# Patient Record
Sex: FEMALE | Race: WHITE | HISPANIC OR LATINO | Employment: UNEMPLOYED | URBAN - METROPOLITAN AREA
[De-identification: names, ages, dates, MRNs, and addresses within clinical notes are randomized per-mention and may not be internally consistent; named-entity substitution may affect disease eponyms.]

---

## 2017-01-05 ENCOUNTER — ALLSCRIPTS OFFICE VISIT (OUTPATIENT)
Dept: OTHER | Facility: OTHER | Age: 57
End: 2017-01-05

## 2017-01-05 DIAGNOSIS — Z98.84 BARIATRIC SURGERY STATUS: ICD-10-CM

## 2017-01-05 DIAGNOSIS — R13.10 DYSPHAGIA: ICD-10-CM

## 2017-01-05 DIAGNOSIS — K91.2 POSTSURGICAL MALABSORPTION, NOT ELSEWHERE CLASSIFIED: ICD-10-CM

## 2017-01-07 ENCOUNTER — LAB (OUTPATIENT)
Dept: LAB | Facility: HOSPITAL | Age: 57
End: 2017-01-07
Attending: SURGERY
Payer: COMMERCIAL

## 2017-01-07 ENCOUNTER — TRANSCRIBE ORDERS (OUTPATIENT)
Dept: LAB | Facility: HOSPITAL | Age: 57
End: 2017-01-07

## 2017-01-07 DIAGNOSIS — R13.10 DYSPHAGIA: ICD-10-CM

## 2017-01-07 DIAGNOSIS — K91.2 OTHER AND UNSPECIFIED POSTSURGICAL NONABSORPTION: ICD-10-CM

## 2017-01-07 DIAGNOSIS — R13.10 PROBLEMS WITH SWALLOWING AND MASTICATION: ICD-10-CM

## 2017-01-07 DIAGNOSIS — R13.10 PROBLEMS WITH SWALLOWING AND MASTICATION: Primary | ICD-10-CM

## 2017-01-07 DIAGNOSIS — Z98.84 BARIATRIC SURGERY STATUS: ICD-10-CM

## 2017-01-07 DIAGNOSIS — K91.2 POSTSURGICAL MALABSORPTION, NOT ELSEWHERE CLASSIFIED: ICD-10-CM

## 2017-01-07 LAB
25(OH)D3 SERPL-MCNC: 14.8 NG/ML (ref 30–100)
ERYTHROCYTE [DISTWIDTH] IN BLOOD BY AUTOMATED COUNT: 13.3 % (ref 11.6–15.1)
HCT VFR BLD AUTO: 42.2 % (ref 34.8–46.1)
HGB BLD-MCNC: 13.6 G/DL (ref 11.5–15.4)
MCH RBC QN AUTO: 29.2 PG (ref 26.8–34.3)
MCHC RBC AUTO-ENTMCNC: 32.2 G/DL (ref 31.4–37.4)
MCV RBC AUTO: 91 FL (ref 82–98)
PLATELET # BLD AUTO: 299 THOUSANDS/UL (ref 149–390)
PMV BLD AUTO: 9.8 FL (ref 8.9–12.7)
PTH-INTACT SERPL-MCNC: 85.7 PG/ML (ref 14–72)
RBC # BLD AUTO: 4.65 MILLION/UL (ref 3.81–5.12)
VIT B12 SERPL-MCNC: 329 PG/ML (ref 100–900)
WBC # BLD AUTO: 4.52 THOUSAND/UL (ref 4.31–10.16)

## 2017-01-07 PROCEDURE — 84425 ASSAY OF VITAMIN B-1: CPT

## 2017-01-07 PROCEDURE — 82306 VITAMIN D 25 HYDROXY: CPT

## 2017-01-07 PROCEDURE — 84590 ASSAY OF VITAMIN A: CPT

## 2017-01-07 PROCEDURE — 85027 COMPLETE CBC AUTOMATED: CPT

## 2017-01-07 PROCEDURE — 83970 ASSAY OF PARATHORMONE: CPT

## 2017-01-07 PROCEDURE — 36415 COLL VENOUS BLD VENIPUNCTURE: CPT

## 2017-01-07 PROCEDURE — 82607 VITAMIN B-12: CPT

## 2017-01-11 LAB
VIT A SERPL-MCNC: 55 UG/DL (ref 20–65)
VIT B1 BLD-SCNC: 128.6 NMOL/L (ref 66.5–200)

## 2017-01-12 ENCOUNTER — GENERIC CONVERSION - ENCOUNTER (OUTPATIENT)
Dept: OTHER | Facility: OTHER | Age: 57
End: 2017-01-12

## 2017-01-24 ENCOUNTER — ANESTHESIA EVENT (OUTPATIENT)
Dept: GASTROENTEROLOGY | Facility: HOSPITAL | Age: 57
End: 2017-01-24
Payer: COMMERCIAL

## 2017-01-24 RX ORDER — SUCRALFATE 1 G/1
1 TABLET ORAL 4 TIMES DAILY
COMMUNITY
End: 2021-11-15 | Stop reason: ALTCHOICE

## 2017-01-24 RX ORDER — OMEPRAZOLE 40 MG/1
40 CAPSULE, DELAYED RELEASE ORAL DAILY
COMMUNITY
End: 2019-03-05 | Stop reason: ALTCHOICE

## 2017-01-24 RX ORDER — CALCIUM GLUCONATE 45(500) MG
500 TABLET ORAL DAILY
COMMUNITY

## 2017-01-24 RX ORDER — METOPROLOL SUCCINATE 25 MG/1
25 TABLET, EXTENDED RELEASE ORAL EVERY 12 HOURS
COMMUNITY
End: 2018-07-02 | Stop reason: SDUPTHER

## 2017-01-24 RX ORDER — CHLORAL HYDRATE 500 MG
1000 CAPSULE ORAL 2 TIMES DAILY
COMMUNITY

## 2017-01-24 RX ORDER — SUCRALFATE ORAL 1 G/10ML
1 SUSPENSION ORAL 4 TIMES DAILY
Status: ON HOLD | COMMUNITY
End: 2017-01-25 | Stop reason: CLARIF

## 2017-01-25 ENCOUNTER — HOSPITAL ENCOUNTER (OUTPATIENT)
Facility: HOSPITAL | Age: 57
Setting detail: OUTPATIENT SURGERY
Discharge: HOME | End: 2017-01-25
Attending: SURGERY | Admitting: SURGERY
Payer: COMMERCIAL

## 2017-01-25 ENCOUNTER — ANESTHESIA (OUTPATIENT)
Dept: GASTROENTEROLOGY | Facility: HOSPITAL | Age: 57
End: 2017-01-25
Payer: COMMERCIAL

## 2017-01-25 VITALS
DIASTOLIC BLOOD PRESSURE: 74 MMHG | HEIGHT: 65 IN | SYSTOLIC BLOOD PRESSURE: 124 MMHG | HEART RATE: 62 BPM | BODY MASS INDEX: 24.16 KG/M2 | OXYGEN SATURATION: 98 % | RESPIRATION RATE: 16 BRPM | TEMPERATURE: 98.2 F | WEIGHT: 145 LBS

## 2017-01-25 DIAGNOSIS — R10.9 ABDOMINAL PAIN: ICD-10-CM

## 2017-01-25 DIAGNOSIS — Z98.84 BARIATRIC SURGERY STATUS: ICD-10-CM

## 2017-01-25 PROCEDURE — 88342 IMHCHEM/IMCYTCHM 1ST ANTB: CPT | Performed by: SURGERY

## 2017-01-25 PROCEDURE — 88305 TISSUE EXAM BY PATHOLOGIST: CPT | Performed by: SURGERY

## 2017-01-25 PROCEDURE — C1726 CATH, BAL DIL, NON-VASCULAR: HCPCS | Performed by: SURGERY

## 2017-01-25 RX ORDER — CITALOPRAM 20 MG/1
20 TABLET ORAL DAILY
Status: ON HOLD | COMMUNITY
End: 2017-05-03 | Stop reason: ALTCHOICE

## 2017-01-25 RX ORDER — PROPOFOL 10 MG/ML
INJECTION, EMULSION INTRAVENOUS AS NEEDED
Status: DISCONTINUED | OUTPATIENT
Start: 2017-01-25 | End: 2017-01-25 | Stop reason: SURG

## 2017-01-25 RX ORDER — SODIUM CHLORIDE 9 MG/ML
125 INJECTION, SOLUTION INTRAVENOUS CONTINUOUS
Status: DISCONTINUED | OUTPATIENT
Start: 2017-01-25 | End: 2017-01-25 | Stop reason: HOSPADM

## 2017-01-25 RX ADMIN — PROPOFOL 50 MG: 10 INJECTION, EMULSION INTRAVENOUS at 11:26

## 2017-01-25 RX ADMIN — SODIUM CHLORIDE 125 ML/HR: 0.9 INJECTION, SOLUTION INTRAVENOUS at 10:23

## 2017-01-25 RX ADMIN — PROPOFOL 150 MG: 10 INJECTION, EMULSION INTRAVENOUS at 11:22

## 2017-01-25 RX ADMIN — PROPOFOL 50 MG: 10 INJECTION, EMULSION INTRAVENOUS at 11:29

## 2017-02-03 ENCOUNTER — ALLSCRIPTS OFFICE VISIT (OUTPATIENT)
Dept: OTHER | Facility: OTHER | Age: 57
End: 2017-02-03

## 2017-05-02 ENCOUNTER — ANESTHESIA EVENT (OUTPATIENT)
Dept: GASTROENTEROLOGY | Facility: HOSPITAL | Age: 57
End: 2017-05-02
Payer: COMMERCIAL

## 2017-05-03 ENCOUNTER — HOSPITAL ENCOUNTER (OUTPATIENT)
Facility: HOSPITAL | Age: 57
Setting detail: OUTPATIENT SURGERY
Discharge: HOME | End: 2017-05-03
Attending: SURGERY | Admitting: SURGERY
Payer: COMMERCIAL

## 2017-05-03 ENCOUNTER — ANESTHESIA (OUTPATIENT)
Dept: GASTROENTEROLOGY | Facility: HOSPITAL | Age: 57
End: 2017-05-03
Payer: COMMERCIAL

## 2017-05-03 VITALS
OXYGEN SATURATION: 100 % | HEIGHT: 65 IN | DIASTOLIC BLOOD PRESSURE: 97 MMHG | SYSTOLIC BLOOD PRESSURE: 152 MMHG | WEIGHT: 148 LBS | TEMPERATURE: 97.6 F | BODY MASS INDEX: 24.66 KG/M2 | RESPIRATION RATE: 16 BRPM | HEART RATE: 67 BPM

## 2017-05-03 PROCEDURE — C1726 CATH, BAL DIL, NON-VASCULAR: HCPCS | Performed by: SURGERY

## 2017-05-03 RX ORDER — GLYCOPYRROLATE 0.2 MG/ML
INJECTION INTRAMUSCULAR; INTRAVENOUS AS NEEDED
Status: DISCONTINUED | OUTPATIENT
Start: 2017-05-03 | End: 2017-05-03 | Stop reason: SURG

## 2017-05-03 RX ORDER — SODIUM CHLORIDE 9 MG/ML
125 INJECTION, SOLUTION INTRAVENOUS CONTINUOUS
Status: DISCONTINUED | OUTPATIENT
Start: 2017-05-03 | End: 2017-05-03 | Stop reason: HOSPADM

## 2017-05-03 RX ORDER — PROPOFOL 10 MG/ML
INJECTION, EMULSION INTRAVENOUS AS NEEDED
Status: DISCONTINUED | OUTPATIENT
Start: 2017-05-03 | End: 2017-05-03 | Stop reason: SURG

## 2017-05-03 RX ADMIN — PROPOFOL 50 MG: 10 INJECTION, EMULSION INTRAVENOUS at 11:55

## 2017-05-03 RX ADMIN — GLYCOPYRROLATE 0.2 MG: 0.2 INJECTION, SOLUTION INTRAMUSCULAR; INTRAVENOUS at 11:59

## 2017-05-03 RX ADMIN — LIDOCAINE HYDROCHLORIDE 50 MG: 20 INJECTION, SOLUTION INTRAVENOUS at 11:50

## 2017-05-03 RX ADMIN — PROPOFOL 150 MG: 10 INJECTION, EMULSION INTRAVENOUS at 11:50

## 2017-05-03 RX ADMIN — SODIUM CHLORIDE 125 ML/HR: 0.9 INJECTION, SOLUTION INTRAVENOUS at 11:13

## 2017-05-12 ENCOUNTER — ALLSCRIPTS OFFICE VISIT (OUTPATIENT)
Dept: OTHER | Facility: OTHER | Age: 57
End: 2017-05-12

## 2017-05-23 ENCOUNTER — HOSPITAL ENCOUNTER (OUTPATIENT)
Dept: SLEEP CENTER | Facility: CLINIC | Age: 57
Discharge: HOME | End: 2017-05-23
Payer: COMMERCIAL

## 2017-05-23 ENCOUNTER — TRANSCRIBE ORDERS (OUTPATIENT)
Dept: SLEEP CENTER | Facility: CLINIC | Age: 57
End: 2017-05-23

## 2017-05-23 DIAGNOSIS — G47.33 OSA (OBSTRUCTIVE SLEEP APNEA): Primary | ICD-10-CM

## 2017-05-23 DIAGNOSIS — G47.00 INSOMNIA, UNSPECIFIED: ICD-10-CM

## 2017-06-07 ENCOUNTER — ALLSCRIPTS OFFICE VISIT (OUTPATIENT)
Dept: OTHER | Facility: OTHER | Age: 57
End: 2017-06-07

## 2017-06-07 DIAGNOSIS — I10 ESSENTIAL (PRIMARY) HYPERTENSION: ICD-10-CM

## 2017-06-07 DIAGNOSIS — E78.5 HYPERLIPIDEMIA: ICD-10-CM

## 2017-06-07 DIAGNOSIS — Z12.31 ENCOUNTER FOR SCREENING MAMMOGRAM FOR MALIGNANT NEOPLASM OF BREAST: ICD-10-CM

## 2017-06-12 ENCOUNTER — LAB (OUTPATIENT)
Dept: LAB | Facility: CLINIC | Age: 57
End: 2017-06-12
Payer: COMMERCIAL

## 2017-06-12 DIAGNOSIS — E78.5 HYPERLIPIDEMIA: ICD-10-CM

## 2017-06-12 DIAGNOSIS — G47.00 INSOMNIA: ICD-10-CM

## 2017-06-12 DIAGNOSIS — I10 ESSENTIAL (PRIMARY) HYPERTENSION: ICD-10-CM

## 2017-06-12 LAB
ALBUMIN SERPL BCP-MCNC: 3.5 G/DL (ref 3.5–5)
ALP SERPL-CCNC: 65 U/L (ref 46–116)
ALT SERPL W P-5'-P-CCNC: 49 U/L (ref 12–78)
ANION GAP SERPL CALCULATED.3IONS-SCNC: 5 MMOL/L (ref 4–13)
AST SERPL W P-5'-P-CCNC: 36 U/L (ref 5–45)
BILIRUB SERPL-MCNC: 0.39 MG/DL (ref 0.2–1)
BUN SERPL-MCNC: 13 MG/DL (ref 5–25)
CALCIUM SERPL-MCNC: 8.6 MG/DL (ref 8.3–10.1)
CHLORIDE SERPL-SCNC: 107 MMOL/L (ref 100–108)
CHOLEST SERPL-MCNC: 246 MG/DL (ref 50–200)
CO2 SERPL-SCNC: 30 MMOL/L (ref 21–32)
CREAT SERPL-MCNC: 0.61 MG/DL (ref 0.6–1.3)
CREAT UR-MCNC: 134 MG/DL
GFR SERPL CREATININE-BSD FRML MDRD: >60 ML/MIN/1.73SQ M
GLUCOSE P FAST SERPL-MCNC: 82 MG/DL (ref 65–99)
HDLC SERPL-MCNC: 53 MG/DL (ref 40–60)
LDLC SERPL CALC-MCNC: 171 MG/DL (ref 0–100)
MICROALBUMIN UR-MCNC: 5.7 MG/L (ref 0–20)
MICROALBUMIN/CREAT 24H UR: 4 MG/G CREATININE (ref 0–30)
POTASSIUM SERPL-SCNC: 4.3 MMOL/L (ref 3.5–5.3)
PROT SERPL-MCNC: 6.8 G/DL (ref 6.4–8.2)
SODIUM SERPL-SCNC: 142 MMOL/L (ref 136–145)
TRIGL SERPL-MCNC: 109 MG/DL
TSH SERPL DL<=0.05 MIU/L-ACNC: 2.64 UIU/ML (ref 0.36–3.74)

## 2017-06-12 PROCEDURE — 84443 ASSAY THYROID STIM HORMONE: CPT

## 2017-06-12 PROCEDURE — 82570 ASSAY OF URINE CREATININE: CPT

## 2017-06-12 PROCEDURE — 80061 LIPID PANEL: CPT

## 2017-06-12 PROCEDURE — 36415 COLL VENOUS BLD VENIPUNCTURE: CPT

## 2017-06-12 PROCEDURE — 82043 UR ALBUMIN QUANTITATIVE: CPT

## 2017-06-12 PROCEDURE — 80053 COMPREHEN METABOLIC PANEL: CPT

## 2017-06-13 ENCOUNTER — ANESTHESIA EVENT (OUTPATIENT)
Dept: GASTROENTEROLOGY | Facility: HOSPITAL | Age: 57
End: 2017-06-13

## 2017-06-14 ENCOUNTER — ANESTHESIA (OUTPATIENT)
Dept: GASTROENTEROLOGY | Facility: HOSPITAL | Age: 57
End: 2017-06-14

## 2017-08-15 ENCOUNTER — ANESTHESIA EVENT (OUTPATIENT)
Dept: GASTROENTEROLOGY | Facility: HOSPITAL | Age: 57
End: 2017-08-15
Payer: COMMERCIAL

## 2017-08-16 ENCOUNTER — ANESTHESIA (OUTPATIENT)
Dept: GASTROENTEROLOGY | Facility: HOSPITAL | Age: 57
End: 2017-08-16
Payer: COMMERCIAL

## 2017-08-16 ENCOUNTER — HOSPITAL ENCOUNTER (OUTPATIENT)
Facility: HOSPITAL | Age: 57
Setting detail: OUTPATIENT SURGERY
Discharge: HOME | End: 2017-08-16
Attending: SURGERY | Admitting: SURGERY
Payer: COMMERCIAL

## 2017-08-16 VITALS
HEART RATE: 78 BPM | SYSTOLIC BLOOD PRESSURE: 176 MMHG | DIASTOLIC BLOOD PRESSURE: 90 MMHG | OXYGEN SATURATION: 99 % | WEIGHT: 158 LBS | RESPIRATION RATE: 16 BRPM | TEMPERATURE: 97.6 F | HEIGHT: 65 IN | BODY MASS INDEX: 26.33 KG/M2

## 2017-08-16 PROCEDURE — C1726 CATH, BAL DIL, NON-VASCULAR: HCPCS | Performed by: SURGERY

## 2017-08-16 RX ORDER — ONDANSETRON 2 MG/ML
4 INJECTION INTRAMUSCULAR; INTRAVENOUS ONCE AS NEEDED
Status: CANCELLED | OUTPATIENT
Start: 2017-08-16

## 2017-08-16 RX ORDER — SODIUM CHLORIDE 9 MG/ML
125 INJECTION, SOLUTION INTRAVENOUS CONTINUOUS
Status: DISCONTINUED | OUTPATIENT
Start: 2017-08-16 | End: 2017-08-16 | Stop reason: HOSPADM

## 2017-08-16 RX ORDER — PROPOFOL 10 MG/ML
INJECTION, EMULSION INTRAVENOUS AS NEEDED
Status: DISCONTINUED | OUTPATIENT
Start: 2017-08-16 | End: 2017-08-16 | Stop reason: SURG

## 2017-08-16 RX ORDER — GLYCOPYRROLATE 0.2 MG/ML
INJECTION INTRAMUSCULAR; INTRAVENOUS AS NEEDED
Status: DISCONTINUED | OUTPATIENT
Start: 2017-08-16 | End: 2017-08-16 | Stop reason: SURG

## 2017-08-16 RX ADMIN — PROPOFOL 150 MG: 10 INJECTION, EMULSION INTRAVENOUS at 09:47

## 2017-08-16 RX ADMIN — GLYCOPYRROLATE 0.4 MG: 0.2 INJECTION, SOLUTION INTRAMUSCULAR; INTRAVENOUS at 09:54

## 2017-08-16 RX ADMIN — PROPOFOL 50 MG: 10 INJECTION, EMULSION INTRAVENOUS at 09:52

## 2017-08-16 RX ADMIN — SODIUM CHLORIDE 125 ML/HR: 0.9 INJECTION, SOLUTION INTRAVENOUS at 08:14

## 2017-09-11 DIAGNOSIS — E78.5 HYPERLIPIDEMIA: ICD-10-CM

## 2017-11-09 ENCOUNTER — HOSPITAL ENCOUNTER (OUTPATIENT)
Dept: RADIOLOGY | Facility: HOSPITAL | Age: 57
Discharge: HOME | End: 2017-11-09
Payer: COMMERCIAL

## 2017-11-09 DIAGNOSIS — Z12.31 ENCOUNTER FOR SCREENING MAMMOGRAM FOR MALIGNANT NEOPLASM OF BREAST: ICD-10-CM

## 2017-11-09 PROCEDURE — G0202 SCR MAMMO BI INCL CAD: HCPCS

## 2017-12-19 ENCOUNTER — TRANSCRIBE ORDERS (OUTPATIENT)
Dept: LAB | Facility: HOSPITAL | Age: 57
End: 2017-12-19

## 2017-12-19 ENCOUNTER — LAB (OUTPATIENT)
Dept: LAB | Facility: HOSPITAL | Age: 57
End: 2017-12-19
Payer: COMMERCIAL

## 2017-12-19 DIAGNOSIS — E78.5 HYPERLIPIDEMIA: ICD-10-CM

## 2017-12-19 LAB
ALBUMIN SERPL BCP-MCNC: 3.4 G/DL (ref 3.5–5)
ALP SERPL-CCNC: 84 U/L (ref 46–116)
ALT SERPL W P-5'-P-CCNC: 52 U/L (ref 12–78)
AST SERPL W P-5'-P-CCNC: 42 U/L (ref 5–45)
BILIRUB DIRECT SERPL-MCNC: 0.1 MG/DL (ref 0–0.2)
BILIRUB SERPL-MCNC: 0.35 MG/DL (ref 0.2–1)
CHOLEST SERPL-MCNC: 127 MG/DL (ref 50–200)
HDLC SERPL-MCNC: 50 MG/DL (ref 40–60)
LDLC SERPL CALC-MCNC: 54 MG/DL (ref 0–100)
PROT SERPL-MCNC: 7.1 G/DL (ref 6.4–8.2)
TRIGL SERPL-MCNC: 117 MG/DL

## 2017-12-19 PROCEDURE — 80061 LIPID PANEL: CPT

## 2017-12-19 PROCEDURE — 80076 HEPATIC FUNCTION PANEL: CPT

## 2017-12-19 PROCEDURE — 36415 COLL VENOUS BLD VENIPUNCTURE: CPT

## 2017-12-21 ENCOUNTER — ALLSCRIPTS OFFICE VISIT (OUTPATIENT)
Dept: OTHER | Facility: OTHER | Age: 57
End: 2017-12-21

## 2017-12-22 NOTE — PROGRESS NOTES
Assessment   Assessed    1  Hypertension (401 9) (I10)   2  Accessory AV connection (426 7) (I45 6)    Plan   Accessory AV connection, Hypertension    · Follow-up visit in 1 year Evaluation and Treatment  Follow-up  Status: Hold For -    Scheduling  Requested for: [id number]   Ordered; For: Accessory AV connection, Hypertension; Ordered By: Niko Santiago Performed:  Due: 50CGA6339  Hypertension    · From  Metoprolol Succinate ER 25 MG Oral Tablet Extended Release 24 Hour    take 1 tablet every twelve hours To Metoprolol Succinate ER 25 MG Oral Tablet Extended    Release 24 Hour take 1 tablet by mouth daily   Rx By: Niko Santiago; Dispense: 90 Days ; #:90 Tablet Extended Release 24 Hour; Refill: 3;For: Hypertension; MILAGROS = N; Sent To: [facility]/PH[facility]ACY #[id number]  · EKG/ECG- POC; Status:Complete;   Done: 10MAE8789   Perform: In Office; [id number]; Last Updated Iglesia Romero; 12/21/2017 12:59:56 PM;Ordered;For:Hypertension; Ordered By:Gilberto Vega;  Unlinked    · Carafate 1 GM/10ML Oral Suspension   Dispense: 0 Days ; #:0 ML; Refill: 0; MILAGROS = N; Record; Last Updated By: Frida Richards; 12/21/2017 12:59:55 PM    Discussion/Summary   Cardiology Discussion Summary Free Text Note Form [facility]:    1) palpitations unclear etiology atach vs related to wpw completely suppressed on beta blocker will cut metoprolol xl down to once daily  Her HR is 58 bpm   htn stable hx gastric bypass surgery  has kept weight off   insomnia - to be addressed by dr Anna Perea  Chief Complaint   Chief Complaints    1  Palpitations  Chief Complaint Free Text Note Form: Pt is here for a yearly f/u office visit  Pt is c/o some occasional palpitations but states that they are much better than they used to be  Pt denies any other cardiac complaints or symptoms  History of Present Illness   Cardiology HPI Free Text Note Form [facility]: f/u wpw, htn, and increased lipids  very rare palpitations  very difficult time with sleep  meds not helping     decreased appetite  has a hard time eating foods but also had gastric bypass which helped with her DM and HTN            Review of Systems   Cardiology Female ROS:         Cardiac: rhythm problems-- and-- palpitations present , but-- as noted in HPI,-- no chest pain,-- no fainting/blackouts,-- no heart murmur present-- and-- no signs of swelling  Skin: No complaints of nonhealing sores or skin rash  Genitourinary: No complaints of recurrent urinary tract infections, frequent urination at night, difficult urination, blood in urine, kidney stones, loss of bladder control, kidney problems, denies any birth control or hormone replacement, is not post menopausal, not currently pregnant  Psychological: No complaints of feeling depressed, anxiety, panic attacks, or difficulty concentrating  General: trouble sleeping-- and-- lack of energy/fatigue, but-- No complaints of trouble sleeping, lack of energy, fatigue, appetite changes, weight changes, fever, frequent infections, or night sweats  -- and-- as noted in HPI  Respiratory: No complaints of shortness of breath, cough with sputum, or wheezing  HEENT: No complaints of serious problems, hearing problems, nose problems, throat problems, or snoring  Gastrointestinal: No complaints of liver problems, nausea, vomiting, heartburn, constipation, bloody stools, diarrhea, problems swallowing, adbominal pain, or rectal bleeding  Hematologic: No complaints of bleeding disorders, anemia, blood clots, or excessive brusing  Neurological: No complaints of numbness, tingling, dizziness, weakness, seizures, headaches, syncope or fainting, AM fatigue, daytime sleepiness, no witnessed apnea episodes  Musculoskeletal: No complaints of arthritis, back pain, or painfull swelling  ROS Reviewed:    ROS reviewed  Active Problems   Problems    1  Abdominal pain (789 00) (R10 9)   2  Accessory AV connection (426 7) (I45 6)   3   Depression with anxiety (300 4) (F41 8)   4  Encounter for adoption services (V68 89) (Z02 82)   5  Encounter for screening colonoscopy (V76 51) (Z12 11)   6  Gastric anastomotic stricture (993 96,804 04) (K92 9,K31 89)   7  Hyperlipidemia (272 4) (E78 5)   8  Hypertension (401 9) (I10)   9  Nausea (787 02) (R11 0)   10  Need for immunization against influenza (V04 81) (Z23)   11  History of Need for pneumococcal vaccination (V03 82) (Z23)   12  History of Need for prophylactic vaccination and inoculation against influenza (V04 81)      (Z23)   13  Postgastrectomy malabsorption (579 3) (K91 2,Z90 3)   14  Status post gastric bypass for obesity (V45 86) (Z98 84)   15  Ventricular pre-excitation syndrome (426 7) (I45 6)   16  Visit for screening mammogram (V76 12) (Z12 31)   17  Vitamin D deficiency (268 9) (E55 9)    Past Medical History   Problems    1  History of Anemia (285 9) (D64 9)   2  History of Anxiety (300 00) (F41 9)   3  History of Atypical chest pain (786 59) (R07 89)   4  History of Breast calcification, right (793 89) (R92 1)   5  History of Degenerative joint disease of ankle and foot, unspecified laterality (715 97)     (M19 079)   6  History of Depression (311) (F32 9)   7  History of Diabetes Mellitus (250 00)   8  History of Dysphagia (787 20) (R13 10)   9  History of Encounter for routine gynecological examination (V72 31) (Z01 419)   10  History of Encounter for screening mammogram for malignant neoplasm of breast      (V76 12) (Z12 31)   11  History of insomnia (V13 89) (Z87 898)   12  History of insomnia (V13 89) (Z87 898)   13  History of intestinal malabsorption (V12 79) (Z87 19)   14  History of iron deficiency anemia (V12 3) (Z86 2)   15  History of senile atrophic vaginitis (V13 29) (Z87 42)   16  History of Inflammatory disease of cervix, vagina, and vulva (616 9) (N73 9)   17  History of Nausea with vomiting (787 01) (R11 2)   18  History of Need for pneumococcal vaccination (V03 82) (Z23)   19   History of Need for prophylactic vaccination and inoculation against influenza (V04 81)      (Z23)   20  History of Obesity (278 00) (E66 9)   21  History of Pain, joint, shoulder (719 41) (M25 519)   22  History of Palpitations (785 1) (R00 2)   23  History of Peptic ulcer (533 90) (K27 9)   24  History of Screening examination for pulmonary tuberculosis (V74 1) (Z11 1)   25  History of Spontaneous  (634 90)   26  History of Stricture esophagus (530 3) (K22 2)   27  History of Tachycardia (785 0) (R00 0)   28  History of Type 2 Diabetes Mellitus - Uncomplicated, Controlled (250 00)  Active Problems And Past Medical History Reviewed: The active problems and past medical history were reviewed and updated today  Surgical History   Problems    1  History of Card Cath Post-Proc Data: ___ Lesions Successfully Dilated   2  History of Gastric Surgery For Morbid Obesity Gastric Bypass   3  History of Hysterectomy   4  History of Oophorectomy  Surgical History Reviewed: The surgical history was reviewed and updated today  Family History   Mother    1  Family history of Congenital Heart Disease  Father    2  Family history of Asthma (V17 5)  Sister    3  Family history of Acute Myocardial Infarction (V17 3)   4  Family history of Congenital Heart Disease   5  Family history of Hypertension (V17 49)  Brother    6  Family history of Acute Myocardial Infarction (V17 3)   7  Family history of Congenital Heart Disease   8  Family history of Hypertension (V17 49)   9  Family history of Stroke Syndrome (V17 1)   10  Family history of Sudden/Instantaneous Cardiac Death (V17 41)  Maternal Grandfather    6  Family history of malignant neoplasm of breast (V16 3) (Z80 3)  Family History    12  Family history of Arthritis (V17 7)   13  Family history of Coronary Artery Disease (V17 49)   14  Family history of Diabetes Mellitus (V18 0)   15  Family history of Reported Family History Of Cancer  Family History Reviewed:     The family history was reviewed and updated today  Social History   Problems    · Denied: History of Alcohol Use (History)   · Denied: History of Drug Use   · Never A Smoker  Social History Reviewed: The social history was reviewed and updated today  The social history was reviewed and is unchanged  Current Meds    1  Atorvastatin Calcium 20 MG Oral Tablet; TAKE 1 TABLET AT BEDTIME; Therapy: 73KPG9560 to (Evaluate:13Nmg2421)  Requested for: 43MIK7548; Last     Rx:27Nov2017 Ordered   2  B-Complex TABS; TAKE 1 TABLET DAILY; Therapy: (Recorded:27Mar2014) to Recorded   3  Calcium 500 MG TABS; TAKE 1 TABLET DAILY; Therapy: (Recorded:27Mar2014) to Recorded   4  Carafate 1 GM/10ML Oral Suspension Recorded   5  [employer] Vitamin D CAPS; take one tablet daily; Therapy: (Recorded:27Mar2014) to Recorded   6  [employer] Vitamin E CAPS; take one tablet daily; Therapy: (Recorded:27Mar2014) to Recorded   7  Fish Oil 1000 MG Oral Capsule; take one tablet twice daily; Therapy: (Recorded:27Mar2014) to Recorded   8  Iron TABS; TAKE 1 TABLET DAILY AS DIRECTED; Therapy: (Recorded:27Mar2014) to Recorded   9  Metoprolol Succinate ER 25 MG Oral Tablet Extended Release 24 Hour; take 1 tablet     every twelve hours; Therapy: 85GAD2308 to (Evaluate:15Nov2018)  Requested for: 04UAA9796; Last     Rx:20Nov2017 Ordered   10  Omeprazole 20 MG Oral Capsule Delayed Release; TAKE ONE CAPSULE BY MOUTH      TWICE A DAY; Therapy: 93YIY7636 to (Evaluate:01Jan2018)  Requested for: 82BGR1359; Last      Rx:03Oct2017 Ordered  Medication List Reviewed: The medication list was reviewed and updated today  Allergies   Medication    1   No Known Drug Allergies    Vitals   Vital Signs    Recorded: 15Gbe9001 12:56PM   Heart Rate 58   Systolic 135, LUE, Sitting   Diastolic 76, LUE, Sitting   Height 5 ft 5 in   Weight 158 lb 8 oz   BMI Calculated 26 38   BSA Calculated 1 79     Physical Exam        Constitutional - General appearance: No acute distress, well appearing and well nourished  Eyes - Conjunctiva and Sclera examination: Conjunctiva pink, sclera anicteric  Neck - Normal, no JVD   Pulmonary - Respiratory effort: No signs of respiratory distress  -- Auscultation of lungs: Clear to auscultation  Cardiovascular - Auscultation of heart: Normal rate and rhythm, normal S1 and S2, no murmurs  -- Pedal pulses: Normal, 2+ bilaterally  -- Examination of extremities for edema and/or varicosities: Normal        Abdomen - Soft  Musculoskeletal - Gait and station: Normal gait  Skin - Skin: Normal without rashes  Skin is warm and well perfused  Neurologic - Speech normal  No focal deficits  Psychiatric - Orientation to person, place, and time: Normal       Results/Data   ECG Report:      Rhythm and rate:  normal sinus rhythm  Future Appointments      Date/Time Provider Specialty Site   12/26/2017 01:10 PM Yuki Hull PCP   01/05/2018 01:30 PM CASEY Puckett   General Surgery [facility]     Signatures    Electronically signed by : Kenzie Burleson DO; Dec 21 2017  1:22PM EST                       (Author)

## 2017-12-26 ENCOUNTER — GENERIC CONVERSION - ENCOUNTER (OUTPATIENT)
Dept: OTHER | Facility: OTHER | Age: 57
End: 2017-12-26

## 2018-01-05 ENCOUNTER — ALLSCRIPTS OFFICE VISIT (OUTPATIENT)
Dept: OTHER | Facility: OTHER | Age: 58
End: 2018-01-05

## 2018-01-06 NOTE — PROGRESS NOTES
Assessment   1  Status post gastric bypass for obesity (V45 86) (Z98 84)   2  Postgastrectomy malabsorption (579 3) (K91 2,Z90 3)   3  Gastric anastomotic stricture (430 48,044 14) (K92 9,K31 89)    Discussion/Summary      69-year-old female status post laparoscopic Carline-en-Y gastric bypass in February of 2011  refer to my previous notes for further details  Unfortunate she has developed an anastomotic stricture that I have dilated 3 times  This stricture is refractory to treatment and she continues to have chronic nausea and vomiting despite maximal therapy with PPI and Carafate  She is turning into a maladaptive eating to compensate for this stricture and this is causing her to gain weight  We will petition for her revision to redo her gastrojejunal anastomosis and to get rid of this stricture  She will formally enroll in the revision pathway  Chief Complaint   Patient in office today to discuss revision  Post-Op   Post-Op Bariatric Surgery:      YURI Sadler is status post laparoscopic Carline-en-Y procedure,-- performed on 2/8/2011  HPI: today's weight is 159 pounds-- and-- herpre-op weight was 257 pounds  The patient reports a loss of appetite,-- nausea-- and-- vomiting, but-- no abdominal pain-- and-- no excessive pain  Diet and Exercise: Diet history reviewed and discussed with the patient  Weight loss/gains to date discussed with the patient  She reports no carbonated beverage consumption-- and-- no regular exercise  Supplements: multivitamins,-- B-12,-- calcium-- and-- iron  PE:      Abdominal exam: soft,-- nontender,-- normal bowel sounds,-- no rebound tenderness,-- no guarding,-- no incisional hernia-- and-- no palpable mass  The surgical incision site is well healed  Assessment:      Post-op, the patient is regressing  Plan: Activity restrictions: None        Instructions / Recommendations: dietary counseling recommended,-- recommended a daily protein intake of  grams,-- vitamin supplement(s) recommended,-- mineral supplement(s) recommended,-- recommended exercising at least 150 minutes per week,-- behavior modifications recommended,-- participating in a multi-disciplinary approach with her primary care provider and endocrinology or gastroenterology recommended-- and-- instructed to call the office for concerns, questions, or problems  The patient was instructed to follow up in 2 months  Active Problems   1  Abdominal pain (789 00) (R10 9)   2  Accessory AV connection (426 7) (I45 6)   3  Depression with anxiety (300 4) (F41 8)   4  Encounter for adoption services (V68 89) (Z02 82)   5  Encounter for screening colonoscopy (V76 51) (Z12 11)   6  Gastric anastomotic stricture (221 95,019 71) (K92 9,K31 89)   7  Hyperlipidemia (272 4) (E78 5)   8  Hypertension (401 9) (I10)   9  Insomnia (780 52) (G47 00)   10  Nausea (787 02) (R11 0)   11  Need for immunization against influenza (V04 81) (Z23)   12  Need for influenza vaccination (V04 81) (Z23)   13  History of Need for pneumococcal vaccination (V03 82) (Z23)   14  History of Need for prophylactic vaccination and inoculation against influenza (V04 81)      (Z23)   15  Postgastrectomy malabsorption (579 3) (K91 2,Z90 3)   16  Screening for colon cancer (V76 51) (Z12 11)   17  Status post gastric bypass for obesity (V45 86) (Z98 84)   18  Ventricular pre-excitation syndrome (426 7) (I45 6)   19  Visit for screening mammogram (V76 12) (Z12 31)   20  Vitamin D deficiency (268 9) (E55 9)    Social History    · Denied: History of Alcohol Use (History)   · Denied: History of Drug Use   · Never A Smoker    Current Meds    1  Atorvastatin Calcium 20 MG Oral Tablet; TAKE 1 TABLET AT BEDTIME; Therapy: 30WYI9891 to (Evaluate:92Ocr2102)  Requested for: 26Dec2017; Last     Rx:27Nov2017 Ordered   2  B-Complex TABS; TAKE 1 TABLET DAILY; Therapy: (Recorded:26Dec2017) to Recorded   3   Calcium 500 MG TABS; TAKE 1 TABLET DAILY; Therapy: (Recorded:87Wzz2890) to Recorded   4  [employer] Vitamin D CAPS; take one tablet daily; Therapy: (Recorded:22Iyt3044) to Recorded   5  [employer] Vitamin E CAPS; take one tablet daily; Therapy: (Recorded:79Zzw6866) to Recorded   6  Fish Oil 1000 MG Oral Capsule; take one tablet twice daily; Therapy: (Recorded:09Lxm1585) to Recorded   7  Iron TABS; TAKE 1 TABLET DAILY AS DIRECTED; Therapy: (Recorded:28Ajz8687) to Recorded   8  Metoprolol Succinate ER 25 MG Oral Tablet Extended Release 24 Hour; take 1 tablet by     mouth daily; Therapy: 99MJR2881 to (Evaluate:30Fcy9017)  Requested for: 80Mhq7029; Last     Rx:43Ija0494 Ordered   9  Omeprazole 20 MG Oral Capsule Delayed Release; TAKE ONE CAPSULE BY MOUTH     TWICE A DAY; Therapy: 15ZEQ4596 to (Evaluate:01Jan2018)  Requested for: 67Fqp3411; Last     Rx:03Oct2017 Ordered    Allergies   1   No Known Drug Allergies    Vitals    Recorded: 28CDW4735 01:51PM   Temperature 98 2 F   Heart Rate 70   Respiration 18   Systolic 694   Diastolic 70   Height 5 ft 3 in   Weight 159 lb    BMI Calculated 28 17   BSA Calculated 1 75     Signatures    Electronically signed by : CASEY Gonzalez ; Jan 5 2018  2:07PM EST                       (Author)

## 2018-01-09 ENCOUNTER — GENERIC CONVERSION - ENCOUNTER (OUTPATIENT)
Dept: OTHER | Facility: OTHER | Age: 58
End: 2018-01-09

## 2018-01-09 ENCOUNTER — TRANSCRIBE ORDERS (OUTPATIENT)
Dept: SLEEP CENTER | Facility: CLINIC | Age: 58
End: 2018-01-09

## 2018-01-09 DIAGNOSIS — G47.33 OSA (OBSTRUCTIVE SLEEP APNEA): Primary | ICD-10-CM

## 2018-01-10 ENCOUNTER — TRANSCRIBE ORDERS (OUTPATIENT)
Dept: SLEEP CENTER | Facility: CLINIC | Age: 58
End: 2018-01-10

## 2018-01-10 ENCOUNTER — HOSPITAL ENCOUNTER (OUTPATIENT)
Dept: SLEEP CENTER | Facility: CLINIC | Age: 58
Discharge: HOME | End: 2018-01-11

## 2018-01-10 DIAGNOSIS — G47.33 OSA (OBSTRUCTIVE SLEEP APNEA): ICD-10-CM

## 2018-01-10 DIAGNOSIS — G47.33 OSA (OBSTRUCTIVE SLEEP APNEA): Primary | ICD-10-CM

## 2018-01-10 NOTE — PROGRESS NOTES
Progress Note - [facility]:[birth year] MRN: [id number]      Reason for Visit:    61 y  o female with chronic insomnia    Assessment:  None of the medications which I have tried have been effective including trazodone, mirtazapine, Belsomra, melatonin, citalopram    Plan:  Try zolpidem 5 mg p o  q h s   I have given the patient a 30 day prescription with 2 refills  She can try 1/2 of a tablet or 2 tablets as necessary  Follow up:  3 months    History of Present Illness:  Prior history of obstructive sleep apnea status post bariatric surgery  The patient no longer snores, denies daytime sleepiness or awakening with a gasping sensation  She has had chronic sleep initiation insomnia which has been refractory to a number of medications  Historical Information    Past Medical History:   Past Medical History:   Diagnosis Date    Anastomotic stricture of gastrojejunostomy     s/p gastric bypass    Anxiety     Depression     Dysphagia     Gastric ulcer     History of melena     Hyperlipidemia     Hypertension     Insomnia     Insomnia     Postgastrectomy malabsorption     Ventricular pre-excitation syndrome     Vitamin D deficiency          Past Surgical History:   Past Surgical History:   Procedure Laterality Date    ESOPHAGOGASTRODUODENOSCOPY      GASTRIC BYPASS      VT EGD TRANSORAL BIOPSY SINGLE/MULTIPLE N/A 1/25/2017    Procedure: ESOPHAGOGASTRODUODENOSCOPY (EGD); Surgeon: Christiano Mallory MD;  Location: [facility]; Service: Bariatrics    VT EGD TRANSORAL BIOPSY SINGLE/MULTIPLE N/A 5/3/2017    Procedure: ESOPHAGOGASTRODUODENOSCOPY (EGD) with balloon dilatation;  Surgeon: Christiano Mallory MD;  Location: [facility]; Service: Bariatrics    VT EGD TRANSORAL BIOPSY SINGLE/MULTIPLE N/A 8/16/2017    Procedure: ESOPHAGOGASTRODUODENOSCOPY (EGD) with balloon dilation;  Surgeon: Christiano Mallory MD;  Location: [facility];   Service: Bariatrics         Social History - see chart  History   Alcohol use: Not on file     History   Drug Use Not on file     History   Smoking Status    Not on file   Smokeless Tobacco    Not on file     Family History: No family history on file  Medications/Allergies:      Current Outpatient Prescriptions:     b complex vitamins tablet, Take 1 tablet by mouth daily, Disp: , Rfl:     calcium gluconate 500 mg tablet, Take 500 mg by mouth daily, Disp: , Rfl:     Cholecalciferol (VITAMIN D PO), Take 1 tablet by mouth daily, Disp: , Rfl:     IRON, FERROUS GLUCONATE, PO, Take 1 tablet by mouth daily, Disp: , Rfl:     metoprolol succinate (TOPROL-XL) 25 mg 24 hr tablet, Take 25 mg by mouth every 12 (twelve) hours, Disp: , Rfl:     Omega-3 Fatty Acids (FISH OIL) 1,000 mg, Take 1,000 mg by mouth 2 (two) times a day, Disp: , Rfl:     omeprazole (PriLOSEC) 40 MG capsule, Take 40 mg by mouth daily, Disp: , Rfl:     sucralfate (CARAFATE) 1 g tablet, Take 1 g by mouth 4 (four) times a day, Disp: , Rfl:     VITAMIN E PO, Take 1 tablet by mouth daily, Disp: , Rfl:       Objective    Vital Signs:   See Chart    Physical Exam:    General: Alert, appropriate, cooperative, overweight    Head: NC/AT    Skin: Warm, dry    Neuro: No motor abnormalities, cranial nerves appear intact    Psych: Normal affect      Counseling / Coordination of Care  Total clinic time spent today 15 minutes  Greater than 50% of total time was spent with the patient and / or family counseling and / or coordination of care  A description of the counseling / coordination of care: treatment was discussed    CASEY Marquez    Board Certified Sleep Specialist

## 2018-01-13 VITALS
TEMPERATURE: 98.8 F | WEIGHT: 148 LBS | BODY MASS INDEX: 24.66 KG/M2 | HEART RATE: 72 BPM | HEIGHT: 65 IN | SYSTOLIC BLOOD PRESSURE: 112 MMHG | RESPIRATION RATE: 22 BRPM | DIASTOLIC BLOOD PRESSURE: 70 MMHG

## 2018-01-13 VITALS
DIASTOLIC BLOOD PRESSURE: 80 MMHG | TEMPERATURE: 97.7 F | HEIGHT: 65 IN | WEIGHT: 154 LBS | RESPIRATION RATE: 22 BRPM | SYSTOLIC BLOOD PRESSURE: 142 MMHG | BODY MASS INDEX: 25.66 KG/M2 | HEART RATE: 74 BPM

## 2018-01-13 VITALS
TEMPERATURE: 98.2 F | HEART RATE: 80 BPM | SYSTOLIC BLOOD PRESSURE: 100 MMHG | HEIGHT: 65 IN | BODY MASS INDEX: 25.71 KG/M2 | DIASTOLIC BLOOD PRESSURE: 78 MMHG | WEIGHT: 154.32 LBS

## 2018-01-15 VITALS
DIASTOLIC BLOOD PRESSURE: 80 MMHG | BODY MASS INDEX: 24.66 KG/M2 | WEIGHT: 148 LBS | SYSTOLIC BLOOD PRESSURE: 132 MMHG | HEART RATE: 67 BPM | HEIGHT: 65 IN | TEMPERATURE: 97.5 F | RESPIRATION RATE: 22 BRPM

## 2018-01-16 NOTE — RESULT NOTES
Dear Leopold Cassis,   Your test results have returned and are listed below:      Plan  Postgastrectomy malabsorption, Status post gastric bypass for  obesity, Vitamin D deficiency    · (LC) Intact PTH (Includes Calcium); Status:Active - Retrospective By  Protocol Authorization; Requested for:12Apr2017;     Discussion/Summary  Your results show some abnormalities  Your Parathyroid hormone (PTH) was elevated and your vitamin D level was low  This can effect your bone health  Recommend that you take 50,000 IU of vitamin D3( Replesta) once per week for 8 weeks  Katharine Simpler is an over the counter product and a slip is enclosed for you to take to the pharmacy, or it can be purchased on line  In addition, you need to take 1200 to 1500 mg of calcium per day, in divided doses of 500 to 600 mg each  After 8 weeks, switch to a maintenance dose of 4000 IU of vitamin D3 per day and continue to take calcium daily  Enclosed is a lab slip to recheck your levels again in 3 months  If you have any questions, please don't hesitate to call the office     Sincerely,      Signatures   Electronically signed by : NOÉ Hood; Jan 12 2017  3:10PM EST                       (Author)    Electronically signed by : CASEY Zapien ; Jan 12 2017  3:57PM EST                       (Validation)

## 2018-01-16 NOTE — MISCELLANEOUS
Provider Comments  Provider Comments:     Dear Andrés Bravo had a scheduled appointment at our office for 10/28/2016 that you called to cancel but did not reschedule for another day  It is very important that you follow up with us so that we can assess your physical and nutritional safety after your surgery  Please call our office at [phone] to reschedule your appointment       Sincerely,     [facility]            Signatures   Electronically signed by : Ilana Johnson, ; Oct 27 2016  2:20PM EST                       (Author)

## 2018-01-23 VITALS — HEIGHT: 63 IN | BODY MASS INDEX: 28.21 KG/M2 | WEIGHT: 159.2 LBS

## 2018-01-23 VITALS
HEIGHT: 63 IN | BODY MASS INDEX: 28.17 KG/M2 | RESPIRATION RATE: 18 BRPM | WEIGHT: 159 LBS | TEMPERATURE: 98.2 F | HEART RATE: 70 BPM | DIASTOLIC BLOOD PRESSURE: 70 MMHG | SYSTOLIC BLOOD PRESSURE: 122 MMHG

## 2018-01-23 VITALS — BODY MASS INDEX: 28.2 KG/M2 | WEIGHT: 159.13 LBS | HEIGHT: 63 IN

## 2018-01-23 VITALS
WEIGHT: 158.5 LBS | SYSTOLIC BLOOD PRESSURE: 118 MMHG | HEIGHT: 65 IN | HEART RATE: 58 BPM | BODY MASS INDEX: 26.41 KG/M2 | DIASTOLIC BLOOD PRESSURE: 76 MMHG

## 2018-01-23 NOTE — PROGRESS NOTES
History of Present Illness  Bariatric Behavioral Health Evaluation [facility]:   She is here today because: Increase health, increase mobility and prevent family health issues  Patient had gastric bypass surgery in   Patient is being considered for a revision  She is seeking a Bariatric surgery evaluation  She researched this option for: Since   She realizes the post-op requirements Yes, patient has researched procedure  Patient had gastric bypass surgery   Her Psychiatric/Psychological diagnosis: She does not have an outpatient counselor  She does not have the counselor's number  She does not have a Psychiatrist  She does not have the Psychiatrist's number  She has not had Inpatient Treatment  (Patient no longer treated for depression and anxiety( insurance no longer covers)  Patient reports she doesnot have depressive nor anxious symptoms  )  Family Constellation: Family Constellation: Mother:  @ 80years old  Father:  when patient was an infant  Siblings: 1 sister living and 2 brothers living  Spouse:  28 years; spouse supports surgery  Children: 2 adopted daughters  She lives withher spouse   Domestic Violence: has not happened  Abuse History: (None reported )   Physical/psychological assessment Appearance: appropriate   Sociability: average  Affect: appropriate  Mood: calm  Thought Process: coherent  Speech: normal  Content: no impairment  The patient was oriented to person, oriented to place, oriented to time and normal memory   normal attention span  no decreased concentration ability  normal judgment  Her emotional insight was: good  Her intellectual insight was: good  Risk assessment: Symptoms:  no suicidal ideation, no suicide plan, no suicide attempt, no homicidal thoughts, no hopelessness, no helplessness, no feelings of despair, no anxiety, no depressed mood, no loss of interests, no anhedonia and no sense of isolation  The patient is currently asymptomatic   Associated symptoms:  no aggressive behavior, no high risk behavior, no racing thoughts, no periods of excess energy, no periods of euphoria, no delusions, no command hallucinations, no auditory hallucinations and no visual hallucinations  No associated symptoms are reported  The patient is not currently being treated for this problem  Pertinent medical history:  no depression, no seasonal affective disorder, no premenstrual dysphoric disorder, no postpartum depression, no anxiety disorder, no bipolar disorder, no post traumatic stress disorder, no eating disorder, no personality disorder, no schizophrenia, no chronic pain, no chronic headaches, no dementia, no malignancy and no HIV infection  Risk factors:  no bereavement, no financial stress, no relationship problems, no job loss, no social isolation, no access to lethal means, no alcohol abuse, no substance abuse, no physical abuse, no sexual abuse, no emotional abuse, no bullying, no previous suicide attempt, no recent psychiatric hospitalization, no recent family suicide and no recent friend suicide  No risk factors have been identified  Family history:  no suicide, no depression, no bipolar disorder and no substance abuse  She was previously evaluated by me  Sexual history: She is not pregnant  She does not have a history of   She does not have a history of miscarriage  She does not have a history of hypersexuality  She does not have a history of STD's  Recommendations: She is recommended for surgery  She states adequate knowledge of nutrition, exercise, and behavior modification  The Following Ratings are based on my: Obsevation of this individual over the last  Risk of Harm to Self or Others: The following are demographic risk factors associated with harm to others: unemployed  ( )  (None reported )  Recent Specific Risk Factors: The patient is currently asymptomatic  No associated symptoms are reported     Summary and Recommendations:   Low: No thoughts or occasional thoughts of suicide, but no intent or actions  Self inflicted scratches, abrasions, or other self- injurious of behavior where medical attention is typically not warranted  No elements of homicidality or occasional thoughts but no plan, intent, or actions  Active Problems    1  Abdominal pain (789 00) (R10 9)   2  Accessory AV connection (426 7) (I45 6)   3  Depression with anxiety (300 4) (F41 8)   4  Encounter for adoption services (V68 89) (Z02 82)   5  Encounter for screening colonoscopy (V76 51) (Z12 11)   6  Gastric anastomotic stricture (388 47,493 60) (K92 9,K31 89)   7  Hyperlipidemia (272 4) (E78 5)   8  Hypertension (401 9) (I10)   9  Insomnia (780 52) (G47 00)   10  Nausea (787 02) (R11 0)   11  Need for immunization against influenza (V04 81) (Z23)   12  Need for influenza vaccination (V04 81) (Z23)   13  History of Need for pneumococcal vaccination (V03 82) (Z23)   14  History of Need for prophylactic vaccination and inoculation against influenza (V04 81)    (Z23)   15  Postgastrectomy malabsorption (579 3) (K91 2,Z90 3)   16  Screening for colon cancer (V76 51) (Z12 11)   17  Status post gastric bypass for obesity (V45 86) (Z98 84)   18  Ventricular pre-excitation syndrome (426 7) (I45 6)   19  Visit for screening mammogram (V76 12) (Z12 31)   20  Vitamin D deficiency (268 9) (E55 9)    Past Medical History    1  History of Anemia (285 9) (D64 9)   2  History of Anxiety (300 00) (F41 9)   3  History of Atypical chest pain (786 59) (R07 89)   4  History of Breast calcification, right (793 89) (R92 1)   5  History of Degenerative joint disease of ankle and foot, unspecified laterality (715 97)   (M19 079)   6  History of Depression (311) (F32 9)   7  History of Diabetes Mellitus (250 00)   8  History of Dysphagia (787 20) (R13 10)   9  History of Encounter for routine gynecological examination (V72 31) (Z01 419)   10   History of Encounter for screening colonoscopy (V76 51) (Z12 11)   11  History of Encounter for screening mammogram for malignant neoplasm of breast    (V76 12) (Z12 31)   12  History of insomnia (V13 89) (Z87 898)   13  History of insomnia (V13 89) (Z87 898)   14  History of intestinal malabsorption (V12 79) (Z87 19)   15  History of iron deficiency anemia (V12 3) (Z86 2)   16  History of senile atrophic vaginitis (V13 29) (Z87 42)   17  History of Inflammatory disease of cervix, vagina, and vulva (616 9) (N73 9)   18  History of Nausea with vomiting (787 01) (R11 2)   19  History of Need for pneumococcal vaccination (V03 82) (Z23)   20  History of Need for prophylactic vaccination and inoculation against influenza (V04 81)    (Z23)   21  History of Obesity (278 00) (E66 9)   22  History of Pain, joint, shoulder (719 41) (M25 519)   23  History of Palpitations (785 1) (R00 2)   24  History of Peptic ulcer (533 90) (K27 9)   25  History of Screening examination for pulmonary tuberculosis (V74 1) (Z11 1)   26  History of Spontaneous  (634 90)   27  History of Stricture esophagus (530 3) (K22 2)   28  History of Tachycardia (785 0) (R00 0)   29  History of Type 2 Diabetes Mellitus - Uncomplicated, Controlled (250 00)    Surgical History    1  History of Card Cath Post-Proc Data: ___ Lesions Successfully Dilated   2  History of Gastric Surgery For Morbid Obesity Gastric Bypass   3  History of Hysterectomy   4  History of Oophorectomy    Family History  Mother    1  Family history of Congenital Heart Disease  Father    2  Family history of Asthma (V17 5)  Sister    3  Family history of Acute Myocardial Infarction (V17 3)   4  Family history of Congenital Heart Disease   5  Family history of Hypertension (V17 49)  Brother    6  Family history of Acute Myocardial Infarction (V17 3)   7  Family history of Congenital Heart Disease   8  Family history of Hypertension (V17 49)   9  Family history of Stroke Syndrome (V17 1)   10   Family history of Sudden/Instantaneous Cardiac Death (V17 41)  Maternal Grandfather    11  Family history of malignant neoplasm of breast (V16 3) (Z80 3)  Family History    12  Family history of Arthritis (V17 7)   13  Family history of Coronary Artery Disease (V17 49)   14  Family history of Diabetes Mellitus (V18 0)   15  Family history of Reported Family History Of Cancer    Social History    · Denied: History of Alcohol Use (History)   · Denied: History of Drug Use   · Never A Smoker    Current Meds   1  Atorvastatin Calcium 20 MG Oral Tablet; TAKE 1 TABLET AT BEDTIME; Therapy: 14HSM1155 to (Evaluate:61Cwc1133)  Requested for: 90Dqu2022; Last   Rx:27Nov2017 Ordered   2  B-Complex TABS; TAKE 1 TABLET DAILY; Therapy: (Recorded:12Kqt6205) to Recorded   3  Calcium 500 MG TABS; TAKE 1 TABLET DAILY; Therapy: (Recorded:85Lxn9284) to Recorded   4  [employer] Vitamin D CAPS; take one tablet daily; Therapy: (Recorded:12Vvq5066) to Recorded   5  [employer] Vitamin E CAPS; take one tablet daily; Therapy: (Recorded:35Tje9090) to Recorded   6  Fish Oil 1000 MG Oral Capsule; take one tablet twice daily; Therapy: (Recorded:68Mxr7421) to Recorded   7  Iron TABS; TAKE 1 TABLET DAILY AS DIRECTED; Therapy: (Recorded:31Tkf5629) to Recorded   8  Metoprolol Succinate ER 25 MG Oral Tablet Extended Release 24 Hour; take 1 tablet by   mouth daily; Therapy: 34FRQ3009 to (Evaluate:05Sfm7672)  Requested for: 56Rro9827; Last   Rx:72Ltg0625 Ordered   9  Omeprazole 20 MG Oral Capsule Delayed Release; TAKE ONE CAPSULE BY MOUTH   TWICE A DAY; Therapy: 86MCC1313 to (Evaluate:01Jan2018)  Requested for: 08Qfb4874; Last   Rx:03Oct2017 Ordered    Allergies    1  No Known Drug Allergies    Vitals  Signs   Recorded: 18NWY9730 08:26AM   Height: 5 ft 3 in  Weight: 159 lb 2 oz  BMI Calculated: 28 19  BSA Calculated: 1 75     Note   Note:     Completed Behavioral Health Assessment  Provided patient, education as needed  Patient denies to [place] I diagnosis   Patient meets criteria   Patient meets criteria for [facility] bariatric surgery program and is referred to physician   SHONA CRESPO LCSW         Signatures   Electronically signed by : DIANE Soler; Jan 9 2018  8:55AM EST                       (Author)    Electronically signed by : CASEY [place] ; Hollis 10 2018 11:51AM EST                       (Validation)

## 2018-01-23 NOTE — PROGRESS NOTES
History of Present Illness  Bariatric MNT [facility] Surgery Screening Preop [facility]:     She was on time  Her surgeon is Dr Alpa De La Vega  The patient was present at the session and her patient's  Faye Hammans attended the session  (BMI 26 5, patient is having complications s/p gastric bypass in 2011)   She has the following comorbidities: strictures  Labs: Landy Lock She was reminded to have her labs drawn   Exercise Frequency:  She exercises walking weather permitting  Relationship to food: She is an emotional eater  She knows the difference between being comfortably full and uncomfortably full and knows the difference between being stuffed and comfortably full  She completed a food journal 24-Hour Food Recall Breakfast bread, banana, 1 cup coffee  Lunch: bread banana  Dinner: tried rice and beans and fish last night was not table to eat  Knowledge Deficit Prior to Education  She previosu gastri cbypass patient  Barriers to education: language  Medical Nutrition Therapy Intervention: Individualized Meal Plan and Lifestyle /Behavior Modification Techniques  Area's Reviewed: Lifestyle [place] Modification Techniques and Pre- surgery goals /rules  Brief Review of Vitamins  Patient is a 61year old who is here for nutritional evaluation for weight loss surgery  Patient is English speaking,  present and provided translation  Patient had gastric bypass in 2011 and since is experience complications (stricture)  I reviewed comorbidities and medications prior to session  Patient reports that she is unable to tolerate meats  She is able to tolerate eggs, yogurt, meat patties, chicken in broth/soups, beans  Patient portillo also tolerate bread, banana, crackers  Patient does practice the 30/60 rule, she does not consume sugar sweetened beverages and she does not consume carbonated beverages  She will skip meals at times due to pain or not being hungry   She has dieted in the past with variable success but would regain the weight back  (refer to diet history for details)  For her personal pre-operative goals she will:   1  include a protein source at all meals and snacks (as tolerated)- provided patient with samples of protein shakes to trial to assist with increasing protein intakes to recommended ranges (premeir)  2  consume adequate hydration daily  She will complete all 6 lesson plans in her bariatric booklet  She was instructed on the importance of consistent vitamin and mineral intake after her surgery to prevent deficiencies  She currently takes a MVI, vitamin D, C, E, b complex, Calcium and omega 3   reviewed recommendations for vitamins and minerals for post-op patients  Reviewed importance of support after surgery and discussed the web forum, rené, pep rally and support group  Patient states adequate knowledge of nutrition, exercise and behavior modification required for long term success  Pt  is recommended for surgery  Pt also understands that she needs to implement lifestyle changes in preparation for surgery  Active Problems    1  Abdominal pain (789 00) (R10 9)   2  Accessory AV connection (426 7) (I45 6)   3  Depression with anxiety (300 4) (F41 8)   4  Encounter for adoption services (V68 89) (Z02 82)   5  Encounter for screening colonoscopy (V76 51) (Z12 11)   6  Gastric anastomotic stricture (031 11,874 55) (K92 9,K31 89)   7  Hyperlipidemia (272 4) (E78 5)   8  Hypertension (401 9) (I10)   9  Insomnia (780 52) (G47 00)   10  Nausea (787 02) (R11 0)   11  Need for immunization against influenza (V04 81) (Z23)   12  Need for influenza vaccination (V04 81) (Z23)   13  History of Need for pneumococcal vaccination (V03 82) (Z23)   14  History of Need for prophylactic vaccination and inoculation against influenza (V04 81)    (Z23)   15  Postgastrectomy malabsorption (579 3) (K91 2,Z90 3)   16  Screening for colon cancer (V76 51) (Z12 11)   17  Status post gastric bypass for obesity (V45 86) (Z98 84)   18  Ventricular pre-excitation syndrome (426 7) (I45 6)   19  Visit for screening mammogram (V76 12) (Z12 31)   20  Vitamin D deficiency (268 9) (E55 9)    Past Medical History    1  History of Anemia (285 9) (D64 9)   2  History of Anxiety (300 00) (F41 9)   3  History of Atypical chest pain (786 59) (R07 89)   4  History of Breast calcification, right (793 89) (R92 1)   5  History of Degenerative joint disease of ankle and foot, unspecified laterality (715 97)   (M19 079)   6  History of Depression (311) (F32 9)   7  History of Diabetes Mellitus (250 00)   8  History of Dysphagia (787 20) (R13 10)   9  History of Encounter for routine gynecological examination (V72 31) (Z01 419)   10  History of Encounter for screening colonoscopy (V76 51) (Z12 11)   11  History of Encounter for screening mammogram for malignant neoplasm of breast    (V76 12) (Z12 31)   12  History of insomnia (V13 89) (Z87 898)   13  History of insomnia (V13 89) (Z87 898)   14  History of intestinal malabsorption (V12 79) (Z87 19)   15  History of iron deficiency anemia (V12 3) (Z86 2)   16  History of senile atrophic vaginitis (V13 29) (Z87 42)   17  History of Inflammatory disease of cervix, vagina, and vulva (616 9) (N73 9)   18  History of Nausea with vomiting (787 01) (R11 2)   19  History of Need for pneumococcal vaccination (V03 82) (Z23)   20  History of Need for prophylactic vaccination and inoculation against influenza (V04 81)    (Z23)   21  History of Obesity (278 00) (E66 9)   22  History of Pain, joint, shoulder (719 41) (M25 519)   23  History of Palpitations (785 1) (R00 2)   24  History of Peptic ulcer (533 90) (K27 9)   25  History of Screening examination for pulmonary tuberculosis (V74 1) (Z11 1)   26  History of Spontaneous  (634 90)   27  History of Stricture esophagus (530 3) (K22 2)   28  History of Tachycardia (785 0) (R00 0)   29   History of Type 2 Diabetes Mellitus - Uncomplicated, Controlled (250 00)    Surgical History    1  History of Card Cath Post-Proc Data: ___ Lesions Successfully Dilated   2  History of Gastric Surgery For Morbid Obesity Gastric Bypass   3  History of Hysterectomy   4  History of Oophorectomy    Family History  Mother    1  Family history of Congenital Heart Disease  Father    2  Family history of Asthma (V17 5)  Sister    3  Family history of Acute Myocardial Infarction (V17 3)   4  Family history of Congenital Heart Disease   5  Family history of Hypertension (V17 49)  Brother    6  Family history of Acute Myocardial Infarction (V17 3)   7  Family history of Congenital Heart Disease   8  Family history of Hypertension (V17 49)   9  Family history of Stroke Syndrome (V17 1)   10  Family history of Sudden/Instantaneous Cardiac Death (V17 41)  Maternal Grandfather    6  Family history of malignant neoplasm of breast (V16 3) (Z80 3)  Family History    12  Family history of Arthritis (V17 7)   13  Family history of Coronary Artery Disease (V17 49)   14  Family history of Diabetes Mellitus (V18 0)   15  Family history of Reported Family History Of Cancer    Social History    · Denied: History of Alcohol Use (History)   · Denied: History of Drug Use   · Never A Smoker    Current Meds   1  Atorvastatin Calcium 20 MG Oral Tablet; TAKE 1 TABLET AT BEDTIME; Therapy: 94QPN3274 to (Evaluate:44Jnp7312)  Requested for: 52Kys2478; Last   Rx:27Nov2017 Ordered   2  B-Complex TABS; TAKE 1 TABLET DAILY; Therapy: (Recorded:71Hql7309) to Recorded   3  Calcium 500 MG TABS; TAKE 1 TABLET DAILY; Therapy: (Recorded:36Oml3703) to Recorded   4  [employer] Vitamin D CAPS; take one tablet daily; Therapy: (Recorded:89Vdp5911) to Recorded   5  [employer]S Vitamin E CAPS; take one tablet daily; Therapy: (Recorded:23Cav5177) to Recorded   6  Fish Oil 1000 MG Oral Capsule; take one tablet twice daily; Therapy: (Recorded:15Tjw8848) to Recorded   7  Iron TABS; TAKE 1 TABLET DAILY AS DIRECTED;    Therapy: (Recorded:77Nyq4302) to Recorded   8  Metoprolol Succinate ER 25 MG Oral Tablet Extended Release 24 Hour; take 1 tablet by   mouth daily; Therapy: 29PAK6180 to (Evaluate:09Wsg6657)  Requested for: 00Aao7369; Last   Rx:74Nki3061 Ordered   9  Omeprazole 20 MG Oral Capsule Delayed Release; TAKE ONE CAPSULE BY MOUTH   TWICE A DAY; Therapy: 22IZF0469 to (Evaluate:01Jan2018)  Requested for: 37Fmm7080; Last   Rx:03Oct2017 Ordered    Allergies    1   No Known Drug Allergies    Vitals  Signs   Recorded: 52EGY4277 09:58AM   Height: 5 ft 3 in  Weight: 159 lb 3 2 oz  BMI Calculated: 28 2  BSA Calculated: 1 76    Signatures   Electronically signed by : NOÉ Ruggiero; Jan 9 2018 10:02AM EST                       (Author)    Electronically signed by : CASEY Sanchez ; Hollis 10 2018 11:51AM EST                       (Validation)

## 2018-01-24 VITALS
DIASTOLIC BLOOD PRESSURE: 90 MMHG | WEIGHT: 157.85 LBS | SYSTOLIC BLOOD PRESSURE: 130 MMHG | TEMPERATURE: 98.7 F | HEART RATE: 68 BPM | BODY MASS INDEX: 27.97 KG/M2 | HEIGHT: 63 IN

## 2018-06-18 ENCOUNTER — TELEPHONE (OUTPATIENT)
Dept: INTERNAL MEDICINE CLINIC | Facility: CLINIC | Age: 58
End: 2018-06-18

## 2018-06-18 DIAGNOSIS — E78.2 MIXED HYPERLIPIDEMIA: Primary | ICD-10-CM

## 2018-06-18 PROBLEM — E55.9 VITAMIN D DEFICIENCY: Status: ACTIVE | Noted: 2017-01-12

## 2018-06-18 PROBLEM — G47.00 INSOMNIA: Status: ACTIVE | Noted: 2017-12-26

## 2018-06-18 PROBLEM — K92.9 GASTRIC ANASTOMOTIC STRICTURE: Status: ACTIVE | Noted: 2017-02-03

## 2018-06-18 PROBLEM — K31.89 GASTRIC ANASTOMOTIC STRICTURE: Status: ACTIVE | Noted: 2017-02-03

## 2018-06-18 RX ORDER — OMEPRAZOLE 20 MG/1
1 CAPSULE, DELAYED RELEASE ORAL 2 TIMES DAILY
COMMUNITY
Start: 2017-05-03 | End: 2018-08-20 | Stop reason: SDUPTHER

## 2018-06-18 RX ORDER — ATORVASTATIN CALCIUM 20 MG/1
20 TABLET, FILM COATED ORAL DAILY
Qty: 90 TABLET | Refills: 0 | Status: SHIPPED | OUTPATIENT
Start: 2018-06-18 | End: 2018-08-20 | Stop reason: SDUPTHER

## 2018-06-18 RX ORDER — TRAZODONE HYDROCHLORIDE 300 MG/1
TABLET ORAL
COMMUNITY
End: 2018-07-02 | Stop reason: ALTCHOICE

## 2018-06-18 RX ORDER — CITALOPRAM 20 MG/1
1 TABLET ORAL DAILY
COMMUNITY
Start: 2014-12-17 | End: 2018-07-02 | Stop reason: ALTCHOICE

## 2018-06-18 RX ORDER — ATORVASTATIN CALCIUM 20 MG/1
1 TABLET, FILM COATED ORAL
COMMUNITY
Start: 2012-02-16 | End: 2018-06-18 | Stop reason: SDUPTHER

## 2018-07-02 ENCOUNTER — OFFICE VISIT (OUTPATIENT)
Dept: INTERNAL MEDICINE CLINIC | Facility: CLINIC | Age: 58
End: 2018-07-02
Payer: COMMERCIAL

## 2018-07-02 VITALS
BODY MASS INDEX: 27.62 KG/M2 | HEIGHT: 65 IN | TEMPERATURE: 98.3 F | SYSTOLIC BLOOD PRESSURE: 130 MMHG | WEIGHT: 165.79 LBS | HEART RATE: 64 BPM | DIASTOLIC BLOOD PRESSURE: 90 MMHG

## 2018-07-02 DIAGNOSIS — R20.2 PARESTHESIA AND PAIN OF BOTH UPPER EXTREMITIES: ICD-10-CM

## 2018-07-02 DIAGNOSIS — M79.601 PARESTHESIA AND PAIN OF BOTH UPPER EXTREMITIES: ICD-10-CM

## 2018-07-02 DIAGNOSIS — Z12.11 COLON CANCER SCREENING: ICD-10-CM

## 2018-07-02 DIAGNOSIS — M79.602 PARESTHESIA AND PAIN OF BOTH UPPER EXTREMITIES: ICD-10-CM

## 2018-07-02 DIAGNOSIS — M79.89 MASS OF SOFT TISSUE: ICD-10-CM

## 2018-07-02 DIAGNOSIS — E78.2 MIXED HYPERLIPIDEMIA: ICD-10-CM

## 2018-07-02 DIAGNOSIS — F51.01 PRIMARY INSOMNIA: ICD-10-CM

## 2018-07-02 DIAGNOSIS — H91.92 DECREASED HEARING, LEFT: ICD-10-CM

## 2018-07-02 DIAGNOSIS — I10 ESSENTIAL HYPERTENSION: Primary | ICD-10-CM

## 2018-07-02 PROCEDURE — 3008F BODY MASS INDEX DOCD: CPT | Performed by: PHYSICIAN ASSISTANT

## 2018-07-02 PROCEDURE — 99214 OFFICE O/P EST MOD 30 MIN: CPT | Performed by: PHYSICIAN ASSISTANT

## 2018-07-02 RX ORDER — METOPROLOL SUCCINATE 25 MG/1
25 TABLET, EXTENDED RELEASE ORAL DAILY
Qty: 90 TABLET | Refills: 1 | Status: SHIPPED | OUTPATIENT
Start: 2018-07-02 | End: 2019-03-05 | Stop reason: SDUPTHER

## 2018-07-02 NOTE — PATIENT INSTRUCTIONS
Continue same medications for BP and cholesterol  Schedule the hearing test  Schedule the ultrasound   Schedule follow up with Dr Brian Hernandez with me after testing  Mammogram due 11/2018  We will call with appt for colonosopy  Get your labs completed

## 2018-07-02 NOTE — PROGRESS NOTES
Assessment/Plan:    Hypertension    Continue metoprolol daily    Hyperlipidemia   Continue atorvastatin daily    Paresthesia and pain of both upper extremities   Please schedule the EMG for evaluation of carpal tunnel    Mass of soft tissue   Please schedule the ultrasound of the right lateral axilla due to mass and pain  Gastric anastomotic stricture    Continue follow-up with Dr Tamika Knott       Diagnoses and all orders for this visit:    Essential hypertension  -     metoprolol succinate (TOPROL-XL) 25 mg 24 hr tablet; Take 1 tablet (25 mg total) by mouth daily for 180 days  -     Comprehensive metabolic panel  -     Microalbumin / creatinine urine ratio    Primary insomnia    Mixed hyperlipidemia  -     Lipid Panel with Direct LDL reflex    Colon cancer screening  -     Ambulatory Referral to GI Endoscopy; Future    Mass of soft tissue  -     US MSK limited; Future    Paresthesia and pain of both upper extremities  -     EMG 2 Limb Upper Extremity; Future    Decreased hearing, left  -     Comprehensive hearing evaluation; Future          Subjective:      Patient ID: Melyssa Ornelas is a 60 y o  female  Pt here for check up  patient confirms taking her blood pressure medication every morning however does admit that she has not had the metoprolol in the past 2 days  Patient confirms taking her atorvastatin every night  States did follow up with Dr Joey Cervantes and was tried on [place] after others did not help and states no change to sleep  Patient states even without the medication she feels she has been sleeping better  Patient reports her depression has resolved and does not have this issue for over 1 year now  Also reports was advised by Dr Tamika Knott the surgery for dilation s/p gastric bypass is not covered  Patient has had the anastomotic stricture dilated 3 times  Patient reports almost no improvement after each 1 of the dilations    Patient reports whenever she tries to eat higher protein foods she ends up vomiting because it will not stay down  Patient states she has reverted to softer poor diet and thus has experienced increased weight gain  The plan was for a neither surgery to go in and correct the anastomosis however this is what they are reporting the insurance is not approving  Patient states they are looking to change her insurance to 1 that will cover the procedure  Patient continues with her stomach medication as per Dr Camila Ivey for the nausea and vomiting  Here today c/o pain to R axilla states not breast, has all the time but inc with pressure  Patient reports that she feels a lump in that area  She states that when she has her arm down or when she rolls onto the side is when she feels most of the discomfort  Patient states she has had this for the past few months however in the past 1 month she feels more discomfort to that area  Patient completed her mammogram in November and BI-RADS 1  Pt also c/o ringing to L ear  also decreased sound  Patient denies any pain in her left here however she feels and hears a fluttering sound and sometimes a ringing and feels that she has decreased hearing in that ear  Patient also reporting the numbness and tingling in both of her hands especially at night and 1st thing in the morning is significantly worse and would now like to proceed with evaluation  Patient states she is not currently dropping anything but the pain is significant  The following portions of the patient's history were reviewed and updated as appropriate: allergies, current medications, past family history, past medical history, past social history, past surgical history and problem list     Review of Systems   Constitutional: Negative  Negative for activity change, appetite change, fatigue and unexpected weight change  HENT: Positive for hearing loss and tinnitus  Eyes: Negative  Respiratory: Negative  Negative for cough and shortness of breath  Cardiovascular: Negative  Negative for chest pain and palpitations  Gastrointestinal: Positive for abdominal pain, nausea and vomiting  Negative for blood in stool, constipation and diarrhea  Endocrine: Negative  Genitourinary: Negative  Musculoskeletal: Positive for arthralgias  Skin: Negative for rash  Allergic/Immunologic: Negative  Neurological: Positive for numbness  Negative for headaches  Hematological: Negative  Psychiatric/Behavioral: Negative  Negative for decreased concentration and dysphoric mood  Objective:      /90 (BP Location: Right arm, Patient Position: Sitting, Cuff Size: Adult)   Pulse 64   Temp 98 3 °F (36 8 °C) (Oral)   Ht 5' 5" (1 651 m)   Wt 75 2 kg (165 lb 12 6 oz)   BMI 27 59 kg/m²          Physical Exam   Constitutional: She is oriented to person, place, and time  She appears well-developed and well-nourished  HENT:   Head: Normocephalic and atraumatic  Right Ear: External ear normal  Tympanic membrane is not erythematous, not retracted and not bulging  No middle ear effusion  No decreased hearing is noted  Left Ear: External ear normal  Tympanic membrane is not perforated, not erythematous, not retracted and not bulging  No middle ear effusion  Decreased hearing is noted  Nose: Nose normal    Mouth/Throat: Oropharynx is clear and moist    Eyes: Pupils are equal, round, and reactive to light  Cardiovascular: Normal rate and regular rhythm  No murmur heard  Pulmonary/Chest: Effort normal and breath sounds normal  She has no wheezes  Abdominal: Soft  Bowel sounds are normal  There is no tenderness  Musculoskeletal: Normal range of motion  Neurological: She is alert and oriented to person, place, and time  No cranial nerve deficit  Neg Tinel, positive Phalen   Skin: Skin is warm and dry  Psychiatric: She has a normal mood and affect   Her behavior is normal  Judgment and thought content normal

## 2018-08-20 DIAGNOSIS — Z98.84 BARIATRIC SURGERY STATUS: Primary | ICD-10-CM

## 2018-08-20 DIAGNOSIS — E78.2 MIXED HYPERLIPIDEMIA: ICD-10-CM

## 2018-08-21 RX ORDER — OMEPRAZOLE 20 MG/1
CAPSULE, DELAYED RELEASE ORAL
Qty: 90 CAPSULE | Refills: 3 | Status: SHIPPED | OUTPATIENT
Start: 2018-08-21 | End: 2019-08-21 | Stop reason: SDUPTHER

## 2018-08-21 RX ORDER — ATORVASTATIN CALCIUM 20 MG/1
20 TABLET, FILM COATED ORAL DAILY
Qty: 90 TABLET | Refills: 0 | Status: SHIPPED | OUTPATIENT
Start: 2018-08-21 | End: 2018-11-26 | Stop reason: SDUPTHER

## 2018-11-26 DIAGNOSIS — E78.2 MIXED HYPERLIPIDEMIA: ICD-10-CM

## 2018-11-26 RX ORDER — ATORVASTATIN CALCIUM 20 MG/1
20 TABLET, FILM COATED ORAL DAILY
Qty: 90 TABLET | Refills: 0 | Status: SHIPPED | OUTPATIENT
Start: 2018-11-26 | End: 2019-05-15 | Stop reason: SDUPTHER

## 2018-11-26 NOTE — TELEPHONE ENCOUNTER
Attempted to contact patient but there was no response, will attempt again at a later time     (voicemail not set up )

## 2019-01-23 ENCOUNTER — TRANSCRIBE ORDERS (OUTPATIENT)
Dept: ADMINISTRATIVE | Facility: HOSPITAL | Age: 59
End: 2019-01-23

## 2019-01-23 DIAGNOSIS — Z12.39 SCREENING BREAST EXAMINATION: Primary | ICD-10-CM

## 2019-01-25 ENCOUNTER — TELEPHONE (OUTPATIENT)
Dept: INTERNAL MEDICINE CLINIC | Facility: CLINIC | Age: 59
End: 2019-01-25

## 2019-01-25 DIAGNOSIS — Z12.31 ENCOUNTER FOR SCREENING MAMMOGRAM FOR BREAST CANCER: Primary | ICD-10-CM

## 2019-01-29 ENCOUNTER — HOSPITAL ENCOUNTER (OUTPATIENT)
Dept: RADIOLOGY | Facility: HOSPITAL | Age: 59
Discharge: HOME | End: 2019-01-29

## 2019-01-29 VITALS — BODY MASS INDEX: 27.49 KG/M2 | HEIGHT: 65 IN | WEIGHT: 165 LBS

## 2019-01-29 DIAGNOSIS — Z12.31 ENCOUNTER FOR SCREENING MAMMOGRAM FOR BREAST CANCER: ICD-10-CM

## 2019-01-29 PROCEDURE — 77067 SCR MAMMO BI INCL CAD: CPT

## 2019-02-14 ENCOUNTER — TELEPHONE (OUTPATIENT)
Dept: INTERNAL MEDICINE CLINIC | Facility: CLINIC | Age: 59
End: 2019-02-14

## 2019-02-18 ENCOUNTER — APPOINTMENT (OUTPATIENT)
Dept: LAB | Facility: CLINIC | Age: 59
End: 2019-02-18

## 2019-02-18 LAB
ALBUMIN SERPL BCP-MCNC: 3.7 G/DL (ref 3.5–5)
ALP SERPL-CCNC: 109 U/L (ref 46–116)
ALT SERPL W P-5'-P-CCNC: 38 U/L (ref 12–78)
ANION GAP SERPL CALCULATED.3IONS-SCNC: 3 MMOL/L (ref 4–13)
AST SERPL W P-5'-P-CCNC: 31 U/L (ref 5–45)
BILIRUB SERPL-MCNC: 0.37 MG/DL (ref 0.2–1)
BUN SERPL-MCNC: 12 MG/DL (ref 5–25)
CALCIUM SERPL-MCNC: 8.6 MG/DL (ref 8.3–10.1)
CHLORIDE SERPL-SCNC: 107 MMOL/L (ref 100–108)
CHOLEST SERPL-MCNC: 145 MG/DL (ref 50–200)
CO2 SERPL-SCNC: 29 MMOL/L (ref 21–32)
CREAT SERPL-MCNC: 0.6 MG/DL (ref 0.6–1.3)
CREAT UR-MCNC: 156 MG/DL
GFR SERPL CREATININE-BSD FRML MDRD: 115 ML/MIN/1.73SQ M
GLUCOSE P FAST SERPL-MCNC: 94 MG/DL (ref 65–99)
HDLC SERPL-MCNC: 51 MG/DL (ref 40–60)
LDLC SERPL CALC-MCNC: 76 MG/DL (ref 0–100)
MICROALBUMIN UR-MCNC: 10 MG/L (ref 0–20)
MICROALBUMIN/CREAT 24H UR: 6 MG/G CREATININE (ref 0–30)
POTASSIUM SERPL-SCNC: 4.5 MMOL/L (ref 3.5–5.3)
PROT SERPL-MCNC: 7.2 G/DL (ref 6.4–8.2)
SODIUM SERPL-SCNC: 139 MMOL/L (ref 136–145)
TRIGL SERPL-MCNC: 92 MG/DL

## 2019-02-18 PROCEDURE — 80053 COMPREHEN METABOLIC PANEL: CPT | Performed by: PHYSICIAN ASSISTANT

## 2019-02-18 PROCEDURE — 80061 LIPID PANEL: CPT | Performed by: PHYSICIAN ASSISTANT

## 2019-02-18 PROCEDURE — 36415 COLL VENOUS BLD VENIPUNCTURE: CPT | Performed by: PHYSICIAN ASSISTANT

## 2019-02-18 PROCEDURE — 82570 ASSAY OF URINE CREATININE: CPT | Performed by: PHYSICIAN ASSISTANT

## 2019-02-18 PROCEDURE — 82043 UR ALBUMIN QUANTITATIVE: CPT | Performed by: PHYSICIAN ASSISTANT

## 2019-03-05 ENCOUNTER — OFFICE VISIT (OUTPATIENT)
Dept: INTERNAL MEDICINE CLINIC | Facility: CLINIC | Age: 59
End: 2019-03-05

## 2019-03-05 VITALS
TEMPERATURE: 98.2 F | BODY MASS INDEX: 30 KG/M2 | WEIGHT: 169.31 LBS | DIASTOLIC BLOOD PRESSURE: 80 MMHG | HEART RATE: 72 BPM | SYSTOLIC BLOOD PRESSURE: 110 MMHG | HEIGHT: 63 IN

## 2019-03-05 DIAGNOSIS — I10 ESSENTIAL HYPERTENSION: Primary | ICD-10-CM

## 2019-03-05 DIAGNOSIS — Z90.3 POSTGASTRECTOMY MALABSORPTION: ICD-10-CM

## 2019-03-05 DIAGNOSIS — K91.2 POSTGASTRECTOMY MALABSORPTION: ICD-10-CM

## 2019-03-05 DIAGNOSIS — E78.2 MIXED HYPERLIPIDEMIA: ICD-10-CM

## 2019-03-05 PROBLEM — R20.2 PARESTHESIA AND PAIN OF BOTH UPPER EXTREMITIES: Status: RESOLVED | Noted: 2018-07-02 | Resolved: 2019-03-05

## 2019-03-05 PROBLEM — M79.602 PARESTHESIA AND PAIN OF BOTH UPPER EXTREMITIES: Status: RESOLVED | Noted: 2018-07-02 | Resolved: 2019-03-05

## 2019-03-05 PROBLEM — M79.601 PARESTHESIA AND PAIN OF BOTH UPPER EXTREMITIES: Status: RESOLVED | Noted: 2018-07-02 | Resolved: 2019-03-05

## 2019-03-05 PROBLEM — G47.00 INSOMNIA: Status: RESOLVED | Noted: 2017-12-26 | Resolved: 2019-03-05

## 2019-03-05 PROCEDURE — 99213 OFFICE O/P EST LOW 20 MIN: CPT | Performed by: PHYSICIAN ASSISTANT

## 2019-03-05 RX ORDER — METOPROLOL SUCCINATE 25 MG/1
25 TABLET, EXTENDED RELEASE ORAL DAILY
Qty: 90 TABLET | Refills: 1 | Status: SHIPPED | OUTPATIENT
Start: 2019-03-05 | End: 2019-08-21 | Stop reason: SDUPTHER

## 2019-03-05 NOTE — PROGRESS NOTES
Assessment/Plan:    Hypertension  As reviewed today are blood pressure is very well controlled on your metoprolol  Please continue this 25 mg tablet once daily  Continue your heart healthy diet as well  Hyperlipidemia  As discussed your cholesterol is significantly improved with your dietary changes as well as the atorvastatin daily  As you can get this for significantly reduced cost from [facility] and you confirm it is the same medication I am fine with you taking the atorvastatin that you get from there  Continue your heart healthy diet  Postgastrectomy malabsorption  Please continue your daily supplements  You are aware that you have to take these continuously after your gastric bypass  Diagnoses and all orders for this visit:    Essential hypertension  -     metoprolol succinate (TOPROL-XL) 25 mg 24 hr tablet; Take 1 tablet (25 mg total) by mouth daily for 180 days    Mixed hyperlipidemia  -     Lipid Panel with Direct LDL reflex; Future  -     Comprehensive metabolic panel; Future    Postgastrectomy malabsorption  -     CBC and differential; Future  -     Vitamin B12; Future  -     Folate; Future  -     Vitamin D 25 hydroxy; Future          Subjective:      Patient ID: Agueda Melgar is a 60 y o  female  Confirms taking meds for BP and cholesterol  States has made diet changes as well  Avoiding fried, not a lot of fatty or red meats  Is getting the atorvastatin from [facility] as much less expensive  Patient is here with her daughter who confirms that it is atorvastatin that they are getting and this was confirmed based on a comparison of the to boxes  Confirms no longer having any stomach pain, continues to take the omeprazole daily  Patient had been following with bariatric surgeon as had anastomosis complications status post gastric bypass  Patient states as noted she is no longer having any pain at all      Confirms taking all her supplements daily    Patient reports she did have a colonoscopy in the past at [facility]  Discussed with her we do not have any record of this but will have someone check  Hypertension   This is a chronic problem  The current episode started more than 1 year ago  The problem is controlled  Pertinent negatives include no blurred vision, chest pain, headaches, malaise/fatigue, palpitations or shortness of breath  There are no associated agents to hypertension  Past treatments include beta blockers  There are no compliance problems  Hyperlipidemia   This is a chronic problem  The current episode started more than 1 year ago  The problem is controlled  Pertinent negatives include no chest pain or shortness of breath  Current antihyperlipidemic treatment includes statins  The current treatment provides significant improvement of lipids  There are no compliance problems  The following portions of the patient's history were reviewed and updated as appropriate: allergies, current medications, past family history, past medical history, past social history, past surgical history and problem list     Review of Systems   Constitutional: Negative  Negative for activity change, appetite change and malaise/fatigue  HENT: Negative  Eyes: Negative  Negative for blurred vision and visual disturbance  Respiratory: Negative  Negative for shortness of breath  Cardiovascular: Negative  Negative for chest pain, palpitations and leg swelling  Gastrointestinal: Negative  Negative for abdominal pain, constipation and diarrhea  Endocrine: Negative  Musculoskeletal: Negative  Neurological: Negative for weakness, light-headedness and headaches  Psychiatric/Behavioral: Negative            Objective:      /80 (BP Location: Left arm, Patient Position: Sitting, Cuff Size: Standard)   Pulse 72   Temp 98 2 °F (36 8 °C) (Oral)   Ht 5' 3" (1 6 m)   Wt 76 8 kg (169 lb 5 oz)   BMI 29 99 kg/m²          Physical Exam   Constitutional: She is oriented to person, place, and time  She appears well-developed and well-nourished  HENT:   Head: Normocephalic  Eyes: Pupils are equal, round, and reactive to light  EOM are normal    Neck: Normal range of motion  No thyromegaly present  Cardiovascular: Normal rate, regular rhythm and normal heart sounds  Pulmonary/Chest: Effort normal and breath sounds normal    Abdominal: Soft  Bowel sounds are normal  There is no tenderness  Musculoskeletal: Normal range of motion  She exhibits no edema  Neurological: She is alert and oriented to person, place, and time  Skin: Skin is warm and dry  Psychiatric: She has a normal mood and affect   Her behavior is normal

## 2019-03-05 NOTE — ASSESSMENT & PLAN NOTE
As reviewed today are blood pressure is very well controlled on your metoprolol  Please continue this 25 mg tablet once daily  Continue your heart healthy diet as well

## 2019-03-05 NOTE — ASSESSMENT & PLAN NOTE
As discussed your cholesterol is significantly improved with your dietary changes as well as the atorvastatin daily  As you can get this for significantly reduced cost from [facility] and you confirm it is the same medication I am fine with you taking the atorvastatin that you get from there  Continue your heart healthy diet

## 2019-03-05 NOTE — PATIENT INSTRUCTIONS
I have refilled your blood pressure medication  Please continue the atorvastatin that you are receiving from [facility]  Please continue your omeprazole and dietary supplements after your bypass surgery  Please get all of your labs completed as dated in 6 months  Mammo was completed in January and due in January of the year 2020  We will look in her records to try to find the report of your colonoscopy completed at this hospital approximately 6 or 7 years ago  Continue routine follow-up with gyn

## 2019-03-05 NOTE — ASSESSMENT & PLAN NOTE
Please continue your daily supplements  You are aware that you have to take these continuously after your gastric bypass

## 2019-05-15 DIAGNOSIS — E78.2 MIXED HYPERLIPIDEMIA: ICD-10-CM

## 2019-05-15 RX ORDER — ATORVASTATIN CALCIUM 20 MG/1
TABLET, FILM COATED ORAL
Qty: 90 TABLET | Refills: 1 | Status: SHIPPED | OUTPATIENT
Start: 2019-05-15 | End: 2019-08-21 | Stop reason: SDUPTHER

## 2019-08-21 DIAGNOSIS — Z98.84 BARIATRIC SURGERY STATUS: ICD-10-CM

## 2019-08-21 DIAGNOSIS — I10 ESSENTIAL HYPERTENSION: ICD-10-CM

## 2019-08-21 DIAGNOSIS — E78.2 MIXED HYPERLIPIDEMIA: ICD-10-CM

## 2019-08-21 RX ORDER — OMEPRAZOLE 20 MG/1
20 CAPSULE, DELAYED RELEASE ORAL DAILY
Qty: 30 CAPSULE | Refills: 0 | Status: SHIPPED | OUTPATIENT
Start: 2019-08-21 | End: 2020-03-09

## 2019-08-21 RX ORDER — ATORVASTATIN CALCIUM 20 MG/1
20 TABLET, FILM COATED ORAL DAILY
Qty: 90 TABLET | Refills: 1 | Status: SHIPPED | OUTPATIENT
Start: 2019-08-21 | End: 2020-02-27

## 2019-08-21 RX ORDER — METOPROLOL SUCCINATE 25 MG/1
25 TABLET, EXTENDED RELEASE ORAL DAILY
Qty: 90 TABLET | Refills: 1 | Status: SHIPPED | OUTPATIENT
Start: 2019-08-21 | End: 2020-03-09

## 2019-08-28 ENCOUNTER — LAB (OUTPATIENT)
Dept: LAB | Facility: CLINIC | Age: 59
End: 2019-08-28

## 2019-08-28 DIAGNOSIS — E78.2 MIXED HYPERLIPIDEMIA: ICD-10-CM

## 2019-08-28 DIAGNOSIS — Z90.3 POSTGASTRECTOMY MALABSORPTION: ICD-10-CM

## 2019-08-28 DIAGNOSIS — K91.2 POSTGASTRECTOMY MALABSORPTION: ICD-10-CM

## 2019-08-28 LAB
25(OH)D3 SERPL-MCNC: 20 NG/ML (ref 30–100)
ALBUMIN SERPL BCP-MCNC: 3.7 G/DL (ref 3.5–5)
ALP SERPL-CCNC: 112 U/L (ref 46–116)
ALT SERPL W P-5'-P-CCNC: 37 U/L (ref 12–78)
ANION GAP SERPL CALCULATED.3IONS-SCNC: 2 MMOL/L (ref 4–13)
AST SERPL W P-5'-P-CCNC: 29 U/L (ref 5–45)
BASOPHILS # BLD AUTO: 0.02 THOUSANDS/ΜL (ref 0–0.1)
BASOPHILS NFR BLD AUTO: 0 % (ref 0–1)
BILIRUB SERPL-MCNC: 0.4 MG/DL (ref 0.2–1)
BUN SERPL-MCNC: 10 MG/DL (ref 5–25)
CALCIUM SERPL-MCNC: 8.6 MG/DL (ref 8.3–10.1)
CHLORIDE SERPL-SCNC: 105 MMOL/L (ref 100–108)
CHOLEST SERPL-MCNC: 144 MG/DL (ref 50–200)
CO2 SERPL-SCNC: 31 MMOL/L (ref 21–32)
CREAT SERPL-MCNC: 0.64 MG/DL (ref 0.6–1.3)
EOSINOPHIL # BLD AUTO: 0.1 THOUSAND/ΜL (ref 0–0.61)
EOSINOPHIL NFR BLD AUTO: 2 % (ref 0–6)
ERYTHROCYTE [DISTWIDTH] IN BLOOD BY AUTOMATED COUNT: 12.8 % (ref 11.6–15.1)
FOLATE SERPL-MCNC: >20 NG/ML (ref 3.1–17.5)
GFR SERPL CREATININE-BSD FRML MDRD: 112 ML/MIN/1.73SQ M
GLUCOSE P FAST SERPL-MCNC: 98 MG/DL (ref 65–99)
HCT VFR BLD AUTO: 41.8 % (ref 34.8–46.1)
HDLC SERPL-MCNC: 49 MG/DL (ref 40–60)
HGB BLD-MCNC: 12.8 G/DL (ref 11.5–15.4)
IMM GRANULOCYTES # BLD AUTO: 0.01 THOUSAND/UL (ref 0–0.2)
IMM GRANULOCYTES NFR BLD AUTO: 0 % (ref 0–2)
LDLC SERPL CALC-MCNC: 77 MG/DL (ref 0–100)
LYMPHOCYTES # BLD AUTO: 1.69 THOUSANDS/ΜL (ref 0.6–4.47)
LYMPHOCYTES NFR BLD AUTO: 33 % (ref 14–44)
MCH RBC QN AUTO: 28.7 PG (ref 26.8–34.3)
MCHC RBC AUTO-ENTMCNC: 30.6 G/DL (ref 31.4–37.4)
MCV RBC AUTO: 94 FL (ref 82–98)
MONOCYTES # BLD AUTO: 0.56 THOUSAND/ΜL (ref 0.17–1.22)
MONOCYTES NFR BLD AUTO: 11 % (ref 4–12)
NEUTROPHILS # BLD AUTO: 2.78 THOUSANDS/ΜL (ref 1.85–7.62)
NEUTS SEG NFR BLD AUTO: 54 % (ref 43–75)
NRBC BLD AUTO-RTO: 0 /100 WBCS
PLATELET # BLD AUTO: 238 THOUSANDS/UL (ref 149–390)
PMV BLD AUTO: 10.8 FL (ref 8.9–12.7)
POTASSIUM SERPL-SCNC: 4.5 MMOL/L (ref 3.5–5.3)
PROT SERPL-MCNC: 7.4 G/DL (ref 6.4–8.2)
RBC # BLD AUTO: 4.46 MILLION/UL (ref 3.81–5.12)
SODIUM SERPL-SCNC: 138 MMOL/L (ref 136–145)
TRIGL SERPL-MCNC: 88 MG/DL
VIT B12 SERPL-MCNC: 375 PG/ML (ref 100–900)
WBC # BLD AUTO: 5.16 THOUSAND/UL (ref 4.31–10.16)

## 2019-08-28 PROCEDURE — 85025 COMPLETE CBC W/AUTO DIFF WBC: CPT

## 2019-08-28 PROCEDURE — 80061 LIPID PANEL: CPT

## 2019-08-28 PROCEDURE — 82306 VITAMIN D 25 HYDROXY: CPT

## 2019-08-28 PROCEDURE — 82607 VITAMIN B-12: CPT

## 2019-08-28 PROCEDURE — 82746 ASSAY OF FOLIC ACID SERUM: CPT

## 2019-08-28 PROCEDURE — 36415 COLL VENOUS BLD VENIPUNCTURE: CPT

## 2019-08-28 PROCEDURE — 80053 COMPREHEN METABOLIC PANEL: CPT

## 2019-09-05 ENCOUNTER — OFFICE VISIT (OUTPATIENT)
Dept: INTERNAL MEDICINE CLINIC | Facility: CLINIC | Age: 59
End: 2019-09-05

## 2019-09-05 VITALS
WEIGHT: 170.19 LBS | BODY MASS INDEX: 30.16 KG/M2 | TEMPERATURE: 98 F | HEIGHT: 63 IN | DIASTOLIC BLOOD PRESSURE: 80 MMHG | HEART RATE: 64 BPM | SYSTOLIC BLOOD PRESSURE: 130 MMHG

## 2019-09-05 DIAGNOSIS — E78.2 MIXED HYPERLIPIDEMIA: ICD-10-CM

## 2019-09-05 DIAGNOSIS — K91.2 POSTGASTRECTOMY MALABSORPTION: ICD-10-CM

## 2019-09-05 DIAGNOSIS — I10 ESSENTIAL HYPERTENSION: Primary | Chronic | ICD-10-CM

## 2019-09-05 DIAGNOSIS — E66.9 OBESITY (BMI 30.0-34.9): ICD-10-CM

## 2019-09-05 DIAGNOSIS — E55.9 VITAMIN D DEFICIENCY: ICD-10-CM

## 2019-09-05 DIAGNOSIS — Z90.3 POSTGASTRECTOMY MALABSORPTION: ICD-10-CM

## 2019-09-05 PROBLEM — M79.89 MASS OF SOFT TISSUE: Status: RESOLVED | Noted: 2018-07-02 | Resolved: 2019-09-05

## 2019-09-05 PROCEDURE — 99213 OFFICE O/P EST LOW 20 MIN: CPT | Performed by: PHYSICIAN ASSISTANT

## 2019-09-05 RX ORDER — CHOLECALCIFEROL (VITAMIN D3) 50 MCG
4000 TABLET ORAL DAILY
Qty: 180 TABLET | Refills: 1 | Status: SHIPPED | OUTPATIENT
Start: 2019-09-05 | End: 2020-03-09

## 2019-09-05 NOTE — PROGRESS NOTES
Assessment/Plan:    As reviewed today all of her labs are good except for your vitamin-D remains to low  Your level is 20 and needs to be greater than 30  You report you were taking vitamin-D 2000 International Units daily and therefore I have increased this to 4000 units daily  Please continue all of your other supplements on a daily basis status post gastric bypass surgery  Also reviewed you have had a steady weight gain  We reviewed importance of returning to your dietary advice status post your bad past surgery  Additional information provided to you today to assist with resuming healthier diet  Blood pressure and cholesterol are well controlled on current medications no changes  Mammogram due January of 2020  Patient had to colonoscopy is completed the 1st in 2009 the 2nd 3 years later in 2012  It is noted on both exams patient had no polyps she did have some mild diverticular disease and hemorrhoids  Reports do not indicate poor prep nor positive family history so unsure why patient has been scheduled for a 5 year follow-up  Based on exam findings and no family history should be a 10 follow-up for colonoscopy  You are hopeful that may be able to get completed in [facility] and will bring results and report here  Appointment here in 6 months  No labs required prior to next visit  No problem-specific Assessment & Plan notes found for this encounter  Diagnoses and all orders for this visit:    Essential hypertension    Mixed hyperlipidemia    Postgastrectomy malabsorption    Vitamin D deficiency  -     Cholecalciferol (VITAMIN D) 2000 units tablet; Take 2 tablets (4,000 Units total) by mouth daily for 180 days    Obesity (BMI 30 0-34  9)          Subjective:      Patient ID: Mary Palacios is a 60 y o  female      Pt here for routine 6 months check up    Feeling well no new concerns today    Reviewed labs and all OK except for Vit D is still low, although slightly improved as her level is now 20 and previously it was 14  Rest OK confirms taking her supplements as is s/p gastric bypass surgery    Patient is aware her last colonoscopy was in 2012 and was noted to need a repeat in 5 years  It was however noted that she had no polyps but did have some diverticular disease and hemorrhoids  Patient also had a colonoscopy 3 years prior to that however that was for change in bowel function and rectal bleeding however was also noted patient did have internal hemorrhoids  Patient is aware this was due in 2017 however she reports secondary to no insurance she cannot afford to have this completed at this time  Based on having already had 2 colonoscopies with no polyps or other abnormalities patient should really be on a 10 year plan which means her next colonoscopy would be due in 2022  Patient reports she is helping when she visit [facility] she can have a completed there and will bring the report here  Patient offers no new medical concerns today and confirm she is taking all of her medications as directed  Also discussed with patient that she is status post gastric bypass surgery and we have been noting a steady increase in her weight  Patient encouraged to resume her previous dietary program   This includes increasing clear liquids and healthy low-fat proteins on a regular basis  Mammogram due January of 2020  The following portions of the patient's history were reviewed and updated as appropriate: allergies, current medications, past family history, past medical history, past social history, past surgical history and problem list     Review of Systems   Constitutional: Negative  Negative for activity change, appetite change and unexpected weight change  Eyes: Negative  Negative for visual disturbance  Respiratory: Negative  Negative for cough, chest tightness and shortness of breath  Cardiovascular: Negative    Negative for chest pain, palpitations and leg swelling  Gastrointestinal: Negative for abdominal pain, constipation and diarrhea  Endocrine: Negative  Negative for cold intolerance and heat intolerance  Genitourinary: Negative  Negative for frequency and urgency  Musculoskeletal: Negative  Negative for back pain and neck pain  Skin: Negative for rash  Neurological: Negative for dizziness, weakness, light-headedness and headaches  Psychiatric/Behavioral: Negative  Negative for sleep disturbance  Objective:      /80 (BP Location: Right arm, Patient Position: Sitting, Cuff Size: Large)   Pulse 64   Temp 98 °F (36 7 °C) (Oral)   Ht 5' 3" (1 6 m)   Wt 77 2 kg (170 lb 3 1 oz)   BMI 30 15 kg/m²          Physical Exam   Constitutional: She is oriented to person, place, and time  She appears well-developed and well-nourished  HENT:   Head: Normocephalic and atraumatic  Eyes: Pupils are equal, round, and reactive to light  Conjunctivae are normal    Neck: No thyromegaly present  Cardiovascular: Normal rate, regular rhythm and normal heart sounds  No murmur heard  Pulmonary/Chest: Effort normal and breath sounds normal    Abdominal: Soft  Bowel sounds are normal  There is no tenderness  Musculoskeletal: She exhibits no edema  Neurological: She is alert and oriented to person, place, and time  Skin: Skin is warm  No rash noted  Psychiatric: She has a normal mood and affect  Her behavior is normal  Judgment and thought content normal          PHQ-9 Depression Screening    PHQ-9:    Frequency of the following problems over the past two weeks:       Little interest or pleasure in doing things:  0 - not at all  Feeling down, depressed, or hopeless:  0 - not at all  PHQ-2 Score:  0       BMI Counseling: Body mass index is 30 15 kg/m²  Discussed the patient's BMI with her  The BMI is above average  BMI counseling and education was provided to the patient   Nutrition recommendations include reducing portion sizes, 3-5 servings of fruits/vegetables daily, decreasing soda and/or juice intake, moderation in carbohydrate intake and increasing intake of lean protein  Exercise recommendations include exercising 3-5 times per week

## 2019-09-05 NOTE — PATIENT INSTRUCTIONS
As reviewed today all of her labs are good except for your vitamin-D remains to low  Your level is 20 and needs to be greater than 30  You report you were taking vitamin-D 2000 International Units daily and therefore I have increased this to 4000 units daily  Please continue all of your other supplements on a daily basis status post gastric bypass surgery  Also reviewed you have had a steady weight gain  We reviewed importance of returning to your dietary advice status post your bad past surgery  Additional information provided to you today to assist with resuming healthier diet  Blood pressure and cholesterol are well controlled on current medications no changes  Mammogram due January of 2020  Patient had to colonoscopy is completed the 1st in 2009 the 2nd 3 years later in 2012  It is noted on both exams patient had no polyps she did have some mild diverticular disease and hemorrhoids  Reports do not indicate poor prep nor positive family history so unsure why patient has been scheduled for a 5 year follow-up  Based on exam findings and no family history should be a 10 follow-up for colonoscopy  You are hopeful that may be able to get completed in [facility] and will bring results and report here  Appointment here in 6 months  No labs required prior to next visit  Nutrición después de la cirugía bariátrica   LO QUE NECESITA SABER:   ¿Qué necesito saber acerca de la cirugía bariátrica? · La cirugía bariátrica incluye la jimenes gástrica laparoscópica ajustable, [place]-en-Y, y la gastrectomía en manga  Los Gabriela Connor de nutrición después de la [place] bariátrica son perder peso, absorber nutrientes, obtener suficientes líquidos y evitar problemas de [place]  Los problemas de debby que pueden ocurrir después de la Duwaine Meter y síndrome de dumping   El síndrome de dumping es trino condición que ocurre cuando el alimento se mueve demasiado rápido desde byrd estómago y en el intestino kline  Puede causar náuseas, dolor abdominal y [place]  · Hay 4 etapas de recomendaciones de nutrición que debe seguir después de la cirugía bariátrica  Moss dietista le indicará cuándo puede pasar de trino etapa a otra y cuáles alimentos debe escoger en cada etapa  ¿Cuál es la primera etapa de las recomendaciones de nutrición? En el primer o jorgito día después de la [facility] , solo podrá mallika líquidos wilman  Estos líquidos serán bajos en azúcar o sin azúcar, sin cafeína y no carbonatados  Podrá mallika [employer], bebidas saborizadas sin azúcar, café descafeinado o té  También puede mallika caldo darren, gelatina sin azúcar y paletas de helado sin azúcar  Renee los líquidos muy lentamente para evitar vómitos  No use pajillas  Hermann ayuda a evitar que tome aire extra  ¿Cuál es la segunda etapa? Dos a 3 días después de la [facility] , usted continuará tomando líquidos wilman  También agregará líquidos completos que contengan proteínas  La proteína lo ayuda a curarse después de la [facility]  Seguirá esta etapa nieves [employer]  · Consuma de 48 a 64 onzas de líquido al día  La mitad de los líquidos que bebemos deben ser líquidos wilman y la otra mitad deben ser líquidos completos  Elegir líquidos bajos en azúcar y Vassie Breaker  [address] tenor graso de 1%, leche de soya, batidos de proteínas, yogurt light y pudín sin azúcar  Puede agregar proteína extra a los líquidos mediante la adición de proteína en polvo  · [address] 5 y 6 comidas pequeñas  Cada comida debe estar compuesta por 8 a 10 onzas de líquido  ¿Cuál es la tercera etapa? · Aproximadamente 2 semanas después de la cirugía:      ¨ Coma 4 a 6 comidas que contengan alimentos blandos con proteína  Los alimentos deben ser blandos, húmedos, estar picados, molidos o en puré   Benny tales renetta la carne picada, las aves de colon (akira o [place]) y el pescado, pueden prepararse picadas, en puré y [place]  Para humedecer los alimentos, agregar Ynes Kennedy  También puede comer SANDEFNEGRITARD, queso cottage, queso bajo en [place], yogur y mallika sopas de frijoles  ¨ [place] cerca de 1/4 taza de alimento por cada comida  Al principio, es posible que solo tolere unas cucharadas de alimento en cada comida o merienda  ¨ Renee de 48 a 64 onzas de líquidos wilman por día  No tome líquidos con las comidas  Espere al menos 30 minutos después de comer para mallika líquidos  · En aproximadamente 4 a 6 semanas después de la cirugía:      ¨ Coma 4 a 6 comidas que contengan alimentos blandos con proteína y frutas y verduras blandas  Ahora puede comer verduras gilberto cocidas y frutas peladas o enlatadas  ¨ Coma cerca de 1/2 taza de alimento por cada comida  Coma la proteína marianne  Coma despacio y [employer]  ¨ Renee de 48 a 64 onzas de líquidos wilman por día  No tome líquidos con las comidas  Espere al menos 30 minutos después de comer para mallika líquidos  ¿Cuál es la Patrick Giron? Al cabo de 7 meses después de la [facility] , usted podrá empezar a comer alimentos sólidos  Siga estas pautas de nutrición por el tamika de byrd chucho  · Consuma alimentos saludables y variados  Coma la proteína marianne  Escoja jabari tiernas, akira, pavo, pescados gilberto cocidos, huevos y alimentos de soja  No añada aceite al preparar estos alimentos  Debe incluir frutas, verduras cocinadas (sin semillas, ni piel) y carbohidratos  · [place] de 4 a 6 comidas pequeñas  Weston alrededor de ¾ de taza de alimento por cada comida  Coma despacio y [employer]  · Probar alimentos nuevos poco a poco   Lentamente, añada alimentos con nuevas texturas que puedan ser difíciles de tolerar  Trate de añadir un nuevo alimento cada día para mary qué jones gilberto usted lo tolera  Estos alimentos incluyen jabari fritas o duras, renetta chuletas de carne o cerdo   Evite las frutas fibrosas, en bruto y las verduras renetta apio, Millie Abler y naranjas  · Renee de 48 a 64 onzas de líquidos wilman por día  No tome líquidos con las comidas  Espere de 30 a 60 minutos después de comer para mallika líquidos  ¿Qué pautas de nutrición general farhan seguir después de la cirugía bariátrica? · [address] y suplementos minerales todos los días según las indicaciones  Después de la [place], moss cuerpo no puede absorber las suficientes vitaminas y [address] de los alimentos que usted consume  Hasta 6 meses después de la cirugía se pueden recomendar suplementos masticables o líquidos  Los suplementos que necesita dependen del tipo de [place] bariátrica a la que se sometió  Moss médico le dirá qué suplementos necesita y cómo debe tomarlos  Posiblemente deba mallika suplementos por el tamika de moss chucho  · [address] se le haya indicado  [place] le ayudará a evitar la deshidratación  Es conveniente mallika 1 taza de líquido poco a poco nieves trino hora para ingerir suficientes líquidos  No tome líquidos dentro de los 30 minutos previos a moss próxima comida  No tome bebidas carbonatadas (gaseosas), o que tengan cafeína o alcohol  Pregúntele a moss dietista o médico cuándo puede mallika esas bebidas de nuevo  · Coma en horarios programados  [place] le ayudará a evitar comer nieves todo el día y comer más de Denis Benitez  Coma despacio y [employer]  Tómese por lo menos 20 minutos hasta terminar moss comida  · Evite los alimentos altos en grasa y los dulces  Los [employer] incluyen:azúcar, miel, [place], [place], tortas, caramelos y [place]  Los alimentos altos en grasa son:los alimentos fritos, el tocino, [address] o jabari con alto contenido de April rosas [place], Sylvia, AbhijitFreeman Heart Institutequeta regular y crema semi-agria  Otros alimentos altos en grasa y [place], el yamileth, las medina, Reji y Miguel regino  Estos alimentos pueden causar síndrome de vaciamiento rápido   John Bowman y los alimentos altos en grasa también son bajos en nutrientes y altos en calorías  Le pueden hacer recuperar el peso que ha perdido  · Concurra a las visitas de seguimiento con [address] [facility], según lo indicado  Agrawal dietista puede ayudarlo a seguir las pautas de nutrición después de la [place]  Agrawal dietista también puede ayudarlo a elegir alimentos saludables para mantener agrawal peso a lo sameer del [place]  ACUERDOS SOBRE AGRAWAL CUIDADO:   Usted tiene el derecho de ayudar a planear agrawal cuidado  Discuta leia opciones de tratamiento con leia médicos para decidir el cuidado que usted desea recibir  Usted siempre tiene el derecho de rechazar el tratamiento  Esta información es sólo para uso en educación  Agrawal intención no es darle un consejo médico sobre enfermedades o tratamientos  Colsulte con agrawal Cherylyn Plough farmacéutico antes de seguir cualquier régimen médico para saber si es seguro y efectivo para usted  © 2017 [address] Information is for End User's use only and may not be sold, redistributed or otherwise used for commercial purposes  All illustrations and images included in CareNotes® are the copyrighted property of [employer]  or Álvaro Mccallum

## 2019-12-02 DIAGNOSIS — I10 ESSENTIAL HYPERTENSION: ICD-10-CM

## 2019-12-02 DIAGNOSIS — E78.2 MIXED HYPERLIPIDEMIA: ICD-10-CM

## 2019-12-02 DIAGNOSIS — E55.9 VITAMIN D DEFICIENCY: ICD-10-CM

## 2019-12-02 RX ORDER — CHOLECALCIFEROL (VITAMIN D3) 50 MCG
4000 TABLET ORAL DAILY
Qty: 180 TABLET | Refills: 1 | Status: CANCELLED | OUTPATIENT
Start: 2019-12-02 | End: 2020-05-30

## 2019-12-02 RX ORDER — ATORVASTATIN CALCIUM 20 MG/1
20 TABLET, FILM COATED ORAL DAILY
Qty: 90 TABLET | Refills: 1 | Status: CANCELLED | OUTPATIENT
Start: 2019-12-02 | End: 2020-05-30

## 2019-12-02 RX ORDER — METOPROLOL SUCCINATE 25 MG/1
25 TABLET, EXTENDED RELEASE ORAL DAILY
Qty: 90 TABLET | Refills: 1 | Status: CANCELLED | OUTPATIENT
Start: 2019-12-02 | End: 2020-05-30

## 2020-01-31 ENCOUNTER — TRANSCRIBE ORDERS (OUTPATIENT)
Dept: ADMINISTRATIVE | Facility: HOSPITAL | Age: 60
End: 2020-01-31

## 2020-01-31 DIAGNOSIS — Z12.31 ENCOUNTER FOR SCREENING MAMMOGRAM FOR MALIGNANT NEOPLASM OF BREAST: Primary | ICD-10-CM

## 2020-02-27 DIAGNOSIS — E78.2 MIXED HYPERLIPIDEMIA: ICD-10-CM

## 2020-02-27 RX ORDER — ATORVASTATIN CALCIUM 20 MG/1
TABLET, FILM COATED ORAL
Qty: 90 TABLET | Refills: 1 | Status: SHIPPED | OUTPATIENT
Start: 2020-02-27 | End: 2020-08-07 | Stop reason: SDUPTHER

## 2020-03-07 DIAGNOSIS — I10 ESSENTIAL HYPERTENSION: ICD-10-CM

## 2020-03-07 DIAGNOSIS — E55.9 VITAMIN D DEFICIENCY: ICD-10-CM

## 2020-03-07 DIAGNOSIS — Z98.84 BARIATRIC SURGERY STATUS: ICD-10-CM

## 2020-03-09 ENCOUNTER — TELEPHONE (OUTPATIENT)
Dept: INTERNAL MEDICINE CLINIC | Facility: CLINIC | Age: 60
End: 2020-03-09

## 2020-03-09 RX ORDER — OMEPRAZOLE 20 MG/1
CAPSULE, DELAYED RELEASE ORAL
Qty: 30 CAPSULE | Refills: 0 | Status: SHIPPED | OUTPATIENT
Start: 2020-03-09 | End: 2020-04-13 | Stop reason: SDUPTHER

## 2020-03-09 RX ORDER — METOPROLOL SUCCINATE 25 MG/1
TABLET, EXTENDED RELEASE ORAL
Qty: 90 TABLET | Refills: 0 | Status: SHIPPED | OUTPATIENT
Start: 2020-03-09 | End: 2020-08-14 | Stop reason: SDUPTHER

## 2020-03-09 RX ORDER — CHOLECALCIFEROL (VITAMIN D3) 125 MCG
CAPSULE ORAL
Qty: 180 TABLET | Refills: 0 | Status: SHIPPED | OUTPATIENT
Start: 2020-03-09 | End: 2020-08-07 | Stop reason: SDUPTHER

## 2020-03-11 ENCOUNTER — OFFICE VISIT (OUTPATIENT)
Dept: INTERNAL MEDICINE CLINIC | Facility: CLINIC | Age: 60
End: 2020-03-11

## 2020-03-11 VITALS
OXYGEN SATURATION: 98 % | DIASTOLIC BLOOD PRESSURE: 80 MMHG | BODY MASS INDEX: 30.23 KG/M2 | WEIGHT: 170.64 LBS | HEART RATE: 70 BPM | HEIGHT: 63 IN | SYSTOLIC BLOOD PRESSURE: 148 MMHG | TEMPERATURE: 97.5 F

## 2020-03-11 DIAGNOSIS — Z23 NEED FOR INFLUENZA VACCINATION: ICD-10-CM

## 2020-03-11 DIAGNOSIS — Z90.3 POSTGASTRECTOMY MALABSORPTION: ICD-10-CM

## 2020-03-11 DIAGNOSIS — I10 ESSENTIAL HYPERTENSION: Chronic | ICD-10-CM

## 2020-03-11 DIAGNOSIS — Z23 NEED FOR TDAP VACCINATION: ICD-10-CM

## 2020-03-11 DIAGNOSIS — K91.2 POSTGASTRECTOMY MALABSORPTION: ICD-10-CM

## 2020-03-11 DIAGNOSIS — Z12.4 CERVICAL CANCER SCREENING: ICD-10-CM

## 2020-03-11 DIAGNOSIS — Z91.89 NEED FOR DENTAL CARE: ICD-10-CM

## 2020-03-11 DIAGNOSIS — Z00.00 ANNUAL PHYSICAL EXAM: Primary | ICD-10-CM

## 2020-03-11 DIAGNOSIS — K92.9 GASTRIC ANASTOMOTIC STRICTURE: ICD-10-CM

## 2020-03-11 DIAGNOSIS — K31.89 GASTRIC ANASTOMOTIC STRICTURE: ICD-10-CM

## 2020-03-11 DIAGNOSIS — E78.2 MIXED HYPERLIPIDEMIA: ICD-10-CM

## 2020-03-11 PROBLEM — H91.92 DECREASED HEARING, LEFT: Status: RESOLVED | Noted: 2018-07-02 | Resolved: 2020-03-11

## 2020-03-11 PROCEDURE — 90472 IMMUNIZATION ADMIN EACH ADD: CPT | Performed by: PHYSICIAN ASSISTANT

## 2020-03-11 PROCEDURE — 90682 RIV4 VACC RECOMBINANT DNA IM: CPT | Performed by: PHYSICIAN ASSISTANT

## 2020-03-11 PROCEDURE — 90471 IMMUNIZATION ADMIN: CPT | Performed by: PHYSICIAN ASSISTANT

## 2020-03-11 PROCEDURE — 90715 TDAP VACCINE 7 YRS/> IM: CPT | Performed by: PHYSICIAN ASSISTANT

## 2020-03-11 PROCEDURE — 99396 PREV VISIT EST AGE 40-64: CPT | Performed by: PHYSICIAN ASSISTANT

## 2020-03-11 NOTE — PROGRESS NOTES
[facility]    NAME: August Luna  AGE: 60 y o  SEX: female  : 1960     DATE: 3/11/2020     Assessment and Plan: We completed your annual checkup today  Immunizations updated today  Referral for gyn for Pap smear provided today  Script to call and schedule your mammogram also provided today  Information to schedule with [facility] dental office also provided today  Regarding your issue status post her gastric bypass unfortunately we cannot refer you back to the weight management program because you do not currently have insurance but we can refer you to the gastroenterologist for further discussion  As reviewed at last visit with Dr Christoph Rubio in 2018 when you had medical insurance the discussion was possible revision secondary to gastric anastomosis and stricture that was not amenable to balloon dilation in the past   And the meantime we can at least get you further evaluation with our Gastroenterology Department to see if there are other recommendations  Please continue your supplements on a daily basis  No change to your medications today  Script provided for labs as dated in 6 months then an appointment here to review    Problem List Items Addressed This Visit        Digestive    Gastric anastomotic stricture    Relevant Orders    Ambulatory referral to Gastroenterology    Postgastrectomy malabsorption    Relevant Orders    Ambulatory referral to Gastroenterology       Cardiovascular and Mediastinum    Essential hypertension (Chronic)    Relevant Orders    Comprehensive metabolic panel    Lipid Panel with Direct LDL reflex       Other    Hyperlipidemia    Relevant Orders    Lipid Panel with Direct LDL reflex      Other Visit Diagnoses     Annual physical exam    -  Primary    Need for influenza vaccination        Relevant Orders    influenza vaccine, 3344-0229, quadrivalent, recombinant, PF, 0 5 mL, for patients 18 yr+ (FLUBLOK) (Completed)    Need for Tdap vaccination        Relevant Orders    TDAP VACCINE GREATER THAN OR EQUAL TO 6YO IM (Completed)    Cervical cancer screening        Relevant Orders    Ambulatory referral to Obstetrics / Gynecology    Need for dental care        Relevant Orders    Ambulatory referral to Dentistry          Immunizations and preventive care screenings were discussed with patient today  Appropriate education was printed on patient's after visit summary  Counseling:  Alcohol/drug use: discussed moderation in alcohol intake, the recommendations for healthy alcohol use, and avoidance of illicit drug use  Dental Health: discussed importance of regular tooth brushing, flossing, and dental visits  · Exercise: the importance of regular exercise/physical activity was discussed  Recommend exercise 3-5 times per week for at least 30 minutes  Return in about 6 months (around 9/18/2020)  Chief Complaint:   Still having abdominal pain  Chief Complaint   Patient presents with    6 months follow up      History of Present Illness:     Adult Annual Physical   Patient here for a comprehensive physical exam  The patient reports problems - still having stomach pain  As noted previously patient had gastric bypass in 2014 and subsequently had complications  Patient had previously been under evaluation by bariatric program and underwent multiple EGDs with noted gastric anastomotic strictures  Patient reports she had dilation procedures but was not effective in alleviating her symptoms  At last visit 2018 discussion was being had regarding getting her set up for surgical revision however since that time patient lost her medical insurance and has not been able to follow-up  Patient continues to report she is not eating the way she is supposed to be after the bypass because almost anything she eats protein wise causes her to have nausea and vomiting    Patient states sometimes she can have salads but more frequently eating softer foods like plan tail ends but does admit she has been avoiding rice  Advised patient because she is without insurance I cannot get her in with the weight management program but we can get her in with the gastroenterologist to would also be able to do follow-up evaluation of her symptoms and move forward with a plan based on that  Patient agreeable to this plan  Patient confirms taking her multivitamins, as well as her blood pressure and cholesterol medications  Diet and Physical Activity  · Diet/Nutrition: poor diet  · Exercise: walking, 3-4 times a week on average and 30-60 minutes on average  Depression Screening  PHQ-9 Depression Screening    PHQ-9:    Frequency of the following problems over the past two weeks:       Little interest or pleasure in doing things:  0 - not at all  Feeling down, depressed, or hopeless:  0 - not at all  PHQ-2 Score:  0       General Health  · Sleep: sleeps poorly and upstairs neighboors very loud, already spoke with landloard  · Hearing: normal - bilateral   · Vision: goes for regular eye exams, most recent eye exam <1 year ago, wears glasses and reading only  · Dental: no dental visits for >1 year, brushes teeth twice daily and last exam [place] >1 year ago  /GYN Health  · Patient is: postmenopausal  · Last menstrual period: *years ago, states had surgery to remove both ovaries in [place] but has uterus states due to cysts  · Contraceptive method: None  Review of Systems:     Review of Systems   Constitutional: Positive for appetite change  HENT: Negative  Eyes: Negative  Respiratory: Negative  Negative for cough and shortness of breath  Cardiovascular: Negative  Negative for chest pain, palpitations and leg swelling  Gastrointestinal: Positive for abdominal distention, abdominal pain, nausea and vomiting  Negative for constipation  Endocrine: Negative    Negative for polydipsia, polyphagia and polyuria  Musculoskeletal: Negative  Skin: Negative  Neurological: Negative  Psychiatric/Behavioral: Negative  Past Medical History:     Past Medical History:   Diagnosis Date    Anastomotic stricture of gastrojejunostomy     s/p gastric bypass    Anemia     iron deficiency resolved 11/14/14    Anxiety     resolved 6/7/17    Degenerative joint disease of ankle and foot, unspecified laterality     last assessed 5/17/13    Depression     last assessed 11/7/12    [facility]     type 2 uncomplicated, controlled - last assessed 1/31/14    Dysphagia     Gastric ulcer     History of melena     Hyperlipidemia     Hypertension     Insomnia     Insomnia     Obesity     resolved 11/14/14    Postgastrectomy malabsorption     Tachycardia     Ventricular pre-excitation syndrome     Vitamin D deficiency       Past Surgical History:     Past Surgical History:   Procedure Laterality Date    CARDIAC CATHETERIZATION      post proc data:____ lesions successfully dilated    ESOPHAGOGASTRODUODENOSCOPY      GASTRIC BYPASS  02/08/2011    for morbid obesity     HYSTERECTOMY      @ age 38    OOPHORECTOMY Bilateral     @ age 38    ME EGD TRANSORAL BIOPSY SINGLE/MULTIPLE N/A 1/25/2017    Procedure: ESOPHAGOGASTRODUODENOSCOPY (EGD); Surgeon: Yolanda Granger MD;  [facility]; Service: Bariatrics    ME EGD TRANSORAL BIOPSY SINGLE/MULTIPLE N/A 5/3/2017    Procedure: ESOPHAGOGASTRODUODENOSCOPY (EGD) with balloon dilatation;  Surgeon: Yolanda Granger MD;  Location: [facility]; Service: Bariatrics    ME EGD TRANSORAL BIOPSY SINGLE/MULTIPLE N/A 8/16/2017    Procedure: ESOPHAGOGASTRODUODENOSCOPY (EGD) with balloon dilation;  Surgeon: Yolanda Granger MD;  Location: [facility];   Service: Bariatrics      Social History:     E-Cigarette/Vaping    E-Cigarette Use Never User      E-Cigarette/Vaping Substances    Nicotine No     THC No     CBD No     Flavoring No     Other No     Unknown No      Social History     Socioeconomic History    Marital status: /Civil Union     Spouse name: None    Number of children: None    Years of education: None    Highest education level: None   Occupational History    None   Social Needs    Financial resource strain: Not hard at all   Merlyn-uromovie insecurity:     Worry: Never true     Inability: Never true   Project Repat needs:     Medical: No     Non-medical: No   Tobacco Use    Smoking status: Never Smoker    Smokeless tobacco: Never Used   Substance and Sexual Activity    Alcohol use: Never     Frequency: Never     Binge frequency: Never    Drug use: Never    Sexual activity: Not Currently   Lifestyle    Physical activity:     Days per week: 0 days     Minutes per session: 0 min    Stress: Not at all   Relationships    Social connections:     Talks on phone: None     Gets together: None     Attends Buddhist service: None     Active member of club or organization: None     Attends meetings of clubs or organizations: None     Relationship status: None    Intimate partner violence:     Fear of current or ex partner: None     Emotionally abused: None     Physically abused: None     Forced sexual activity: None   Other Topics Concern    None   Social History Narrative    None      Family History:     Family History   Problem Relation Age of Onset    Congenital heart disease Mother     Asthma Father     Heart attack Sister         acute MI    Congenital heart disease Sister     Hypertension Sister     Heart attack Brother         acute MI    Congenital heart disease Brother     Hypertension Brother     Stroke Brother         syndrome    Other Brother         sudden/instantaneous cardiac death    Arthritis Family     Coronary artery disease Family     Diabetes Family         mellitus    Cancer Family     Breast cancer Cousin         ? age   [facility] Breast cancer Paternal Grandmother 80      Current Medications:     Current Outpatient Medications   Medication Sig Dispense Refill    atorvastatin (LIPITOR) 20 mg tablet Take 1 tablet by mouth once daily 90 tablet 1    b complex vitamins tablet Take 1 tablet by mouth daily      calcium gluconate 500 mg tablet Take 500 mg by mouth daily      Cholecalciferol (VITAMIN D3) 50 MCG (2000 UT) TABS Take 2 tablets by mouth once daily 180 tablet 0    IRON, FERROUS GLUCONATE, PO Take 1 tablet by mouth daily      metoprolol succinate (TOPROL-XL) 25 mg 24 hr tablet Take 1 tablet by mouth once daily 90 tablet 0    Omega-3 Fatty Acids (FISH OIL) 1,000 mg Take 1,000 mg by mouth 2 (two) times a day      omeprazole (PriLOSEC) 20 mg delayed release capsule Take 1 capsule by mouth once daily 30 capsule 0    sucralfate (CARAFATE) 1 g tablet Take 1 g by mouth 4 (four) times a day      VITAMIN E PO Take 1 tablet by mouth daily       No current facility-administered medications for this visit  Allergies:     No Known Allergies   Physical Exam:     /80 (BP Location: Right arm, Patient Position: Sitting, Cuff Size: Large)   Pulse 70   Temp 97 5 °F (36 4 °C) (Oral)   Ht 5' 3" (1 6 m)   Wt 77 4 kg (170 lb 10 2 oz)   SpO2 98%   BMI 30 23 kg/m²     Physical Exam   Constitutional: She appears well-nourished  No distress  HENT:   Head: Normocephalic and atraumatic  Eyes: Pupils are equal, round, and reactive to light  Conjunctivae are normal    Neck: Normal range of motion  No JVD present  Cardiovascular: Normal rate, regular rhythm and normal heart sounds  Pulmonary/Chest: Effort normal and breath sounds normal    Abdominal: Soft  Bowel sounds are normal  There is tenderness  Musculoskeletal: Normal range of motion  She exhibits no edema  Neurological: She is alert  Skin: Skin is warm  No rash noted  Psychiatric: She has a normal mood and affect  Her behavior is normal  Thought content normal    Nursing note and vitals reviewed        Alba Douglas ANU [address]

## 2020-03-11 NOTE — PATIENT INSTRUCTIONS
We completed your annual checkup today  Immunizations updated today  Referral for gyn for Pap smear provided today  Script to call and schedule your mammogram also provided today  Information to schedule with [facility] dental office also provided today  Regarding your issue status post her gastric bypass unfortunately we cannot refer you back to the weight management program because you do not currently have insurance but we can refer you to the gastroenterologist for further discussion  As reviewed at last visit with Dr Ry Sena in 2018 when you had medical insurance the discussion was possible revision secondary to gastric anastomosis and stricture that was not amenable to balloon dilation in the past   And the meantime we can at least get you further evaluation with our Gastroenterology Department to see if there are other recommendations  Please continue your supplements on a daily basis  No change to your medications today  Script provided for labs as dated in 6 months then an appointment here to review  Wellness Visit for Adults   AMBULATORY CARE:   A wellness visit  is when you see your healthcare provider to get screened for health problems  You can also get advice on how to stay healthy  Write down your questions so you remember to ask them  Ask your healthcare provider how often you should have a wellness visit  What happens at a wellness visit:  Your healthcare provider will ask about your health, and your family history of health problems  This includes high blood pressure, heart disease, and cancer  He or she will ask if you have symptoms that concern you, if you smoke, and about your mood  You may also be asked about your intake of medicines, supplements, food, and alcohol  Any of the following may be done:  · Your weight  will be checked  Your height may also be checked so your body mass index (BMI) can be calculated  Your BMI shows if you are at a healthy weight       · Your blood pressure  and heart rate will be checked  Your temperature may also be checked  · Blood and urine tests  may be done  Blood tests may be done to check your cholesterol levels  Abnormal cholesterol levels increase your risk for heart disease and stroke  You may also need a blood or urine test to check for diabetes if you are at increased risk  Urine tests may be done to look for signs of an infection or kidney disease  · A physical exam  includes checking your heartbeat and lungs with a stethoscope  Your healthcare provider may also check your skin to look for sun damage  · Screening tests  may be recommended  A screening test is done to check for diseases that may not cause symptoms  The screening tests you may need depend on your age, gender, family history, and lifestyle habits  For example, colorectal screening may be recommended if you are 48years old or older  Screening tests you need if you are a woman:   · A Pap smear  is used to screen for cervical cancer  Pap smears are usually done every 3 to 5 years depending on your age  You may need them more often if you have had abnormal Pap smear test results in the past  Ask your healthcare provider how often you should have a Pap smear  · A mammogram  is an x-ray of your breasts to screen for breast cancer  Experts recommend mammograms every 2 years starting at age 48 years  You may need a mammogram at age 52 years or younger if you have an increased risk for breast cancer  Talk to your healthcare provider about when you should start having mammograms and how often you need them  Vaccines you may need:   · Get an influenza vaccine  every year  The influenza vaccine protects you from the flu  Several types of viruses cause the flu  The viruses change over time, so new vaccines are made each year  · Get a tetanus-diphtheria (Td) booster vaccine  every 10 years  This vaccine protects you against tetanus and diphtheria   Tetanus is a severe infection that may cause painful muscle spasms and lockjaw  Diphtheria is a severe bacterial infection that causes a thick covering in the back of your mouth and throat  · Get a human papillomavirus (HPV) vaccine  if you are female and aged 23 to 32 or male 23 to 24 and never received it  This vaccine protects you from HPV infection  HPV is the most common infection spread by sexual contact  HPV may also cause vaginal, penile, and anal cancers  · Get a pneumococcal vaccine  if you are aged 72 years or older  The pneumococcal vaccine is an injection given to protect you from pneumococcal disease  Pneumococcal disease is an infection caused by pneumococcal bacteria  The infection may cause pneumonia, meningitis, or an ear infection  · Get a shingles vaccine  if you are aged 61 or older, even if you have had shingles before  The shingles vaccine is an injection to protect you from the varicella-zoster virus  This is the same virus that causes chickenpox  Shingles is a painful rash that develops in people who had chickenpox or have been exposed to the virus  How to eat healthy:  My Plate is a model for planning healthy meals  It shows the types and amounts of foods that should go on your plate  Fruits and vegetables make up about half of your plate, and grains and protein make up the other half  A serving of dairy is included on the side of your plate  The amount of calories and serving sizes you need depends on your age, gender, weight, and height  Examples of healthy foods are listed below:  · Eat a variety of vegetables  such as dark green, red, and orange vegetables  You can also include canned vegetables low in sodium (salt) and frozen vegetables without added butter or sauces  · Eat a variety of fresh fruits , canned fruit in 100% juice, frozen fruit, and dried fruit  · Include whole grains  At least half of the grains you eat should be whole grains   Examples include whole-wheat bread, wheat pasta, brown rice, and whole-grain cereals such as oatmeal     · Eat a variety of protein foods such as seafood (fish and shellfish), lean meat, and poultry without skin (turkey and chicken)  Examples of lean meats include pork leg, shoulder, or tenderloin, and beef round, sirloin, tenderloin, and extra lean ground beef  Other protein foods include eggs and egg substitutes, beans, peas, soy products, nuts, and seeds  · Choose low-fat dairy products such as skim or 1% milk or low-fat yogurt, cheese, and cottage cheese  · Limit unhealthy fats  such as butter, hard margarine, and shortening  Exercise:  Exercise at least 30 minutes per day on most days of the week  Some examples of exercise include walking, biking, dancing, and swimming  You can also fit in more physical activity by taking the stairs instead of the elevator or parking farther away from stores  Include muscle strengthening activities 2 days each week  Regular exercise provides many health benefits  It helps you manage your weight, and decreases your risk for type 2 diabetes, heart disease, stroke, and high blood pressure  Exercise can also help improve your mood  Ask your healthcare provider about the best exercise plan for you  General health and safety guidelines:   · Do not smoke  Nicotine and other chemicals in cigarettes and cigars can cause lung damage  Ask your healthcare provider for information if you currently smoke and need help to quit  E-cigarettes or smokeless tobacco still contain nicotine  Talk to your healthcare provider before you use these products  · Limit alcohol  A drink of alcohol is 12 ounces of beer, 5 ounces of wine, or 1½ ounces of liquor  · Lose weight, if needed  Being overweight increases your risk of certain health conditions  These include heart disease, high blood pressure, type 2 diabetes, and certain types of cancer  · Protect your skin  Do not sunbathe or use tanning beds   Use sunscreen with a SPF 13 or higher  Apply sunscreen at least 15 minutes before you go outside  Reapply sunscreen every 2 hours  Wear protective clothing, hats, and sunglasses when you are outside  · Drive safely  Always wear your seatbelt  Make sure everyone in your car wears a seatbelt  A seatbelt can save your life if you are in an accident  Do not use your cell phone when you are driving  This could distract you and cause an accident  Pull over if you need to make a call or send a text message  · Practice safe sex  Use latex condoms if are sexually active and have more than one partner  Your healthcare provider may recommend screening tests for sexually transmitted infections (STIs)  · Wear helmets, lifejackets, and protective gear  Always wear a helmet when you ride a bike or motorcycle, go skiing, or play sports that could cause a head injury  Wear protective equipment when you play sports  Wear a lifejacket when you are on a boat or doing water sports  © 2017 [facility] Information is for End User's use only and may not be sold, redistributed or otherwise used for commercial purposes  All illustrations and images included in CareNotes® are the copyrighted property of A D A M , Inc  or Álvaro Mccallum  The above information is an  only  It is not intended as medical advice for individual conditions or treatments  Talk to your doctor, nurse or pharmacist before following any medical regimen to see if it is safe and effective for you

## 2020-03-18 NOTE — PROGRESS NOTES
Maeve Hill is a 63 y o  female who presents for annual well woman exam     GYN:  · Fernanda Brito is s/p a hysterectomy w/ BSO at age 38  After discussion, she is unsure if she had a hysterectomy, total hysterectomy or a supracervical hysterectomy  · Denies vaginal discharge, labial erythema or lesions, dyspareunia  · Menarche at 11  · Contraception: none  · Patient is sexually active with 1, her   · She has not history of STDs or abnormal pap tests    OB:  ·  female  · She adopted 2 children with her   :  · Denies dysuria, urinary frequency or urgency  · Denies hematuria, flank pain, incontinence  Breast:  · Denies breast mass, skin changes, dimpling, reddening, nipple retraction  · denies breast discharge  · Patient does not have a family history of breast, endometrial, or ovarian ca  General:  · Diet: very little, she is s/p gastrectomy and has difficulty eating and keeping meals down  · Exercise: minimal  · Work: no  · ETOH use: no  · Tobacco use: no  · Recreational drug use: no    Screening:  · Cervical cancer: last pap smear is unknown  · Breast cancer: last mammogram in 2019  Results were negative  · STD screening: declined      Past Medical History:   Diagnosis Date    Anastomotic stricture of gastrojejunostomy     s/p gastric bypass    Anemia     iron deficiency resolved 14    Anxiety     resolved 17    Degenerative joint disease of ankle and foot, unspecified laterality     last assessed 13    Depression     last assessed 12    Diabetes mellitus ([facility] [address] )     type 2 uncomplicated, controlled - last assessed 14    Dysphagia     Gastric ulcer     History of melena     Hyperlipidemia     Hypertension     Insomnia     Insomnia     Obesity     resolved 14    Postgastrectomy malabsorption     Tachycardia     Ventricular pre-excitation syndrome     Vitamin D deficiency      Past Surgical History: Procedure Laterality Date    CARDIAC CATHETERIZATION      post proc data:____ lesions successfully dilated    ESOPHAGOGASTRODUODENOSCOPY      GASTRIC BYPASS  02/08/2011    for morbid obesity     HYSTERECTOMY      @ age 38    OOPHORECTOMY Bilateral     @ age 38    NC EGD TRANSORAL BIOPSY SINGLE/MULTIPLE N/A 1/25/2017    Procedure: ESOPHAGOGASTRODUODENOSCOPY (EGD); Surgeon: Colby Fuentes MD;  [facility]: [facility]; Service: Bariatrics    NC EGD TRANSORAL BIOPSY SINGLE/MULTIPLE N/A 5/3/2017    Procedure: ESOPHAGOGASTRODUODENOSCOPY (EGD) with balloon dilatation;  Surgeon: Colby Fuentes MD;  [facility]; Service: Bariatrics    NC EGD TRANSORAL BIOPSY SINGLE/MULTIPLE N/A 8/16/2017    Procedure: ESOPHAGOGASTRODUODENOSCOPY (EGD) with balloon dilation;  Surgeon: Colby Fuentes MD;  [facility]; Service: Bariatrics     Objective      BP (!) 161/101 (BP Location: Left arm)   Pulse 77   Ht 5' 5" (1 651 m)   Wt 78 4 kg (172 lb 12 8 oz)   BMI 28 76 kg/m²   Physical Exam   Constitutional: She is oriented to person, place, and time  She appears well-developed and well-nourished  No distress  Cardiovascular: Normal rate, regular rhythm and normal heart sounds  Exam reveals no gallop and no friction rub  No murmur heard  Pulmonary/Chest: Effort normal and breath sounds normal  No stridor  No respiratory distress  She has no wheezes  She exhibits no tenderness  Right breast exhibits no inverted nipple, no mass and no skin change  Left breast exhibits no inverted nipple, no mass and no skin change  No breast swelling, tenderness, discharge or bleeding  Breasts are symmetrical    Abdominal: Soft  She exhibits no distension  There is no tenderness  Genitourinary: Vagina normal  Rectal exam shows external hemorrhoid  No breast swelling, tenderness, discharge or bleeding  There is no rash, tenderness or lesion on the right labia  There is no rash, tenderness or lesion on the left labia   Cervix exhibits no motion tenderness, no discharge and no friability  Right adnexum displays no mass, no tenderness and no fullness  Left adnexum displays no mass, no tenderness and no fullness  No erythema, tenderness or bleeding in the vagina  No vaginal discharge found  Genitourinary Comments: Small nulliparous, postmenopausal cervix  Minimal vaginal rugae  Unable to palpate uterus or ovaries    Neurological: She is alert and oriented to person, place, and time  Skin: Skin is warm and dry  She is not diaphoretic  Psychiatric: She has a normal mood and affect  Her behavior is normal                Assessment/Plan  Problem List Items Addressed This Visit        Unprioritized    Women's annual routine gynecological examination - Primary     Patient found to have cervix  Uterus was not palpated  Patient will be sent for pelvic ultrasound to confirm present anatomy  Pap smear completed today  Mammogram scheduled for April  Elevated blood pressure on exam today  Patient will follow up with her PCP  She took her BP medication this morning at 0800  RTC in 1 year unless needed sooner  Relevant Orders    Liquid-based pap, screening    US pelvis complete non OB      Other Visit Diagnoses     Cervical cancer screening        Relevant Orders    Liquid-based pap, screening          Chelo Choe MD  OB/GYN  3/19/2020  10:17 AM

## 2020-03-19 ENCOUNTER — OFFICE VISIT (OUTPATIENT)
Dept: OBGYN CLINIC | Facility: CLINIC | Age: 60
End: 2020-03-19

## 2020-03-19 VITALS
WEIGHT: 172.8 LBS | SYSTOLIC BLOOD PRESSURE: 161 MMHG | BODY MASS INDEX: 28.79 KG/M2 | HEART RATE: 77 BPM | HEIGHT: 65 IN | DIASTOLIC BLOOD PRESSURE: 101 MMHG

## 2020-03-19 DIAGNOSIS — Z12.4 CERVICAL CANCER SCREENING: ICD-10-CM

## 2020-03-19 DIAGNOSIS — Z01.419 WOMEN'S ANNUAL ROUTINE GYNECOLOGICAL EXAMINATION: Primary | ICD-10-CM

## 2020-03-19 PROCEDURE — 1036F TOBACCO NON-USER: CPT | Performed by: OBSTETRICS & GYNECOLOGY

## 2020-03-19 PROCEDURE — 3077F SYST BP >= 140 MM HG: CPT | Performed by: OBSTETRICS & GYNECOLOGY

## 2020-03-19 PROCEDURE — 3080F DIAST BP >= 90 MM HG: CPT | Performed by: OBSTETRICS & GYNECOLOGY

## 2020-03-19 PROCEDURE — 3008F BODY MASS INDEX DOCD: CPT | Performed by: OBSTETRICS & GYNECOLOGY

## 2020-03-19 PROCEDURE — G0145 SCR C/V CYTO,THINLAYER,RESCR: HCPCS | Performed by: OBSTETRICS & GYNECOLOGY

## 2020-03-19 PROCEDURE — 87624 HPV HI-RISK TYP POOLED RSLT: CPT | Performed by: OBSTETRICS & GYNECOLOGY

## 2020-03-19 PROCEDURE — 99386 PREV VISIT NEW AGE 40-64: CPT | Performed by: OBSTETRICS & GYNECOLOGY

## 2020-03-19 NOTE — ASSESSMENT & PLAN NOTE
Patient found to have cervix  Uterus was not palpated  Patient will be sent for pelvic ultrasound to confirm present anatomy  Pap smear completed today  Mammogram scheduled for April  Elevated blood pressure on exam today  Patient will follow up with her PCP  She took her BP medication this morning at 0800  RTC in 1 year unless needed sooner

## 2020-03-20 LAB
HPV HR 12 DNA CVX QL NAA+PROBE: NEGATIVE
HPV16 DNA CVX QL NAA+PROBE: NEGATIVE
HPV18 DNA CVX QL NAA+PROBE: NEGATIVE

## 2020-03-24 LAB
LAB AP GYN PRIMARY INTERPRETATION: NORMAL
Lab: NORMAL

## 2020-03-26 ENCOUNTER — TELEPHONE (OUTPATIENT)
Dept: OBGYN CLINIC | Facility: CLINIC | Age: 60
End: 2020-03-26

## 2020-03-26 NOTE — TELEPHONE ENCOUNTER
Patient notified of negative pap  She was instructed that this was the last PAP she would need but that she should still continue with her annual gyn check ups,

## 2020-03-28 DIAGNOSIS — I10 ESSENTIAL HYPERTENSION: ICD-10-CM

## 2020-03-30 RX ORDER — METOPROLOL SUCCINATE 25 MG/1
TABLET, EXTENDED RELEASE ORAL
Qty: 90 TABLET | Refills: 0 | OUTPATIENT
Start: 2020-03-30

## 2020-04-06 DIAGNOSIS — I10 ESSENTIAL HYPERTENSION: ICD-10-CM

## 2020-04-06 RX ORDER — METOPROLOL SUCCINATE 25 MG/1
TABLET, EXTENDED RELEASE ORAL
Qty: 90 TABLET | Refills: 0 | OUTPATIENT
Start: 2020-04-06

## 2020-04-07 DIAGNOSIS — I10 ESSENTIAL HYPERTENSION: ICD-10-CM

## 2020-04-07 RX ORDER — METOPROLOL SUCCINATE 25 MG/1
TABLET, EXTENDED RELEASE ORAL
Qty: 90 TABLET | Refills: 0 | OUTPATIENT
Start: 2020-04-07

## 2020-04-08 DIAGNOSIS — I10 ESSENTIAL HYPERTENSION: ICD-10-CM

## 2020-04-08 RX ORDER — METOPROLOL SUCCINATE 25 MG/1
TABLET, EXTENDED RELEASE ORAL
Qty: 90 TABLET | Refills: 0 | OUTPATIENT
Start: 2020-04-08

## 2020-04-11 DIAGNOSIS — E55.9 VITAMIN D DEFICIENCY: ICD-10-CM

## 2020-04-11 DIAGNOSIS — Z98.84 BARIATRIC SURGERY STATUS: ICD-10-CM

## 2020-04-11 DIAGNOSIS — I10 ESSENTIAL HYPERTENSION: ICD-10-CM

## 2020-04-13 RX ORDER — OMEPRAZOLE 20 MG/1
20 CAPSULE, DELAYED RELEASE ORAL DAILY
Qty: 90 CAPSULE | Refills: 0 | Status: SHIPPED | OUTPATIENT
Start: 2020-04-13 | End: 2020-08-07 | Stop reason: SDUPTHER

## 2020-04-13 RX ORDER — METOPROLOL SUCCINATE 25 MG/1
TABLET, EXTENDED RELEASE ORAL
Qty: 90 TABLET | Refills: 0 | OUTPATIENT
Start: 2020-04-13

## 2020-04-13 RX ORDER — CHOLECALCIFEROL (VITAMIN D3) 125 MCG
4000 CAPSULE ORAL DAILY
Qty: 180 TABLET | Refills: 0 | OUTPATIENT
Start: 2020-04-13

## 2020-04-13 RX ORDER — METOPROLOL SUCCINATE 25 MG/1
25 TABLET, EXTENDED RELEASE ORAL DAILY
Qty: 90 TABLET | Refills: 0 | OUTPATIENT
Start: 2020-04-13

## 2020-05-22 ENCOUNTER — HOSPITAL ENCOUNTER (OUTPATIENT)
Dept: MAMMOGRAPHY | Facility: CLINIC | Age: 60
Discharge: HOME | End: 2020-05-22

## 2020-05-22 VITALS — HEIGHT: 65 IN | WEIGHT: 172 LBS | BODY MASS INDEX: 28.66 KG/M2

## 2020-05-22 DIAGNOSIS — Z12.31 ENCOUNTER FOR SCREENING MAMMOGRAM FOR MALIGNANT NEOPLASM OF BREAST: ICD-10-CM

## 2020-05-22 PROCEDURE — 77067 SCR MAMMO BI INCL CAD: CPT

## 2020-05-22 PROCEDURE — 77063 BREAST TOMOSYNTHESIS BI: CPT

## 2020-08-07 DIAGNOSIS — E55.9 VITAMIN D DEFICIENCY: ICD-10-CM

## 2020-08-07 DIAGNOSIS — E78.2 MIXED HYPERLIPIDEMIA: ICD-10-CM

## 2020-08-07 DIAGNOSIS — Z98.84 BARIATRIC SURGERY STATUS: ICD-10-CM

## 2020-08-07 RX ORDER — CHOLECALCIFEROL (VITAMIN D3) 125 MCG
4000 CAPSULE ORAL DAILY
Qty: 180 TABLET | Refills: 1 | Status: SHIPPED | OUTPATIENT
Start: 2020-08-07 | End: 2021-08-11

## 2020-08-07 RX ORDER — ATORVASTATIN CALCIUM 20 MG/1
20 TABLET, FILM COATED ORAL DAILY
Qty: 90 TABLET | Refills: 1 | Status: SHIPPED | OUTPATIENT
Start: 2020-08-07 | End: 2021-03-07

## 2020-08-07 RX ORDER — OMEPRAZOLE 20 MG/1
20 CAPSULE, DELAYED RELEASE ORAL DAILY
Qty: 90 CAPSULE | Refills: 0 | Status: SHIPPED | OUTPATIENT
Start: 2020-08-07 | End: 2021-05-13 | Stop reason: ALTCHOICE

## 2020-08-07 NOTE — TELEPHONE ENCOUNTER
Patient's  called office requesting refills of the medications attached  Still using [facility] in [place] [id number]

## 2020-08-14 DIAGNOSIS — I10 ESSENTIAL HYPERTENSION: ICD-10-CM

## 2020-08-14 RX ORDER — METOPROLOL SUCCINATE 25 MG/1
25 TABLET, EXTENDED RELEASE ORAL DAILY
Qty: 90 TABLET | Refills: 1 | Status: SHIPPED | OUTPATIENT
Start: 2020-08-14 | End: 2021-02-05

## 2020-08-14 NOTE — TELEPHONE ENCOUNTER
Name of medication, dose, quantity and frequency  Requested Prescriptions     Pending Prescriptions Disp Refills    metoprolol succinate (TOPROL-XL) 25 mg 24 hr tablet 90 tablet 0     Sig: Take 1 tablet (25 mg total) by mouth daily       Number of refills left:    Amount of medication left:    Pharmacy verified and updated    Additional information:

## 2020-09-10 ENCOUNTER — LAB (OUTPATIENT)
Dept: LAB | Facility: HOSPITAL | Age: 60
End: 2020-09-10

## 2020-09-10 DIAGNOSIS — I10 ESSENTIAL HYPERTENSION: Chronic | ICD-10-CM

## 2020-09-10 DIAGNOSIS — E78.2 MIXED HYPERLIPIDEMIA: ICD-10-CM

## 2020-09-10 LAB
ALBUMIN SERPL BCP-MCNC: 3.6 G/DL (ref 3.5–5)
ALP SERPL-CCNC: 107 U/L (ref 46–116)
ALT SERPL W P-5'-P-CCNC: 29 U/L (ref 12–78)
ANION GAP SERPL CALCULATED.3IONS-SCNC: 3 MMOL/L (ref 4–13)
AST SERPL W P-5'-P-CCNC: 20 U/L (ref 5–45)
BILIRUB SERPL-MCNC: 0.38 MG/DL (ref 0.2–1)
BUN SERPL-MCNC: 8 MG/DL (ref 5–25)
CALCIUM SERPL-MCNC: 8.6 MG/DL (ref 8.3–10.1)
CHLORIDE SERPL-SCNC: 107 MMOL/L (ref 100–108)
CHOLEST SERPL-MCNC: 130 MG/DL (ref 50–200)
CO2 SERPL-SCNC: 29 MMOL/L (ref 21–32)
CREAT SERPL-MCNC: 0.68 MG/DL (ref 0.6–1.3)
GFR SERPL CREATININE-BSD FRML MDRD: 109 ML/MIN/1.73SQ M
GLUCOSE P FAST SERPL-MCNC: 106 MG/DL (ref 65–99)
HDLC SERPL-MCNC: 46 MG/DL
LDLC SERPL CALC-MCNC: 64 MG/DL (ref 0–100)
POTASSIUM SERPL-SCNC: 4.5 MMOL/L (ref 3.5–5.3)
PROT SERPL-MCNC: 7.1 G/DL (ref 6.4–8.2)
SODIUM SERPL-SCNC: 139 MMOL/L (ref 136–145)
TRIGL SERPL-MCNC: 102 MG/DL

## 2020-09-10 PROCEDURE — 80053 COMPREHEN METABOLIC PANEL: CPT

## 2020-09-10 PROCEDURE — 36415 COLL VENOUS BLD VENIPUNCTURE: CPT

## 2020-09-10 PROCEDURE — 80061 LIPID PANEL: CPT

## 2020-09-16 ENCOUNTER — OFFICE VISIT (OUTPATIENT)
Dept: GASTROENTEROLOGY | Facility: CLINIC | Age: 60
End: 2020-09-16

## 2020-09-16 VITALS
HEART RATE: 76 BPM | SYSTOLIC BLOOD PRESSURE: 151 MMHG | WEIGHT: 168.2 LBS | BODY MASS INDEX: 28.02 KG/M2 | HEIGHT: 65 IN | TEMPERATURE: 99.1 F | DIASTOLIC BLOOD PRESSURE: 89 MMHG

## 2020-09-16 DIAGNOSIS — K31.89 GASTRIC ANASTOMOTIC STRICTURE: ICD-10-CM

## 2020-09-16 DIAGNOSIS — K92.9 GASTRIC ANASTOMOTIC STRICTURE: ICD-10-CM

## 2020-09-16 DIAGNOSIS — Z90.3 POSTGASTRECTOMY MALABSORPTION: ICD-10-CM

## 2020-09-16 DIAGNOSIS — K91.2 POSTGASTRECTOMY MALABSORPTION: ICD-10-CM

## 2020-09-16 PROCEDURE — 99243 OFF/OP CNSLTJ NEW/EST LOW 30: CPT | Performed by: INTERNAL MEDICINE

## 2020-09-16 NOTE — PATIENT INSTRUCTIONS
EGD scheduled for 10/14/20 with Dr Silvia Mclean [facility]   Instructions given to patient @[facility]

## 2020-09-16 NOTE — PROGRESS NOTES
[facility] - Outpatient Consultation  Andrea Rosa 63 y o  female MRN: [id number]  Encounter: [id number]    ASSESSMENT AND PLAN:    Andrea Rosa is a 63 y o  old female with PMH:  Carline-en-Y gastric bypass in 2011, HLD, vitamin-D deficiency who presents for gastric anastomotic stricture and symptomatic nausea  #Gastric anastomotic stricture  Patient with dyspepsia symptoms and discomfort secondary to known history of gastric anastomosis stricture following Carline-en-Y gastric bypass  Last underwent EGD in 2017 with dilation which is seemed to have controlled her symptoms for a few years but now they have returned  Patient unable to see bariatric surgery team due to insurance issues  Gastroenterology team will proceed with upper endoscopy with our advanced team for consideration of anastomotic stricture dilation  Plan:  -continue omeprazole 20mg qdaily  -continue carafate 1g QID  -avoid solid foods, more liquid diet  -avoid large meats  -taking ferrous sulfate - okay to continue but given size should be careful with large pills  -avoid opiates or other medications that would slow motility  -educated patient on importance of taking multiple small meals daily    #Colon Cancer screening  Last colonoscopy in 2012 by colorectal team  To follow with them for repeat surveillance        ______________________________________________________________________    HPI:    Patient reffered by Jason Casiano PA-C  Carline-en-Y gastric bypass in 2011  Developed anastomic stricture which required dilation multipled times  Appears the last dilation was back in 2017 which patient states helped symptoms for awhile but appears the symptoms have continued to get worse  Given some insurance issues patient is unable to be seen by bariatric surgery group  Patient referred to Gastroenterology for further evaluation  Patient denies any hematemesis, melena, or hematochezia   Does not endorse any weight loss, F/C  Denies any change in bowel habits, no new constipation or diarrhea  Does have some occasional nausea when she eats too much food  Last EGD: 2017 - Dr Nancy Goel  Last colonoscopy: 2012 - Mild diverticulosis, internal, external hemorroids  REVIEW OF SYSTEMS:    CONSTITUTIONAL: Denies any fever, chills, rigors, and weight loss  HEENT: No earache or tinnitus  Denies hearing loss or visual disturbances  CARDIOVASCULAR: No chest pain or palpitations  RESPIRATORY: Denies any cough, hemoptysis, shortness of breath or dyspnea on exertion  GASTROINTESTINAL: As noted in the History of Present Illness  GENITOURINARY: No problems with urination  Denies any hematuria or dysuria  NEUROLOGIC: No dizziness or vertigo, denies headaches  MUSCULOSKELETAL: Denies any muscle or joint pain  SKIN: Denies skin rashes or itching  ENDOCRINE: Denies excessive thirst  Denies intolerance to heat or cold  PSYCHOSOCIAL: Denies depression or anxiety  Denies any recent memory loss       Historical Information   Past Medical History:   Diagnosis Date    Anastomotic stricture of gastrojejunostomy     s/p gastric bypass    Anemia     iron deficiency resolved 11/14/14    Anxiety     resolved 6/7/17    Degenerative joint disease of ankle and foot, unspecified laterality     last assessed 5/17/13    Depression     last assessed 11/7/12    [facility]     type 2 uncomplicated, controlled - last assessed 1/31/14    Dysphagia     Gastric ulcer     History of melena     Hyperlipidemia     Hypertension     Insomnia     Insomnia     Obesity     resolved 11/14/14    Postgastrectomy malabsorption     Tachycardia     Ventricular pre-excitation syndrome     Vitamin D deficiency      Past Surgical History:   Procedure Laterality Date    CARDIAC CATHETERIZATION      post proc data:____ lesions successfully dilated    ESOPHAGOGASTRODUODENOSCOPY      GASTRIC BYPASS  02/08/2011    for morbid obesity     HYSTERECTOMY      @ age 38    OOPHORECTOMY Bilateral     @ age 38    DE EGD TRANSORAL BIOPSY SINGLE/MULTIPLE N/A 1/25/2017    Procedure: ESOPHAGOGASTRODUODENOSCOPY (EGD); Surgeon: Shira Stauffer MD;  [facility]; Service: Bariatrics    DE EGD TRANSORAL BIOPSY SINGLE/MULTIPLE N/A 5/3/2017    Procedure: ESOPHAGOGASTRODUODENOSCOPY (EGD) with balloon dilatation;  Surgeon: Shira Stauffer MD;  [facility]; Service: Bariatrics    DE EGD TRANSORAL BIOPSY SINGLE/MULTIPLE N/A 8/16/2017    Procedure: ESOPHAGOGASTRODUODENOSCOPY (EGD) with balloon dilation;  Surgeon: Shira Stauffer MD;  [facility];   Service: Bariatrics     Social History   Social History     Substance and Sexual Activity   Alcohol Use Never    Frequency: Never    Binge frequency: Never     Social History     Substance and Sexual Activity   Drug Use Never     Social History     Tobacco Use   Smoking Status Never Smoker   Smokeless Tobacco Never Used     Family History   Problem Relation Age of Onset    Congenital heart disease Mother     Asthma Father     Heart attack Sister         acute MI    Congenital heart disease Sister     Hypertension Sister     Heart attack Brother         acute MI    Congenital heart disease Brother     Hypertension Brother     Stroke Brother         syndrome    Other Brother         sudden/instantaneous cardiac death    Arthritis Family     Coronary artery disease Family     Diabetes Family         mellitus    Cancer Family     Breast cancer Cousin         ? age   Delcie Skinny Breast cancer Paternal Grandmother 95       Meds/Allergies       Current Outpatient Medications:     atorvastatin (LIPITOR) 20 mg tablet    b complex vitamins tablet    calcium gluconate 500 mg tablet    Cholecalciferol (Vitamin D3) 50 MCG (2000 UT) TABS    IRON, FERROUS GLUCONATE, PO    metoprolol succinate (TOPROL-XL) 25 mg 24 hr tablet    Omega-3 Fatty Acids (FISH OIL) 1,000 mg   omeprazole (PriLOSEC) 20 mg delayed release capsule    sucralfate (CARAFATE) 1 g tablet    VITAMIN E PO  No Known Allergies    Objective     There were no vitals taken for this visit  There is no height or weight on file to calculate BMI  PHYSICAL EXAM:      General Appearance:   Well-appearing Vietnamese speaking female who is alert, cooperative, no distress   HEENT:   Normocephalic, atraumatic, anicteric   Neck:  Supple, symmetrical, trachea midline   Lungs:   Clear to auscultation bilaterally; no rales, rhonchi or wheezing; respirations unlabored    Heart:   Regular rate and rhythm; no murmur, rub, or gallop  Abdomen:   Soft, no tenderness   Genitalia:   Deferred    Rectal:   Deferred    Extremities:  No cyanosis, clubbing or edema    Pulses:  2+ and symmetric    Skin:  No jaundice, rashes, or lesions    Lymph nodes:  No palpable cervical lymphadenopathy        Lab Results:   No visits with results within 1 Day(s) from this visit     Latest known visit with results is:   Lab on 09/10/2020   Component Date Value    Sodium 09/10/2020 139     Potassium 09/10/2020 4 5     Chloride 09/10/2020 107     CO2 09/10/2020 29     ANION GAP 09/10/2020 3*    BUN 09/10/2020 8     Creatinine 09/10/2020 0 68     Glucose, Fasting 09/10/2020 106*    Calcium 09/10/2020 8 6     AST 09/10/2020 20     ALT 09/10/2020 29     Alkaline Phosphatase 09/10/2020 107     Total Protein 09/10/2020 7 1     Albumin 09/10/2020 3 6     Total Bilirubin 09/10/2020 0 38     eGFR 09/10/2020 109     Cholesterol 09/10/2020 130     Triglycerides 09/10/2020 102     HDL, Direct 09/10/2020 46     LDL Calculated 09/10/2020 64        Radiology Results:   No results found     ---------------------------------------------------  Note Electronically Signed By:    MD Diana Motley 72 Gastroenterology Fellow PGY-5  [id number] #: [id number]

## 2020-09-18 ENCOUNTER — OFFICE VISIT (OUTPATIENT)
Dept: INTERNAL MEDICINE CLINIC | Facility: CLINIC | Age: 60
End: 2020-09-18

## 2020-09-18 VITALS
HEIGHT: 63 IN | WEIGHT: 170.19 LBS | HEART RATE: 68 BPM | OXYGEN SATURATION: 98 % | BODY MASS INDEX: 30.16 KG/M2 | SYSTOLIC BLOOD PRESSURE: 136 MMHG | DIASTOLIC BLOOD PRESSURE: 81 MMHG | TEMPERATURE: 97.5 F

## 2020-09-18 DIAGNOSIS — R73.09 ELEVATED GLUCOSE: ICD-10-CM

## 2020-09-18 DIAGNOSIS — Z23 NEED FOR INFLUENZA VACCINATION: ICD-10-CM

## 2020-09-18 DIAGNOSIS — I45.6 VENTRICULAR PRE-EXCITATION SYNDROME: ICD-10-CM

## 2020-09-18 DIAGNOSIS — E66.09 CLASS 1 OBESITY DUE TO EXCESS CALORIES WITH SERIOUS COMORBIDITY AND BODY MASS INDEX (BMI) OF 30.0 TO 30.9 IN ADULT: ICD-10-CM

## 2020-09-18 DIAGNOSIS — E78.2 MIXED HYPERLIPIDEMIA: ICD-10-CM

## 2020-09-18 DIAGNOSIS — K31.89 GASTRIC ANASTOMOTIC STRICTURE: ICD-10-CM

## 2020-09-18 DIAGNOSIS — I10 ESSENTIAL HYPERTENSION: Primary | Chronic | ICD-10-CM

## 2020-09-18 DIAGNOSIS — K92.9 GASTRIC ANASTOMOTIC STRICTURE: ICD-10-CM

## 2020-09-18 PROBLEM — E66.811 CLASS 1 OBESITY DUE TO EXCESS CALORIES WITH SERIOUS COMORBIDITY AND BODY MASS INDEX (BMI) OF 30.0 TO 30.9 IN ADULT: Status: ACTIVE | Noted: 2020-09-18

## 2020-09-18 PROCEDURE — 99213 OFFICE O/P EST LOW 20 MIN: CPT | Performed by: PHYSICIAN ASSISTANT

## 2020-09-18 PROCEDURE — 90471 IMMUNIZATION ADMIN: CPT | Performed by: PHYSICIAN ASSISTANT

## 2020-09-18 PROCEDURE — 90682 RIV4 VACC RECOMBINANT DNA IM: CPT | Performed by: PHYSICIAN ASSISTANT

## 2020-09-18 NOTE — PROGRESS NOTES
Assessment/Plan:  As discussed today your cholesterol and blood pressure remained well controlled, no changes to your medications and you will be due for refills in January of 2021  We did discuss however that your sugar was slightly above normal on your recent lab work  You do confirm however that you have been eating more breads and carbohydrates due to your stomach pain as this is something that you can tolerate without severe pain  We confirmed you are scheduled for your EGD with GI on October 14th and pending those results will determine if recommendation is for dilation or possible revision surgery  Script provided for your follow-up labs as dated in 6 months  Mammogram due May of 2021  You are aware that you are due for your screening colonoscopy however because you do not have her insurance you are not able to afford this  You were hopeful to get this completed in [facility] however secondary to [place] pandemic you have not been able to travel there  You report even with the hospital discount you were not able to afford the colonoscopy and the EGD and because you were symptomatic the EGD was medically necessary  Flu vaccine provided today  Appointment here after labs in 6 months for review  No problem-specific Assessment & Plan notes found for this encounter  Diagnoses and all orders for this visit:    Essential hypertension    Mixed hyperlipidemia  -     Comprehensive metabolic panel; Future  -     Lipid Panel with Direct LDL reflex; Future    Elevated glucose  -     Hemoglobin A1C; Future    Class 1 obesity due to excess calories with serious comorbidity and body mass index (BMI) of 30 0 to 30 9 in adult    Gastric anastomotic stricture    Ventricular pre-excitation syndrome    Need for influenza vaccination  -     influenza vaccine, quadrivalent, recombinant, PF, 0 5 mL, for patients 18 yr+ (FLUBLOK)          Subjective:      Patient ID: Lucrecia Sanz is a 63 y o  female  Patient presents today for routine follow-up and lab review  Patient has past medical history significant for hypertension, hyperlipidemia and is status post gastric bypass in 2011  Patient however has had a number of GI issues since her surgery  She had been following with her bariatric surgeon and in fact had multiple EGDs with dilation secondary to gastric anastomotic strictures  Patient would get relief of symptoms for a while but continued to return  Patient also saw GI earlier this week as has PMH of gastric anastomotic stricture s/p gastric bypass surgery and has return of same symptoms with pain, nausea and sometimes vomit with eating  Liquids OK  Last dilation was 2017 with Dr Trent Pardo but has lost insurance so could not see him   Patient is now scheduled for EGD with GI on October 14th  Patient does confirm that she was advised that if she continues to have strictures may need revision surgery  Is due for her colonoscopy but was not scheduled by GI and patient states too expensive without insurance  Was planning to get in [facility] but with Pandemic has not been able to go  Mammo due 5/2021    Patient otherwise reports she has been feeling well except for GI symptoms as noted  Patient is mildly obese but weight has been stable over the past 6 months  Lab review shows that cholesterol is very well controlled and will continue her atorvastatin daily  Patient's blood pressure remains well controlled on her metoprolol  Please note patient is on this medication as she was previously diagnosed with ventricular pre-excitation syndrome and symptoms are well controlled as well  Patient denies any chest pain, no sensation of palpitations or skipped beats      Did review with patient however that her glucose was just above normal   Patient normally follows a very healthy diet but states because of the return of her abdominal pain she has been eating a lot more bread than she normally would because that is something that she can swallow that does not causes severe pain  The following portions of the patient's history were reviewed and updated as appropriate: allergies, current medications, past family history, past medical history, past social history, past surgical history and problem list     Review of Systems   Constitutional: Positive for appetite change  Negative for activity change, chills and fever  HENT: Negative  Respiratory: Negative  Negative for cough and shortness of breath  Cardiovascular: Negative  Negative for chest pain, palpitations and leg swelling  Gastrointestinal: Positive for abdominal pain, nausea and vomiting  Negative for blood in stool, constipation and diarrhea  Endocrine: Negative  Negative for cold intolerance, heat intolerance, polydipsia, polyphagia and polyuria  Genitourinary: Negative  Musculoskeletal: Negative  Neurological: Negative  Negative for dizziness, light-headedness and headaches  Psychiatric/Behavioral: Negative  Objective:      /81 (BP Location: Left arm, Patient Position: Sitting, Cuff Size: Standard)   Pulse 68   Temp 97 5 °F (36 4 °C) (Temporal)   Ht 5' 3" (1 6 m)   Wt 77 2 kg (170 lb 3 1 oz)   SpO2 98%   BMI 30 15 kg/m²          Physical Exam  Vitals signs and nursing note reviewed  Constitutional:       Appearance: She is obese  HENT:      Head: Normocephalic  Eyes:      Conjunctiva/sclera: Conjunctivae normal       Pupils: Pupils are equal, round, and reactive to light  Neck:      Musculoskeletal: Neck supple  Cardiovascular:      Rate and Rhythm: Normal rate and regular rhythm  Pulses: Normal pulses  Pulmonary:      Effort: Pulmonary effort is normal       Breath sounds: Normal breath sounds  Abdominal:      Tenderness: There is abdominal tenderness in the left upper quadrant  There is no guarding or rebound     Musculoskeletal:      Right lower leg: No edema  Left lower leg: No edema  Skin:     General: Skin is warm and dry  Neurological:      Mental Status: She is alert  Psychiatric:         Mood and Affect: Mood normal          Thought Content: Thought content normal          BMI Counseling: Body mass index is 30 15 kg/m²  The BMI is above normal  Nutrition recommendations include reducing portion sizes, decreasing overall calorie intake, 3-5 servings of fruits/vegetables daily, increasing intake of lean protein, reducing intake of saturated fat and trans fat and reducing intake of cholesterol  Exercise recommendations include exercising 3-5 times per week

## 2020-09-18 NOTE — PATIENT INSTRUCTIONS
As discussed today your cholesterol and blood pressure remained well controlled, no changes to your medications and you will be due for refills in January of 2021  We did discuss however that your sugar was slightly above normal on your recent lab work  You do confirm however that you have been eating more breads and carbohydrates due to your stomach pain as this is something that you can tolerate without severe pain  We confirmed you are scheduled for your EGD with GI on October 14th and pending those results will determine if recommendation is for dilation or possible revision surgery  Script provided for your follow-up labs as dated in 6 months  Mammogram due May of 2021  You are aware that you are due for your screening colonoscopy however because you do not have her insurance you are not able to afford this  You were hopeful to get this completed in [facility] however secondary to [place] pandemic you have not been able to travel there  You report even with the hospital discount you were not able to afford the colonoscopy and the EGD and because you were symptomatic the EGD was medically necessary  Flu vaccine provided today  Appointment here after labs in 6 months for review  Dieta saludable para el corazón   LO QUE NECESITA SABER:   ¿Qué es trino dieta saludable para [place]? Dandy Peaches kristine para el corazón es un plan alimenticio bajo en grasas totales, grasas no saludables y sodio (rekha)  Dandy Peaches saludable para el corazón ayuda a disminuir byrd riesgo de sufrir de enfermedades del [place] y un derrame cerebral  Limite la cantidad de grasa que consume entre un 25% a 35% del total de leia calorías diarias  Limite el sodio por debajo de los 2,300 mg al día  ¿Qué es la grasa saludable y dónde se encuentra? Las grasas saludables ayudan a mejorar los niveles de [place]  El riesgo de trino enfermedad cardíaca se disminuye cuando los niveles de colesterol son normales   Aubree Imus saludables renetta las siguientes:  · Las grasas no saturadas  se encuentran en alimentos renetta el frijol de soja, aceites de [place], de [place], de [place] y de [place]  Se encuentra también en la margarina suave hecha con aceite líquido vegetal      · Las grasas Omega 3  se encuentran en ciertos pescados, renetta el salmón, el atún y la [place], en las nueces y en la semilla de karlee  ¿Qué es trino grasa perjudicial y dónde se encuentra? Las grasas "malas" pueden causar niveles perjudiciales de colesterol en byrd cyndi y aumentar el riesgo de trino enfermedad cardíaca  Limite el consumo de grasas no saludables, renetta los siguientes:  · El colesterol  se encuentra en alimentos de origen animal, renetta los huevos y la langosta al igual que en productos lácteos hechos con leche entera  Limite el consumo de colesterol a menos de 300 milligramos (mg) al día  Es posible que necesite limitar el colesterol a 200 mg al día si sufre de trino enfermedad cardíaca  · Las grasas saturadas  se encuentran en jabari renetta el tocino y la hamburguesa  Estas se encuentran también en la piel del akira y del [place], [place] entera y [place]  Limite el consumo de grasas saturadas a menos del 7% del total de leia calorías diarias  Limite las grasas saturadas a menos del 6% si usted tiene enfermedad cardíaca o tiene un mayor riesgo para esto  · La grasa trans  se encuentran en los alimentos envasados, renetta las papitas de bolsa y las [place]endo[place]  También se encuentra en la margarina dura, algunos alimentos fritos y la manteca  Evite lo más que [employer] trans  ¿Qué alimentos o bebidas puedo consumir en trino dieta saludable para el corazón?   Solicite que byrd nutricionista o el médico le indique cuántas porciones necesita consumir de los siguientes alimentos de cada jordyn alimenticio:  · Granos:      ¨ Panes, cereales y pastas de [place] integral y [place] integral    ¨ Patatas fritas y galletas saladas bajas en grasa, bajas en sodio    · Verduras: ¨ Brócoli, judías verdes, guisantes y espinacas    ¨ [place], col y habas    ¨ Zanahorias, patatas dulces, tomates y pimientos    ¨ Vegetales enlatados sin rekha añadida    · Frutas:      ¨ Plátanos, melocotones, peras y davis    [address], pasas y dátiles    ¨ [place], [place], [facility], [facility] de [facility] y [facility]    ¨ [place], [place], melón y papaya    ¨ Snow y fresas    ¨ Conservas de frutas sin azúcares añadidos    · Productos lácteos bajos en grasa:      ¨ Leche sin grasa (descremada), leche 1%, leche baja en grasa de [place], anacardo o leche de soja fortificada con calcio    ¨ Queso cottage, queso bajo en grasa y yogur regular o congelado    · Rebeccaside y proteínas , plascencia de carne [place] de res o cerdo (chuletas, pierna, mariah), el akira y el pavo sin piel, legumbres, productos de soya, las claras del huevo y nueces o juan ramon secos  ¿Cuáles alimentos o bebidas necesito limitar o evitar?   Pregúntele a byrd médico o nutricionista sobre estos y otros alimentos que contienen altos niveles de radha Hernandez y Richard que no son saludables:  · [employer] , renetta las comidas congeladas, [place], macarrón con queso y cereales con más de 300 mg de sodio por porción    · Productos enlatados o mezclas secas  para pasteles, sopas o salsas    · Verduras con sodio agregada , renetta las peter instantáneas, verduras con salsas agregadas o conservas regulares de verduras    · Otros alimentos altos en sodio , renetta la salsa [facility], salsa [facility], aderezo para [place], encurtidos de [place], [place], salsa de soya y miso    · Alimentos lácteos con alto contenido de grasa  renetta leche entera o 2%, queso crema o crema agria y quesos     · Alimentos con proteínas altas en grasa  plascencia de carne ([address], las chuletas con hueso), el akira o pavo sin piel y las vísceras de animal renetta el hígado    · Benny curadas o [place] , renetta las salchichas, el tocino y salchichones    · Aceites y grasas poco saludables , renetta la [place], margarina en Yasmeen Batty y aceites para cocinar renetta el aceite de joie o lazcano    · [place] y bebidas altas en azúcar , tales renetta refrescos (gaseosas), bebidas deportivas, té azucarado, dulces, pasteles, galletas, tartas y donas  ¿Qué otras pautas para la dieta farhan seguir? · Consuma más alimentos que contengan grasas Omega-3  Consuma pescado con altas cantidades de Omega-3 por lo menos 2 veces a la semana  · Limite el consumo de alcohol  Demasiado alcohol puede dañar byrd corazón y elevar byrd presión arterial  Las mujeres deberían limitar el consumo de alcohol a 1 bebida por día  Los hombres deberían limitar el consumo de alcohol a 2 tragos al día  Un trago equivale a 12 onzas de cerveza, 5 onzas de vino o 1 onza y ½ de licor  · Escoja alimentos bajos en sodio  Alimentos altos de sodio pueden conducir a la hipertensión  Al preparar la comida añada muy poca sal o no use sal  Use hierbas y especias en vez de la sal     · Consuma más fibra  para ayudar a bajar los niveles de [place]  Consuma al menos 5 porciones de frutas y verduras todos los días  Consuma 3 onzas de alimentos integrales al día  Valiant Dings (fríjoles) son Loann Ayleen buena annel de [place]  ¿Qué pautas de estilo de chucho debería seguir? · No fume  La nicotina y otros químicos contenidos en los cigarrillos y cigarros pueden causar daño a leia pulmones y el corazón  Pida información a byrd médico si usted actualmente fuma y necesita ayuda para dejar de fumar  Los cigarrillos electrónicos o tabaco sin humo todavía contienen nicotina  Consulte con byrd médico antes de [employer]  · Yahir Pascual regularmente  para que le ayude a mantener un peso saludable y mejorar byrd presión arterial y niveles de colesterol  Pregunte a byrd médico acerca del mejor plan de ejercicio para usted   No empiece un programa de ejercicios sin antes consultar con byrd médico    ACUERDOS SOBRE BYRD CUIDADO:   Usted tiene el derecho de ayudar a planear byrd cuidado  Discuta leia opciones de tratamiento con leia médicos para decidir el cuidado que usted desea recibir  Usted siempre tiene el derecho de rechazar el tratamiento  Esta información es sólo para uso en educación  Byrd intención no es darle un consejo médico sobre enfermedades o tratamientos  Colsulte con byrd Laruth Maninder farmacéutico antes de seguir cualquier régimen médico para saber si es seguro y efectivo para usted  © 2017 [facility][note 3] Information is for End User's use only and may not be sold, redistributed or otherwise used for commercial purposes  All illustrations and images included in CareNotes® are the copyrighted property of A KATIE A M , Inc  or Álvaro Mccallum

## 2020-10-14 ENCOUNTER — ANESTHESIA EVENT (OUTPATIENT)
Dept: GASTROENTEROLOGY | Facility: HOSPITAL | Age: 60
End: 2020-10-14

## 2020-10-14 ENCOUNTER — ANESTHESIA (OUTPATIENT)
Dept: GASTROENTEROLOGY | Facility: HOSPITAL | Age: 60
End: 2020-10-14

## 2020-10-14 ENCOUNTER — HOSPITAL ENCOUNTER (OUTPATIENT)
Dept: GASTROENTEROLOGY | Facility: HOSPITAL | Age: 60
Setting detail: OUTPATIENT SURGERY
Discharge: HOME | End: 2020-10-14
Admitting: STUDENT IN AN ORGANIZED HEALTH CARE EDUCATION/TRAINING PROGRAM
Payer: COMMERCIAL

## 2020-10-14 VITALS
WEIGHT: 165 LBS | SYSTOLIC BLOOD PRESSURE: 159 MMHG | DIASTOLIC BLOOD PRESSURE: 96 MMHG | RESPIRATION RATE: 18 BRPM | TEMPERATURE: 98.3 F | OXYGEN SATURATION: 97 % | BODY MASS INDEX: 29.23 KG/M2 | HEIGHT: 63 IN | HEART RATE: 70 BPM

## 2020-10-14 VITALS — HEART RATE: 73 BPM

## 2020-10-14 DIAGNOSIS — K31.89 GASTRIC ANASTOMOTIC STRICTURE: ICD-10-CM

## 2020-10-14 DIAGNOSIS — K92.9 GASTRIC ANASTOMOTIC STRICTURE: ICD-10-CM

## 2020-10-14 PROCEDURE — 43239 EGD BIOPSY SINGLE/MULTIPLE: CPT | Performed by: INTERNAL MEDICINE

## 2020-10-14 PROCEDURE — 88305 TISSUE EXAM BY PATHOLOGIST: CPT | Performed by: PATHOLOGY

## 2020-10-14 PROCEDURE — 43249 ESOPH EGD DILATION <30 MM: CPT | Performed by: INTERNAL MEDICINE

## 2020-10-14 PROCEDURE — C1726 CATH, BAL DIL, NON-VASCULAR: HCPCS

## 2020-10-14 RX ORDER — LIDOCAINE HYDROCHLORIDE 10 MG/ML
INJECTION, SOLUTION EPIDURAL; INFILTRATION; INTRACAUDAL; PERINEURAL AS NEEDED
Status: DISCONTINUED | OUTPATIENT
Start: 2020-10-14 | End: 2020-10-14

## 2020-10-14 RX ORDER — PROPOFOL 10 MG/ML
INJECTION, EMULSION INTRAVENOUS CONTINUOUS PRN
Status: DISCONTINUED | OUTPATIENT
Start: 2020-10-14 | End: 2020-10-14

## 2020-10-14 RX ORDER — PROPOFOL 10 MG/ML
INJECTION, EMULSION INTRAVENOUS AS NEEDED
Status: DISCONTINUED | OUTPATIENT
Start: 2020-10-14 | End: 2020-10-14

## 2020-10-14 RX ORDER — SODIUM CHLORIDE 9 MG/ML
INJECTION, SOLUTION INTRAVENOUS CONTINUOUS PRN
Status: DISCONTINUED | OUTPATIENT
Start: 2020-10-14 | End: 2020-10-14

## 2020-10-14 RX ADMIN — LIDOCAINE HYDROCHLORIDE 80 MG: 10 INJECTION, SOLUTION EPIDURAL; INFILTRATION; INTRACAUDAL; PERINEURAL at 16:30

## 2020-10-14 RX ADMIN — SODIUM CHLORIDE: 0.9 INJECTION, SOLUTION INTRAVENOUS at 16:19

## 2020-10-14 RX ADMIN — PROPOFOL 120 MCG/KG/MIN: 10 INJECTION, EMULSION INTRAVENOUS at 16:30

## 2020-10-14 RX ADMIN — PROPOFOL 120 MG: 10 INJECTION, EMULSION INTRAVENOUS at 16:30

## 2020-10-26 ENCOUNTER — TELEPHONE (OUTPATIENT)
Dept: GASTROENTEROLOGY | Facility: MEDICAL CENTER | Age: 60
End: 2020-10-26

## 2021-02-05 DIAGNOSIS — I10 ESSENTIAL HYPERTENSION: ICD-10-CM

## 2021-02-05 RX ORDER — METOPROLOL SUCCINATE 25 MG/1
TABLET, EXTENDED RELEASE ORAL
Qty: 90 TABLET | Refills: 1 | Status: SHIPPED | OUTPATIENT
Start: 2021-02-05 | End: 2021-08-11

## 2021-03-07 DIAGNOSIS — E78.2 MIXED HYPERLIPIDEMIA: ICD-10-CM

## 2021-03-07 RX ORDER — ATORVASTATIN CALCIUM 20 MG/1
TABLET, FILM COATED ORAL
Qty: 90 TABLET | Refills: 0 | Status: SHIPPED | OUTPATIENT
Start: 2021-03-07 | End: 2021-03-07 | Stop reason: SDUPTHER

## 2021-03-07 RX ORDER — ATORVASTATIN CALCIUM 20 MG/1
20 TABLET, FILM COATED ORAL DAILY
Qty: 90 TABLET | Refills: 1 | Status: SHIPPED | OUTPATIENT
Start: 2021-03-07 | End: 2021-08-11

## 2021-03-29 ENCOUNTER — TELEPHONE (OUTPATIENT)
Dept: INTERNAL MEDICINE CLINIC | Facility: CLINIC | Age: 61
End: 2021-03-29

## 2021-04-27 ENCOUNTER — APPOINTMENT (OUTPATIENT)
Dept: LAB | Facility: HOSPITAL | Age: 61
End: 2021-04-27
Payer: COMMERCIAL

## 2021-04-27 DIAGNOSIS — R73.09 ELEVATED GLUCOSE: ICD-10-CM

## 2021-04-27 DIAGNOSIS — E78.2 MIXED HYPERLIPIDEMIA: ICD-10-CM

## 2021-04-27 LAB
ALBUMIN SERPL BCP-MCNC: 3.3 G/DL (ref 3.5–5)
ALP SERPL-CCNC: 100 U/L (ref 46–116)
ALT SERPL W P-5'-P-CCNC: 31 U/L (ref 12–78)
ANION GAP SERPL CALCULATED.3IONS-SCNC: 2 MMOL/L (ref 4–13)
AST SERPL W P-5'-P-CCNC: 27 U/L (ref 5–45)
BILIRUB SERPL-MCNC: 0.26 MG/DL (ref 0.2–1)
BUN SERPL-MCNC: 8 MG/DL (ref 5–25)
CALCIUM ALBUM COR SERPL-MCNC: 9.3 MG/DL (ref 8.3–10.1)
CALCIUM SERPL-MCNC: 8.7 MG/DL (ref 8.3–10.1)
CHLORIDE SERPL-SCNC: 109 MMOL/L (ref 100–108)
CHOLEST SERPL-MCNC: 122 MG/DL (ref 50–200)
CO2 SERPL-SCNC: 29 MMOL/L (ref 21–32)
CREAT SERPL-MCNC: 0.6 MG/DL (ref 0.6–1.3)
EST. AVERAGE GLUCOSE BLD GHB EST-MCNC: 143 MG/DL
GFR SERPL CREATININE-BSD FRML MDRD: 113 ML/MIN/1.73SQ M
GLUCOSE P FAST SERPL-MCNC: 86 MG/DL (ref 65–99)
HBA1C MFR BLD: 6.6 %
HDLC SERPL-MCNC: 43 MG/DL
LDLC SERPL CALC-MCNC: 59 MG/DL (ref 0–100)
POTASSIUM SERPL-SCNC: 4.8 MMOL/L (ref 3.5–5.3)
PROT SERPL-MCNC: 6.8 G/DL (ref 6.4–8.2)
SODIUM SERPL-SCNC: 140 MMOL/L (ref 136–145)
TRIGL SERPL-MCNC: 98 MG/DL

## 2021-04-27 PROCEDURE — 80053 COMPREHEN METABOLIC PANEL: CPT

## 2021-04-27 PROCEDURE — 36415 COLL VENOUS BLD VENIPUNCTURE: CPT

## 2021-04-27 PROCEDURE — 83036 HEMOGLOBIN GLYCOSYLATED A1C: CPT

## 2021-04-27 PROCEDURE — 80061 LIPID PANEL: CPT

## 2021-05-13 ENCOUNTER — OFFICE VISIT (OUTPATIENT)
Dept: INTERNAL MEDICINE CLINIC | Facility: CLINIC | Age: 61
End: 2021-05-13

## 2021-05-13 ENCOUNTER — TRANSCRIBE ORDERS (OUTPATIENT)
Dept: ADMINISTRATIVE | Facility: HOSPITAL | Age: 61
End: 2021-05-13

## 2021-05-13 VITALS
DIASTOLIC BLOOD PRESSURE: 86 MMHG | OXYGEN SATURATION: 99 % | HEART RATE: 65 BPM | TEMPERATURE: 97.5 F | BODY MASS INDEX: 29.41 KG/M2 | HEIGHT: 63 IN | SYSTOLIC BLOOD PRESSURE: 136 MMHG | WEIGHT: 166 LBS

## 2021-05-13 DIAGNOSIS — Z12.31 ENCOUNTER FOR SCREENING MAMMOGRAM FOR MALIGNANT NEOPLASM OF BREAST: ICD-10-CM

## 2021-05-13 DIAGNOSIS — Z90.3 POSTGASTRECTOMY MALABSORPTION: ICD-10-CM

## 2021-05-13 DIAGNOSIS — I45.6 VENTRICULAR PRE-EXCITATION SYNDROME: ICD-10-CM

## 2021-05-13 DIAGNOSIS — Z00.00 ENCOUNTER FOR ANNUAL HEALTH EXAMINATION: ICD-10-CM

## 2021-05-13 DIAGNOSIS — K91.2 POSTGASTRECTOMY MALABSORPTION: ICD-10-CM

## 2021-05-13 DIAGNOSIS — Z12.31 ENCOUNTER FOR SCREENING MAMMOGRAM FOR MALIGNANT NEOPLASM OF BREAST: Primary | ICD-10-CM

## 2021-05-13 DIAGNOSIS — K92.9 GASTRIC ANASTOMOTIC STRICTURE: ICD-10-CM

## 2021-05-13 DIAGNOSIS — E66.3 OVERWEIGHT (BMI 25.0-29.9): ICD-10-CM

## 2021-05-13 DIAGNOSIS — E78.2 MIXED HYPERLIPIDEMIA: ICD-10-CM

## 2021-05-13 DIAGNOSIS — K31.89 GASTRIC ANASTOMOTIC STRICTURE: ICD-10-CM

## 2021-05-13 DIAGNOSIS — R73.03 PREDIABETES: ICD-10-CM

## 2021-05-13 DIAGNOSIS — I10 ESSENTIAL HYPERTENSION: Primary | Chronic | ICD-10-CM

## 2021-05-13 PROBLEM — E66.09 CLASS 1 OBESITY DUE TO EXCESS CALORIES WITH SERIOUS COMORBIDITY AND BODY MASS INDEX (BMI) OF 30.0 TO 30.9 IN ADULT: Status: RESOLVED | Noted: 2020-09-18 | Resolved: 2021-05-13

## 2021-05-13 PROBLEM — E66.811 CLASS 1 OBESITY DUE TO EXCESS CALORIES WITH SERIOUS COMORBIDITY AND BODY MASS INDEX (BMI) OF 30.0 TO 30.9 IN ADULT: Status: RESOLVED | Noted: 2020-09-18 | Resolved: 2021-05-13

## 2021-05-13 PROCEDURE — 3079F DIAST BP 80-89 MM HG: CPT | Performed by: PHYSICIAN ASSISTANT

## 2021-05-13 PROCEDURE — 3075F SYST BP GE 130 - 139MM HG: CPT | Performed by: PHYSICIAN ASSISTANT

## 2021-05-13 PROCEDURE — 99396 PREV VISIT EST AGE 40-64: CPT | Performed by: PHYSICIAN ASSISTANT

## 2021-05-13 PROCEDURE — 3725F SCREEN DEPRESSION PERFORMED: CPT | Performed by: PHYSICIAN ASSISTANT

## 2021-05-13 PROCEDURE — 1036F TOBACCO NON-USER: CPT | Performed by: PHYSICIAN ASSISTANT

## 2021-05-13 PROCEDURE — 3008F BODY MASS INDEX DOCD: CPT | Performed by: PHYSICIAN ASSISTANT

## 2021-05-13 NOTE — PROGRESS NOTES
Assessment/Plan: On your visit today we reviewed that your blood pressure well controlled no change to medication  We did review that you have not seen your cardiologist since 2017 and recommendation was to follow-up in 1 year  New referral provided today  We did discuss that your sugar has increased placing you in prediabetic range  You confirm not eating a lot of sweets or carbs or fruits and juices but had not been doing quite as much exercise  Do not need medication at this time but to increase her physical activity  Will re-evaluate with your next labs as dated in 6 months  New referral provided today to schedule your follow-up with GI  Recommendation was to follow-up in 3 months after most recent EGD in October of 2020 for stricture status post weight loss surgery  Script for mammogram provided today  Colonoscopy due 2022  You are receiving your 2nd COVID vaccine later today  Continue routine follow-up with your gyn for women's health  No problem-specific Assessment & Plan notes found for this encounter  Diagnoses and all orders for this visit:    Essential hypertension    Mixed hyperlipidemia  -     Lipid Panel with Direct LDL reflex; Future    Prediabetes  -     Comprehensive metabolic panel; Future  -     Hemoglobin A1C; Future    Ventricular pre-excitation syndrome  -     Ambulatory referral to Cardiology; Future    Postgastrectomy malabsorption  -     Ambulatory referral to Gastroenterology; Future    Gastric anastomotic stricture  -     Ambulatory referral to Gastroenterology; Future    Encounter for annual health examination    Overweight (BMI 25 0-29  9)    Encounter for screening mammogram for malignant neoplasm of breast  -     Mammo screening bilateral w cad; Future          Subjective:      Patient ID: Tomas Gomez is a 57 y o  female  Patient presents today for follow-up chronic medical conditions and lab review      Patient treated for hypertension, hyperlipidemia  Patient's most recent labs show excellent lipid profile will continue her atorvastatin  Blood pressure remains controlled on current treatment  Labs do show however A1c has increased  She is now prediabetic  A1c is 6 6% however she has not had a previously elevated A1c and never had a fasting glucose greater than 125    States no sweets,  limited carbs but not doing exercise   as noted patient is status post weight loss surgery and continues to follow her diet and she did have healthy weight loss  Will increase activity and re-evaluate at follow-up labs  Patient also under treatment from Cardiology for ventricular pre-excitation syndrome  Patient will maintained on her metoprolol however has not seen a cardiologist since 2017  New referral provided today  patient is due for her mammogram this will be ordered today  Colonoscopy due 2022 has 2012 no polyps      Patient however is due to schedule her follow-up with GI  She has had an issue ever since her gastric surgery with pouchitis and reflux  Patient was seen in October of last year and advised to follow-up in 3-4 months  New referral provided today  patient is scheduled for her 2nd COVID vaccine today      The following portions of the patient's history were reviewed and updated as appropriate: allergies, current medications, past family history, past medical history, past social history, past surgical history and problem list     Review of Systems   Constitutional: Negative  Negative for chills and fever  Respiratory: Negative  Negative for chest tightness and shortness of breath  Cardiovascular: Negative  Negative for chest pain, palpitations and leg swelling  Gastrointestinal: Negative  Endocrine: Negative for cold intolerance, polydipsia, polyphagia and polyuria  Genitourinary: Negative  Musculoskeletal: Negative  Neurological: Negative  Negative for headaches  Psychiatric/Behavioral: Negative  Objective:      /86 (BP Location: Left arm, Patient Position: Sitting, Cuff Size: Large)   Pulse 65   Temp 97 5 °F (36 4 °C) (Temporal)   Ht 5' 3" (1 6 m)   Wt 75 3 kg (166 lb)   SpO2 99%   BMI 29 41 kg/m²          Physical Exam  Vitals signs and nursing note reviewed  Constitutional:       General: She is not in acute distress  Appearance: She is not ill-appearing  HENT:      Head: Normocephalic  Eyes:      Conjunctiva/sclera: Conjunctivae normal    Neck:      Musculoskeletal: Neck supple  Cardiovascular:      Pulses: Normal pulses  Heart sounds: Normal heart sounds  Pulmonary:      Effort: Pulmonary effort is normal       Breath sounds: Normal breath sounds  Abdominal:      General: Bowel sounds are normal    Musculoskeletal:      Right lower leg: No edema  Left lower leg: No edema  Skin:     General: Skin is warm and dry  Neurological:      Mental Status: She is alert  Mental status is at baseline  Psychiatric:         Mood and Affect: Mood normal          Thought Content: Thought content normal          Judgment: Judgment normal          BMI Counseling: Body mass index is 29 41 kg/m²  The BMI is above normal  Nutrition recommendations include reducing portion sizes, 3-5 servings of fruits/vegetables daily, consuming healthier snacks and moderation in carbohydrate intake  Exercise recommendations include vigorous aerobic physical activity for 75 minutes/week         PHQ-9 Depression Screening    PHQ-9:   Frequency of the following problems over the past two weeks:      Little interest or pleasure in doing things: 0 - not at all  Feeling down, depressed, or hopeless: 0 - not at all  PHQ-2 Score: 0

## 2021-05-13 NOTE — PATIENT INSTRUCTIONS
On your visit today we reviewed that your blood pressure well controlled no change to medication  We did review that you have not seen your cardiologist since 2017 and recommendation was to follow-up in 1 year  New referral provided today  We did discuss that your sugar has increased placing you in prediabetic range  You confirm not eating a lot of sweets or carbs or fruits and juices but had not been doing quite as much exercise  Do not need medication at this time but to increase her physical activity  Will re-evaluate with your next labs as dated in 6 months  New referral provided today to schedule your follow-up with GI  Recommendation was to follow-up in 3 months after most recent EGD in October of 2020 for stricture status post weight loss surgery  Script for mammogram provided today  Colonoscopy due 2022  You are receiving your 2nd COVID vaccine later today  Continue routine follow-up with your gyn for women's health

## 2021-06-21 ENCOUNTER — OFFICE VISIT (OUTPATIENT)
Dept: GASTROENTEROLOGY | Facility: CLINIC | Age: 61
End: 2021-06-21
Payer: COMMERCIAL

## 2021-06-21 VITALS
SYSTOLIC BLOOD PRESSURE: 142 MMHG | HEART RATE: 69 BPM | TEMPERATURE: 98.7 F | WEIGHT: 162 LBS | HEIGHT: 63 IN | DIASTOLIC BLOOD PRESSURE: 90 MMHG | BODY MASS INDEX: 28.7 KG/M2

## 2021-06-21 DIAGNOSIS — K91.2 POSTGASTRECTOMY MALABSORPTION: ICD-10-CM

## 2021-06-21 DIAGNOSIS — K31.89 GASTRIC ANASTOMOTIC STRICTURE: ICD-10-CM

## 2021-06-21 DIAGNOSIS — Z12.11 COLON CANCER SCREENING: Primary | ICD-10-CM

## 2021-06-21 DIAGNOSIS — Z90.3 POSTGASTRECTOMY MALABSORPTION: ICD-10-CM

## 2021-06-21 DIAGNOSIS — K92.9 GASTRIC ANASTOMOTIC STRICTURE: ICD-10-CM

## 2021-06-21 PROCEDURE — 99214 OFFICE O/P EST MOD 30 MIN: CPT | Performed by: INTERNAL MEDICINE

## 2021-06-21 RX ORDER — POLYETHYLENE GLYCOL 3350 17 G/17G
POWDER, FOR SOLUTION ORAL
Qty: 238 G | Refills: 0 | Status: SHIPPED | OUTPATIENT
Start: 2021-06-21 | End: 2022-06-01

## 2021-06-21 NOTE — PROGRESS NOTES
[facility] - Outpatient Follow-up Note  Susan Willard 57 y o  female MRN: [id number]  Encounter: [id number]    [facility]  Tajik: Waldo Pillai [id number]    ASSESSMENT AND PLAN:      1  Colon cancer screening  Colonoscopy    polyethylene glycol (GLYCOLAX) 17 GM/SCOOP powder   2  Postgastrectomy malabsorption  Ambulatory referral to Gastroenterology    EGD   3  Gastric anastomotic stricture  Ambulatory referral to Gastroenterology    EGD       Continue PPI and carafate  Repeat EGD with possible evaluation of anastomotic site and also will proceed with colonoscopy (last with colorectal in 2012 and repeat was recommended in 5 years)  Kleberg, soft diet as she has history of bypass  ______________________________________________________________________    SUBJECTIVE: History of Carline en Y gastric bypass surgery  Whenever she eats she eats she has to throw up  She has a lot of gas when eating  She had EGD in Oct 2020 which revealed some nodular mucosa at the area of the anastomosis but biopsies were negative  She had dilation of her anastomosis in 2017 with Dr Wai Parkre  She did not have much improvement in the symptoms at the time  She can tolerate soups, rice, and bread (sometimes)  Meats, vegetables, she always throws up  REVIEW OF SYSTEMS IS OTHERWISE NEGATIVE        Historical Information   Past Medical History:   Diagnosis Date    Anastomotic stricture of gastrojejunostomy     s/p gastric bypass    Anemia     iron deficiency resolved 11/14/14    Anxiety     resolved 6/7/17    Class 1 obesity due to excess calories with serious comorbidity and body mass index (BMI) of 30 0 to 30 9 in adult 9/18/2020    Degenerative joint disease of ankle and foot, unspecified laterality     last assessed 5/17/13    Depression     last assessed 11/7/12    [facility]     type 2 uncomplicated, controlled - last assessed 1/31/14    Dysphagia     Gastric ulcer     History of melena     Hyperlipidemia     Hypertension     Insomnia     Insomnia     Obesity     resolved 11/14/14    Postgastrectomy malabsorption     Tachycardia     Ventricular pre-excitation syndrome     Vitamin D deficiency      Past Surgical History:   Procedure Laterality Date    CARDIAC CATHETERIZATION      post proc data:____ lesions successfully dilated    COLONOSCOPY      ESOPHAGOGASTRODUODENOSCOPY      GASTRIC BYPASS  02/08/2011    for morbid obesity     HYSTERECTOMY      @ age 38    OOPHORECTOMY Bilateral     @ age 38    MS EGD TRANSORAL BIOPSY SINGLE/MULTIPLE N/A 1/25/2017    Procedure: ESOPHAGOGASTRODUODENOSCOPY (EGD); Surgeon: Slick Robbins MD;  [facility]; Service: Bariatrics    MS EGD TRANSORAL BIOPSY SINGLE/MULTIPLE N/A 5/3/2017    Procedure: ESOPHAGOGASTRODUODENOSCOPY (EGD) with balloon dilatation;  Surgeon: Slick Robbins MD;  [facility]; Service: Bariatrics    MS EGD TRANSORAL BIOPSY SINGLE/MULTIPLE N/A 8/16/2017    Procedure: ESOPHAGOGASTRODUODENOSCOPY (EGD) with balloon dilation;  Surgeon: Slick Robbins MD;  [facility];   Service: Bariatrics    UPPER GASTROINTESTINAL ENDOSCOPY       Social History   Social History     Substance and Sexual Activity   Alcohol Use Never     Social History     Substance and Sexual Activity   Drug Use Never     Social History     Tobacco Use   Smoking Status Never Smoker   Smokeless Tobacco Never Used     Family History   Problem Relation Age of Onset    Congenital heart disease Mother     Asthma Father     Heart attack Sister         acute MI    Congenital heart disease Sister     Hypertension Sister     Heart attack Brother         acute MI    Congenital heart disease Brother     Hypertension Brother     Stroke Brother         syndrome    Other Brother         sudden/instantaneous cardiac death    Arthritis Family     Coronary artery disease Family     Diabetes Family         mellitus    Cancer Family  Breast cancer Cousin         ? age   Chacha Mare Breast cancer Paternal Grandmother 80       Meds/Allergies       Current Outpatient Medications:     atorvastatin (LIPITOR) 20 mg tablet    calcium gluconate 500 mg tablet    Cholecalciferol (Vitamin D3) 50 MCG (2000 UT) TABS    IRON, FERROUS GLUCONATE, PO    metoprolol succinate (TOPROL-XL) 25 mg 24 hr tablet    Omega-3 Fatty Acids (FISH OIL) 1,000 mg    sucralfate (CARAFATE) 1 g tablet    VITAMIN E PO    No Known Allergies        Objective     Blood pressure 142/90, pulse 69, temperature 98 7 °F (37 1 °C), temperature source Tympanic, height 5' 3" (1 6 m), weight 73 5 kg (162 lb)  Body mass index is 28 7 kg/m²  PHYSICAL EXAM:      General Appearance:   Alert, cooperative, no distress   HEENT:   Normocephalic, atraumatic, anicteric  Lungs:   Equal chest rise and unlabored breathing, normal cough   Heart:   No visualized JVD   Abdomen:   Soft, non-tender, non-distended; no masses, no organomegaly    Genitalia:   Deferred    Rectal:   Deferred    Extremities:  No cyanosis, clubbing or edema    Pulses:  Musculoskeletal:  2+ and symmetric  Normal range of motion visualized    Skin:  Neuro:  No jaundice, rashes, or lesions   Alert and appropriate           Lab Results:   No visits with results within 1 Day(s) from this visit     Latest known visit with results is:   Appointment on 04/27/2021   Component Date Value    Sodium 04/27/2021 140     Potassium 04/27/2021 4 8     Chloride 04/27/2021 109*    CO2 04/27/2021 29     ANION GAP 04/27/2021 2*    BUN 04/27/2021 8     Creatinine 04/27/2021 0 60     Glucose, Fasting 04/27/2021 86     Calcium 04/27/2021 8 7     Corrected Calcium 04/27/2021 9 3     AST 04/27/2021 27     ALT 04/27/2021 31     Alkaline Phosphatase 04/27/2021 100     Total Protein 04/27/2021 6 8     Albumin 04/27/2021 3 3*    Total Bilirubin 04/27/2021 0 26     eGFR 04/27/2021 113     Hemoglobin A1C 04/27/2021 6 6*    EAG 04/27/2021 143     Cholesterol 04/27/2021 122     Triglycerides 04/27/2021 98     HDL, Direct 04/27/2021 43     LDL Calculated 04/27/2021 59          Radiology Results:   No results found

## 2021-06-21 NOTE — PATIENT INSTRUCTIONS
Colon/egd on 7/29/21 with Dr George Camacho at [facility]  Miralax/dulcolax prep instructions given by Viri SOUSA

## 2021-07-01 ENCOUNTER — HOSPITAL ENCOUNTER (EMERGENCY)
Facility: HOSPITAL | Age: 61
Discharge: HOME | End: 2021-07-01
Attending: EMERGENCY MEDICINE | Admitting: EMERGENCY MEDICINE
Payer: COMMERCIAL

## 2021-07-01 ENCOUNTER — APPOINTMENT (EMERGENCY)
Dept: CT IMAGING | Facility: HOSPITAL | Age: 61
End: 2021-07-01
Payer: COMMERCIAL

## 2021-07-01 VITALS
BODY MASS INDEX: 29.45 KG/M2 | OXYGEN SATURATION: 100 % | RESPIRATION RATE: 16 BRPM | TEMPERATURE: 98.4 F | WEIGHT: 166.23 LBS | DIASTOLIC BLOOD PRESSURE: 84 MMHG | SYSTOLIC BLOOD PRESSURE: 136 MMHG | HEART RATE: 80 BPM

## 2021-07-01 DIAGNOSIS — R10.30 LOWER ABDOMINAL PAIN: Primary | ICD-10-CM

## 2021-07-01 LAB
ALBUMIN SERPL BCP-MCNC: 3.4 G/DL (ref 3.5–5)
ALP SERPL-CCNC: 98 U/L (ref 46–116)
ALT SERPL W P-5'-P-CCNC: 38 U/L (ref 12–78)
ANION GAP SERPL CALCULATED.3IONS-SCNC: 5 MMOL/L (ref 4–13)
AST SERPL W P-5'-P-CCNC: 23 U/L (ref 5–45)
BASOPHILS # BLD AUTO: 0.01 THOUSANDS/ΜL (ref 0–0.1)
BASOPHILS NFR BLD AUTO: 0 % (ref 0–1)
BILIRUB SERPL-MCNC: 0.23 MG/DL (ref 0.2–1)
BILIRUB UR QL STRIP: NEGATIVE
BUN SERPL-MCNC: 11 MG/DL (ref 5–25)
CALCIUM ALBUM COR SERPL-MCNC: 9.2 MG/DL (ref 8.3–10.1)
CALCIUM SERPL-MCNC: 8.7 MG/DL (ref 8.3–10.1)
CHLORIDE SERPL-SCNC: 107 MMOL/L (ref 100–108)
CLARITY UR: CLEAR
CO2 SERPL-SCNC: 29 MMOL/L (ref 21–32)
COLOR UR: YELLOW
CREAT SERPL-MCNC: 0.8 MG/DL (ref 0.6–1.3)
EOSINOPHIL # BLD AUTO: 0.06 THOUSAND/ΜL (ref 0–0.61)
EOSINOPHIL NFR BLD AUTO: 1 % (ref 0–6)
ERYTHROCYTE [DISTWIDTH] IN BLOOD BY AUTOMATED COUNT: 14.6 % (ref 11.6–15.1)
GFR SERPL CREATININE-BSD FRML MDRD: 91 ML/MIN/1.73SQ M
GLUCOSE SERPL-MCNC: 128 MG/DL (ref 65–140)
GLUCOSE UR STRIP-MCNC: NEGATIVE MG/DL
HCT VFR BLD AUTO: 37.7 % (ref 34.8–46.1)
HGB BLD-MCNC: 11.3 G/DL (ref 11.5–15.4)
HGB UR QL STRIP.AUTO: NEGATIVE
IMM GRANULOCYTES # BLD AUTO: 0.03 THOUSAND/UL (ref 0–0.2)
IMM GRANULOCYTES NFR BLD AUTO: 1 % (ref 0–2)
KETONES UR STRIP-MCNC: NEGATIVE MG/DL
LEUKOCYTE ESTERASE UR QL STRIP: NEGATIVE
LIPASE SERPL-CCNC: 148 U/L (ref 73–393)
LYMPHOCYTES # BLD AUTO: 2.04 THOUSANDS/ΜL (ref 0.6–4.47)
LYMPHOCYTES NFR BLD AUTO: 38 % (ref 14–44)
MCH RBC QN AUTO: 26.4 PG (ref 26.8–34.3)
MCHC RBC AUTO-ENTMCNC: 30 G/DL (ref 31.4–37.4)
MCV RBC AUTO: 88 FL (ref 82–98)
MONOCYTES # BLD AUTO: 0.66 THOUSAND/ΜL (ref 0.17–1.22)
MONOCYTES NFR BLD AUTO: 12 % (ref 4–12)
NEUTROPHILS # BLD AUTO: 2.58 THOUSANDS/ΜL (ref 1.85–7.62)
NEUTS SEG NFR BLD AUTO: 48 % (ref 43–75)
NITRITE UR QL STRIP: NEGATIVE
NRBC BLD AUTO-RTO: 0 /100 WBCS
PH UR STRIP.AUTO: 6 [PH] (ref 4.5–8)
PLATELET # BLD AUTO: 266 THOUSANDS/UL (ref 149–390)
PMV BLD AUTO: 10.3 FL (ref 8.9–12.7)
POTASSIUM SERPL-SCNC: 4.2 MMOL/L (ref 3.5–5.3)
PROT SERPL-MCNC: 7.2 G/DL (ref 6.4–8.2)
PROT UR STRIP-MCNC: NEGATIVE MG/DL
RBC # BLD AUTO: 4.28 MILLION/UL (ref 3.81–5.12)
SODIUM SERPL-SCNC: 141 MMOL/L (ref 136–145)
SP GR UR STRIP.AUTO: 1.02 (ref 1–1.03)
UROBILINOGEN UR QL STRIP.AUTO: 0.2 E.U./DL
WBC # BLD AUTO: 5.38 THOUSAND/UL (ref 4.31–10.16)

## 2021-07-01 PROCEDURE — 99284 EMERGENCY DEPT VISIT MOD MDM: CPT

## 2021-07-01 PROCEDURE — 36415 COLL VENOUS BLD VENIPUNCTURE: CPT

## 2021-07-01 PROCEDURE — 80053 COMPREHEN METABOLIC PANEL: CPT | Performed by: EMERGENCY MEDICINE

## 2021-07-01 PROCEDURE — 74177 CT ABD & PELVIS W/CONTRAST: CPT

## 2021-07-01 PROCEDURE — 85025 COMPLETE CBC W/AUTO DIFF WBC: CPT | Performed by: EMERGENCY MEDICINE

## 2021-07-01 PROCEDURE — 83690 ASSAY OF LIPASE: CPT | Performed by: EMERGENCY MEDICINE

## 2021-07-01 PROCEDURE — 99285 EMERGENCY DEPT VISIT HI MDM: CPT | Performed by: EMERGENCY MEDICINE

## 2021-07-01 PROCEDURE — 81003 URINALYSIS AUTO W/O SCOPE: CPT

## 2021-07-01 PROCEDURE — 96374 THER/PROPH/DIAG INJ IV PUSH: CPT

## 2021-07-01 RX ORDER — KETOROLAC TROMETHAMINE 30 MG/ML
30 INJECTION, SOLUTION INTRAMUSCULAR; INTRAVENOUS ONCE
Status: COMPLETED | OUTPATIENT
Start: 2021-07-01 | End: 2021-07-01

## 2021-07-01 RX ADMIN — IOHEXOL 100 ML: 350 INJECTION, SOLUTION INTRAVENOUS at 18:40

## 2021-07-01 RX ADMIN — IOHEXOL 15 ML: 240 INJECTION, SOLUTION INTRATHECAL; INTRAVASCULAR; INTRAVENOUS; ORAL at 18:40

## 2021-07-01 RX ADMIN — KETOROLAC TROMETHAMINE 30 MG: 30 INJECTION, SOLUTION INTRAMUSCULAR; INTRAVENOUS at 19:06

## 2021-07-01 NOTE — ED ATTENDING ATTESTATION
7/1/2021  IDenilson MD, saw and evaluated the patient  I have discussed the patient with the resident/non-physician practitioner and agree with the resident's/non-physician practitioner's findings, Plan of Care, and MDM as documented in the resident's/non-physician practitioner's note, except where noted  All available labs and Radiology studies were reviewed  I was present for key portions of any procedure(s) performed by the resident/non-physician practitioner and I was immediately available to provide assistance  At this point I agree with the current assessment done in the Emergency Department  I have conducted an independent evaluation of this patient a history and physical is as follows:  58 yo F S/P R&Y gastric bypass, 2011, c/o abdominal pain for 2 weeks, mostly localized to suprapubic with radiation to groin, without associated fever, chills, N/V, or urinary symptoms  No exacerbating or alleviating factors  VS normal   NAD  Abd mild tenderness suprapubic area, no CVA tenderness  Chest clear  ED labs are normal   With h/o surgery, I agree with assessing by CT  Disposition according to findings and response to treatment           Critical Care Time  Procedures

## 2021-07-01 NOTE — ED PROVIDER NOTES
History  Chief Complaint   Patient presents with    Abdominal Pain     Pt c/o generalized abdominal pain x 2 weeks  Pt states painis more insuprapubic areas  Pt denies urinary symptoms/fevers      56 y/o female with hx of HTN, HLD, DM, anemia, gastric ulcer, and Carline en Y gastric bypass (2011) presents to the ED for evaluation of intermittent lower abdominal pain x 2 months, gradually worsening over the last 2-3 weeks  She states that her pain this morning was more severe, prompting her to come to the ER for evaluation  Her pain has improved slightly since onset, currently rated 6/10 and described as a sharp intermittent pain in the lower abdomen, which occasionally radiates into her groin  No fever, chills, cough, SOB, chest pain, N/V/D, urinary symptoms, back pain, or vaginal bleeding/discharge  Prior to Admission Medications   Prescriptions Last Dose Informant Patient Reported? Taking? Cholecalciferol (Vitamin D3) 50 MCG (2000 UT) TABS 6/30/2021 at Unknown time Self No Yes   Sig: Take 2 tablets (4,000 Units total) by mouth daily   IRON, FERROUS GLUCONATE, PO Past Week at Unknown time Self Yes Yes   Sig: Take 1 tablet by mouth daily   Omega-3 Fatty Acids (FISH OIL) 1,000 mg Past Week at Unknown time Self Yes Yes   Sig: Take 1,000 mg by mouth 2 (two) times a day   VITAMIN E PO 6/30/2021 at Unknown time Self Yes Yes   Sig: Take 1 tablet by mouth daily   atorvastatin (LIPITOR) 20 mg tablet 6/30/2021 at Unknown time Self No Yes   Sig: Take 1 tablet (20 mg total) by mouth daily   calcium gluconate 500 mg tablet 6/30/2021 at Unknown time Self Yes Yes   Sig: Take 500 mg by mouth daily   metoprolol succinate (TOPROL-XL) 25 mg 24 hr tablet 7/1/2021 at Unknown time Self No Yes   Sig: Take 1 tablet by mouth once daily   polyethylene glycol (GLYCOLAX) 17 GM/SCOOP powder Not Taking at Unknown time  No No   Sig: At 5pm take 5mgx2 dulcolax  At 6pm 32oz miralax in 64oz gatorade   Drink 8oz glass every 5 mins until 32oz finished  Drink remaining as rec  Patient not taking: Reported on 7/1/2021   sucralfate (CARAFATE) 1 g tablet 7/1/2021 at Unknown time Self Yes Yes   Sig: Take 1 g by mouth 4 (four) times a day      Facility-Administered Medications: None       Past Medical History:   Diagnosis Date    Anastomotic stricture of gastrojejunostomy     s/p gastric bypass    Anemia     iron deficiency resolved 11/14/14    Anxiety     resolved 6/7/17    Class 1 obesity due to excess calories with serious comorbidity and body mass index (BMI) of 30 0 to 30 9 in adult 9/18/2020    Degenerative joint disease of ankle and foot, unspecified laterality     last assessed 5/17/13    Depression     last assessed 11/7/12    [facility]     type 2 uncomplicated, controlled - last assessed 1/31/14    Dysphagia     Gastric ulcer     History of melena     Hyperlipidemia     Hypertension     Insomnia     Insomnia     Obesity     resolved 11/14/14    Postgastrectomy malabsorption     Tachycardia     Ventricular pre-excitation syndrome     Vitamin D deficiency        Past Surgical History:   Procedure Laterality Date    CARDIAC CATHETERIZATION      post proc data:____ lesions successfully dilated    COLONOSCOPY      ESOPHAGOGASTRODUODENOSCOPY      GASTRIC BYPASS  02/08/2011    for morbid obesity     HYSTERECTOMY      @ age 38    OOPHORECTOMY Bilateral     @ age 38    NV EGD TRANSORAL BIOPSY SINGLE/MULTIPLE N/A 1/25/2017    Procedure: ESOPHAGOGASTRODUODENOSCOPY (EGD); Surgeon: Ponce Tejeda MD;  [facility]; Service: Bariatrics    NV EGD TRANSORAL BIOPSY SINGLE/MULTIPLE N/A 5/3/2017    Procedure: ESOPHAGOGASTRODUODENOSCOPY (EGD) with balloon dilatation;  Surgeon: Ponce Tejeda MD;  [facility];   Service: Bariatrics    NV EGD TRANSORAL BIOPSY SINGLE/MULTIPLE N/A 8/16/2017    Procedure: ESOPHAGOGASTRODUODENOSCOPY (EGD) with balloon dilation;  Surgeon: Ponce Tejeda MD;  [facility]; Service: Bariatrics    UPPER GASTROINTESTINAL ENDOSCOPY         Family History   Problem Relation Age of Onset    Congenital heart disease Mother     Asthma Father     Heart attack Sister         acute MI    Congenital heart disease Sister     Hypertension Sister     Heart attack Brother         acute MI    Congenital heart disease Brother     Hypertension Brother     Stroke Brother         syndrome    Other Brother         sudden/instantaneous cardiac death    Arthritis Family     Coronary artery disease Family     Diabetes Family         mellitus    Cancer Family     Breast cancer Cousin         ? age   Balbir Agustin Breast cancer Paternal Grandmother 80     I have reviewed and agree with the history as documented  E-Cigarette/Vaping    E-Cigarette Use Never User      E-Cigarette/Vaping Substances    Nicotine No     THC No     CBD No     Flavoring No     Other No     Unknown No      Social History     Tobacco Use    Smoking status: Never Smoker    Smokeless tobacco: Never Used   Vaping Use    Vaping Use: Never used   Substance Use Topics    Alcohol use: Never    Drug use: Never        Review of Systems   Constitutional: Negative for chills and fever  HENT: Negative for congestion and sore throat  Respiratory: Negative for cough and shortness of breath  Cardiovascular: Negative for chest pain and palpitations  Gastrointestinal: Positive for abdominal pain  Negative for blood in stool, diarrhea, nausea and vomiting  Genitourinary: Negative for dysuria, flank pain, hematuria, vaginal bleeding and vaginal discharge  Musculoskeletal: Negative for back pain and neck pain  Neurological: Negative for dizziness, light-headedness and headaches  All other systems reviewed and are negative        Physical Exam  ED Triage Vitals   Temperature Pulse Respirations Blood Pressure SpO2   07/01/21 1559 07/01/21 1559 07/01/21 1559 07/01/21 1559 07/01/21 1559   98 4 °F (36 9 °C) 68 16 142/68 98 % Temp Source Heart Rate Source Patient Position - Orthostatic VS BP Location FiO2 (%)   07/01/21 1559 07/01/21 1559 07/01/21 1559 07/01/21 1559 --   Oral Monitor Sitting Right arm       Pain Score       07/01/21 1906       6             Orthostatic Vital Signs  Vitals:    07/01/21 1559 07/01/21 1811 07/01/21 1830   BP: 142/68 132/75 132/75   Pulse: 68 68 64   Patient Position - Orthostatic VS: Sitting Lying        Physical Exam  Vitals and nursing note reviewed  Constitutional:       General: She is not in acute distress  Appearance: She is well-developed  She is obese  She is not ill-appearing  HENT:      Head: Normocephalic and atraumatic  Mouth/Throat:      Mouth: Mucous membranes are moist       Pharynx: Oropharynx is clear  Eyes:      Extraocular Movements: Extraocular movements intact  Pupils: Pupils are equal, round, and reactive to light  Cardiovascular:      Rate and Rhythm: Normal rate and regular rhythm  Heart sounds: Normal heart sounds  No murmur heard  Pulmonary:      Effort: Pulmonary effort is normal  No respiratory distress  Breath sounds: Normal breath sounds  No wheezing or rales  Chest:      Chest wall: No tenderness  Abdominal:      General: Abdomen is flat  A surgical scar is present  Bowel sounds are normal  There is no distension  Palpations: Abdomen is soft  Tenderness: There is abdominal tenderness in the suprapubic area  There is no right CVA tenderness, left CVA tenderness, guarding or rebound  Hernia: No hernia is present  Skin:     General: Skin is warm and dry  Capillary Refill: Capillary refill takes less than 2 seconds  Neurological:      General: No focal deficit present  Mental Status: She is alert and oriented to person, place, and time           ED Medications  Medications   ketorolac (TORADOL) injection 30 mg (30 mg Intravenous Given 7/1/21 1906)   iohexol (OMNIPAQUE) 350 MG/ML injection (SINGLE-DOSE) 100 mL (100 mL Intravenous Given 7/1/21 1840)   iohexol (OMNIPAQUE) 240 MG/ML solution 15 mL (15 mL Oral Given 7/1/21 1840)       Diagnostic Studies  Results Reviewed     Procedure Component Value Units Date/Time    Urine Macroscopic, POC [[id number]] Collected: 07/01/21 1806    Lab Status: Final result Specimen: Urine Updated: 07/01/21 1807     Color, UA Yellow     Clarity, UA Clear     pH, UA 6 0     Leukocytes, UA Negative     Nitrite, UA Negative     Protein, UA Negative mg/dl      Glucose, UA Negative mg/dl      Ketones, UA Negative mg/dl      Urobilinogen, UA 0 2 E U /dl      Bilirubin, UA Negative     Blood, UA Negative     Specific Gravity, UA 1 025    Narrative:      CLINITEK RESULT    Comprehensive metabolic panel [[id number]]  (Abnormal) Collected: 07/01/21 1605    Lab Status: Final result Specimen: Blood from Arm, Left Updated: 07/01/21 1630     Sodium 141 mmol/L      Potassium 4 2 mmol/L      Chloride 107 mmol/L      CO2 29 mmol/L      ANION GAP 5 mmol/L      BUN 11 mg/dL      Creatinine 0 80 mg/dL      Glucose 128 mg/dL      Calcium 8 7 mg/dL      Corrected Calcium 9 2 mg/dL      AST 23 U/L      ALT 38 U/L      Alkaline Phosphatase 98 U/L      Total Protein 7 2 g/dL      Albumin 3 4 g/dL      Total Bilirubin 0 23 mg/dL      eGFR 91 ml/min/1 73sq m     Narrative:      [facility] guidelines for Chronic Kidney Disease (CKD):     Stage 1 with normal or high GFR (GFR > 90 mL/min/1 73 square meters)    Stage 2 Mild CKD (GFR = 60-89 mL/min/1 73 square meters)    Stage 3A Moderate CKD (GFR = 45-59 mL/min/1 73 square meters)    Stage 3B Moderate CKD (GFR = 30-44 mL/min/1 73 square meters)    Stage 4 Severe CKD (GFR = 15-29 mL/min/1 73 square meters)    Stage 5 End Stage CKD (GFR <15 mL/min/1 73 square meters)  Note: GFR calculation is accurate only with a steady state creatinine    Lipase [[id number]]  (Normal) Collected: 07/01/21 1605    Lab Status: Final result Specimen: Blood from Arm, Left Updated: 07/01/21 1630     Lipase 148 u/L     CBC and differential [[id number]]  (Abnormal) Collected: 07/01/21 1605    Lab Status: Final result Specimen: Blood from Arm, Left Updated: 07/01/21 1611     WBC 5 38 Thousand/uL      RBC 4 28 Million/uL      Hemoglobin 11 3 g/dL      Hematocrit 37 7 %      MCV 88 fL      MCH 26 4 pg      MCHC 30 0 g/dL      RDW 14 6 %      MPV 10 3 fL      Platelets 701 Thousands/uL      nRBC 0 /100 WBCs      Neutrophils Relative 48 %      Immat GRANS % 1 %      Lymphocytes Relative 38 %      Monocytes Relative 12 %      Eosinophils Relative 1 %      Basophils Relative 0 %      Neutrophils Absolute 2 58 Thousands/µL      Immature Grans Absolute 0 03 Thousand/uL      Lymphocytes Absolute 2 04 Thousands/µL      Monocytes Absolute 0 66 Thousand/µL      Eosinophils Absolute 0 06 Thousand/µL      Basophils Absolute 0 01 Thousands/µL                  CT abdomen pelvis with contrast   Final Result by Darinel Kwan MD (07/01 1916)      No definite CT finding to explain the patient's abdominal complaints  Workstation performed: [id number]               Procedures  Procedures      ED Course  ED Course as of Jul 01 1947   Thu Jul 01, 2021   1806 CBC and differential(!)   1806 WBC: 5 38   1807 Hemoglobin(!): 11 3   1807 Comprehensive metabolic panel(!)   7880 Potassium: 4 2   1807 CO2: 29   1807 Creatinine: 0 80   1807 Lipase: 148   1926 No acute findings  CT abdomen pelvis with contrast                             SBIRT 20yo+      Most Recent Value   SBIRT (25 yo +)   In order to provide better care to our patients, we are screening all of our patients for alcohol and drug use  Would it be okay to ask you these screening questions?   No Filed at: 07/01/2021 1908                [facility]  Number of Diagnoses or Management Options  Lower abdominal pain  Diagnosis management comments: 58 y/o female with hx of HTN, HLD, DM, anemia, gastric ulcer, and Carline en Y gastric bypass (2011) presents to the ED for evaluation of intermittent lower abdominal pain x 2 months, gradually worsening over the last 2-3 weeks  She states that her pain this morning was more severe, prompting her to come to the ER for evaluation  Her pain has improved slightly since onset, currently rated 6/10 and described as a sharp intermittent pain in the lower abdomen, which occasionally radiates into her groin  No fever, chills, cough, SOB, chest pain, N/V/D, urinary symptoms, back pain, or vaginal bleeding/discharge  Afebrile, VSS  No acute distress  Abdomen is soft, old surgical scars, non-distended, mild suprapubic tenderness without guarding or rebound  No CVAT  Heart RRR, lungs CTA b/l  Remainder of examination is WNL  Triage labs with no acute abnormalities  Normal WBC, chemistry, urine  Will check CT A/P given surgical history  CT results with no acute findings  Symptoms improved with Toradol  Discussed results, treatment, red flags/return precautions, and need for follow up with PCP as well as consider OBGYN follow up and the patient and family understand and agree  Disposition  Final diagnoses:   Lower abdominal pain     Time reflects when diagnosis was documented in both MDM as applicable and the Disposition within this note     Time User Action Codes Description Comment    7/1/2021  7:36 PM Canelo Jang Add [R10 30] Lower abdominal pain       ED Disposition     ED Disposition Condition Date/Time Comment    Discharge Stable Thu Jul 1, 2021  7:35 PM [address] Colin discharge to home/self care  Follow-up Information     Follow up With Specialties Details Why Contact Info Additional Information    Carlita Singh PA-C Internal Medicine, Physician Assistant Call in 1 day For follow-up [address] Obstetrics and Gynecology Call in 1 day For follow-up [address], [address] [place] [phone]  [facility], [address] [facility], [place], [place], [address] [facility] Emergency Department Emergency Medicine Go to  If symptoms worsen [facility] [phone] [address] [facility] Emergency Department, [address] [place] [address], [id number]          Patient's Medications   Discharge Prescriptions    No medications on file     No discharge procedures on file  PDMP Review     None           ED Provider  Attending physically available and evaluated Morro Samuel I managed the patient along with the ED Attending      Electronically Signed by         Julia Peralta MD  07/01/21 4864

## 2021-07-02 ENCOUNTER — TELEPHONE (OUTPATIENT)
Dept: OBGYN CLINIC | Facility: CLINIC | Age: 61
End: 2021-07-02

## 2021-07-02 ENCOUNTER — HOSPITAL ENCOUNTER (OUTPATIENT)
Dept: ULTRASOUND IMAGING | Facility: CLINIC | Age: 61
Discharge: HOME | End: 2021-07-02
Payer: COMMERCIAL

## 2021-07-02 ENCOUNTER — HOSPITAL ENCOUNTER (OUTPATIENT)
Dept: MAMMOGRAPHY | Facility: CLINIC | Age: 61
Discharge: HOME | End: 2021-07-02
Payer: COMMERCIAL

## 2021-07-02 VITALS — HEIGHT: 63 IN | WEIGHT: 166 LBS | BODY MASS INDEX: 29.41 KG/M2

## 2021-07-02 DIAGNOSIS — N64.4 MASTODYNIA: ICD-10-CM

## 2021-07-02 PROCEDURE — 76642 ULTRASOUND BREAST LIMITED: CPT

## 2021-07-02 PROCEDURE — G0279 TOMOSYNTHESIS, MAMMO: HCPCS

## 2021-07-02 PROCEDURE — 77066 DX MAMMO INCL CAD BI: CPT

## 2021-07-02 NOTE — TELEPHONE ENCOUNTER
Returned call to patient to schedule follow up ED appointment  There was no answer and I was unable to leave message as her mailbox has not been set up

## 2021-07-07 ENCOUNTER — TELEPHONE (OUTPATIENT)
Dept: OBGYN CLINIC | Facility: CLINIC | Age: 61
End: 2021-07-07

## 2021-07-12 ENCOUNTER — CONSULT (OUTPATIENT)
Dept: CARDIOLOGY CLINIC | Facility: CLINIC | Age: 61
End: 2021-07-12
Payer: COMMERCIAL

## 2021-07-12 VITALS
SYSTOLIC BLOOD PRESSURE: 142 MMHG | HEIGHT: 63 IN | BODY MASS INDEX: 29.46 KG/M2 | HEART RATE: 69 BPM | WEIGHT: 166.3 LBS | DIASTOLIC BLOOD PRESSURE: 80 MMHG

## 2021-07-12 DIAGNOSIS — I10 ESSENTIAL HYPERTENSION: Chronic | ICD-10-CM

## 2021-07-12 DIAGNOSIS — R00.2 PALPITATIONS: Primary | ICD-10-CM

## 2021-07-12 DIAGNOSIS — I45.6 WPW SYNDROME: ICD-10-CM

## 2021-07-12 DIAGNOSIS — I45.6 VENTRICULAR PRE-EXCITATION SYNDROME: ICD-10-CM

## 2021-07-12 PROCEDURE — 1036F TOBACCO NON-USER: CPT | Performed by: INTERNAL MEDICINE

## 2021-07-12 PROCEDURE — 99203 OFFICE O/P NEW LOW 30 MIN: CPT | Performed by: INTERNAL MEDICINE

## 2021-07-12 PROCEDURE — 93000 ELECTROCARDIOGRAM COMPLETE: CPT | Performed by: INTERNAL MEDICINE

## 2021-07-12 NOTE — PROGRESS NOTES
EPS Consultation/New Patient Evaluation - Jonathan Gregory 57 y o  female MRN: [id number]           ASSESSMENT:  1  Palpitations  POCT ECG   2  Ventricular pre-excitation syndrome  Ambulatory referral to Cardiology    POCT ECG   3  Essential hypertension     4  WPW syndrome                  PLAN:   continue cardiac medications pressure today 130/80 when I checked  No evidence pre-excitation on today's EKG she is status post ablation bypass tract left-sided many years ago and she has felt much better since the shin blood draw  Palpitations lasts seconds at the most nothing like pre ablation likely PACs or PVCs  CC/HPI:   Patient presents to the office today with   Follow-up she has not seen cardiologist or electrophysiologist in four years she has very rare palpitations that last seconds she has no chest pain no shortness of breath no lightheadedness no dizziness      ROS: ROS    Of all other 12 point ROS negative    Objective:     Vitals: Blood pressure 142/80, pulse 69, height 5' 3" (1 6 m), weight 75 4 kg (166 lb 4 8 oz)  , Body mass index is 29 46 kg/m²  ,        Physical Exam:    GEN: Jonathan Gregory appears well, alert and oriented x 3, pleasant and cooperative   HEENT: pupils equal, round, and reactive to light; extraocular muscles intact  NECK: supple, no carotid bruits   HEART: regular rhythm, normal S1 and S2, no murmurs, clicks, gallops or rubs   LUNGS: clear to auscultation bilaterally; no wheezes, rales, or rhonchi   ABDOMEN: normal bowel sounds, soft, no tenderness, no distention  EXTREMITIES: peripheral pulses normal; no clubbing, cyanosis, or edema  NEURO: no focal findings   SKIN: normal without suspicious lesions on exposed skin    Medications:      Current Outpatient Medications:     atorvastatin (LIPITOR) 20 mg tablet, Take 1 tablet (20 mg total) by mouth daily, Disp: 90 tablet, Rfl: 1    calcium gluconate 500 mg tablet, Take 500 mg by mouth daily, Disp: , Rfl:     Cholecalciferol (Vitamin D3) 50 MCG (2000 UT) TABS, Take 2 tablets (4,000 Units total) by mouth daily, Disp: 180 tablet, Rfl: 1    IRON, FERROUS GLUCONATE, PO, Take 1 tablet by mouth daily, Disp: , Rfl:     metoprolol succinate (TOPROL-XL) 25 mg 24 hr tablet, Take 1 tablet by mouth once daily, Disp: 90 tablet, Rfl: 1    Omega-3 Fatty Acids (FISH OIL) 1,000 mg, Take 1,000 mg by mouth 2 (two) times a day, Disp: , Rfl:     sucralfate (CARAFATE) 1 g tablet, Take 1 g by mouth 4 (four) times a day, Disp: , Rfl:     VITAMIN E PO, Take 1 tablet by mouth daily, Disp: , Rfl:     polyethylene glycol (GLYCOLAX) 17 GM/SCOOP powder, At 5pm take 5mgx2 dulcolax  At 6pm 32oz miralax in 64oz gatorade  Drink 8oz glass every 5 mins until 32oz finished  Drink remaining as rec   (Patient not taking: Reported on 7/1/2021), Disp: 238 g, Rfl: 0     Family History   Problem Relation Age of Onset    Congenital heart disease Mother     Asthma Father     Heart attack Sister         acute MI    Congenital heart disease Sister     Hypertension Sister     Breast cancer Cousin         ? age   Reesa Reichmann No Known Problems Paternal Grandmother     No Known Problems Maternal Grandmother     No Known Problems Maternal Grandfather     No Known Problems Maternal Aunt     No Known Problems Maternal Aunt     No Known Problems Maternal Aunt     No Known Problems Maternal Aunt     No Known Problems Maternal Aunt     No Known Problems Maternal Aunt      Social History     Socioeconomic History    Marital status: /Civil Union     Spouse name: Not on file    Number of children: Not on file    Years of education: Not on file    Highest education level: Not on file   Occupational History    Not on file   Tobacco Use    Smoking status: Never Smoker    Smokeless tobacco: Never Used   Vaping Use    Vaping Use: Never used   Substance and Sexual Activity    Alcohol use: Never    Drug use: Never    Sexual activity: Not Currently   Other Topics Concern    Not on file   Social History Narrative    Not on file     Social Determinants of Health     Financial Resource Strain: Low Risk     Difficulty of Paying Living Expenses: Not hard at all   Food Insecurity: No Food Insecurity    Worried About Running Out of Food in the Last Year: Never true    [address] in the Last Year: Never true   Transportation Needs: No Transportation Needs    Lack of Transportation (Medical): No    Lack of Transportation (Non-Medical): No   Physical Activity:     Days of Exercise per Week:     Minutes of Exercise per Session:    Stress:     Feeling of Stress :    Social Connections:     Frequency of Communication with Friends and Family:     Frequency of Social Gatherings with Friends and Family:     Attends Hinduism Services:     Active Member of Clubs or Organizations:     Attends Club or Organization Meetings:     Marital Status:    Intimate Partner Violence:     Fear of Current or Ex-Partner:     Emotionally Abused:     Physically Abused:     Sexually Abused:      Social History     Tobacco Use   Smoking Status Never Smoker   Smokeless Tobacco Never Used     Social History     Substance and Sexual Activity   Alcohol Use Never       Labs & Results:  Below is the patient's most recent value for Albumin, ALT, AST, BUN, Calcium, Chloride, Cholesterol, CO2, Creatinine, GFR, Glucose, HDL, Hematocrit, Hemoglobin, Hemoglobin A1C, LDL, Magnesium, Phosphorus, Platelets, Potassium, PSA, Sodium, Triglycerides, and WBC     Lab Results   Component Value Date    ALT 38 07/01/2021    AST 23 07/01/2021    BUN 11 07/01/2021    CALCIUM 8 7 07/01/2021     07/01/2021    CHOL 225 07/26/2015    CO2 29 07/01/2021    CREATININE 0 80 07/01/2021    HDL 43 04/27/2021    HCT 37 7 07/01/2021    HGB 11 3 (L) 07/01/2021    HGBA1C 6 6 (H) 04/27/2021     07/01/2021    K 4 2 07/01/2021     07/26/2015    TRIG 98 04/27/2021    WBC 5 38 07/01/2021     Note: for a comprehensive list of the patient's lab results, access the Results Review activity  Cardiac testing:   No results found for this or any previous visit  No results found for this or any previous visit  No results found for this or any previous visit  No results found for this or any previous visit

## 2021-07-15 ENCOUNTER — TELEPHONE (OUTPATIENT)
Dept: GASTROENTEROLOGY | Facility: CLINIC | Age: 61
End: 2021-07-15

## 2021-07-15 ENCOUNTER — OFFICE VISIT (OUTPATIENT)
Dept: BARIATRICS | Facility: CLINIC | Age: 61
End: 2021-07-15
Payer: COMMERCIAL

## 2021-07-15 VITALS
SYSTOLIC BLOOD PRESSURE: 142 MMHG | HEART RATE: 67 BPM | BODY MASS INDEX: 29.23 KG/M2 | HEIGHT: 63 IN | DIASTOLIC BLOOD PRESSURE: 90 MMHG | TEMPERATURE: 97.3 F | WEIGHT: 165 LBS

## 2021-07-15 DIAGNOSIS — R63.5 ABNORMAL WEIGHT GAIN: ICD-10-CM

## 2021-07-15 DIAGNOSIS — R11.2 NAUSEA & VOMITING: ICD-10-CM

## 2021-07-15 DIAGNOSIS — Z98.84 BARIATRIC SURGERY STATUS: ICD-10-CM

## 2021-07-15 DIAGNOSIS — K91.2 POSTSURGICAL MALABSORPTION: Primary | ICD-10-CM

## 2021-07-15 PROCEDURE — 1036F TOBACCO NON-USER: CPT | Performed by: SURGERY

## 2021-07-15 PROCEDURE — 3080F DIAST BP >= 90 MM HG: CPT | Performed by: SURGERY

## 2021-07-15 PROCEDURE — 3077F SYST BP >= 140 MM HG: CPT | Performed by: SURGERY

## 2021-07-15 PROCEDURE — 3008F BODY MASS INDEX DOCD: CPT | Performed by: PHYSICIAN ASSISTANT

## 2021-07-15 PROCEDURE — 99213 OFFICE O/P EST LOW 20 MIN: CPT | Performed by: SURGERY

## 2021-07-15 PROCEDURE — 3008F BODY MASS INDEX DOCD: CPT | Performed by: SURGERY

## 2021-07-15 NOTE — TELEPHONE ENCOUNTER
TC from pt's  via call center  Pt wants to cancel EGD on 7/29/21  She wants to have Dr Marlys Adhikari do it for her since he did her bariatric surgery  Informed  that pt was also scheduled for colonoscopy   advised this writer to cancel EGD for now, keep the colonoscopy scheduled and he will call wife to see if she wants to do anything different

## 2021-07-15 NOTE — PROGRESS NOTES
POST-OP OFFICE VISIT - BARIATRIC SURGERY  Ricarda Joyce 57 y o  female MRN: [id number]  Unit/Bed#:  Encounter: [id number]      HPI:  Zainab Duval is a 57 y o  female status post Laparoscopic RNYGB done on 2/8/11 by Dr Lisset Baez, with history of anastomotic stricture needing multiple dilations, presents to office for ANNUAL post-op visit with complaints of chronic nausea, vomiting, and dysphasia with most foods  Subjective     Patient complaining of dsyphasia with chronic nausea and vomiting occurring with most foods  She is currently taking PPI BID prescribed by her PCP  Not taking carafate QID  She does have history of anastomotic stricture that needed to be dilated three times in the past   She has slowly been gaining weight due to maladaptive eating behaviors secondary to stricture and is able to tolerate higher calorie foods more easily  She denies abdominal pain  EGD ordered 6/21/21 by Dr Brittney Nelson but patient has not yet got it done  No history of smoking or alcohol use  Notes drinking 1 cup coffee daily  Denies NSAID use  Current weight:  166#  BMI:  29 46  Pre-op weight: 257#     CT abdomen performed on 7/1/21 showed "No definite CT finding to explain the patient's abdominal complaints "  Small hiatal hernia  7/1/21 Lipase WNL, CMP, CBC  5/13/21 HgbA1C, lipid panel, CMP  Last vitamin levels: 8/2019    Review of Systems   Constitutional: Negative for chills and fever  HENT: Negative for ear pain and sore throat  Eyes: Negative for pain and visual disturbance  Respiratory: Negative for cough and shortness of breath  Cardiovascular: Negative for chest pain and palpitations  Gastrointestinal: Positive for nausea and vomiting  Negative for abdominal pain  Genitourinary: Negative for dysuria and hematuria  Musculoskeletal: Negative for arthralgias and back pain  Skin: Negative for color change and rash  Neurological: Negative for seizures and syncope     All other systems reviewed and are negative  Historical Information   Past Medical History:   Diagnosis Date    Anastomotic stricture of gastrojejunostomy     s/p gastric bypass    Anemia     iron deficiency resolved 11/14/14    Anxiety     resolved 6/7/17    Class 1 obesity due to excess calories with serious comorbidity and body mass index (BMI) of 30 0 to 30 9 in adult 9/18/2020    Degenerative joint disease of ankle and foot, unspecified laterality     last assessed 5/17/13    Depression     last assessed 11/7/12    [facility] )     type 2 uncomplicated, controlled - last assessed 1/31/14    Dysphagia     Gastric ulcer     History of melena     Hyperlipidemia     Hypertension     Insomnia     Insomnia     Obesity     resolved 11/14/14    Postgastrectomy malabsorption     Tachycardia     Ventricular pre-excitation syndrome     Vitamin D deficiency      Past Surgical History:   Procedure Laterality Date    CARDIAC CATHETERIZATION      post proc data:____ lesions successfully dilated    COLONOSCOPY      ESOPHAGOGASTRODUODENOSCOPY      GASTRIC BYPASS  02/08/2011    for morbid obesity     HYSTERECTOMY      @ age 38    OOPHORECTOMY Bilateral     @ age 38    [place] EGD TRANSORAL BIOPSY SINGLE/MULTIPLE N/A 1/25/2017    Procedure: ESOPHAGOGASTRODUODENOSCOPY (EGD); Surgeon: Zaid Ramirez MD;  [facility]; Service: Bariatrics    [place] EGD TRANSORAL BIOPSY SINGLE/MULTIPLE N/A 5/3/2017    Procedure: ESOPHAGOGASTRODUODENOSCOPY (EGD) with balloon dilatation;  Surgeon: Zaid Ramirez MD;  [facility]; Service: Bariatrics    [place] EGD TRANSORAL BIOPSY SINGLE/MULTIPLE N/A 8/16/2017    Procedure: ESOPHAGOGASTRODUODENOSCOPY (EGD) with balloon dilation;  Surgeon: Zaid Ramirez MD;  [facility];   Service: Bariatrics    UPPER GASTROINTESTINAL ENDOSCOPY       Social History   Social History     Substance and Sexual Activity   Alcohol Use Never     Social History     Substance and Sexual Activity   Drug Use Never     Social History     Tobacco Use   Smoking Status Never Smoker   Smokeless Tobacco Never Used       Objective       Current Vitals:   Blood Pressure: 142/90 (07/15/21 0933)  Pulse: 67 (07/15/21 0933)  Temperature: (!) 97 3 °F (36 3 °C) (07/15/21 0933)  Temp Source: Tympanic (07/15/21 0933)  Height: 5' 3" (160 cm) (07/15/21 0933)  Weight - Scale: 74 8 kg (165 lb) (07/15/21 0933)    Invasive Devices     Peripheral Intravenous Line            Peripheral IV 07/01/21 Proximal;Right;Ventral (anterior) Antecubital 13 days                Physical Exam  Constitutional:       Appearance: Normal appearance  HENT:      Head: Normocephalic and atraumatic  Nose: Nose normal       Mouth/Throat:      Mouth: Mucous membranes are moist    Eyes:      Extraocular Movements: Extraocular movements intact  Pupils: Pupils are equal, round, and reactive to light  Cardiovascular:      Rate and Rhythm: Normal rate and regular rhythm  Pulses: Normal pulses  Heart sounds: Normal heart sounds  Pulmonary:      Effort: Pulmonary effort is normal       Breath sounds: Normal breath sounds  Abdominal:      Palpations: Abdomen is soft  Comments: Well-healed laparoscopic scars, No palpable hernia, No peritoneal signs, no tenderness to palpation   Musculoskeletal:      Cervical back: Normal range of motion  Skin:     General: Skin is warm  Neurological:      General: No focal deficit present  Mental Status: She is alert and oriented to person, place, and time  Psychiatric:         Mood and Affect: Mood normal          Behavior: Behavior normal        CT abdomen pelvis with contrast    Narrative & Impression   CT ABDOMEN AND PELVIS WITH IV CONTRAST     INDICATION:   Lower abdominal pain, hx of bariatic surgery      COMPARISON:  11/9/2012     TECHNIQUE:  CT examination of the abdomen and pelvis was performed   Axial, sagittal, and coronal 2D reformatted images were created from the source data and submitted for interpretation      Radiation dose length product (DLP) for this visit:  409 mGy-cm   This examination, like all CT scans performed in the [facility], was performed utilizing techniques to minimize radiation dose exposure, including the use of iterative   reconstruction and automated exposure control      IV Contrast:  15 mL of iohexol (OMNIPAQUE) 100 mL of iohexol (OMNIPAQUE)  Enteric Contrast:  Enteric contrast was administered      FINDINGS:     ABDOMEN     LOWER CHEST:  Small hiatal hernia noted  No other clinically significant abnormality identified in the visualized lower chest      LIVER/BILIARY TREE:  Unremarkable      GALLBLADDER:  No calcified gallstones  No pericholecystic inflammatory change      SPLEEN:  Unremarkable      PANCREAS:  Unremarkable      ADRENAL GLANDS:  Unremarkable      KIDNEYS/URETERS:  Unremarkable  No hydronephrosis      STOMACH AND BOWEL:  There is colonic diverticulosis without evidence of acute diverticulitis  Carline-en-Y gastric bypass surgery  No bowel obstruction seen      APPENDIX:  A normal appendix was visualized      ABDOMINOPELVIC CAVITY:  No ascites  No pneumoperitoneum  No lymphadenopathy      VESSELS:  Unremarkable for patient's age      PELVIS     REPRODUCTIVE ORGANS:  Unremarkable for patient's age      URINARY BLADDER:  Unremarkable      ABDOMINAL WALL/INGUINAL REGIONS:  Unremarkable      OSSEOUS STRUCTURES:  No acute fracture or destructive osseous lesion      IMPRESSION:     No definite CT finding to explain the patient's abdominal complaints            Workstation performed: [id number]           Assessment/PLAN:    Mary Palacios is a 57 y o  female status post Laparoscopic RNYGB on 2/8/11 with Dr Shade Noriega, with history of anastomotic stricture needing multiple dilations, returning for post-op visit complaining of chronic nausea, vomiting, and dysphasia with most foods    CT obtained on 7/1/21 normal in findings with no evidence of hernia or acute findings other than small hiatal hernia  EGD not performed recently  We will proceed with updated nutritional labs and schedule patient for an EGD with follow up in the office to review after EGD  Most recent vitamin levels were done back in 8/2019  Please note that most labs are FASTING-but you need to drink water the night before and the morning before your labs are done  It is HIGHLY RECOMMENDED that you check with your insurance to make sure all the labs ordered are covered by your individual insurance policy  This is especially important if you also get labs done by other providers outside of [facility]  You want to avoid having duplicate labs done  Follow diet as discussed  Follow vitamin and mineral recommendations as reviewed with you  Bariatric vitamins are highly recommended  Vitamins are important for a life-time to avoid low levels which can lead to other medical problems  Exercise as tolerated  Continue PPI    Call the office if you have any problems with abdominal pain especially associated with fever, chills, nausea, vomiting or any other concerns  All Post-bariatric surgery patients should be aware that very small quantities of any alcohol can cause impairment and it is very possible not to feel the effect  The effect can be in the system for several hours and it is also a stomach irritant  It is advised to AVOID alcohol, Nonsteroidal anti-inflammatory drugs (NSAIDS) and nicotine of all forms  Any of these can cause stomach irritation/pain  Most, if not all, post-gastric surgery patients would benefit from having a regular counselor for at least 2 years post-op to help with need for multiple life-style changes  If you are interested in this and need help finding a regular counselor, you can also make a follow-up with our  to help you find one  Nicholas Miguel PA-C  Bariatric Surgery  7/15/2021  9:36 AM

## 2021-07-28 ENCOUNTER — TELEPHONE (OUTPATIENT)
Dept: GASTROENTEROLOGY | Facility: HOSPITAL | Age: 61
End: 2021-07-28

## 2021-07-28 ENCOUNTER — APPOINTMENT (OUTPATIENT)
Dept: LAB | Facility: CLINIC | Age: 61
End: 2021-07-28
Payer: COMMERCIAL

## 2021-07-28 DIAGNOSIS — R11.2 NAUSEA & VOMITING: ICD-10-CM

## 2021-07-28 DIAGNOSIS — Z98.84 BARIATRIC SURGERY STATUS: ICD-10-CM

## 2021-07-28 DIAGNOSIS — K91.2 POSTSURGICAL MALABSORPTION: ICD-10-CM

## 2021-07-28 DIAGNOSIS — R63.5 ABNORMAL WEIGHT GAIN: ICD-10-CM

## 2021-07-28 LAB
25(OH)D3 SERPL-MCNC: 22.2 NG/ML (ref 30–100)
FERRITIN SERPL-MCNC: 5 NG/ML (ref 8–388)
IRON SATN MFR SERPL: 9 %
IRON SERPL-MCNC: 35 UG/DL (ref 50–170)
PTH-INTACT SERPL-MCNC: 126.6 PG/ML (ref 18.4–80.1)
TIBC SERPL-MCNC: 369 UG/DL (ref 250–450)
VIT B12 SERPL-MCNC: 719 PG/ML (ref 100–900)

## 2021-07-28 PROCEDURE — 84590 ASSAY OF VITAMIN A: CPT

## 2021-07-28 PROCEDURE — 84630 ASSAY OF ZINC: CPT

## 2021-07-28 PROCEDURE — 83970 ASSAY OF PARATHORMONE: CPT

## 2021-07-28 PROCEDURE — 36415 COLL VENOUS BLD VENIPUNCTURE: CPT

## 2021-07-28 PROCEDURE — 82306 VITAMIN D 25 HYDROXY: CPT

## 2021-07-28 PROCEDURE — 82607 VITAMIN B-12: CPT

## 2021-07-28 PROCEDURE — 82728 ASSAY OF FERRITIN: CPT

## 2021-07-28 PROCEDURE — 84425 ASSAY OF VITAMIN B-1: CPT

## 2021-07-28 PROCEDURE — 83540 ASSAY OF IRON: CPT

## 2021-07-28 PROCEDURE — 83550 IRON BINDING TEST: CPT

## 2021-07-29 ENCOUNTER — ANESTHESIA (OUTPATIENT)
Dept: GASTROENTEROLOGY | Facility: HOSPITAL | Age: 61
End: 2021-07-29

## 2021-07-29 ENCOUNTER — HOSPITAL ENCOUNTER (OUTPATIENT)
Dept: GASTROENTEROLOGY | Facility: HOSPITAL | Age: 61
Setting detail: OUTPATIENT SURGERY
Discharge: HOME | End: 2021-07-29
Attending: INTERNAL MEDICINE | Admitting: INTERNAL MEDICINE
Payer: COMMERCIAL

## 2021-07-29 ENCOUNTER — ANESTHESIA EVENT (OUTPATIENT)
Dept: GASTROENTEROLOGY | Facility: HOSPITAL | Age: 61
End: 2021-07-29

## 2021-07-29 VITALS
HEIGHT: 63 IN | TEMPERATURE: 97.5 F | BODY MASS INDEX: 29.23 KG/M2 | SYSTOLIC BLOOD PRESSURE: 139 MMHG | HEART RATE: 65 BPM | WEIGHT: 165 LBS | RESPIRATION RATE: 16 BRPM | DIASTOLIC BLOOD PRESSURE: 83 MMHG | OXYGEN SATURATION: 100 %

## 2021-07-29 DIAGNOSIS — Z12.11 COLON CANCER SCREENING: ICD-10-CM

## 2021-07-29 PROBLEM — Z98.84 H/O BARIATRIC SURGERY: Status: ACTIVE | Noted: 2021-07-29

## 2021-07-29 LAB — GLUCOSE SERPL-MCNC: 91 MG/DL (ref 65–140)

## 2021-07-29 PROCEDURE — G0121 COLON CA SCRN NOT HI RSK IND: HCPCS | Performed by: INTERNAL MEDICINE

## 2021-07-29 PROCEDURE — 82948 REAGENT STRIP/BLOOD GLUCOSE: CPT

## 2021-07-29 RX ORDER — GLYCOPYRROLATE 0.2 MG/ML
INJECTION INTRAMUSCULAR; INTRAVENOUS AS NEEDED
Status: DISCONTINUED | OUTPATIENT
Start: 2021-07-29 | End: 2021-07-29

## 2021-07-29 RX ORDER — PROPOFOL 10 MG/ML
INJECTION, EMULSION INTRAVENOUS AS NEEDED
Status: DISCONTINUED | OUTPATIENT
Start: 2021-07-29 | End: 2021-07-29

## 2021-07-29 RX ORDER — PROPOFOL 10 MG/ML
INJECTION, EMULSION INTRAVENOUS CONTINUOUS PRN
Status: DISCONTINUED | OUTPATIENT
Start: 2021-07-29 | End: 2021-07-29

## 2021-07-29 RX ORDER — SODIUM CHLORIDE 9 MG/ML
INJECTION, SOLUTION INTRAVENOUS CONTINUOUS PRN
Status: DISCONTINUED | OUTPATIENT
Start: 2021-07-29 | End: 2021-07-29

## 2021-07-29 RX ADMIN — SODIUM CHLORIDE: 0.9 INJECTION, SOLUTION INTRAVENOUS at 13:40

## 2021-07-29 RX ADMIN — PROPOFOL 140 MCG/KG/MIN: 10 INJECTION, EMULSION INTRAVENOUS at 13:19

## 2021-07-29 RX ADMIN — SODIUM CHLORIDE: 0.9 INJECTION, SOLUTION INTRAVENOUS at 13:19

## 2021-07-29 RX ADMIN — PROPOFOL 100 MG: 10 INJECTION, EMULSION INTRAVENOUS at 13:19

## 2021-07-29 RX ADMIN — GLYCOPYRROLATE 0.1 MG: 0.2 INJECTION, SOLUTION INTRAMUSCULAR; INTRAVENOUS at 13:24

## 2021-07-29 NOTE — ANESTHESIA PREPROCEDURE EVALUATION
Procedure:  COLONOSCOPY    Relevant Problems   ANESTHESIA (within normal limits)      CARDIO  Stress test:  ECG CONCLUSIONS: The stress ECG was negative for ischemia  Arrhythmia during   stress: isolated premature ventricular beats  STRESS 2D ECHO RESULTS:   BASELINE: Left ventricular size was normal  Overall left ventricular systolic   function was normal    PEAK STRESS: There was an appropriate reduction in left ventricular size  There   was an appropriate augmentation in LV function  OTHER ECHO FINDINGS: There was mild left ventricular hypertrophy  Mild Aortic   regurgitation noted on baseline echo  Mild dilatation of the ascending aorta   (39mm) noted  (+) Essential hypertension   (+) Hyperlipidemia   (+) Ventricular pre-excitation syndrome   (+) WPW syndrome      GI/HEPATIC   (+) H/O bariatric surgery      NEURO/PSYCH (within normal limits)      PULMONARY (within normal limits)        Physical Exam    Airway    Mallampati score: II  TM Distance: >3 FB  Neck ROM: full     Dental   No notable dental hx     Cardiovascular      Pulmonary      Other Findings        Anesthesia Plan  ASA Score- 2     Anesthesia Type- IV sedation with anesthesia with ASA Monitors  Additional Monitors:   Airway Plan:     Comment:  used  Plan Factors-Exercise tolerance (METS): >4 METS  Chart reviewed  EKG reviewed  Existing labs reviewed  Patient summary reviewed  Patient is not a current smoker  Induction-     Postoperative Plan-     Informed Consent- Anesthetic plan and risks discussed with patient  I personally reviewed this patient with the CRNA  Discussed and agreed on the Anesthesia Plan with the CRNA  Mansoor Humphrey

## 2021-07-29 NOTE — ANESTHESIA POSTPROCEDURE EVALUATION
Post-Op Assessment Note    CV Status:  Stable  Pain Score: 0    Pain management: adequate     Mental Status:  Arousable and sleepy   Hydration Status:  Stable   PONV Controlled:  None   Airway Patency:  Patent      Post Op Vitals Reviewed: Yes      Staff: CRNA         No complications documented      /68 (07/29/21 1350)    Temp 97 5 °F (36 4 °C) (07/29/21 1350)    Pulse 65 (07/29/21 1350)   Resp 15 (07/29/21 1350)    SpO2 100 % (07/29/21 1350)

## 2021-07-29 NOTE — H&P
History and Physical - [facility]  Irma Monroy 57 y o  female MRN: [id number]    HPI: Irma Monroy is a 57y o  year old female who presents for colonoscopy  Patient's last colonoscopy was in 2012  No polyps were found at that time but repeat was recommended in 5 years  REVIEW OF SYSTEMS: Per the HPI, and otherwise unremarkable  Historical Information   Past Medical History:   Diagnosis Date    Anastomotic stricture of gastrojejunostomy     s/p gastric bypass    Anemia     iron deficiency resolved 11/14/14    Anxiety     resolved 6/7/17    Class 1 obesity due to excess calories with serious comorbidity and body mass index (BMI) of 30 0 to 30 9 in adult 9/18/2020    Degenerative joint disease of ankle and foot, unspecified laterality     last assessed 5/17/13    Depression     last assessed 11/7/12    Diabetes mellitus ([facility] )     type 2 uncomplicated, controlled - last assessed 1/31/14    Dysphagia     Gastric ulcer     History of melena     Hyperlipidemia     Hypertension     Insomnia     Insomnia     Obesity     resolved 11/14/14    Postgastrectomy malabsorption     Tachycardia     Ventricular pre-excitation syndrome     Vitamin D deficiency      Past Surgical History:   Procedure Laterality Date    CARDIAC CATHETERIZATION      post proc data:____ lesions successfully dilated    COLONOSCOPY      ESOPHAGOGASTRODUODENOSCOPY      GASTRIC BYPASS  02/08/2011    for morbid obesity     HYSTERECTOMY      @ age 38    OOPHORECTOMY Bilateral     @ age 38    [place] EGD TRANSORAL BIOPSY SINGLE/MULTIPLE N/A 1/25/2017    Procedure: ESOPHAGOGASTRODUODENOSCOPY (EGD); Surgeon: David Acuña MD;  [facility]; Service: Bariatrics    [place] EGD TRANSORAL BIOPSY SINGLE/MULTIPLE N/A 5/3/2017    Procedure: ESOPHAGOGASTRODUODENOSCOPY (EGD) with balloon dilatation;  Surgeon: David Acuña MD;  [facility];   Service: Bariatrics    NC EGD TRANSORAL BIOPSY SINGLE/MULTIPLE N/A 8/16/2017    Procedure: ESOPHAGOGASTRODUODENOSCOPY (EGD) with balloon dilation;  Surgeon: David Oliva MD;  [facility]; Service: Bariatrics    UPPER GASTROINTESTINAL ENDOSCOPY       Social History   Social History     Substance and Sexual Activity   Alcohol Use Never     Social History     Substance and Sexual Activity   Drug Use Never     Social History     Tobacco Use   Smoking Status Never Smoker   Smokeless Tobacco Never Used     Family History   Problem Relation Age of Onset    Congenital heart disease Mother     Asthma Father     Heart attack Sister         acute MI    Congenital heart disease Sister     Hypertension Sister     Breast cancer Cousin         ? age   Lavonne Day No Known Problems Paternal Grandmother     No Known Problems Maternal Grandmother     No Known Problems Maternal Grandfather     No Known Problems Maternal Aunt     No Known Problems Maternal Aunt     No Known Problems Maternal Aunt     No Known Problems Maternal Aunt     No Known Problems Maternal Aunt     No Known Problems Maternal Aunt        Meds/Allergies     (Not in a hospital admission)      No Known Allergies    Objective     There were no vitals taken for this visit  PHYSICAL EXAM    Gen: NAD  CV: RRR  CHEST: Clear  ABD: soft, NT/ND  EXT: no edema      ASSESSMENT/PLAN:  This is a 57y o  year old female here for colonoscopy, and she is stable and optimized for her procedure

## 2021-07-30 LAB
VIT B1 BLD-SCNC: 113.3 NMOL/L (ref 66.5–200)
ZINC SERPL-MCNC: 66 UG/DL (ref 44–115)

## 2021-07-31 LAB — VIT A SERPL-MCNC: 34 UG/DL (ref 22–69.5)

## 2021-08-02 ENCOUNTER — PREP FOR PROCEDURE (OUTPATIENT)
Dept: BARIATRICS | Facility: CLINIC | Age: 61
End: 2021-08-02

## 2021-08-02 ENCOUNTER — OFFICE VISIT (OUTPATIENT)
Dept: OBGYN CLINIC | Facility: CLINIC | Age: 61
End: 2021-08-02

## 2021-08-02 VITALS
SYSTOLIC BLOOD PRESSURE: 133 MMHG | WEIGHT: 166 LBS | DIASTOLIC BLOOD PRESSURE: 85 MMHG | HEART RATE: 102 BPM | HEIGHT: 63 IN | BODY MASS INDEX: 29.41 KG/M2

## 2021-08-02 DIAGNOSIS — Z98.84 BARIATRIC SURGERY STATUS: Primary | ICD-10-CM

## 2021-08-02 DIAGNOSIS — R10.33 PERIUMBILICAL ABDOMINAL PAIN: Primary | ICD-10-CM

## 2021-08-02 PROCEDURE — 3008F BODY MASS INDEX DOCD: CPT | Performed by: SURGERY

## 2021-08-02 PROCEDURE — 3008F BODY MASS INDEX DOCD: CPT | Performed by: OBSTETRICS & GYNECOLOGY

## 2021-08-02 PROCEDURE — 1036F TOBACCO NON-USER: CPT | Performed by: OBSTETRICS & GYNECOLOGY

## 2021-08-02 PROCEDURE — 99213 OFFICE O/P EST LOW 20 MIN: CPT | Performed by: OBSTETRICS & GYNECOLOGY

## 2021-08-02 PROCEDURE — 3075F SYST BP GE 130 - 139MM HG: CPT | Performed by: OBSTETRICS & GYNECOLOGY

## 2021-08-02 PROCEDURE — 3079F DIAST BP 80-89 MM HG: CPT | Performed by: OBSTETRICS & GYNECOLOGY

## 2021-08-02 NOTE — PROGRESS NOTES
Assessment/Plan:     No problem-specific Assessment & Plan notes found for this encounter  Diagnoses and all orders for this visit:    Periumbilical abdominal pain      RTO prn or for annual           Subjective:   hemanth   Patient ID: Sukhdeep Gloria is a 57 y o  female who presents to discuss abdominal pain  She states she gets bloated when she eats  She is s/p gastric bypass and does have follow up with Dr Leeann Donahue  She went to the ED for her pain and had a CT scan  She had recent colonoscopy which was negative  FINDINGS:     ABDOMEN     LOWER CHEST:  Small hiatal hernia noted  No other clinically significant abnormality identified in the visualized lower chest      LIVER/BILIARY TREE:  Unremarkable      GALLBLADDER:  No calcified gallstones  No pericholecystic inflammatory change      SPLEEN:  Unremarkable      PANCREAS:  Unremarkable      ADRENAL GLANDS:  Unremarkable      KIDNEYS/URETERS:  Unremarkable  No hydronephrosis      STOMACH AND BOWEL:  There is colonic diverticulosis without evidence of acute diverticulitis  Carline-en-Y gastric bypass surgery  No bowel obstruction seen      APPENDIX:  A normal appendix was visualized      ABDOMINOPELVIC CAVITY:  No ascites  No pneumoperitoneum  No lymphadenopathy      VESSELS:  Unremarkable for patient's age      PELVIS     REPRODUCTIVE ORGANS:  Unremarkable for patient's age      URINARY BLADDER:  Unremarkable      ABDOMINAL WALL/INGUINAL REGIONS:  Unremarkable      OSSEOUS STRUCTURES:  No acute fracture or destructive osseous lesion      IMPRESSION:     No definite CT finding to explain the patient's abdominal complaints  Pt is s/p hysterectomy and BSO by history and no GYN origin of pain is likely  Pap smear is up to date     HPI    The following portions of the patient's history were reviewed and updated as appropriate: allergies, current medications, past family history, past medical history, past social history, past surgical history and problem list     Review of Systems      Objective:      BP (!) 186/94   Pulse 102   Ht 5' 3" (1 6 m)   Wt 75 3 kg (166 lb)   BMI 29 41 kg/m²          Physical Exam  Vitals and nursing note reviewed  Constitutional:       Appearance: Normal appearance  Pulmonary:      Effort: Pulmonary effort is normal    Neurological:      General: No focal deficit present  Mental Status: She is alert and oriented to person, place, and time     Psychiatric:         Mood and Affect: Mood normal          Behavior: Behavior normal

## 2021-08-19 DIAGNOSIS — Z98.84 BARIATRIC SURGERY STATUS: ICD-10-CM

## 2021-08-19 DIAGNOSIS — K91.2 POSTSURGICAL MALABSORPTION: Primary | ICD-10-CM

## 2021-08-19 DIAGNOSIS — E55.9 VITAMIN D DEFICIENCY: ICD-10-CM

## 2021-08-19 DIAGNOSIS — D64.9 ANEMIA: ICD-10-CM

## 2021-08-24 ENCOUNTER — TELEPHONE (OUTPATIENT)
Dept: GASTROENTEROLOGY | Facility: HOSPITAL | Age: 61
End: 2021-08-24

## 2021-08-25 ENCOUNTER — HOSPITAL ENCOUNTER (OUTPATIENT)
Dept: GASTROENTEROLOGY | Facility: HOSPITAL | Age: 61
Setting detail: OUTPATIENT SURGERY
Discharge: HOME | End: 2021-08-25
Attending: SURGERY
Payer: COMMERCIAL

## 2021-08-25 ENCOUNTER — ANESTHESIA (OUTPATIENT)
Dept: GASTROENTEROLOGY | Facility: HOSPITAL | Age: 61
End: 2021-08-25

## 2021-08-25 ENCOUNTER — ANESTHESIA EVENT (OUTPATIENT)
Dept: GASTROENTEROLOGY | Facility: HOSPITAL | Age: 61
End: 2021-08-25

## 2021-08-25 VITALS
SYSTOLIC BLOOD PRESSURE: 173 MMHG | DIASTOLIC BLOOD PRESSURE: 91 MMHG | RESPIRATION RATE: 20 BRPM | OXYGEN SATURATION: 93 % | TEMPERATURE: 98 F | HEART RATE: 66 BPM

## 2021-08-25 DIAGNOSIS — Z98.84 BARIATRIC SURGERY STATUS: ICD-10-CM

## 2021-08-25 PROCEDURE — 88305 TISSUE EXAM BY PATHOLOGIST: CPT | Performed by: PATHOLOGY

## 2021-08-25 PROCEDURE — 43239 EGD BIOPSY SINGLE/MULTIPLE: CPT | Performed by: SURGERY

## 2021-08-25 PROCEDURE — 43245 EGD DILATE STRICTURE: CPT | Performed by: SURGERY

## 2021-08-25 PROCEDURE — C1726 CATH, BAL DIL, NON-VASCULAR: HCPCS

## 2021-08-25 RX ORDER — SODIUM CHLORIDE 9 MG/ML
INJECTION, SOLUTION INTRAVENOUS CONTINUOUS PRN
Status: DISCONTINUED | OUTPATIENT
Start: 2021-08-25 | End: 2021-08-25

## 2021-08-25 RX ORDER — LIDOCAINE HYDROCHLORIDE 20 MG/ML
INJECTION, SOLUTION EPIDURAL; INFILTRATION; INTRACAUDAL; PERINEURAL AS NEEDED
Status: DISCONTINUED | OUTPATIENT
Start: 2021-08-25 | End: 2021-08-25

## 2021-08-25 RX ORDER — PROPOFOL 10 MG/ML
INJECTION, EMULSION INTRAVENOUS AS NEEDED
Status: DISCONTINUED | OUTPATIENT
Start: 2021-08-25 | End: 2021-08-25

## 2021-08-25 RX ADMIN — LIDOCAINE HYDROCHLORIDE 100 MG: 20 INJECTION, SOLUTION EPIDURAL; INFILTRATION; INTRACAUDAL; PERINEURAL at 10:20

## 2021-08-25 RX ADMIN — PROPOFOL 150 MG: 10 INJECTION, EMULSION INTRAVENOUS at 10:20

## 2021-08-25 RX ADMIN — SODIUM CHLORIDE: 0.9 INJECTION, SOLUTION INTRAVENOUS at 10:14

## 2021-08-25 RX ADMIN — PROPOFOL 50 MG: 10 INJECTION, EMULSION INTRAVENOUS at 10:23

## 2021-08-25 NOTE — ANESTHESIA PREPROCEDURE EVALUATION
Procedure:  EGD    Relevant Problems   CARDIO   (+) Essential hypertension   (+) Hyperlipidemia   (+) Ventricular pre-excitation syndrome   (+) WPW syndrome      GI/HEPATIC   (+) H/O bariatric surgery        Physical Exam    Airway    Mallampati score: II  TM Distance: >3 FB  Neck ROM: full     Dental   No notable dental hx     Cardiovascular  Rhythm: regular, Rate: normal, Cardiovascular exam normal    Pulmonary  Breath sounds clear to auscultation,     Other Findings        Anesthesia Plan  ASA Score- 2     Anesthesia Type- general and IV sedation with anesthesia with ASA Monitors  Additional Monitors:   Airway Plan:     Comment: Pt's  translated  Plan Factors-    Chart reviewed  Patient summary reviewed  Induction-     Postoperative Plan-     Informed Consent- Anesthetic plan and risks discussed with patient and spouse

## 2021-08-25 NOTE — H&P
57 y o  female status post Laparoscopic RNYGB done on 2/8/11 by me, with history of anastomotic stricture needing multiple dilations (last one to 18 mm in 9/20) presents to office for ANNUAL post-op visit with complaints of chronic nausea, vomiting, and dysphasia with most foods  Here for an EGD to evaluate the anatomy of the GI tract  Physical Exam    /86   Pulse 59   Temp 98 °F (36 7 °C) (Temporal)   Resp 18   SpO2 98%    AAOx3  RRR  CTA B  Abdomen obese  Benign  A/P:    This is a 57 y o  female status post a gastric bypass in 2011 and history of anastomotic stricture  Will proceed with the EGD and biopsies        David Oliva MD  08/25/21  10:12 AM

## 2021-09-09 ENCOUNTER — OFFICE VISIT (OUTPATIENT)
Dept: BARIATRICS | Facility: CLINIC | Age: 61
End: 2021-09-09
Payer: COMMERCIAL

## 2021-09-09 VITALS
DIASTOLIC BLOOD PRESSURE: 70 MMHG | BODY MASS INDEX: 27.82 KG/M2 | HEIGHT: 65 IN | WEIGHT: 167 LBS | TEMPERATURE: 97.5 F | SYSTOLIC BLOOD PRESSURE: 142 MMHG | HEART RATE: 76 BPM

## 2021-09-09 DIAGNOSIS — K91.89 GASTROJEJUNAL ANASTOMOTIC STRICTURE: Primary | ICD-10-CM

## 2021-09-09 DIAGNOSIS — Z98.84 BARIATRIC SURGERY STATUS: ICD-10-CM

## 2021-09-09 PROCEDURE — 3077F SYST BP >= 140 MM HG: CPT | Performed by: SURGERY

## 2021-09-09 PROCEDURE — 1036F TOBACCO NON-USER: CPT | Performed by: SURGERY

## 2021-09-09 PROCEDURE — 99213 OFFICE O/P EST LOW 20 MIN: CPT | Performed by: SURGERY

## 2021-09-09 PROCEDURE — 3078F DIAST BP <80 MM HG: CPT | Performed by: SURGERY

## 2021-09-09 NOTE — PROGRESS NOTES
Subjective:      57 y o  female with history of Carline-En-Y Gastric Bypass with Dr Elsie Whiting in 2018 , presents to the office today for follow up of EGD performed on 8/25/2021 by Eden RAHMAN dilatation was performed at the time  Since the procedure, patient feels improvement in her pain, bloating and PO tolerance  However, she still resorts to eating liquids over solids and often makes unhealthier choices as the food is easier to eat  She is in omeprazole daily  She is no longer on hfer carafate  To the patient's knowledge, sh has never been diagnosed with a gastric ulcer  Sh denies ever smoking or having frequent exposure to second hand smoke  She drinks 6oz of caffeine daily  Denies NSAID/ETOH usage  Of note, the patient states that she has pain in her lower abdomen by the pannus that radiates to her back  She attributes this to her pannus  She has also noted intermittent rashes below her pannus       Historical Information   Past Medical History:   Diagnosis Date    Anastomotic stricture of gastrojejunostomy     s/p gastric bypass    Anemia     iron deficiency resolved 11/14/14    Anxiety     resolved 6/7/17    Class 1 obesity due to excess calories with serious comorbidity and body mass index (BMI) of 30 0 to 30 9 in adult 9/18/2020    Degenerative joint disease of ankle and foot, unspecified laterality     last assessed 5/17/13    Depression     last assessed 11/7/12    [facility]     type 2 uncomplicated, controlled - last assessed 1/31/14    Dysphagia     Gastric ulcer     History of melena     Hyperlipidemia     Hypertension     Insomnia     Insomnia     Obesity     resolved 11/14/14    Postgastrectomy malabsorption     Tachycardia     Ventricular pre-excitation syndrome     Vitamin D deficiency      Past Surgical History:   Procedure Laterality Date    CARDIAC CATHETERIZATION      post proc data:____ lesions successfully dilated    COLONOSCOPY      ESOPHAGOGASTRODUODENOSCOPY      GASTRIC BYPASS  02/08/2011    for morbid obesity     HYSTERECTOMY      @ age 38    OOPHORECTOMY Bilateral     @ age 38    [place] EGD TRANSORAL BIOPSY SINGLE/MULTIPLE N/A 1/25/2017    Procedure: ESOPHAGOGASTRODUODENOSCOPY (EGD); Surgeon: Iam Diaz MD;  Location: [facility]; Service: Bariatrics    [place] EGD TRANSORAL BIOPSY SINGLE/MULTIPLE N/A 5/3/2017    Procedure: ESOPHAGOGASTRODUODENOSCOPY (EGD) with balloon dilatation;  Surgeon: Iam Diaz MD;  [facility]; Service: Bariatrics    [place] EGD TRANSORAL BIOPSY SINGLE/MULTIPLE N/A 8/16/2017    Procedure: ESOPHAGOGASTRODUODENOSCOPY (EGD) with balloon dilation;  Surgeon: Iam Diaz MD;  [facility]; Service: Bariatrics    UPPER GASTROINTESTINAL ENDOSCOPY       Social History   Social History     Substance and Sexual Activity   Alcohol Use Never     Social History     Substance and Sexual Activity   Drug Use Never     Social History     Tobacco Use   Smoking Status Never Smoker   Smokeless Tobacco Never Used     Family History: non-contributory    Meds/Allergies   all medications and allergies reviewed  No Known Allergies    Objective   Review of Systems   Constitutional: Negative  HENT: Negative  Eyes: Negative  Respiratory: Negative  Cardiovascular: Negative  Gastrointestinal: Positive for abdominal pain  Endocrine: Negative  Genitourinary: Negative  Musculoskeletal: Negative  Skin: Negative  Allergic/Immunologic: Negative  Neurological: Negative  Hematological: Negative  Psychiatric/Behavioral: Negative  All other systems reviewed and are negative  Objective:    /70 (BP Location: Left arm, Patient Position: Sitting)   Pulse 76   Temp 97 5 °F (36 4 °C)   Ht 5' 5" (1 651 m)   Wt 75 8 kg (167 lb)   BMI 27 79 kg/m²      Physical Exam  Vitals and nursing note reviewed  Constitutional:       Appearance: Normal appearance     HENT:      Head: Normocephalic and atraumatic  Nose: Nose normal       Mouth/Throat:      Mouth: Mucous membranes are moist       Pharynx: Oropharynx is clear  Eyes:      Extraocular Movements: Extraocular movements intact  Pupils: Pupils are equal, round, and reactive to light  Cardiovascular:      Rate and Rhythm: Normal rate and regular rhythm  Pulses: Normal pulses  Pulmonary:      Effort: Pulmonary effort is normal    Abdominal:      General: Abdomen is flat  Bowel sounds are normal       Palpations: Abdomen is soft  Comments: Incisions are C/D/I  Healing well, without erythema or drainage  No bulges noted  Musculoskeletal:         General: Normal range of motion  Cervical back: Normal range of motion and neck supple  Skin:     General: Skin is warm and dry  Neurological:      General: No focal deficit present  Mental Status: She is alert and oriented to person, place, and time  Mental status is at baseline  Psychiatric:         Mood and Affect: Mood normal          Behavior: Behavior normal          Thought Content: Thought content normal          Judgment: Judgment normal              EGD (8/25/2021): IMPRESSION:  Pouchitis  Anastomotic stricture with an opening of at least 10 mm as I was able to traverse the scope without difficulty  I again balloon dilated to 18 mm      RECOMMENDATION:  Continue with the bariatric program process and follow up in the office  Biopsy results pending  Pathology (8/25/2021): Final Diagnosis  A  Stomach (biopsy):  - Mild chronic inactive oxyntic gastritis  - No H pylori identified (H&E)  - No intestinal metaplasia    Assessment/Plan:       Anne Ledesma is a 57 y o  female  s/p Carline-En-Y Gastric Bypass with Dr Eileen Felder with anastomotic stricture, now dilated four times  She has symptomatic improvement with each dilatation, however, still resorts to comfort foods  · Continue PPI therapy  · Repeat endoscopy in 3 months    · F/u dietician to review healthy eating habits and options  · Continue vitamins as directed, with routine blood work follow up  · Hold off on plastic surgery evaluation at this time for panniculectomy/ abdominoplasty  Patient recommended to note any rashes or medications she may need for fungal infections

## 2021-09-13 ENCOUNTER — PREP FOR PROCEDURE (OUTPATIENT)
Dept: BARIATRICS | Facility: CLINIC | Age: 61
End: 2021-09-13

## 2021-09-13 DIAGNOSIS — Z98.84 BARIATRIC SURGERY STATUS: Primary | ICD-10-CM

## 2021-09-23 ENCOUNTER — OFFICE VISIT (OUTPATIENT)
Dept: BARIATRICS | Facility: CLINIC | Age: 61
End: 2021-09-23

## 2021-09-23 VITALS — HEIGHT: 65 IN | WEIGHT: 166.8 LBS | BODY MASS INDEX: 27.79 KG/M2

## 2021-09-23 DIAGNOSIS — Z98.84 BARIATRIC SURGERY STATUS: Primary | ICD-10-CM

## 2021-09-23 PROCEDURE — RECHECK: Performed by: DIETITIAN, REGISTERED

## 2021-09-23 PROCEDURE — 3008F BODY MASS INDEX DOCD: CPT | Performed by: OBSTETRICS & GYNECOLOGY

## 2021-09-23 NOTE — PROGRESS NOTES
Bariatric Follow Up Nutrition Note  Type of surgery  Gastric bypass: laparoscopic  Surgery Date: 2/8/2011  10 years  post-op  Surgeon: Dr Webster Last  57 y o   female  Ht 5' 5" (1 651 m)   Wt 75 7 kg (166 lb 12 8 oz)   BMI 27 76 kg/m²     [place]  [place] Equation:  1317  Estimated calories for weight loss 581-1081( 1-2# per wk wt loss - sedentary )  Estimated protein needs 68-82(1 0-1 2 gms/kg IBW )   Estimated fluid needs 0191-0999(30-35 ml/kg IBW )      Weight on Day of Weight Loss Surgery: 265  Weight in (lb) to have BMI = 25: 150  Pre-Op Excess Wt: 115  Post-Op Wt Loss: 90 2#/ 84% EBWL in 10 year(s)    Review of History and Medications   Past Medical History:   Diagnosis Date    Anastomotic stricture of gastrojejunostomy     s/p gastric bypass    Anemia     iron deficiency resolved 11/14/14    Anxiety     resolved 6/7/17    Class 1 obesity due to excess calories with serious comorbidity and body mass index (BMI) of 30 0 to 30 9 in adult 9/18/2020    Degenerative joint disease of ankle and foot, unspecified laterality     last assessed 5/17/13    Depression     last assessed 11/7/12    [facility] )     type 2 uncomplicated, controlled - last assessed 1/31/14    Dysphagia     Gastric ulcer     History of melena     Hyperlipidemia     Hypertension     Insomnia     Insomnia     Obesity     resolved 11/14/14    Postgastrectomy malabsorption     Tachycardia     Ventricular pre-excitation syndrome     Vitamin D deficiency      Past Surgical History:   Procedure Laterality Date    CARDIAC CATHETERIZATION      post proc data:____ lesions successfully dilated    COLONOSCOPY      ESOPHAGOGASTRODUODENOSCOPY      GASTRIC BYPASS  02/08/2011    for morbid obesity     HYSTERECTOMY      @ age 38    OOPHORECTOMY Bilateral     @ age 38    [place] EGD TRANSORAL BIOPSY SINGLE/MULTIPLE N/A 1/25/2017    Procedure: ESOPHAGOGASTRODUODENOSCOPY (EGD); Surgeon: David Oliva MD;  [facility]: [facility]; Service: Bariatrics    [place] EGD TRANSORAL BIOPSY SINGLE/MULTIPLE N/A 5/3/2017    Procedure: ESOPHAGOGASTRODUODENOSCOPY (EGD) with balloon dilatation;  Surgeon: David Oliva MD;  [facility]; Service: Bariatrics    [place] EGD TRANSORAL BIOPSY SINGLE/MULTIPLE N/A 8/16/2017    Procedure: ESOPHAGOGASTRODUODENOSCOPY (EGD) with balloon dilation;  Surgeon: David Oliva MD;  [facility]; Service: Bariatrics    UPPER GASTROINTESTINAL ENDOSCOPY       Social History     Socioeconomic History    Marital status: /Civil Union     Spouse name: Not on file    Number of children: Not on file    Years of education: Not on file    Highest education level: Not on file   Occupational History    Not on file   Tobacco Use    Smoking status: Never Smoker    Smokeless tobacco: Never Used   Vaping Use    Vaping Use: Never used   Substance and Sexual Activity    Alcohol use: Never    Drug use: Never    Sexual activity: Not Currently   Other Topics Concern    Not on file   Social History Narrative    Not on file     Social Determinants of Health     Financial Resource Strain: Low Risk     Difficulty of Paying Living Expenses: Not hard at all   Food Insecurity: No Food Insecurity    Worried About Running Out of Food in the Last Year: Never true    [address] in the Last Year: Never true   Transportation Needs: No Transportation Needs    Lack of Transportation (Medical): No    Lack of Transportation (Non-Medical):  No   Physical Activity:     Days of Exercise per Week:     Minutes of Exercise per Session:    Stress:     Feeling of Stress :    Social Connections:     Frequency of Communication with Friends and Family:     Frequency of Social Gatherings with Friends and Family:     Attends Spiritism Services:     Active Member of Clubs or Organizations:     Attends Club or Organization Meetings:     Marital Status:    Intimate Partner Violence:     Fear of Current or Ex-Partner:     Emotionally Abused:     Physically Abused:     Sexually Abused:        Current Outpatient Medications:     atorvastatin (LIPITOR) 20 mg tablet, Take 1 tablet by mouth once daily, Disp: 90 tablet, Rfl: 1    calcium gluconate 500 mg tablet, Take 500 mg by mouth daily, Disp: , Rfl:     Cholecalciferol (Vitamin D3) 50 MCG (2000 UT) TABS, Take 2 tablets by mouth once daily, Disp: 180 tablet, Rfl: 1    IRON, FERROUS GLUCONATE, PO, Take 1 tablet by mouth daily, Disp: , Rfl:     metoprolol succinate (TOPROL-XL) 25 mg 24 hr tablet, Take 1 tablet by mouth once daily, Disp: 90 tablet, Rfl: 1    Omega-3 Fatty Acids (FISH OIL) 1,000 mg, Take 1,000 mg by mouth 2 (two) times a day, Disp: , Rfl:     omeprazole (PriLOSEC) 20 mg delayed release capsule, Take 1 capsule by mouth once daily, Disp: 90 capsule, Rfl: 0    polyethylene glycol (GLYCOLAX) 17 GM/SCOOP powder, At 5pm take 5mgx2 dulcolax  At 6pm 32oz miralax in 64oz gatorade  Drink 8oz glass every 5 mins until 32oz finished   Drink remaining as rec , Disp: 238 g, Rfl: 0    sucralfate (CARAFATE) 1 g tablet, Take 1 g by mouth 4 (four) times a day, Disp: , Rfl:     VITAMIN E PO, Take 1 tablet by mouth daily, Disp: , Rfl:     Food Intake and Lifestyle Assessment   Food Intake Assessment completed via usual diet recall    Breakfast: coffee with cookie  Snack: hard boiled egg  Lunch: shake (fruit and milk blended)  Snack: crackers, breads  Dinner: soups- broths with chicken and vegetables  Snack:   Beverage intake: water, 1% milk and juice, coffee  Diet texture/stage: liquid  Protein supplement: none  Estimated protein intake per day: 30-40g  Estimated fluid intake per day: 64oz  Meals eaten away from home: n/a  Typical meal pattern: 0-3 meals per day and 2 snacks per day  Eating Behaviors: frequent intakes on refined CHO foods as patient states these foods are better tolerated for her, choosing liquid meals due to tolerance issues    Food allergies or intolerances: meats,   Can eat: yogurt, eggs, most food need to be blended for tolerance, crackers, breads, cakes  Cultural or Church considerations:     Physical Assessment  Nutrition Related Findings  Vomiting    Physical Activity  Types of exercise: Not assessed this visit  Current physical limitations: n/a    Psychosocial Assessment   Support systems: spouse  Socioeconomic factors: not addressed    Nutrition Diagnosis  Diagnosis: Disordered eating pattern (NB-1 5)  Related to: Altered GI function  As Evidenced by: Unintentional weight gain and frequent vomitting     Nutrition Prescription: Recommend the following diet  Low fat, Low sugar, High protein and texture as tolerated      Interventions and Teaching   Patient educated on post-op nutrition guidelines  Patient educated and handouts provided  Surgical changes to stomach / GI  Capacity of post-surgery stomach  Diet progression  Adequate hydration  Weight loss plateaus/ possibility of weight regain  Nutrition considerations after surgery  Protein supplements provided samples of powders and ready to drink protein supplement (include unflavored and chicken soup)  Meal planning and preparation  Appropriate carbohydrate, protein, and fat intake, and food/fluid choices to maximize safe weight loss, nutrient intake, and tolerance Emphasized importance of adequate protein intakes to meet needs and discuss ways to limit snacking on simple CHO     Dietary and lifestyle changes  Possible problems with poor eating habits  Techniques for self monitoring and keeping daily food journal Reviewed food journaling techniques, provided paper journal    Potential for food intolerance after surgery, and ways to deal with them including: lactose intolerance, nausea, reflux, vomiting, diarrhea, food intolerance, appetite changes, gas    Education provided to: patient and and     Barriers to learning: Language    Readiness to change: preparation    Comprehension: needs reinforcement and verbalizes understanding     Expected Compliance: good    Evaluation/Monitoring   Eating pattern as discussed Tolerance of nutrition prescription Body weight Lab values Physical activity Bowel pattern    Goals  1  Consume dietary protein sources as tolerated  2  If unable to tolerate dietary sources of protein please include atleast 1 protein shake per day (provided samples), OK to add protein to tolerated foods  3  Food journal daily- journal foods along with symptoms  4   Try smaller more frequent meals to assist with improved tolerance     Time Spent:   30 Minutes

## 2021-11-13 ENCOUNTER — APPOINTMENT (OUTPATIENT)
Dept: LAB | Facility: HOSPITAL | Age: 61
End: 2021-11-13
Payer: COMMERCIAL

## 2021-11-13 DIAGNOSIS — D64.9 ANEMIA: ICD-10-CM

## 2021-11-13 DIAGNOSIS — E55.9 VITAMIN D DEFICIENCY: ICD-10-CM

## 2021-11-13 DIAGNOSIS — E78.2 MIXED HYPERLIPIDEMIA: ICD-10-CM

## 2021-11-13 DIAGNOSIS — K91.2 POSTSURGICAL MALABSORPTION: ICD-10-CM

## 2021-11-13 DIAGNOSIS — Z98.84 BARIATRIC SURGERY STATUS: ICD-10-CM

## 2021-11-13 DIAGNOSIS — R73.03 PREDIABETES: ICD-10-CM

## 2021-11-13 LAB
25(OH)D3 SERPL-MCNC: 26.3 NG/ML (ref 30–100)
ALBUMIN SERPL BCP-MCNC: 3.4 G/DL (ref 3.5–5)
ALP SERPL-CCNC: 103 U/L (ref 46–116)
ALT SERPL W P-5'-P-CCNC: 30 U/L (ref 12–78)
ANION GAP SERPL CALCULATED.3IONS-SCNC: 2 MMOL/L (ref 4–13)
AST SERPL W P-5'-P-CCNC: 24 U/L (ref 5–45)
BILIRUB SERPL-MCNC: 0.33 MG/DL (ref 0.2–1)
BUN SERPL-MCNC: 7 MG/DL (ref 5–25)
CALCIUM ALBUM COR SERPL-MCNC: 9.4 MG/DL (ref 8.3–10.1)
CALCIUM SERPL-MCNC: 8.9 MG/DL (ref 8.3–10.1)
CHLORIDE SERPL-SCNC: 104 MMOL/L (ref 100–108)
CHOLEST SERPL-MCNC: 139 MG/DL (ref 50–200)
CO2 SERPL-SCNC: 31 MMOL/L (ref 21–32)
CREAT SERPL-MCNC: 0.64 MG/DL (ref 0.6–1.3)
EST. AVERAGE GLUCOSE BLD GHB EST-MCNC: 137 MG/DL
FERRITIN SERPL-MCNC: 19 NG/ML (ref 8–388)
GFR SERPL CREATININE-BSD FRML MDRD: 111 ML/MIN/1.73SQ M
GLUCOSE P FAST SERPL-MCNC: 91 MG/DL (ref 65–99)
HBA1C MFR BLD: 6.4 %
HDLC SERPL-MCNC: 44 MG/DL
IRON SATN MFR SERPL: 15 % (ref 15–50)
IRON SERPL-MCNC: 48 UG/DL (ref 50–170)
LDLC SERPL CALC-MCNC: 71 MG/DL (ref 0–100)
POTASSIUM SERPL-SCNC: 4.6 MMOL/L (ref 3.5–5.3)
PROT SERPL-MCNC: 7.5 G/DL (ref 6.4–8.2)
SODIUM SERPL-SCNC: 137 MMOL/L (ref 136–145)
TIBC SERPL-MCNC: 330 UG/DL (ref 250–450)
TRIGL SERPL-MCNC: 119 MG/DL

## 2021-11-13 PROCEDURE — 82306 VITAMIN D 25 HYDROXY: CPT

## 2021-11-13 PROCEDURE — 83036 HEMOGLOBIN GLYCOSYLATED A1C: CPT

## 2021-11-13 PROCEDURE — 83540 ASSAY OF IRON: CPT

## 2021-11-13 PROCEDURE — 80053 COMPREHEN METABOLIC PANEL: CPT

## 2021-11-13 PROCEDURE — 83550 IRON BINDING TEST: CPT

## 2021-11-13 PROCEDURE — 36415 COLL VENOUS BLD VENIPUNCTURE: CPT

## 2021-11-13 PROCEDURE — 80061 LIPID PANEL: CPT

## 2021-11-13 PROCEDURE — 82728 ASSAY OF FERRITIN: CPT

## 2021-11-15 ENCOUNTER — OFFICE VISIT (OUTPATIENT)
Dept: INTERNAL MEDICINE CLINIC | Facility: CLINIC | Age: 61
End: 2021-11-15

## 2021-11-15 VITALS
DIASTOLIC BLOOD PRESSURE: 91 MMHG | SYSTOLIC BLOOD PRESSURE: 137 MMHG | HEART RATE: 70 BPM | BODY MASS INDEX: 30.02 KG/M2 | OXYGEN SATURATION: 98 % | TEMPERATURE: 97.8 F | HEIGHT: 63 IN | WEIGHT: 169.4 LBS

## 2021-11-15 DIAGNOSIS — K31.89 GASTRIC ANASTOMOTIC STRICTURE: ICD-10-CM

## 2021-11-15 DIAGNOSIS — E65 ABDOMINAL PANNUS: ICD-10-CM

## 2021-11-15 DIAGNOSIS — I10 ESSENTIAL HYPERTENSION: Chronic | ICD-10-CM

## 2021-11-15 DIAGNOSIS — Z23 NEED FOR INFLUENZA VACCINATION: ICD-10-CM

## 2021-11-15 DIAGNOSIS — K92.9 GASTRIC ANASTOMOTIC STRICTURE: ICD-10-CM

## 2021-11-15 DIAGNOSIS — Z90.3 POSTGASTRECTOMY MALABSORPTION: ICD-10-CM

## 2021-11-15 DIAGNOSIS — K91.2 POSTGASTRECTOMY MALABSORPTION: ICD-10-CM

## 2021-11-15 DIAGNOSIS — Z98.84 H/O BARIATRIC SURGERY: ICD-10-CM

## 2021-11-15 DIAGNOSIS — E66.09 CLASS 1 OBESITY DUE TO EXCESS CALORIES WITH SERIOUS COMORBIDITY AND BODY MASS INDEX (BMI) OF 30.0 TO 30.9 IN ADULT: ICD-10-CM

## 2021-11-15 DIAGNOSIS — E78.2 MIXED HYPERLIPIDEMIA: Chronic | ICD-10-CM

## 2021-11-15 DIAGNOSIS — R73.03 PREDIABETES: Primary | ICD-10-CM

## 2021-11-15 PROBLEM — E66.3 OVERWEIGHT (BMI 25.0-29.9): Status: RESOLVED | Noted: 2021-05-13 | Resolved: 2021-11-15

## 2021-11-15 PROCEDURE — 99213 OFFICE O/P EST LOW 20 MIN: CPT | Performed by: PHYSICIAN ASSISTANT

## 2021-11-15 PROCEDURE — 3080F DIAST BP >= 90 MM HG: CPT | Performed by: PHYSICIAN ASSISTANT

## 2021-11-15 PROCEDURE — 1036F TOBACCO NON-USER: CPT | Performed by: PHYSICIAN ASSISTANT

## 2021-11-15 PROCEDURE — 3075F SYST BP GE 130 - 139MM HG: CPT | Performed by: PHYSICIAN ASSISTANT

## 2021-11-15 PROCEDURE — 90682 RIV4 VACC RECOMBINANT DNA IM: CPT | Performed by: PHYSICIAN ASSISTANT

## 2021-11-15 PROCEDURE — 3008F BODY MASS INDEX DOCD: CPT | Performed by: PHYSICIAN ASSISTANT

## 2021-11-15 PROCEDURE — 90471 IMMUNIZATION ADMIN: CPT | Performed by: PHYSICIAN ASSISTANT

## 2021-11-15 RX ORDER — METFORMIN HYDROCHLORIDE 500 MG/1
500 TABLET, EXTENDED RELEASE ORAL DAILY
Qty: 90 TABLET | Refills: 1 | Status: SHIPPED | OUTPATIENT
Start: 2021-11-15 | End: 2022-04-18

## 2021-11-16 ENCOUNTER — TELEPHONE (OUTPATIENT)
Dept: GASTROENTEROLOGY | Facility: HOSPITAL | Age: 61
End: 2021-11-16

## 2021-11-17 ENCOUNTER — ANESTHESIA (OUTPATIENT)
Dept: GASTROENTEROLOGY | Facility: HOSPITAL | Age: 61
End: 2021-11-17

## 2021-11-17 ENCOUNTER — HOSPITAL ENCOUNTER (OUTPATIENT)
Dept: GASTROENTEROLOGY | Facility: HOSPITAL | Age: 61
Setting detail: OUTPATIENT SURGERY
Discharge: HOME | End: 2021-11-17
Attending: SURGERY | Admitting: SURGERY
Payer: COMMERCIAL

## 2021-11-17 ENCOUNTER — ANESTHESIA EVENT (OUTPATIENT)
Dept: GASTROENTEROLOGY | Facility: HOSPITAL | Age: 61
End: 2021-11-17

## 2021-11-17 VITALS
RESPIRATION RATE: 16 BRPM | OXYGEN SATURATION: 99 % | WEIGHT: 169.31 LBS | TEMPERATURE: 97.3 F | DIASTOLIC BLOOD PRESSURE: 75 MMHG | HEIGHT: 63 IN | SYSTOLIC BLOOD PRESSURE: 137 MMHG | HEART RATE: 65 BPM | BODY MASS INDEX: 30 KG/M2

## 2021-11-17 DIAGNOSIS — K31.89 GASTRIC ANASTOMOTIC STRICTURE: Primary | ICD-10-CM

## 2021-11-17 DIAGNOSIS — K92.9 GASTRIC ANASTOMOTIC STRICTURE: Primary | ICD-10-CM

## 2021-11-17 DIAGNOSIS — Z98.84 BARIATRIC SURGERY STATUS: ICD-10-CM

## 2021-11-17 LAB — GLUCOSE SERPL-MCNC: 76 MG/DL (ref 65–140)

## 2021-11-17 PROCEDURE — 88305 TISSUE EXAM BY PATHOLOGIST: CPT | Performed by: PATHOLOGY

## 2021-11-17 PROCEDURE — 82948 REAGENT STRIP/BLOOD GLUCOSE: CPT

## 2021-11-17 PROCEDURE — 43245 EGD DILATE STRICTURE: CPT | Performed by: SURGERY

## 2021-11-17 PROCEDURE — C1726 CATH, BAL DIL, NON-VASCULAR: HCPCS

## 2021-11-17 PROCEDURE — 43239 EGD BIOPSY SINGLE/MULTIPLE: CPT | Performed by: SURGERY

## 2021-11-17 RX ORDER — SUCRALFATE ORAL 1 G/10ML
1 SUSPENSION ORAL
Qty: 1200 ML | Refills: 0 | Status: SHIPPED | OUTPATIENT
Start: 2021-11-17 | End: 2021-12-02 | Stop reason: SDUPTHER

## 2021-11-17 RX ORDER — SODIUM CHLORIDE 9 MG/ML
125 INJECTION, SOLUTION INTRAVENOUS CONTINUOUS
Status: DISCONTINUED | OUTPATIENT
Start: 2021-11-17 | End: 2021-11-21 | Stop reason: HOSPADM

## 2021-11-17 RX ORDER — PROPOFOL 10 MG/ML
INJECTION, EMULSION INTRAVENOUS AS NEEDED
Status: DISCONTINUED | OUTPATIENT
Start: 2021-11-17 | End: 2021-11-17

## 2021-11-17 RX ORDER — OMEPRAZOLE 20 MG/1
CAPSULE, DELAYED RELEASE ORAL
Qty: 90 CAPSULE | Refills: 0 | Status: SHIPPED | OUTPATIENT
Start: 2021-11-17 | End: 2022-02-07

## 2021-11-17 RX ADMIN — PROPOFOL 50 MG: 10 INJECTION, EMULSION INTRAVENOUS at 07:38

## 2021-11-17 RX ADMIN — PROPOFOL 50 MG: 10 INJECTION, EMULSION INTRAVENOUS at 07:43

## 2021-11-17 RX ADMIN — PROPOFOL 150 MG: 10 INJECTION, EMULSION INTRAVENOUS at 07:35

## 2021-11-17 RX ADMIN — SODIUM CHLORIDE 125 ML/HR: 0.9 INJECTION, SOLUTION INTRAVENOUS at 07:15

## 2021-11-18 DIAGNOSIS — K91.2 POSTSURGICAL MALABSORPTION: Primary | ICD-10-CM

## 2021-11-18 DIAGNOSIS — E61.1 LOW IRON: ICD-10-CM

## 2021-11-18 DIAGNOSIS — Z98.84 BARIATRIC SURGERY STATUS: ICD-10-CM

## 2021-12-02 ENCOUNTER — OFFICE VISIT (OUTPATIENT)
Dept: BARIATRICS | Facility: CLINIC | Age: 61
End: 2021-12-02
Payer: COMMERCIAL

## 2021-12-02 VITALS
TEMPERATURE: 96.3 F | DIASTOLIC BLOOD PRESSURE: 80 MMHG | WEIGHT: 166 LBS | SYSTOLIC BLOOD PRESSURE: 142 MMHG | BODY MASS INDEX: 27.66 KG/M2 | HEART RATE: 73 BPM | HEIGHT: 65 IN

## 2021-12-02 DIAGNOSIS — K91.89 GASTROJEJUNAL ANASTOMOTIC STRICTURE: Primary | ICD-10-CM

## 2021-12-02 DIAGNOSIS — K92.9 GASTRIC ANASTOMOTIC STRICTURE: ICD-10-CM

## 2021-12-02 DIAGNOSIS — K31.89 GASTRIC ANASTOMOTIC STRICTURE: ICD-10-CM

## 2021-12-02 PROCEDURE — 1036F TOBACCO NON-USER: CPT | Performed by: SURGERY

## 2021-12-02 PROCEDURE — 3008F BODY MASS INDEX DOCD: CPT | Performed by: PHYSICIAN ASSISTANT

## 2021-12-02 PROCEDURE — 3077F SYST BP >= 140 MM HG: CPT | Performed by: SURGERY

## 2021-12-02 PROCEDURE — 3008F BODY MASS INDEX DOCD: CPT | Performed by: SURGERY

## 2021-12-02 PROCEDURE — 3079F DIAST BP 80-89 MM HG: CPT | Performed by: SURGERY

## 2021-12-02 PROCEDURE — 99213 OFFICE O/P EST LOW 20 MIN: CPT | Performed by: SURGERY

## 2021-12-02 RX ORDER — SUCRALFATE ORAL 1 G/10ML
1 SUSPENSION ORAL
Qty: 1200 ML | Refills: 3 | Status: SHIPPED | OUTPATIENT
Start: 2021-12-02 | End: 2022-06-01 | Stop reason: ALTCHOICE

## 2022-02-08 ENCOUNTER — OFFICE VISIT (OUTPATIENT)
Dept: INTERNAL MEDICINE CLINIC | Facility: CLINIC | Age: 62
End: 2022-02-08

## 2022-02-08 VITALS
BODY MASS INDEX: 27.96 KG/M2 | HEART RATE: 66 BPM | WEIGHT: 168 LBS | DIASTOLIC BLOOD PRESSURE: 90 MMHG | OXYGEN SATURATION: 97 % | SYSTOLIC BLOOD PRESSURE: 156 MMHG | TEMPERATURE: 97.4 F

## 2022-02-08 DIAGNOSIS — L98.7 EXCESS SKIN: ICD-10-CM

## 2022-02-08 DIAGNOSIS — Z98.84 H/O BARIATRIC SURGERY: ICD-10-CM

## 2022-02-08 DIAGNOSIS — E65 ABDOMINAL PANNUS: ICD-10-CM

## 2022-02-08 DIAGNOSIS — B37.2 CANDIDAL SKIN INFECTION: Primary | ICD-10-CM

## 2022-02-08 PROCEDURE — 3077F SYST BP >= 140 MM HG: CPT | Performed by: PHYSICIAN ASSISTANT

## 2022-02-08 PROCEDURE — 3080F DIAST BP >= 90 MM HG: CPT | Performed by: PHYSICIAN ASSISTANT

## 2022-02-08 PROCEDURE — 99213 OFFICE O/P EST LOW 20 MIN: CPT | Performed by: PHYSICIAN ASSISTANT

## 2022-02-08 PROCEDURE — 1036F TOBACCO NON-USER: CPT | Performed by: PHYSICIAN ASSISTANT

## 2022-02-08 RX ORDER — NYSTATIN 100000 [USP'U]/G
POWDER TOPICAL 3 TIMES DAILY
Qty: 60 G | Refills: 3 | Status: SHIPPED | OUTPATIENT
Start: 2022-02-08

## 2022-02-08 NOTE — PROGRESS NOTES
Assessment/Plan:      Diagnoses and all orders for this visit:    Candidal skin infection  -     triamcinolone (KENALOG) 0 1 % ointment; Apply topically 2 (two) times a day  -     nystatin (MYCOSTATIN) powder; Apply topically 3 (three) times a day    Abdominal pannus  -     triamcinolone (KENALOG) 0 1 % ointment; Apply topically 2 (two) times a day  -     nystatin (MYCOSTATIN) powder; Apply topically 3 (three) times a day    Excess skin  -     triamcinolone (KENALOG) 0 1 % ointment; Apply topically 2 (two) times a day  -     nystatin (MYCOSTATIN) powder; Apply topically 3 (three) times a day    H/O bariatric surgery      57-year-old female presenting today with her  for assistance with Slovenian translation as needed same-day appointment for concern of persistent rash within her skin folds for the past few weeks  Patient had gastric bypass surgery with significant weight loss resulting in excessive skin/abdominal pannus which I explained is the likely cause to her rash  I explained that this most likely is a fungal rash that is caused by a appropriate environment of dark, warm moist, compressed areas where fungus thrives the most   They do already have an appointment scheduled with Plastic surgery to discuss the excessive skin and will can be done  In the meantime I advise keeping the skin is dry as possible  She is agreeable to try nystatin powder that she will apply to the affected areas within the skin folds 3 to 4 times a day  I did prescribe her topical triamcinolone for spot treatment b i d  p r n  itchy areas  Patient and has been agreeable to treatment and understand the importance of consultation appointment with plastics  She is already scheduled May 23rd for routine follow-up here at the clinic but will call sooner if there are any concerns  Chief Complaint   Patient presents with    Rash     started about 1 month ago       Subjective:     Patient ID: Jasbir Sandhu is a 57 y o  female  60y/o female here today for concern of rash x 3-4 weeks in skin folds - abdominal  And groin  Hx of gastric bypass/weight loss surgery 10 years ago  She reports rash in groin, itching/burning, red, as well as abdominal skin folds under pannus/lower abdomen  She has tried cream OTC - vaseline and abx cream without relief  Has specialist appt with plastics 3/12/22  Review of Systems   Constitutional: Negative  Gastrointestinal: Negative  Genitourinary: Negative  Skin:        As in HPI         The following portions of the patient's history were reviewed and updated as appropriate: allergies, current medications, past family history, past medical history, past social history, past surgical history and problem list       Objective:     Physical Exam  Vitals reviewed  Constitutional:       General: She is not in acute distress  Appearance: She is not ill-appearing or toxic-appearing  Skin:     Comments: Excessive abdominal skin/folds with beefy red rash, some circular or oval in distribution within, in skin indent within, there is some moist cracking  Skin in this area generally moist with a slight odor  Similar rash to less degree B/L groin  Neurological:      Mental Status: She is alert and oriented to person, place, and time     Psychiatric:         Mood and Affect: Mood normal          Behavior: Behavior normal            Vitals:    02/08/22 1235   BP: 156/90   BP Location: Left arm   Patient Position: Sitting   Cuff Size: Standard   Pulse: 66   Temp: (!) 97 4 °F (36 3 °C)   TempSrc: Temporal   SpO2: 97%   Weight: 76 2 kg (168 lb)

## 2022-03-21 ENCOUNTER — TELEPHONE (OUTPATIENT)
Dept: PLASTIC SURGERY | Facility: CLINIC | Age: 62
End: 2022-03-21

## 2022-03-21 ENCOUNTER — APPOINTMENT (EMERGENCY)
Dept: RADIOLOGY | Facility: HOSPITAL | Age: 62
End: 2022-03-21
Payer: COMMERCIAL

## 2022-03-21 ENCOUNTER — CONSULT (OUTPATIENT)
Dept: PLASTIC SURGERY | Facility: CLINIC | Age: 62
End: 2022-03-21
Payer: COMMERCIAL

## 2022-03-21 ENCOUNTER — HOSPITAL ENCOUNTER (EMERGENCY)
Facility: HOSPITAL | Age: 62
Discharge: HOME | End: 2022-03-21
Attending: EMERGENCY MEDICINE | Admitting: EMERGENCY MEDICINE
Payer: COMMERCIAL

## 2022-03-21 ENCOUNTER — TELEPHONE (OUTPATIENT)
Dept: INTERNAL MEDICINE CLINIC | Facility: CLINIC | Age: 62
End: 2022-03-21

## 2022-03-21 VITALS
HEART RATE: 80 BPM | WEIGHT: 170 LBS | DIASTOLIC BLOOD PRESSURE: 110 MMHG | SYSTOLIC BLOOD PRESSURE: 193 MMHG | HEIGHT: 65 IN | BODY MASS INDEX: 28.32 KG/M2 | TEMPERATURE: 97.6 F

## 2022-03-21 VITALS
RESPIRATION RATE: 16 BRPM | TEMPERATURE: 98.6 F | SYSTOLIC BLOOD PRESSURE: 190 MMHG | BODY MASS INDEX: 28.32 KG/M2 | HEART RATE: 60 BPM | DIASTOLIC BLOOD PRESSURE: 90 MMHG | HEIGHT: 65 IN | OXYGEN SATURATION: 99 % | WEIGHT: 170 LBS

## 2022-03-21 DIAGNOSIS — M25.512 LEFT SHOULDER PAIN: ICD-10-CM

## 2022-03-21 DIAGNOSIS — I10 HIGH BLOOD PRESSURE: Primary | ICD-10-CM

## 2022-03-21 DIAGNOSIS — E65 ABDOMINAL PANNUS: Primary | ICD-10-CM

## 2022-03-21 LAB
2HR DELTA HS TROPONIN: 3 NG/L
ANION GAP SERPL CALCULATED.3IONS-SCNC: 3 MMOL/L (ref 4–13)
ATRIAL RATE: 66 BPM
BASOPHILS # BLD AUTO: 0.02 THOUSANDS/ΜL (ref 0–0.1)
BASOPHILS NFR BLD AUTO: 0 % (ref 0–1)
BUN SERPL-MCNC: 14 MG/DL (ref 5–25)
CALCIUM SERPL-MCNC: 8.9 MG/DL (ref 8.3–10.1)
CARDIAC TROPONIN I PNL SERPL HS: 5 NG/L
CARDIAC TROPONIN I PNL SERPL HS: 8 NG/L
CHLORIDE SERPL-SCNC: 111 MMOL/L (ref 100–108)
CO2 SERPL-SCNC: 28 MMOL/L (ref 21–32)
CREAT SERPL-MCNC: 0.69 MG/DL (ref 0.6–1.3)
EOSINOPHIL # BLD AUTO: 0.07 THOUSAND/ΜL (ref 0–0.61)
EOSINOPHIL NFR BLD AUTO: 1 % (ref 0–6)
ERYTHROCYTE [DISTWIDTH] IN BLOOD BY AUTOMATED COUNT: 13.4 % (ref 11.6–15.1)
GFR SERPL CREATININE-BSD FRML MDRD: 92 ML/MIN/1.73SQ M
GLUCOSE SERPL-MCNC: 81 MG/DL (ref 65–140)
HCT VFR BLD AUTO: 40.2 % (ref 34.8–46.1)
HGB BLD-MCNC: 12.5 G/DL (ref 11.5–15.4)
IMM GRANULOCYTES # BLD AUTO: 0.02 THOUSAND/UL (ref 0–0.2)
IMM GRANULOCYTES NFR BLD AUTO: 0 % (ref 0–2)
LYMPHOCYTES # BLD AUTO: 1.88 THOUSANDS/ΜL (ref 0.6–4.47)
LYMPHOCYTES NFR BLD AUTO: 33 % (ref 14–44)
MCH RBC QN AUTO: 28.2 PG (ref 26.8–34.3)
MCHC RBC AUTO-ENTMCNC: 31.1 G/DL (ref 31.4–37.4)
MCV RBC AUTO: 91 FL (ref 82–98)
MONOCYTES # BLD AUTO: 0.7 THOUSAND/ΜL (ref 0.17–1.22)
MONOCYTES NFR BLD AUTO: 12 % (ref 4–12)
NEUTROPHILS # BLD AUTO: 2.95 THOUSANDS/ΜL (ref 1.85–7.62)
NEUTS SEG NFR BLD AUTO: 54 % (ref 43–75)
NRBC BLD AUTO-RTO: 0 /100 WBCS
P AXIS: 50 DEGREES
PLATELET # BLD AUTO: 245 THOUSANDS/UL (ref 149–390)
PMV BLD AUTO: 10.7 FL (ref 8.9–12.7)
POTASSIUM SERPL-SCNC: 4.9 MMOL/L (ref 3.5–5.3)
PR INTERVAL: 168 MS
QRS AXIS: 3 DEGREES
QRSD INTERVAL: 80 MS
QT INTERVAL: 394 MS
QTC INTERVAL: 413 MS
RBC # BLD AUTO: 4.43 MILLION/UL (ref 3.81–5.12)
SODIUM SERPL-SCNC: 142 MMOL/L (ref 136–145)
T WAVE AXIS: 43 DEGREES
VENTRICULAR RATE: 66 BPM
WBC # BLD AUTO: 5.64 THOUSAND/UL (ref 4.31–10.16)

## 2022-03-21 PROCEDURE — 36415 COLL VENOUS BLD VENIPUNCTURE: CPT | Performed by: EMERGENCY MEDICINE

## 2022-03-21 PROCEDURE — 85025 COMPLETE CBC W/AUTO DIFF WBC: CPT | Performed by: EMERGENCY MEDICINE

## 2022-03-21 PROCEDURE — 99285 EMERGENCY DEPT VISIT HI MDM: CPT | Performed by: EMERGENCY MEDICINE

## 2022-03-21 PROCEDURE — 1036F TOBACCO NON-USER: CPT | Performed by: STUDENT IN AN ORGANIZED HEALTH CARE EDUCATION/TRAINING PROGRAM

## 2022-03-21 PROCEDURE — 99204 OFFICE O/P NEW MOD 45 MIN: CPT | Performed by: STUDENT IN AN ORGANIZED HEALTH CARE EDUCATION/TRAINING PROGRAM

## 2022-03-21 PROCEDURE — 96374 THER/PROPH/DIAG INJ IV PUSH: CPT

## 2022-03-21 PROCEDURE — 80048 BASIC METABOLIC PNL TOTAL CA: CPT | Performed by: EMERGENCY MEDICINE

## 2022-03-21 PROCEDURE — 93005 ELECTROCARDIOGRAM TRACING: CPT

## 2022-03-21 PROCEDURE — 93010 ELECTROCARDIOGRAM REPORT: CPT | Performed by: INTERNAL MEDICINE

## 2022-03-21 PROCEDURE — 99284 EMERGENCY DEPT VISIT MOD MDM: CPT

## 2022-03-21 PROCEDURE — 71046 X-RAY EXAM CHEST 2 VIEWS: CPT

## 2022-03-21 PROCEDURE — 84484 ASSAY OF TROPONIN QUANT: CPT | Performed by: EMERGENCY MEDICINE

## 2022-03-21 RX ORDER — KETOROLAC TROMETHAMINE 30 MG/ML
15 INJECTION, SOLUTION INTRAMUSCULAR; INTRAVENOUS ONCE
Status: COMPLETED | OUTPATIENT
Start: 2022-03-21 | End: 2022-03-21

## 2022-03-21 RX ADMIN — KETOROLAC TROMETHAMINE 15 MG: 30 INJECTION, SOLUTION INTRAMUSCULAR at 10:44

## 2022-03-21 NOTE — ED ATTENDING ATTESTATION
3/21/2022  IDoc MD, saw and evaluated the patient  I have discussed the patient with the resident/non-physician practitioner and agree with the resident's/non-physician practitioner's findings, Plan of Care, and MDM as documented in the resident's/non-physician practitioner's note, except where noted  All available labs and Radiology studies were reviewed  I was present for key portions of any procedure(s) performed by the resident/non-physician practitioner and I was immediately available to provide assistance  At this point I agree with the current assessment done in the Emergency Department  I have conducted an independent evaluation of this patient a history and physical is as follows:    ED Course     Patient is a 78-year-old female brought in for left shoulder pain and elevated blood pressure after being seen in outpatient office visit today  Patient denies any chest pain denies any headache at this time  Denies any focal weakness or numbness  Shoulder pain is been constant and present for greater than 6 hours  Vital signs reviewed  Heart regular rate rhythm without murmurs  Lungs clear to auscultation bilaterally  Abdomen soft nontender nondistended normal bowel sounds  Extremities no edema  Neuro exam no focal deficits  Impression left shoulder pain plan check cardiac evaluation going ECG troponin chest x-ray continue monitor blood pressure    ED results reviewed patient will be discharged home follow-up with PCP/cardiology for re-evaluation of blood pressure and further medication management        Critical Care Time  Procedures

## 2022-03-21 NOTE — PROGRESS NOTES
Plastic Surgery Consult     Reason for visit: Excess overhanging abdominal skin     HPI:  Patient is a 60 y/ospanish speaking female who presents with  who speaks [place] Trace with excess overhanging abdominal skin after undergoing [place]-en-Y gastric bypass in 2011 by Dr Maldonado Guzman with over 90 lb weight loss  Her starting weight was 270 and current weight is 170  Her weight has been stable for the last 12 months  Her primary complaint at this time are the rashes, blistering, open sores, foul smell and intertrigo that occurs under her panniculus  She states that she has used various creams and powders prescribed by bariatrics and PCP with minimal improvement  This has also hindered her ability to exercise as the area becomes sweaty, moist, and exacerbates her intertrigo, foul smell, and open sores  She does not currently have any rashes or open sores but does have photographs of when they do occur  Patient also states that foul smell makes her conscientious at work which interrupts her work for her to perform self-hygiene multiple times per day  Patient with no history of blood clots  ROS: 12 pt ROS negative, except as otherwise noted in HPI       PMH: HLD, WPW, ventricular pre-excitation syndrome  FamHx: non-contrib  SurgHx: hysterectomy, colonoscopy  SocHx: no tobacco, no ETOH  Meds: Metformin, no blood thinners, see epic for complete list  Allergies: NKDA     Vitals:    03/21/22 0840   BP: (!) 193/110   Pulse: 80   Temp: 97 6 °F (36 4 °C)        General: NC/AT, breathing comfortably on RA  Neuro: CN II-XII grossly intact, symmetric reflexes  HEENT: PERRLA, EOMI, external ears normal, no lesions or deformities, neck supple, trachea midline  Respiratory: CTAB, normal respiratory effort  Cardio: RRR, normal S1, S2, no murmur, rubs, gallops  GI: soft, non-tender, non-distended  MSK: normal alignment, mobility, gait  Skin: no rashes, lesions, subcutaneous nodules     BMI 28    Abdominal exam:  Moderate abdominal lipodystrophy particularly in epigastric region  Mild diastasis, no hernia  6 cm overhanging abdominal panniculus, worse centrally than laterally  No current intertrigo or open sores  Under panniculus region is very moist     A/P: 61 y/o [place]wandan speaking female massive weight loss from gastric bypass with stable weight now for over 12 months who presents with symptomatic overhanging panniculus   -Discussed with patient that she is a good candidate for panniculectomy  I explained that panniculectomy is a functional procedure, not cosmetic  The only thing that will be removed is the overhanging excess abdominal skin  The abdominal contour, diffuse lipodystrophy, particularly in the epigastric/central region, is not addressed  Also, loss of umbilicus is a possibility as well as inferiorly displaced umbilicus  Patient acknowledged  She understands that the surgery will only remove the excess overhanging skin causing her symptoms   -I also informed her that her elevated BMI increases her risk of complications including but not limited to wound breakdown, infection, abscesses, delayed wound healing  Patient acknowledged   -I discussed possible, additional lipoabdominoplasty to address the central and upper abdominal lipodystrophy and improve the cosmetic contour  Will email quote  -Again reiterated that panniculectomy is functional (insurance) vs abdominoplasty is cosmetic (will pay out of pocket)  -Risks, benefits, procedure, and complications discussed for panniculectomy and abdominoplasty  Patient acknowledged   All questions answered, concerns addressed   -Photos taken, will submit for insurance approval for panniculectomy   -Instructed to follow-up with PCP due to HTN, asymptomatic at time of evaluation  -If approved, will require preop labs to be completed for preop appointment    -Will require medical (HTN) and cardiac clearance (WPW) , evaluation of metformin        Merline Soman, MD   [address] [facility]   [address], [address]   [place], [address]    Office: [phone]

## 2022-03-21 NOTE — TELEPHONE ENCOUNTER
Spoke to patient, per patient she was only experiencing left shoulder pain and was worried about her elevated bp  Spoke to nurse Kevin Gordon which recommended patient be seen in the ED  Patient was agreeable and was going to ED to be further evaluated

## 2022-03-21 NOTE — TELEPHONE ENCOUNTER
Called patient and informed patient to follow up with PCP due to high blood pressure in office  Patient's  verbalized understanding

## 2022-03-21 NOTE — ED PROVIDER NOTES
History  Chief Complaint   Patient presents with    High Blood Pressure     Patient reports that she went to a doctor's appointment today and her blood pressure was elevated; was told to come in for evaluation      50-year-old female with history diabetes, hypertension, WPW status post ablation and palpitations presents to the emergency department for evaluation of elevated blood pressure and left shoulder pain  The patient reports that she had an appointment with a plastic surgeon today and was noted to have elevated blood pressure and was recommended to come to the emergency department for further evaluation  The patient states that she has been having intermittent left shoulder pain over the past several months with associated palpitations  The patient states that she follows with Dr Mahin Ayala  States that she was having episodes of the left shoulder pain last night which resolved but then came back again this morning  States that the pain has been constant since it started this morning but the severity has decreased  She did not take any medications to treat her symptoms prior to coming to the emergency department  The patient states that she has been compliant with her blood pressure medications and states that she did take it this morning  The patient reports that she does not usually check her blood pressure at home and does not know what it usually is  The patient denies headache, blurry vision, palpitations, chest pain, shortness of breath, nausea, vomiting, diarrhea, fever, chills and localized numbness, tingling or weakness  Prior to Admission Medications   Prescriptions Last Dose Informant Patient Reported? Taking?    Cholecalciferol (Vitamin D3) 50 MCG (2000 UT) TABS   No Yes   Sig: Take 2 tablets by mouth once daily   IRON, FERROUS GLUCONATE, PO  Self Yes Yes   Sig: Take 1 tablet by mouth daily   Multiple Vitamins-Minerals (ZINC PO)   Yes Yes   Sig: Take by mouth   Omega-3 Fatty Acids (FISH OIL) 1,000 mg  Self Yes Yes   Sig: Take 1,000 mg by mouth 2 (two) times a day   VITAMIN E PO  Self Yes Yes   Sig: Take 1 tablet by mouth daily   atorvastatin (LIPITOR) 20 mg tablet   No Yes   Sig: Take 1 tablet by mouth once daily   calcium gluconate 500 mg tablet  Self Yes Yes   Sig: Take 500 mg by mouth daily   metFORMIN (GLUCOPHAGE-XR) 500 mg 24 hr tablet   No Yes   Sig: Take 1 tablet (500 mg total) by mouth daily   metoprolol succinate (TOPROL-XL) 25 mg 24 hr tablet   No Yes   Sig: Take 1 tablet by mouth once daily   nystatin (MYCOSTATIN) powder   No Yes   Sig: Apply topically 3 (three) times a day   omeprazole (PriLOSEC) 20 mg delayed release capsule   No Yes   Sig: Take 1 capsule by mouth once daily   polyethylene glycol (GLYCOLAX) 17 GM/SCOOP powder  Self No Yes   Sig: At 5pm take 5mgx2 dulcolax  At 6pm 32oz miralax in 64oz gatorade  Drink 8oz glass every 5 mins until 32oz finished   Drink remaining as rec    sucralfate (CARAFATE) 1 g/10 mL suspension   No Yes   Sig: Take 10 mL (1 g total) by mouth 4 (four) times a day (with meals and at bedtime)   triamcinolone (KENALOG) 0 1 % ointment   No Yes   Sig: Apply topically 2 (two) times a day      Facility-Administered Medications: None       Past Medical History:   Diagnosis Date    Anastomotic stricture of gastrojejunostomy     s/p gastric bypass    Anemia     iron deficiency resolved 11/14/14    Anxiety     resolved 6/7/17    Class 1 obesity due to excess calories with serious comorbidity and body mass index (BMI) of 30 0 to 30 9 in adult 9/18/2020    Degenerative joint disease of ankle and foot, unspecified laterality     last assessed 5/17/13    Depression     last assessed 11/7/12    [facility]     type 2 uncomplicated, controlled - last assessed 1/31/14    Dysphagia     Gastric ulcer     History of melena     Hyperlipidemia     Hypertension     Insomnia     Insomnia     Obesity     resolved 11/14/14    Overweight (BMI 25 0-29 9) 5/13/2021    Postgastrectomy malabsorption     Tachycardia     Ventricular pre-excitation syndrome     Vitamin D deficiency        Past Surgical History:   Procedure Laterality Date    CARDIAC CATHETERIZATION      post proc data:____ lesions successfully dilated    COLONOSCOPY      ESOPHAGOGASTRODUODENOSCOPY      GASTRIC BYPASS  02/08/2011    for morbid obesity     HYSTERECTOMY      @ age 38    OOPHORECTOMY Bilateral     @ age 38    CT EGD TRANSORAL BIOPSY SINGLE/MULTIPLE N/A 1/25/2017    Procedure: ESOPHAGOGASTRODUODENOSCOPY (EGD); Surgeon: Earle Vyas MD;  Location: [facility]; Service: Bariatrics    CT EGD TRANSORAL BIOPSY SINGLE/MULTIPLE N/A 5/3/2017    Procedure: ESOPHAGOGASTRODUODENOSCOPY (EGD) with balloon dilatation;  Surgeon: Earle Vyas MD;  Location: [facility]; Service: Bariatrics    CT EGD TRANSORAL BIOPSY SINGLE/MULTIPLE N/A 8/16/2017    Procedure: ESOPHAGOGASTRODUODENOSCOPY (EGD) with balloon dilation;  Surgeon: Earle Vyas MD;  Location: [facility]; Service: Bariatrics    UPPER GASTROINTESTINAL ENDOSCOPY         Family History   Problem Relation Age of Onset    Congenital heart disease Mother     Asthma Father     Heart attack Sister         acute MI    Congenital heart disease Sister     Hypertension Sister     Breast cancer Cousin         ? age   Debbie  No Known Problems Paternal Grandmother     No Known Problems Maternal Grandmother     No Known Problems Maternal Grandfather     No Known Problems Maternal Aunt     No Known Problems Maternal Aunt     No Known Problems Maternal Aunt     No Known Problems Maternal Aunt     No Known Problems Maternal Aunt     No Known Problems Maternal Aunt      I have reviewed and agree with the history as documented      E-Cigarette/Vaping    E-Cigarette Use Never User      E-Cigarette/Vaping Substances    Nicotine No     THC No     CBD No     Flavoring No     Other No     Unknown No      Social History Tobacco Use    Smoking status: Never Smoker    Smokeless tobacco: Never Used   Vaping Use    Vaping Use: Never used   Substance Use Topics    Alcohol use: Never    Drug use: Never        Review of Systems   Constitutional: Negative for chills and fever  HENT: Negative for ear pain and sore throat  Eyes: Negative for pain and visual disturbance  Respiratory: Negative for cough and shortness of breath  Cardiovascular: Negative for chest pain and palpitations  Gastrointestinal: Negative for abdominal pain and vomiting  Genitourinary: Negative for dysuria and hematuria  Musculoskeletal: Negative for arthralgias and back pain  Skin: Negative for color change and rash  Neurological: Negative for seizures and syncope  All other systems reviewed and are negative  Physical Exam  ED Triage Vitals [03/21/22 1001]   Temperature Pulse Respirations Blood Pressure SpO2   98 6 °F (37 °C) 64 18 (!) 204/97 98 %      Temp Source Heart Rate Source Patient Position - Orthostatic VS BP Location FiO2 (%)   Oral Monitor Lying Left arm --      Pain Score       4             Orthostatic Vital Signs  Vitals:    03/21/22 1001 03/21/22 1030 03/21/22 1230   BP: (!) 204/97 (!) 187/90 (!) 190/90   Pulse: 64 59 60   Patient Position - Orthostatic VS: Lying Lying Lying       Physical Exam  Vitals and nursing note reviewed  Constitutional:       General: She is not in acute distress  Appearance: She is well-developed  HENT:      Head: Normocephalic and atraumatic  Eyes:      Conjunctiva/sclera: Conjunctivae normal    Cardiovascular:      Rate and Rhythm: Normal rate and regular rhythm  Pulses:           Radial pulses are 2+ on the right side and 2+ on the left side  Heart sounds: No murmur heard  Pulmonary:      Effort: Pulmonary effort is normal  No respiratory distress  Breath sounds: Normal breath sounds  Abdominal:      Palpations: Abdomen is soft  Tenderness:  There is no abdominal tenderness  Musculoskeletal:      Right shoulder: Normal       Left shoulder: Normal       Cervical back: Neck supple  Comments: Equal  strength bilaterally  Skin:     General: Skin is warm and dry  Neurological:      Mental Status: She is alert  Sensory: Sensation is intact  Motor: Motor function is intact           ED Medications  Medications   ketorolac (TORADOL) injection 15 mg (15 mg Intravenous Given 3/21/22 1044)       Diagnostic Studies  Results Reviewed     Procedure Component Value Units Date/Time    HS Troponin I 2hr [[id number]]  (Normal) Collected: 03/21/22 1243    Lab Status: Final result Specimen: Blood from Arm, Left Updated: 03/21/22 1337     hs TnI 2hr 8 ng/L      Delta 2hr hsTnI 3 ng/L     HS Troponin 0hr (reflex protocol) [[id number]]  (Normal) Collected: 03/21/22 1044    Lab Status: Final result Specimen: Blood from Arm, Left Updated: 03/21/22 1122     hs TnI 0hr 5 ng/L     Basic metabolic panel [[id number]]  (Abnormal) Collected: 03/21/22 1044    Lab Status: Final result Specimen: Blood from Arm, Left Updated: 03/21/22 1113     Sodium 142 mmol/L      Potassium 4 9 mmol/L      Chloride 111 mmol/L      CO2 28 mmol/L      ANION GAP 3 mmol/L      BUN 14 mg/dL      Creatinine 0 69 mg/dL      Glucose 81 mg/dL      Calcium 8 9 mg/dL      eGFR 92 ml/min/1 73sq m     Narrative:      [facility] guidelines for Chronic Kidney Disease (CKD):     Stage 1 with normal or high GFR (GFR > 90 mL/min/1 73 square meters)    Stage 2 Mild CKD (GFR = 60-89 mL/min/1 73 square meters)    Stage 3A Moderate CKD (GFR = 45-59 mL/min/1 73 square meters)    Stage 3B Moderate CKD (GFR = 30-44 mL/min/1 73 square meters)    Stage 4 Severe CKD (GFR = 15-29 mL/min/1 73 square meters)    Stage 5 End Stage CKD (GFR <15 mL/min/1 73 square meters)  Note: GFR calculation is accurate only with a steady state creatinine    CBC and differential [[id number]]  (Abnormal) Collected: 03/21/22 1044    Lab Status: Final result Specimen: Blood from Arm, Left Updated: 03/21/22 1055     WBC 5 64 Thousand/uL      RBC 4 43 Million/uL      Hemoglobin 12 5 g/dL      Hematocrit 40 2 %      MCV 91 fL      MCH 28 2 pg      MCHC 31 1 g/dL      RDW 13 4 %      MPV 10 7 fL      Platelets 441 Thousands/uL      nRBC 0 /100 WBCs      Neutrophils Relative 54 %      Immat GRANS % 0 %      Lymphocytes Relative 33 %      Monocytes Relative 12 %      Eosinophils Relative 1 %      Basophils Relative 0 %      Neutrophils Absolute 2 95 Thousands/µL      Immature Grans Absolute 0 02 Thousand/uL      Lymphocytes Absolute 1 88 Thousands/µL      Monocytes Absolute 0 70 Thousand/µL      Eosinophils Absolute 0 07 Thousand/µL      Basophils Absolute 0 02 Thousands/µL                  XR chest 2 views   Final Result by Shaun Dong MD (03/21 1132)      No acute cardiopulmonary disease                    Workstation performed: [id number]               Procedures  ECG 12 Lead Documentation Only    Date/Time: 3/21/2022 11:00 AM  Performed by: Tristan Dakin, MD  Authorized by: Tristan Dakin, MD     ECG reviewed by me, the ED Provider: yes    Patient location:  ED  Previous ECG:     Previous ECG:  Compared to current    Similarity:  No change    Comparison to cardiac monitor: Yes    Interpretation:     Interpretation: normal    Rate:     ECG rate assessment: normal    Rhythm:     Rhythm: sinus rhythm    Ectopy:     Ectopy: none    QRS:     QRS axis:  Normal  Conduction:     Conduction: normal    ST segments:     ST segments:  Normal  T waves:     T waves: normal            ED Course             HEART Risk Score      Most Recent Value   Heart Score Risk Calculator    History 0 Filed at: 03/21/2022 1549   ECG 0 Filed at: 03/21/2022 1549   Age 1 Filed at: 03/21/2022 1549   Risk Factors 1 Filed at: 03/21/2022 1549   Troponin 0 Filed at: 03/21/2022 1549   HEART Score 2 Filed at: 03/21/2022 1549                      SBIRT 22yo+      Most Recent Value   SBIRT (22 yo +)    In order to provide better care to our patients, we are screening all of our patients for alcohol and drug use  Would it be okay to ask you these screening questions? No Filed at: 03/21/2022 1016                [facility]  Number of Diagnoses or Management Options  High blood pressure  Left shoulder pain  Diagnosis management comments: 53-year-old female presented to the emergency department for evaluation of high blood pressure and left shoulder pain  On arrival the patient was awake, alert, oriented and in no acute distress  Cardiac workup done in the emergency department showed the patient had a nonischemic EKG, chest x-ray with no acute cardiopulmonary process and negative delta troponin  All diagnostic studies were discussed with the patient in detail with recommendation to follow up with her PCP and with her cardiologist for continued symptoms  Return precautions were discussed  Patient agrees with the plan for discharge and feels comfortable to go home with proper f/u  Advised to return for worsening or additional problems  Diagnostic tests were reviewed and questions answered  Diagnosis, care plan and treatment options were discussed  The patient understands instructions and will follow up as directed  Disposition  Final diagnoses:   High blood pressure   Left shoulder pain     Time reflects when diagnosis was documented in both MDM as applicable and the Disposition within this note     Time User Action Codes Description Comment    3/21/2022  1:46 PM Melvin [place] Add [I10] High blood pressure     3/21/2022  1:46 PM Melvin [place] Add [X07 096] Left shoulder pain       ED Disposition     ED Disposition Condition Date/Time Comment    Discharge Stable Mon Mar 21, 2022  1:46 PM Thaddeus Selby discharge to home/self care              Follow-up Information     Follow up With Specialties Details Why Contact Info Additional Information    Hiren Johnson MD [place]  Schedule an appointment as soon as possible for a visit   General - Internal Medicine    [address] [facility] Emergency Department Emergency Medicine Go to  If symptoms worsen [address]  [address] [facility] [address], [address], [place], [place], [facility]          Discharge Medication List as of 3/21/2022  1:47 PM      CONTINUE these medications which have NOT CHANGED    Details   atorvastatin (LIPITOR) 20 mg tablet Take 1 tablet by mouth once daily, Normal      calcium gluconate 500 mg tablet Take 500 mg by mouth daily, Historical Med      Cholecalciferol (Vitamin D3) 50 MCG (2000 UT) TABS Take 2 tablets by mouth once daily, Normal      IRON, FERROUS GLUCONATE, PO Take 1 tablet by mouth daily, Historical Med      metFORMIN (GLUCOPHAGE-XR) 500 mg 24 hr tablet Take 1 tablet (500 mg total) by mouth daily, Starting Mon 11/15/2021, Until Sat 5/14/2022, Normal      metoprolol succinate (TOPROL-XL) 25 mg 24 hr tablet Take 1 tablet by mouth once daily, Normal      Multiple Vitamins-Minerals (ZINC PO) Take by mouth, Historical Med      nystatin (MYCOSTATIN) powder Apply topically 3 (three) times a day, Starting Tue 2/8/2022, Normal      Omega-3 Fatty Acids (FISH OIL) 1,000 mg Take 1,000 mg by mouth 2 (two) times a day, Historical Med      omeprazole (PriLOSEC) 20 mg delayed release capsule Take 1 capsule by mouth once daily, Normal      polyethylene glycol (GLYCOLAX) 17 GM/SCOOP powder At 5pm take 5mgx2 dulcolax  At 6pm 32oz miralax in 64oz gatorade  Drink 8oz glass every 5 mins until 32oz finished   Drink remaining as rec , Normal      sucralfate (CARAFATE) 1 g/10 mL suspension Take 10 mL (1 g total) by mouth 4 (four) times a day (with meals and at bedtime), Starting Thu 12/2/2021, Until Mon 3/21/2022, Normal      triamcinolone (KENALOG) 0 1 % ointment Apply topically 2 (two) times a day, Starting Tue 2/8/2022, Normal      VITAMIN E PO Take 1 tablet by mouth daily, Historical Med           No discharge procedures on file  PDMP Review     None           ED Provider  Attending physically available and evaluated Gela Green I managed the patient along with the ED Attending      Electronically Signed by         Elmon Callow, MD  03/21/22 2075

## 2022-03-21 NOTE — TELEPHONE ENCOUNTER
Patient called with her  on the line as well  She just had an appointment with Dr Abdullahi Silva  Their office told her Bp was high 193/110 today at her appointment  She is experiencing a little shortness of breath, a little weak, headache and her left shoulder hurts a little  Call transferred to [facility] for further assistance

## 2022-03-25 ENCOUNTER — OFFICE VISIT (OUTPATIENT)
Dept: INTERNAL MEDICINE CLINIC | Facility: CLINIC | Age: 62
End: 2022-03-25

## 2022-03-25 VITALS
HEART RATE: 65 BPM | BODY MASS INDEX: 27.82 KG/M2 | DIASTOLIC BLOOD PRESSURE: 97 MMHG | SYSTOLIC BLOOD PRESSURE: 145 MMHG | TEMPERATURE: 98 F | WEIGHT: 167 LBS | HEIGHT: 65 IN

## 2022-03-25 DIAGNOSIS — I10 PRIMARY HYPERTENSION: Primary | ICD-10-CM

## 2022-03-25 PROCEDURE — 99213 OFFICE O/P EST LOW 20 MIN: CPT | Performed by: INTERNAL MEDICINE

## 2022-03-25 PROCEDURE — 3008F BODY MASS INDEX DOCD: CPT | Performed by: PHYSICIAN ASSISTANT

## 2022-03-25 PROCEDURE — 3008F BODY MASS INDEX DOCD: CPT | Performed by: STUDENT IN AN ORGANIZED HEALTH CARE EDUCATION/TRAINING PROGRAM

## 2022-03-25 RX ORDER — LISINOPRIL 10 MG/1
10 TABLET ORAL DAILY
Qty: 90 TABLET | Refills: 3 | Status: SHIPPED | OUTPATIENT
Start: 2022-03-25 | End: 2022-04-01 | Stop reason: ALTCHOICE

## 2022-03-25 NOTE — PATIENT INSTRUCTIONS
Please check your blood pressure every day and write down the numbers  Please bring your blood pressure numbers at your next visit

## 2022-03-25 NOTE — PROGRESS NOTES
[facility]  INTERNAL MEDICINE OFFICE VISIT     PATIENT INFORMATION     Pratik Selby   60 y o  female   MRN: [id number]    ASSESSMENT/PLAN     Problem List Items Addressed This Visit        Cardiovascular and Mediastinum    Primary hypertension - Primary     Pt incidentally found to have severe /110 at outpatient plastic surgeon appointment 4 days ago  Asymptomatic at the time  Subsequent ED visit that same day with similar BP readings  Troponins 5, 8   CBC, BMP, and CXR unremarkable  Since then, BP readings at home in the 180s/90s  Office /97  Pt has no symptoms currently but notes right sided cloudy vision for the last month and intermittent palpitations associated with left arm discomfort  · Start lisinopril 10 mg  · Instructed pt to take daily BP at home and keep a log, bring log at next visit  · Return in 1 week for nurse visit for BP check  · If SBP > 130, increase lisinopril to 20 mg         Relevant Medications    lisinopril (ZESTRIL) 10 mg tablet        Schedule a follow-up appointment in 1 week for BP check      HEALTH MAINTENANCE     Immunization History   Administered Date(s) Administered    COVID-19 MODERNA VACC 0 5 ML IM 04/15/2021, 05/13/2021, 12/06/2021    INFLUENZA 10/08/2015, 10/07/2016, 12/26/2017    Influenza Quadrivalent Preservative Free 3 years and older IM 10/07/2016    Influenza Quadrivalent, 6-35 Months IM 12/26/2017    Influenza, recombinant, quadrivalent,injectable, preservative free 03/11/2020, 09/18/2020, 11/15/2021    Influenza, seasonal, injectable 01/31/2014, 11/14/2014, 10/08/2015    Pneumococcal Polysaccharide PPV23 01/31/2014    Tdap 03/11/2020    Tuberculin Skin Test-PPD Intradermal 03/24/2015     CHIEF COMPLAINT     Chief Complaint   Patient presents with    Follow-up     ER, bp elevated      HISTORY OF PRESENT ILLNESS     61 y/o female with history of WPW s/p ablation, type 2 diabetes on metformin, HLD, Carline-en-Y in 2011 who presents for HTN  Accompanied by her  who provided translation  4 days ago, pt was at plastic surgeon appointment and incidentally found to have /110  She was asymptomatic at this time  Pt was sent to the ED the same day  BP readings 190/90-200s/90s  ED workup was unrevealing  Troponin 5, 8  CBC and BMP unremarkable  CXR unremarkable  Pt has checked her BP at home since then with readings usually 180s/90s  Pt endorses intermittent cloudy vision in right side that started about 1 month ago  Sometimes lasts 10-15 minutes  Pt states she has palpitations and feels palpitations in the right side of her neck and associated with left arm discomfort  These symptoms can occur at rest or laying down  No family history of heart disease or arrhythmias  Pt currently has no symptoms  Denies headache, chest pain, shortness of breath, nausea, vomiting  REVIEW OF SYSTEMS     Review of Systems   Constitutional: Negative for chills, diaphoresis and fever  Eyes: Positive for visual disturbance  Respiratory: Negative for shortness of breath  Cardiovascular: Positive for palpitations  Negative for chest pain  Gastrointestinal: Negative for abdominal pain, nausea and vomiting  Genitourinary: Negative for dysuria  Neurological: Negative for dizziness, weakness, light-headedness, numbness and headaches  OBJECTIVE     Vitals:    03/25/22 1132   BP: 145/97   BP Location: Left arm   Patient Position: Sitting   Cuff Size: Large   Pulse: 65   Temp: 98 °F (36 7 °C)   TempSrc: Temporal   Weight: 75 8 kg (167 lb)   Height: 5' 5" (1 651 m)     Physical Exam  Constitutional:       General: She is not in acute distress  Eyes:      General: No scleral icterus  Neck:      Vascular: No carotid bruit  Cardiovascular:      Rate and Rhythm: Normal rate and regular rhythm  Pulses: Normal pulses  Heart sounds: Normal heart sounds  No murmur heard        Pulmonary:      Breath sounds: No wheezing, rhonchi or rales  Musculoskeletal:      Right lower leg: No edema  Left lower leg: No edema  Skin:     General: Skin is warm and dry  Neurological:      Mental Status: She is alert  Psychiatric:         Behavior: Behavior normal        CURRENT MEDICATIONS     Current Outpatient Medications:     atorvastatin (LIPITOR) 20 mg tablet, Take 1 tablet by mouth once daily, Disp: 90 tablet, Rfl: 3    calcium gluconate 500 mg tablet, Take 500 mg by mouth daily, Disp: , Rfl:     Cholecalciferol (Vitamin D3) 50 MCG (2000 UT) TABS, Take 2 tablets by mouth once daily, Disp: 180 tablet, Rfl: 1    IRON, FERROUS GLUCONATE, PO, Take 1 tablet by mouth daily, Disp: , Rfl:     metFORMIN (GLUCOPHAGE-XR) 500 mg 24 hr tablet, Take 1 tablet (500 mg total) by mouth daily, Disp: 90 tablet, Rfl: 1    metoprolol succinate (TOPROL-XL) 25 mg 24 hr tablet, Take 1 tablet by mouth once daily, Disp: 90 tablet, Rfl: 3    Multiple Vitamins-Minerals (ZINC PO), Take by mouth, Disp: , Rfl:     nystatin (MYCOSTATIN) powder, Apply topically 3 (three) times a day, Disp: 60 g, Rfl: 3    Omega-3 Fatty Acids (FISH OIL) 1,000 mg, Take 1,000 mg by mouth 2 (two) times a day, Disp: , Rfl:     omeprazole (PriLOSEC) 20 mg delayed release capsule, Take 1 capsule by mouth once daily, Disp: 90 capsule, Rfl: 3    polyethylene glycol (GLYCOLAX) 17 GM/SCOOP powder, At 5pm take 5mgx2 dulcolax  At 6pm 32oz miralax in 64oz gatorade  Drink 8oz glass every 5 mins until 32oz finished   Drink remaining as rec , Disp: 238 g, Rfl: 0    sucralfate (CARAFATE) 1 g/10 mL suspension, Take 10 mL (1 g total) by mouth 4 (four) times a day (with meals and at bedtime), Disp: 1200 mL, Rfl: 3    triamcinolone (KENALOG) 0 1 % ointment, Apply topically 2 (two) times a day, Disp: 30 g, Rfl: 1    VITAMIN E PO, Take 1 tablet by mouth daily, Disp: , Rfl:     PAST MEDICAL & SURGICAL HISTORY     Past Medical History:   Diagnosis Date    Anastomotic stricture of gastrojejunostomy     s/p gastric bypass    Anemia     iron deficiency resolved 11/14/14    Anxiety     resolved 6/7/17    Class 1 obesity due to excess calories with serious comorbidity and body mass index (BMI) of 30 0 to 30 9 in adult 9/18/2020    Degenerative joint disease of ankle and foot, unspecified laterality     last assessed 5/17/13    Depression     last assessed 11/7/12    Diabetes mellitus ([facility] [address] )     type 2 uncomplicated, controlled - last assessed 1/31/14    Dysphagia     Gastric ulcer     History of melena     Hyperlipidemia     Hypertension     Insomnia     Insomnia     Obesity     resolved 11/14/14    Overweight (BMI 25 0-29 9) 5/13/2021    Postgastrectomy malabsorption     Tachycardia     Ventricular pre-excitation syndrome     Vitamin D deficiency      Past Surgical History:   Procedure Laterality Date    CARDIAC CATHETERIZATION      post proc data:____ lesions successfully dilated    COLONOSCOPY      ESOPHAGOGASTRODUODENOSCOPY      GASTRIC BYPASS  02/08/2011    for morbid obesity     HYSTERECTOMY      @ age 38    OOPHORECTOMY Bilateral     @ age 38    MT EGD TRANSORAL BIOPSY SINGLE/MULTIPLE N/A 1/25/2017    Procedure: ESOPHAGOGASTRODUODENOSCOPY (EGD); Surgeon: Maribeth Jessica MD;  Location: [facility]; Service: Bariatrics    MT EGD TRANSORAL BIOPSY SINGLE/MULTIPLE N/A 5/3/2017    Procedure: ESOPHAGOGASTRODUODENOSCOPY (EGD) with balloon dilatation;  Surgeon: Maribeth Jessica MD;  Location: [facility]; Service: Bariatrics    MT EGD TRANSORAL BIOPSY SINGLE/MULTIPLE N/A 8/16/2017    Procedure: ESOPHAGOGASTRODUODENOSCOPY (EGD) with balloon dilation;  Surgeon: Maribeth Jessica MD;  Location: [facility];   Service: Bariatrics    UPPER GASTROINTESTINAL ENDOSCOPY       SOCIAL & FAMILY HISTORY     Social History     Socioeconomic History    Marital status: /Civil Union     Spouse name: Not on file    Number of children: Not on file    Years of education: Not on file  Highest education level: Not on file   Occupational History    Not on file   Tobacco Use    Smoking status: Never Smoker    Smokeless tobacco: Never Used   Vaping Use    Vaping Use: Never used   Substance and Sexual Activity    Alcohol use: Never    Drug use: Never    Sexual activity: Not Currently   Other Topics Concern    Not on file   Social History Narrative    Not on file     Social Determinants of Health     Financial Resource Strain: Low Risk     Difficulty of Paying Living Expenses: Not hard at all   Food Insecurity: No Food Insecurity    Worried About [address] [employer] in the Last Year: Never true    [facility] [employer] in the Last Year: Never true   Transportation Needs: No Transportation Needs    Lack of Transportation (Medical): No    Lack of Transportation (Non-Medical):  No   Physical Activity: Inactive    Days of Exercise per Week: 0 days    Minutes of Exercise per Session: 0 min   Stress: Not on file   Social Connections: Not on file   Intimate Partner Violence: Not on file   Housing Stability: Low Risk     Unable to Pay for Housing in the Last Year: No    Number of Places Lived in the Last Year: 1    Unstable Housing in the Last Year: No     Social History     Substance and Sexual Activity   Alcohol Use Never     Social History     Substance and Sexual Activity   Drug Use Never     Social History     Tobacco Use   Smoking Status Never Smoker   Smokeless Tobacco Never Used     Family History   Problem Relation Age of Onset    Congenital heart disease Mother     Asthma Father     Heart attack Sister         acute MI    Congenital heart disease Sister     Hypertension Sister     Breast cancer Cousin         ? age   Ben Lemme No Known Problems Paternal Grandmother     No Known Problems Maternal Grandmother     No Known Problems Maternal Grandfather     No Known Problems Maternal Aunt     No Known Problems Maternal Aunt     No Known Problems Maternal Aunt     No Known Problems Maternal Aunt     No Known Problems Maternal Aunt     No Known Problems Maternal Aunt          ==  Mark Mendez, DO  Internal Medicine Resident, PGY-1  [address] [facility]  [address]   Count includes the [facility] , [address] [address] [facility] [address] [facility]  Office: [phone]  Fax: [phone]

## 2022-03-26 NOTE — ASSESSMENT & PLAN NOTE
Pt incidentally found to have severe /110 at outpatient plastic surgeon appointment 4 days ago  Asymptomatic at the time  Subsequent ED visit that same day with similar BP readings  Troponins 5, 8   CBC, BMP, and CXR unremarkable  Since then, BP readings at home in the 180s/90s  Office /97  Pt has no symptoms currently but notes right sided cloudy vision for the last month and intermittent palpitations associated with left arm discomfort      · Start lisinopril 10 mg  · Instructed pt to take daily BP at home and keep a log, bring log at next visit  · Return in 1 week for nurse visit for BP check  · If SBP > 130, increase lisinopril to 20 mg

## 2022-03-30 ENCOUNTER — CLINICAL SUPPORT (OUTPATIENT)
Dept: CARDIOLOGY CLINIC | Facility: CLINIC | Age: 62
End: 2022-03-30
Payer: COMMERCIAL

## 2022-03-30 DIAGNOSIS — R00.2 PALPITATIONS: Primary | ICD-10-CM

## 2022-03-30 PROCEDURE — 93242 EXT ECG>48HR<7D RECORDING: CPT | Performed by: PHYSICIAN ASSISTANT

## 2022-03-30 NOTE — PROGRESS NOTES
George Perez is here today under the direction of Shmoop for one week Zio XT  Patch applied and all instructions for use given to patient in office

## 2022-04-01 ENCOUNTER — TREATMENT (OUTPATIENT)
Dept: INTERNAL MEDICINE CLINIC | Facility: CLINIC | Age: 62
End: 2022-04-01

## 2022-04-01 ENCOUNTER — CLINICAL SUPPORT (OUTPATIENT)
Dept: INTERNAL MEDICINE CLINIC | Facility: CLINIC | Age: 62
End: 2022-04-01

## 2022-04-01 ENCOUNTER — OFFICE VISIT (OUTPATIENT)
Dept: INTERNAL MEDICINE CLINIC | Facility: CLINIC | Age: 62
End: 2022-04-01

## 2022-04-01 VITALS — HEART RATE: 62 BPM | DIASTOLIC BLOOD PRESSURE: 89 MMHG | SYSTOLIC BLOOD PRESSURE: 160 MMHG

## 2022-04-01 DIAGNOSIS — I10 PRIMARY HYPERTENSION: Primary | ICD-10-CM

## 2022-04-01 DIAGNOSIS — I10 HYPERTENSION, UNSPECIFIED TYPE: Primary | ICD-10-CM

## 2022-04-01 DIAGNOSIS — I10 ESSENTIAL HYPERTENSION: Primary | ICD-10-CM

## 2022-04-01 PROCEDURE — 99213 OFFICE O/P EST LOW 20 MIN: CPT | Performed by: INTERNAL MEDICINE

## 2022-04-01 PROCEDURE — 1036F TOBACCO NON-USER: CPT | Performed by: INTERNAL MEDICINE

## 2022-04-01 RX ORDER — LOSARTAN POTASSIUM AND HYDROCHLOROTHIAZIDE 25; 100 MG/1; MG/1
1 TABLET ORAL DAILY
Qty: 30 TABLET | Refills: 5 | Status: SHIPPED | OUTPATIENT
Start: 2022-04-01

## 2022-04-01 NOTE — PROGRESS NOTES
Patient came in today for a nurse visit for blood pressure check  Patient did bring in BP readings which as been elevated at home  Blood pressure today as documented 160/89  Patient stated she has been experiencing tachycardia which she has a monitor for and follows up with cardiology  Patient also stated she has been experiencing pain in her left arm  Consulted Dr Nurys Webb who is doc of the day  Please refer to Dr Nurys Webb separate encounter

## 2022-04-01 NOTE — PROGRESS NOTES
INTERNAL MEDICINE OFFICE VISIT  [facility]  [address] [facility] [place]    NAME: Michael Albarran  AGE: 60 y o  SEX: female    DATE OF ENCOUNTER: 4/1/2022    Assessment and Plan     1  Essential hypertension  Discontinue lisinopril 10 mg  Started Hyzaar 100-25 mg daily  To continue Toprol XL 25 mg daily  Advised on low-salt diet  Patient to come back in a week nursing visit for blood pressure recheck and to bring the blood pressure log  To call for score appointment as needed or if experiencing any new symptoms or side effect  To keep follow-up appointment in May with us and to see Cardiology as instructed        No orders of the defined types were placed in this encounter       - Counseling Documentation: patient was counseled regarding: diagnostic results, instructions for management and risk factor reductions    Chief Complaint     No chief complaint on file  Essential hypertension  History of Present Illness     Ricarda Avila Person presented initially today for nursing visit to recheck her blood pressure which was 160's/70's and broad blood pressure log with her showing at least 8 different readings hold them above 746 systolic, between 668 and 492 over diastolic 58E to 34K; denies headache or chest pain, however reports intermittent palpitation for which she has Zio patch in place and has a follow-up appointment with Cardiology  Discussed above assessment and plan with the patient with the help of staff member to translate Sami to English during entire visit patient verbalized understanding and agreement and to call us sooner than scheduled appointment as needed, possible side effects of the new medication discussed including flank pain, oliguria as well as possible complications of hypertensive emergency explained, including but not limited to headache chest pain abdominal pain       The following portions of the patient's history were reviewed and updated as appropriate: allergies, current medications, past family history, past medical history, past social history, past surgical history and problem list     Review of Systems   - GENERAL: Negative for any nausea, vomiting, fevers, chills, or weight loss  - HEENT: Negative for any head/Neck trauma, pain, double/blurry vision, sinusitis, rhinitis, nose bleeding   - CARDIAC:  Positive for intermittent palpitation ; Negative for any chest pain,, Dyspnea on exertion, peripheral edema  - PULMONARY: Negative for any SOB, cough, wheezing    - GASTROINTESTINAL: Negative for any abdominal pain, N/V/D/C, blood in stool    - GENITOURINARY: Negative for any dysuria, hematuria, incontinence   - NEUROLOGIC: Negative for any muscle weakness, numbness/tingling, memory changes  - MUSCULOSKELETAL: Negative for any joint pains/swelling, limited ROM  - INTEGUMENTARY: Negative for any rashes, cuts/ lesions   - HEMATOLOGIC: Negative for any abnormal bruising, frequent infections or bleeding        Active Problem List     Patient Active Problem List   Diagnosis    Gastric anastomotic stricture    Mixed hyperlipidemia    Primary hypertension    Postgastrectomy malabsorption    Ventricular pre-excitation syndrome    Vitamin D deficiency    Colon cancer screening    Women's annual routine gynecological examination    Class 1 obesity due to excess calories with serious comorbidity and body mass index (BMI) of 30 0 to 30 9 in adult    Prediabetes    WPW syndrome    H/O bariatric surgery    Abdominal pannus    Candidal skin infection    Excess skin       Objective   Physical Exam:  - GEN: Appears well, alert and oriented x 3, pleasant and cooperative, in no acute distress  - HEENT: Anicteric, mucous membranes moist, PERRL and EOMI   - NECK: No lymphadenopathy, JVD or carotid bruits   - HEART:  Positive for grade 2 systolic murmur heard best at right 2nd intercostal space;  RRR, normal S1 and S2, no, clicks, gallops or rubs   - LUNGS: Clear to auscultation bilaterally; no wheezes, rales, or rhonchi  - ABDOMEN: Normal bowel sounds, soft, no tenderness, no distention, no organomegaly or masses felt on exam    - EXTREMITIES: Peripheral pulses normal; no clubbing, cyanosis, or edema  - NEURO: No focal findings, CN II-XII are grossly intact  - Musculoskeletal: 5/5 strength, normal ROM, no swollen or erythematous joints  - SKIN: Normal without suspicious lesions on exposed skin    There were no vitals taken for this visit      Pertinent Laboratory/Diagnostic Studies:  Chemistry Profile:   Lab Results   Component Value Date/Time     07/26/2015 11:56 AM    K 4 9 03/21/2022 10:44 AM    K 4 4 07/26/2015 11:56 AM     (H) 03/21/2022 10:44 AM     07/26/2015 11:56 AM    CO2 28 03/21/2022 10:44 AM    CO2 31 07/26/2015 11:56 AM    ANIONGAP 3 (L) 07/26/2015 11:56 AM    BUN 14 03/21/2022 10:44 AM    BUN 15 07/26/2015 11:56 AM    CREATININE 0 69 03/21/2022 10:44 AM    CREATININE 0 59 (L) 07/26/2015 11:56 AM    GLUC 81 03/21/2022 10:44 AM    GLUF 91 11/13/2021 11:01 AM    GLUCOSE 94 07/26/2015 11:56 AM    CALCIUM 8 9 03/21/2022 10:44 AM    CALCIUM 8 4 07/26/2015 11:56 AM    CORRECTEDCA 9 4 11/13/2021 11:01 AM    AST 24 11/13/2021 11:01 AM    AST 27 07/26/2015 11:56 AM    ALT 30 11/13/2021 11:01 AM    ALT 45 07/26/2015 11:56 AM    ALKPHOS 103 11/13/2021 11:01 AM    ALKPHOS 80 07/26/2015 11:56 AM    PROT 6 6 07/26/2015 11:56 AM    BILITOT 0 27 07/26/2015 11:56 AM    EGFR 92 03/21/2022 10:44 AM         Current Medications     Current Outpatient Medications:     atorvastatin (LIPITOR) 20 mg tablet, Take 1 tablet by mouth once daily, Disp: 90 tablet, Rfl: 3    calcium gluconate 500 mg tablet, Take 500 mg by mouth daily, Disp: , Rfl:     Cholecalciferol (Vitamin D3) 50 MCG (2000 UT) TABS, Take 2 tablets by mouth once daily, Disp: 180 tablet, Rfl: 1    IRON, FERROUS GLUCONATE, PO, Take 1 tablet by mouth daily, Disp: , Rfl:    losartan-hydrochlorothiazide (HYZAAR) 100-25 MG per tablet, Take 1 tablet by mouth daily, Disp: 30 tablet, Rfl: 5    metFORMIN (GLUCOPHAGE-XR) 500 mg 24 hr tablet, Take 1 tablet (500 mg total) by mouth daily, Disp: 90 tablet, Rfl: 1    metoprolol succinate (TOPROL-XL) 25 mg 24 hr tablet, Take 1 tablet by mouth once daily, Disp: 90 tablet, Rfl: 3    Multiple Vitamins-Minerals (ZINC PO), Take by mouth, Disp: , Rfl:     nystatin (MYCOSTATIN) powder, Apply topically 3 (three) times a day, Disp: 60 g, Rfl: 3    Omega-3 Fatty Acids (FISH OIL) 1,000 mg, Take 1,000 mg by mouth 2 (two) times a day, Disp: , Rfl:     omeprazole (PriLOSEC) 20 mg delayed release capsule, Take 1 capsule by mouth once daily, Disp: 90 capsule, Rfl: 3    polyethylene glycol (GLYCOLAX) 17 GM/SCOOP powder, At 5pm take 5mgx2 dulcolax  At 6pm 32oz miralax in 64oz gatorade  Drink 8oz glass every 5 mins until 32oz finished   Drink remaining as rec , Disp: 238 g, Rfl: 0    sucralfate (CARAFATE) 1 g/10 mL suspension, Take 10 mL (1 g total) by mouth 4 (four) times a day (with meals and at bedtime), Disp: 1200 mL, Rfl: 3    triamcinolone (KENALOG) 0 1 % ointment, Apply topically 2 (two) times a day, Disp: 30 g, Rfl: 1    VITAMIN E PO, Take 1 tablet by mouth daily, Disp: , Rfl:     Health Maintenance     Health Maintenance   Topic Date Due    Depression Follow-up Plan  Never done    Annual Physical  05/13/2022    Breast Cancer Screening: Mammogram  07/02/2022    BMI: Followup Plan  12/02/2022    Depression Screening  03/25/2023    BMI: Adult  03/25/2023    Cervical Cancer Screening  03/19/2025    DTaP,Tdap,and Td Vaccines (2 - Td or Tdap) 03/11/2030    Colorectal Cancer Screening  07/27/2031    HIV Screening  Completed    Hepatitis C Screening  Completed    Influenza Vaccine  Completed    COVID-19 Vaccine  Completed    Pneumococcal Vaccine: Pediatrics (0 to 5 Years) and At-Risk Patients (6 to 59 Years)  Aged Out    HIB Vaccine  Aged Al[place]  Hepatitis B Vaccine  Aged Out    IPV Vaccine  Aged Out    Hepatitis A Vaccine  Aged Out    Meningococcal ACWY Vaccine  Aged Out    HPV Vaccine  Aged Out     Immunization History   Administered Date(s) Administered    COVID-19 MODERNA VACC 0 5 ML IM 04/15/2021, 05/13/2021, 12/06/2021    INFLUENZA 10/08/2015, 10/07/2016, 12/26/2017    Influenza Quadrivalent Preservative Free 3 years and older IM 10/07/2016    Influenza Quadrivalent, 6-35 Months IM 12/26/2017    Influenza, recombinant, quadrivalent,injectable, preservative free 03/11/2020, 09/18/2020, 11/15/2021    Influenza, seasonal, injectable 01/31/2014, 11/14/2014, 10/08/2015    Pneumococcal Polysaccharide PPV23 01/31/2014    Tdap 03/11/2020    Tuberculin Skin Test-PPD Intradermal 03/24/2015       Kings Morrison DO   Internal Medicine - [address]    4/1/2022 12:10 PM

## 2022-04-08 ENCOUNTER — CLINICAL SUPPORT (OUTPATIENT)
Dept: INTERNAL MEDICINE CLINIC | Facility: CLINIC | Age: 62
End: 2022-04-08

## 2022-04-08 ENCOUNTER — TELEPHONE (OUTPATIENT)
Dept: INTERNAL MEDICINE CLINIC | Facility: CLINIC | Age: 62
End: 2022-04-08

## 2022-04-08 VITALS — SYSTOLIC BLOOD PRESSURE: 113 MMHG | DIASTOLIC BLOOD PRESSURE: 75 MMHG | HEART RATE: 73 BPM

## 2022-04-08 DIAGNOSIS — I10 HYPERTENSION, UNSPECIFIED TYPE: Primary | ICD-10-CM

## 2022-04-08 NOTE — TELEPHONE ENCOUNTER
Patient came for a nurse visit for blood pressure check  Patient's blood pressure is 113/75 with a pulse of 73  Patient stated she had no symptoms, no dizziness, headaches, palpitations or chest pain  Patient does check her blood pressure at home and did provide a blood pressure log for her nurse visit today  Please review and advice if patient should come in for a nurse visit or a office visit and when to schedule

## 2022-04-08 NOTE — TELEPHONE ENCOUNTER
Patients blood pressure appears to be well controlled on current regimen  Can resume normal visits with us  Lets keep her scheduled for her current appointment in May and she can come in PRN prior to that if necessary

## 2022-04-10 NOTE — PROGRESS NOTES
Kade Leonard, this asked for my signature, but I think you were supposed to do a note?     Dr Becky Hare

## 2022-04-11 NOTE — PROGRESS NOTES
Noah Avila came on 04/01 for Nursing BP check visit, found to be HTN and BP meds adjusted appropriately with recs to follow up closely as planned  Please refer to the Visit note on 04/01/2022 at 11:30 am by myself, and was cosigned by Dr Lucio Cruz, for more details

## 2022-04-12 NOTE — TELEPHONE ENCOUNTER
Spoke to patient's  (  is on communication consent) made aware as per note and verbalized understanding and will verbalize to patient   No questions at this time

## 2022-04-14 ENCOUNTER — CLINICAL SUPPORT (OUTPATIENT)
Dept: CARDIOLOGY CLINIC | Facility: CLINIC | Age: 62
End: 2022-04-14
Payer: COMMERCIAL

## 2022-04-14 ENCOUNTER — OFFICE VISIT (OUTPATIENT)
Dept: CARDIOLOGY CLINIC | Facility: CLINIC | Age: 62
End: 2022-04-14
Payer: COMMERCIAL

## 2022-04-14 VITALS
HEIGHT: 65 IN | HEART RATE: 66 BPM | DIASTOLIC BLOOD PRESSURE: 78 MMHG | WEIGHT: 165.8 LBS | RESPIRATION RATE: 16 BRPM | SYSTOLIC BLOOD PRESSURE: 112 MMHG | BODY MASS INDEX: 27.63 KG/M2

## 2022-04-14 DIAGNOSIS — I10 ESSENTIAL HYPERTENSION: ICD-10-CM

## 2022-04-14 DIAGNOSIS — I45.6 VENTRICULAR PRE-EXCITATION SYNDROME: Primary | ICD-10-CM

## 2022-04-14 DIAGNOSIS — R00.2 PALPITATIONS: ICD-10-CM

## 2022-04-14 DIAGNOSIS — I45.6 WPW SYNDROME: ICD-10-CM

## 2022-04-14 PROCEDURE — 3008F BODY MASS INDEX DOCD: CPT | Performed by: STUDENT IN AN ORGANIZED HEALTH CARE EDUCATION/TRAINING PROGRAM

## 2022-04-14 PROCEDURE — 99214 OFFICE O/P EST MOD 30 MIN: CPT | Performed by: PHYSICIAN ASSISTANT

## 2022-04-14 PROCEDURE — 93248 EXT ECG>7D<15D REV&INTERPJ: CPT | Performed by: INTERNAL MEDICINE

## 2022-04-14 PROCEDURE — 93000 ELECTROCARDIOGRAM COMPLETE: CPT | Performed by: PHYSICIAN ASSISTANT

## 2022-04-14 PROCEDURE — 3008F BODY MASS INDEX DOCD: CPT | Performed by: INTERNAL MEDICINE

## 2022-04-14 NOTE — PATIENT INSTRUCTIONS
Compra maquina de pression arterial - [employer]    incrementar metoprolol (toprol XL) 25mg dos veces cada paula - para la tachycardia     [place] la pression y no mallika metoprolol si byrd pression es mas baja de 100/70 - llamar si la pression baja [phone]

## 2022-04-17 DIAGNOSIS — R73.03 PREDIABETES: ICD-10-CM

## 2022-04-18 RX ORDER — METFORMIN HYDROCHLORIDE 500 MG/1
TABLET, EXTENDED RELEASE ORAL
Qty: 90 TABLET | Refills: 0 | Status: SHIPPED | OUTPATIENT
Start: 2022-04-18 | End: 2022-05-12

## 2022-04-18 RX ORDER — METOPROLOL SUCCINATE 25 MG/1
25 TABLET, EXTENDED RELEASE ORAL 2 TIMES DAILY
Qty: 90 TABLET | Refills: 3 | Status: SHIPPED | OUTPATIENT
Start: 2022-04-18 | End: 2022-10-15

## 2022-04-21 NOTE — PROGRESS NOTES
Electrophysiology Follow Up  Heart & Vascular [facility]  [address], [facility] [place], [address]    Name: Renny Bai  : 1960  MRN: [id number]    ASSESSMENT:  1  Ventricular pre-excitation syndrome  POCT ECG   2  WPW syndrome  POCT ECG   3  Essential hypertension  metoprolol succinate (TOPROL-XL) 25 mg 24 hr tablet       PLAN:  1  Palpitations  2  History of WPW status post ablation  3  Essential hypertension  4  History of gastric bypass    IMPRESSION:  1  In review of her monitor, patient does have some ventricular ectopy but nothing that is sustained  In terms of her palpitations, we will try to increase her metoprolol and see if this improves her symptoms  There is no pre-excitation on her EKG  She will call the office if she has any issues with the increase in metoprolol  Patient is to follow up in our EP office as needed  She is to call our office with any further questions or concerns in the meantime  HPI:   Interim history: Renny Bai is a 60 y o  female with the above history  She presents today for office visit  She has come to follow-up on her event monitor  She reports that she is not feeling well and that sometimes she has these palpitations for which she feels that her heart is going fast   She has had a prior ablation and reports that the feeling is similar but not as forceful  Cannot find when ablation for WPW was but prior to   EKG:  Normal sinus rhythm at 66 beats per minute, no evidence of PACs or PVCs on rhythm strip    ROS:   Review of Systems   Constitutional: Negative for chills, diaphoresis, fever and malaise/fatigue  Cardiovascular: Positive for palpitations (Tachycardia)  Negative for chest pain, claudication, cyanosis, dyspnea on exertion, irregular heartbeat, leg swelling, near-syncope, orthopnea, paroxysmal nocturnal dyspnea and syncope     Respiratory: Negative for shortness of breath and sleep disturbances due to breathing  Neurological: Negative for dizziness and light-headedness  All other systems reviewed and are negative  OBJECTIVE:   Vitals:   /78   Pulse 66   Resp 16   Ht 5' 5" (1 651 m)   Wt 75 2 kg (165 lb 12 8 oz)   BMI 27 59 kg/m²       Physical Exam:   Physical Exam  Vitals and nursing note reviewed  Constitutional:       General: She is not in acute distress  Appearance: She is well-developed  She is not diaphoretic  HENT:      Head: Normocephalic and atraumatic  Eyes:      Pupils: Pupils are equal, round, and reactive to light  Neck:      Vascular: No JVD  Cardiovascular:      Rate and Rhythm: Normal rate and regular rhythm  Heart sounds: No murmur heard  No friction rub  No gallop  Pulmonary:      Effort: Pulmonary effort is normal  No respiratory distress  Breath sounds: Normal breath sounds  No wheezing  Abdominal:      Palpations: Abdomen is soft  Musculoskeletal:         General: Normal range of motion  Cervical back: Normal range of motion  Skin:     General: Skin is warm and dry  Neurological:      Mental Status: She is alert and oriented to person, place, and time           Medications:      Current Outpatient Medications:     atorvastatin (LIPITOR) 20 mg tablet, Take 1 tablet by mouth once daily, Disp: 90 tablet, Rfl: 3    calcium gluconate 500 mg tablet, Take 500 mg by mouth daily, Disp: , Rfl:     Cholecalciferol (Vitamin D3) 50 MCG (2000 UT) TABS, Take 2 tablets by mouth once daily, Disp: 180 tablet, Rfl: 1    IRON, FERROUS GLUCONATE, PO, Take 1 tablet by mouth daily, Disp: , Rfl:     losartan-hydrochlorothiazide (HYZAAR) 100-25 MG per tablet, Take 1 tablet by mouth daily, Disp: 30 tablet, Rfl: 5    metoprolol succinate (TOPROL-XL) 25 mg 24 hr tablet, Take 1 tablet (25 mg total) by mouth 2 (two) times a day, Disp: 90 tablet, Rfl: 3    Multiple Vitamins-Minerals (ZINC PO), Take by mouth, Disp: , Rfl:     nystatin (MYCOSTATIN) powder, Apply topically 3 (three) times a day, Disp: 60 g, Rfl: 3    Omega-3 Fatty Acids (FISH OIL) 1,000 mg, Take 1,000 mg by mouth 2 (two) times a day, Disp: , Rfl:     omeprazole (PriLOSEC) 20 mg delayed release capsule, Take 1 capsule by mouth once daily, Disp: 90 capsule, Rfl: 3    sucralfate (CARAFATE) 1 g/10 mL suspension, Take 10 mL (1 g total) by mouth 4 (four) times a day (with meals and at bedtime), Disp: 1200 mL, Rfl: 3    triamcinolone (KENALOG) 0 1 % ointment, Apply topically 2 (two) times a day, Disp: 30 g, Rfl: 1    VITAMIN E PO, Take 1 tablet by mouth daily, Disp: , Rfl:     metFORMIN (GLUCOPHAGE-XR) 500 mg 24 hr tablet, Take 1 tablet by mouth once daily, Disp: 90 tablet, Rfl: 0    polyethylene glycol (GLYCOLAX) 17 GM/SCOOP powder, At 5pm take 5mgx2 dulcolax  At 6pm 32oz miralax in 64oz gatorade  Drink 8oz glass every 5 mins until 32oz finished   Drink remaining as rec , Disp: 238 g, Rfl: 0     Family History   Problem Relation Age of Onset    Congenital heart disease Mother     Asthma Father     Heart attack Sister         acute MI    Congenital heart disease Sister     Hypertension Sister     Breast cancer Cousin         ? age   [place] No Known Problems Paternal Grandmother     No Known Problems Maternal Grandmother     No Known Problems Maternal Grandfather     No Known Problems Maternal Aunt     No Known Problems Maternal Aunt     No Known Problems Maternal Aunt     No Known Problems Maternal Aunt     No Known Problems Maternal Aunt     No Known Problems Maternal Aunt      Social History     Socioeconomic History    Marital status: /Civil Union     Spouse name: Not on file    Number of children: Not on file    Years of education: Not on file    Highest education level: Not on file   Occupational History    Not on file   Tobacco Use    Smoking status: Never Smoker    Smokeless tobacco: Never Used   Vaping Use    Vaping Use: Never used   Substance and Sexual Activity  Alcohol use: Never    Drug use: Never    Sexual activity: Not Currently   Other Topics Concern    Not on file   Social History Narrative    Not on file     Social Determinants of Health     Financial Resource Strain: Low Risk     Difficulty of Paying Living Expenses: Not hard at all   Food Insecurity: No Food Insecurity    Worried About Running Out of Food in the Last Year: Never true    [address] in the Last Year: Never true   Transportation Needs: No Transportation Needs    Lack of Transportation (Medical): No    Lack of Transportation (Non-Medical): No   Physical Activity: Inactive    Days of Exercise per Week: 0 days    Minutes of Exercise per Session: 0 min   Stress: Not on file   Social Connections: Not on file   Intimate Partner Violence: Not on file   Housing Stability: Low Risk     Unable to Pay for Housing in the Last Year: No    Number of Places Lived in the Last Year: 1    Unstable Housing in the Last Year: No     Social History     Tobacco Use   Smoking Status Never Smoker   Smokeless Tobacco Never Used     Social History     Substance and Sexual Activity   Alcohol Use Never       Labs & Results:  Below is the patient's most recent value for Albumin, ALT, AST, BUN, Calcium, Chloride, Cholesterol, CO2, Creatinine, GFR, Glucose, HDL, Hematocrit, Hemoglobin, Hemoglobin A1C, LDL, Magnesium, Phosphorus, Platelets, Potassium, PSA, Sodium, Triglycerides, and WBC     Lab Results   Component Value Date    ALT 30 11/13/2021    AST 24 11/13/2021    BUN 14 03/21/2022    CALCIUM 8 9 03/21/2022     (H) 03/21/2022    CHOL 225 07/26/2015    CO2 28 03/21/2022    CREATININE 0 69 03/21/2022    HDL 44 11/13/2021    HCT 40 2 03/21/2022    HGB 12 5 03/21/2022    HGBA1C 6 4 (H) 11/13/2021     03/21/2022    K 4 9 03/21/2022     07/26/2015    TRIG 119 11/13/2021    WBC 5 64 03/21/2022     Note: for a comprehensive list of the patient's lab results, access the Results Review activity  CARDIAC TESTING:   ECHO:   No results found for this or any previous visit  No results found for this or any previous visit  CATH:  No results found for this or any previous visit  STRESS TEST:  No results found for this or any previous visit

## 2022-05-29 ENCOUNTER — LAB (OUTPATIENT)
Dept: LAB | Facility: HOSPITAL | Age: 62
End: 2022-05-29
Payer: COMMERCIAL

## 2022-05-29 DIAGNOSIS — E61.1 LOW IRON: ICD-10-CM

## 2022-05-29 DIAGNOSIS — Z98.84 BARIATRIC SURGERY STATUS: ICD-10-CM

## 2022-05-29 DIAGNOSIS — K91.2 POSTSURGICAL MALABSORPTION: ICD-10-CM

## 2022-05-29 DIAGNOSIS — R73.03 PREDIABETES: ICD-10-CM

## 2022-05-29 DIAGNOSIS — K91.89 GASTROJEJUNAL ANASTOMOTIC STRICTURE: ICD-10-CM

## 2022-05-29 DIAGNOSIS — E78.2 MIXED HYPERLIPIDEMIA: Chronic | ICD-10-CM

## 2022-05-29 LAB
25(OH)D3 SERPL-MCNC: 24.9 NG/ML (ref 30–100)
ALBUMIN SERPL BCP-MCNC: 3.5 G/DL (ref 3.5–5)
ALP SERPL-CCNC: 87 U/L (ref 46–116)
ALT SERPL W P-5'-P-CCNC: 46 U/L (ref 12–78)
ANION GAP SERPL CALCULATED.3IONS-SCNC: 2 MMOL/L (ref 4–13)
AST SERPL W P-5'-P-CCNC: 29 U/L (ref 5–45)
BASOPHILS # BLD AUTO: 0.02 THOUSANDS/ΜL (ref 0–0.1)
BASOPHILS NFR BLD AUTO: 0 % (ref 0–1)
BILIRUB SERPL-MCNC: 0.39 MG/DL (ref 0.2–1)
BUN SERPL-MCNC: 15 MG/DL (ref 5–25)
CALCIUM SERPL-MCNC: 8.9 MG/DL (ref 8.3–10.1)
CHLORIDE SERPL-SCNC: 107 MMOL/L (ref 100–108)
CHOLEST SERPL-MCNC: 147 MG/DL
CO2 SERPL-SCNC: 32 MMOL/L (ref 21–32)
CREAT SERPL-MCNC: 0.65 MG/DL (ref 0.6–1.3)
EOSINOPHIL # BLD AUTO: 0.09 THOUSAND/ΜL (ref 0–0.61)
EOSINOPHIL NFR BLD AUTO: 2 % (ref 0–6)
ERYTHROCYTE [DISTWIDTH] IN BLOOD BY AUTOMATED COUNT: 13.1 % (ref 11.6–15.1)
EST. AVERAGE GLUCOSE BLD GHB EST-MCNC: 148 MG/DL
FERRITIN SERPL-MCNC: 44 NG/ML (ref 8–388)
FOLATE SERPL-MCNC: >20 NG/ML (ref 3.1–17.5)
GFR SERPL CREATININE-BSD FRML MDRD: 94 ML/MIN/1.73SQ M
GLUCOSE P FAST SERPL-MCNC: 105 MG/DL (ref 65–99)
HBA1C MFR BLD: 6.8 %
HCT VFR BLD AUTO: 40.3 % (ref 34.8–46.1)
HDLC SERPL-MCNC: 44 MG/DL
HGB BLD-MCNC: 12.5 G/DL (ref 11.5–15.4)
IMM GRANULOCYTES # BLD AUTO: 0.03 THOUSAND/UL (ref 0–0.2)
IMM GRANULOCYTES NFR BLD AUTO: 1 % (ref 0–2)
IRON SATN MFR SERPL: 20 % (ref 15–50)
IRON SERPL-MCNC: 62 UG/DL (ref 50–170)
LDLC SERPL CALC-MCNC: 75 MG/DL (ref 0–100)
LYMPHOCYTES # BLD AUTO: 1.81 THOUSANDS/ΜL (ref 0.6–4.47)
LYMPHOCYTES NFR BLD AUTO: 35 % (ref 14–44)
MCH RBC QN AUTO: 28.6 PG (ref 26.8–34.3)
MCHC RBC AUTO-ENTMCNC: 31 G/DL (ref 31.4–37.4)
MCV RBC AUTO: 92 FL (ref 82–98)
MONOCYTES # BLD AUTO: 0.59 THOUSAND/ΜL (ref 0.17–1.22)
MONOCYTES NFR BLD AUTO: 12 % (ref 4–12)
NEUTROPHILS # BLD AUTO: 2.58 THOUSANDS/ΜL (ref 1.85–7.62)
NEUTS SEG NFR BLD AUTO: 50 % (ref 43–75)
NRBC BLD AUTO-RTO: 0 /100 WBCS
PLATELET # BLD AUTO: 245 THOUSANDS/UL (ref 149–390)
PMV BLD AUTO: 10.3 FL (ref 8.9–12.7)
POTASSIUM SERPL-SCNC: 4.1 MMOL/L (ref 3.5–5.3)
PROT SERPL-MCNC: 7.1 G/DL (ref 6.4–8.2)
PTH-INTACT SERPL-MCNC: 144.2 PG/ML (ref 18.4–80.1)
RBC # BLD AUTO: 4.37 MILLION/UL (ref 3.81–5.12)
SODIUM SERPL-SCNC: 141 MMOL/L (ref 136–145)
TIBC SERPL-MCNC: 311 UG/DL (ref 250–450)
TRIGL SERPL-MCNC: 141 MG/DL
TSH SERPL DL<=0.05 MIU/L-ACNC: 2.04 UIU/ML (ref 0.45–4.5)
VIT B12 SERPL-MCNC: 488 PG/ML (ref 100–900)
WBC # BLD AUTO: 5.12 THOUSAND/UL (ref 4.31–10.16)

## 2022-05-29 PROCEDURE — 83540 ASSAY OF IRON: CPT

## 2022-05-29 PROCEDURE — 36415 COLL VENOUS BLD VENIPUNCTURE: CPT

## 2022-05-29 PROCEDURE — 82728 ASSAY OF FERRITIN: CPT

## 2022-05-29 PROCEDURE — 82607 VITAMIN B-12: CPT

## 2022-05-29 PROCEDURE — 82306 VITAMIN D 25 HYDROXY: CPT

## 2022-05-29 PROCEDURE — 83550 IRON BINDING TEST: CPT

## 2022-05-29 PROCEDURE — 84590 ASSAY OF VITAMIN A: CPT

## 2022-05-29 PROCEDURE — 80061 LIPID PANEL: CPT

## 2022-05-29 PROCEDURE — 84425 ASSAY OF VITAMIN B-1: CPT

## 2022-05-29 PROCEDURE — 80053 COMPREHEN METABOLIC PANEL: CPT

## 2022-05-29 PROCEDURE — 83036 HEMOGLOBIN GLYCOSYLATED A1C: CPT

## 2022-05-29 PROCEDURE — 84443 ASSAY THYROID STIM HORMONE: CPT

## 2022-05-29 PROCEDURE — 82746 ASSAY OF FOLIC ACID SERUM: CPT

## 2022-05-29 PROCEDURE — 85025 COMPLETE CBC W/AUTO DIFF WBC: CPT

## 2022-05-29 PROCEDURE — 83970 ASSAY OF PARATHORMONE: CPT

## 2022-06-01 ENCOUNTER — OFFICE VISIT (OUTPATIENT)
Dept: INTERNAL MEDICINE CLINIC | Facility: CLINIC | Age: 62
End: 2022-06-01

## 2022-06-01 VITALS
HEART RATE: 55 BPM | DIASTOLIC BLOOD PRESSURE: 82 MMHG | WEIGHT: 168 LBS | SYSTOLIC BLOOD PRESSURE: 119 MMHG | TEMPERATURE: 97.8 F | OXYGEN SATURATION: 97 % | BODY MASS INDEX: 27.96 KG/M2

## 2022-06-01 DIAGNOSIS — Z23 NEED FOR HEPATITIS B VACCINATION: ICD-10-CM

## 2022-06-01 DIAGNOSIS — E65 ABDOMINAL PANNUS: ICD-10-CM

## 2022-06-01 DIAGNOSIS — L98.7 EXCESS SKIN: ICD-10-CM

## 2022-06-01 DIAGNOSIS — Z90.3 POSTGASTRECTOMY MALABSORPTION: ICD-10-CM

## 2022-06-01 DIAGNOSIS — R73.03 PREDIABETES: ICD-10-CM

## 2022-06-01 DIAGNOSIS — B37.2 CANDIDAL SKIN INFECTION: ICD-10-CM

## 2022-06-01 DIAGNOSIS — Z12.31 VISIT FOR SCREENING MAMMOGRAM: Primary | ICD-10-CM

## 2022-06-01 DIAGNOSIS — Z23 NEED FOR HEPATITIS B BOOSTER VACCINATION: ICD-10-CM

## 2022-06-01 DIAGNOSIS — E55.9 VITAMIN D DEFICIENCY: ICD-10-CM

## 2022-06-01 DIAGNOSIS — K91.2 POSTGASTRECTOMY MALABSORPTION: ICD-10-CM

## 2022-06-01 PROCEDURE — 90471 IMMUNIZATION ADMIN: CPT | Performed by: INTERNAL MEDICINE

## 2022-06-01 PROCEDURE — 90746 HEPB VACCINE 3 DOSE ADULT IM: CPT | Performed by: INTERNAL MEDICINE

## 2022-06-01 PROCEDURE — 1036F TOBACCO NON-USER: CPT | Performed by: INTERNAL MEDICINE

## 2022-06-01 PROCEDURE — 3079F DIAST BP 80-89 MM HG: CPT | Performed by: INTERNAL MEDICINE

## 2022-06-01 PROCEDURE — 99213 OFFICE O/P EST LOW 20 MIN: CPT | Performed by: INTERNAL MEDICINE

## 2022-06-01 PROCEDURE — 3074F SYST BP LT 130 MM HG: CPT | Performed by: INTERNAL MEDICINE

## 2022-06-01 RX ORDER — ERGOCALCIFEROL 1.25 MG/1
50000 CAPSULE ORAL WEEKLY
Qty: 8 CAPSULE | Refills: 0 | Status: SHIPPED | OUTPATIENT
Start: 2022-06-01 | End: 2022-07-21

## 2022-06-01 RX ORDER — METFORMIN HYDROCHLORIDE 500 MG/1
1000 TABLET, EXTENDED RELEASE ORAL
Qty: 90 TABLET | Refills: 3 | Status: SHIPPED | OUTPATIENT
Start: 2022-06-01 | End: 2022-07-11

## 2022-06-01 NOTE — PATIENT INSTRUCTIONS
-please call to schedule your DEXA bone scan  -please call to schedule your annual mammogram in July  -please INCREASE your metformin to 1000 mg a day with dinner  -please increase your vitamin D to 50,000 units a week for 8 weeks

## 2022-06-01 NOTE — PROGRESS NOTES
Assessment/Plan:    Essential hypertension  -BP well controlled in office (119/82) and at home  -cont Hyzaar 100-25 mg daily  -continue Toprol XL 25 mg daily    S/p gastric bypass  -bothersome, heavy panus  Seeking additional consult from plastic surgeon  Surgery not being covered by insurance at this point  -continue skin/wound care with topicals and good hygiene  -encouraged increased aerobic exercise and better diabetic control  -continue all vitamin/mineral supplement  Vitamin D still very deficient with elevated PTH and normal Ca2+  Will increase vitamin D to 50,000 units weekly for 8 weeks  Type 2 Diabetes  -A1c with 6 8%  -will increase Metformin ER to 1000 mg HS  -repeat A1c 3 months  -encouraged increased aerobic exercise and decreased carb intake, which patient admits she is currently struggling with    General health maintenance:  -due for annual mammogram July 2022- script provided  -Hepatitis B #1/3 in office today  Return in 2 months for #2 (nursing visit)  -due for DEXA- script provided  -colonoscopy due 2030 (10 year f/u)      Subjective:      Patient ID: Renny Bai is a 60 y o  female with WPW s/p ablation, type 2 diabetes on metformin, HLD, Carline-en-Y in 2011 who presents for routine f/u  Patient has been feeling well and is taking all of her medications  Her palpitations have resolved since her metoprolol was increased  She denies episodes of CP  She has no SOB or cough  Denies fever/chills  Bowel movements are regular  Denies fatigue  Most significant complaint is her abdominal pannus  She has a lot of discomfort from it  Objective:    Vitals:    06/01/22 1021   BP: 119/82   Pulse: 55   Temp: 97 8 °F (36 6 °C)   SpO2: 97%      Physical Exam  Constitutional:       General: She is not in acute distress  Appearance: She is not ill-appearing, toxic-appearing or diaphoretic  Cardiovascular:      Rate and Rhythm: Normal rate and regular rhythm     Pulmonary:      Effort: Pulmonary effort is normal  No respiratory distress  Breath sounds: Normal breath sounds  No wheezing  Abdominal:      General: There is no distension  Palpations: Abdomen is soft  Tenderness: There is no abdominal tenderness  There is no guarding  Musculoskeletal:      Right lower leg: No edema  Left lower leg: No edema  Skin:     General: Skin is warm  Neurological:      Mental Status: She is alert and oriented to person, place, and time  Psychiatric:         Mood and Affect: Mood normal          Behavior: Behavior normal          Thought Content:  Thought content normal          Judgment: Judgment normal

## 2022-06-06 LAB — VIT A SERPL-MCNC: 46.2 UG/DL (ref 22–69.5)

## 2022-06-13 LAB — VIT B1 BLD-SCNC: 85.6 NMOL/L (ref 66.5–200)

## 2022-06-14 ENCOUNTER — OFFICE VISIT (OUTPATIENT)
Dept: CARDIOLOGY CLINIC | Facility: CLINIC | Age: 62
End: 2022-06-14
Payer: COMMERCIAL

## 2022-06-14 VITALS
SYSTOLIC BLOOD PRESSURE: 120 MMHG | DIASTOLIC BLOOD PRESSURE: 86 MMHG | HEART RATE: 66 BPM | WEIGHT: 168.6 LBS | BODY MASS INDEX: 28.06 KG/M2

## 2022-06-14 DIAGNOSIS — I47.2 NSVT (NONSUSTAINED VENTRICULAR TACHYCARDIA) (HCC): ICD-10-CM

## 2022-06-14 DIAGNOSIS — Z98.890 HISTORY OF CARDIAC RADIOFREQUENCY ABLATION (RFA): ICD-10-CM

## 2022-06-14 DIAGNOSIS — I10 PRIMARY HYPERTENSION: Primary | ICD-10-CM

## 2022-06-14 DIAGNOSIS — R94.31 ABNORMAL EKG: ICD-10-CM

## 2022-06-14 DIAGNOSIS — I45.6 WPW SYNDROME: ICD-10-CM

## 2022-06-14 PROBLEM — I47.29 NSVT (NONSUSTAINED VENTRICULAR TACHYCARDIA): Status: ACTIVE | Noted: 2022-06-14

## 2022-06-14 PROCEDURE — 1036F TOBACCO NON-USER: CPT | Performed by: INTERNAL MEDICINE

## 2022-06-14 PROCEDURE — 93000 ELECTROCARDIOGRAM COMPLETE: CPT | Performed by: INTERNAL MEDICINE

## 2022-06-14 PROCEDURE — 99214 OFFICE O/P EST MOD 30 MIN: CPT | Performed by: INTERNAL MEDICINE

## 2022-06-14 NOTE — PROGRESS NOTES
Cardiology Follow Up    Je 23  1960  [id number]  [facility] [place]  [facility]  FER[facility] [address]   [phone]    1  Primary hypertension  POCT ECG   2  WPW syndrome     3  NSVT (nonsustained ventricular tachycardia) ([facility] [address] )     4  History of cardiac radiofrequency ablation (RFA)         Interval History:  Cardiology consultation  Pleasant 58-year-old female who has history of cardiac arrhythmias, he does have reportedly history of Gustabo-Parkinson-White syndrome  Underwent radiofrequency ablation for over a decade ago  No clinical recurrences  She has never had syncope or presyncope  Patient complains of intermittent palpitations with fast heart rate that usually short-lived  No particular triggering factors  No associated syncope or presyncope  She was seen by electrophysiology  , they did increase her beta-blocker therapy with improvement of his symptoms  She underwent a loop recorder:  Personally Reviewed, within normal sinus rhythm, he did have occasional PVCs she did have 2 runs of wide complex tachycardia 1 particularly fast, only 4 beats at a rate of 220 beats per minute  And several short runs of SVT the longest 1 9 beat an average rate of 150 beats per minute  She did have symptoms with associated PVCs and PACs  And occasionally without any associated arrhythmias  But is active, she did undergo bariatric surgery and lost close to 100 lb  She denies any chest pain or dyspnea  Denies any syncope presyncope  Patient was interviewed in Latvian      Assessment/Plan     Hypertension, normal blood pressure   Evidence of target organ damage: none  Dietary sodium restriction  Regular aerobic exercise         Severa Billow is a 60 y o  female who presents for evaluation of elevated blood pressures  Age at onset of elevated blood pressure:    Cardiac symptoms: palpitations  Patient denies: palpitations  Cardiovascular risk factors: family history of premature cardiovascular disease and hypertension  Use of agents associated with hypertension: none  History of target organ damage: none  The following portions of the patient's history were reviewed and updated as appropriate: allergies, current medications, past family history, past medical history, past social history, past surgical history and problem list     Review of Systems    was interviewed in [facility] [place]      Patient Active Problem List   Diagnosis    Gastric anastomotic stricture    Mixed hyperlipidemia    Primary hypertension    Postgastrectomy malabsorption    Ventricular pre-excitation syndrome    Vitamin D deficiency    Colon cancer screening    Women's annual routine gynecological examination    Class 1 obesity due to excess calories with serious comorbidity and body mass index (BMI) of 30 0 to 30 9 in adult    Prediabetes    WPW syndrome    H/O bariatric surgery    Abdominal pannus    Candidal skin infection    Excess skin    NSVT (nonsustained ventricular tachycardia) (HCC)    History of cardiac radiofrequency ablation (RFA)     Past Medical History:   Diagnosis Date    Anastomotic stricture of gastrojejunostomy     s/p gastric bypass    Anemia     iron deficiency resolved 11/14/14    Anxiety     resolved 6/7/17    Class 1 obesity due to excess calories with serious comorbidity and body mass index (BMI) of 30 0 to 30 9 in adult 9/18/2020    Degenerative joint disease of ankle and foot, unspecified laterality     last assessed 5/17/13    Depression     last assessed 11/7/12    [facility]     type 2 uncomplicated, controlled - last assessed 1/31/14    Dysphagia     Gastric ulcer     History of melena     Hyperlipidemia     Hypertension     Insomnia     Insomnia     Obesity     resolved 11/14/14    Overweight (BMI 25 0-29 9) 5/13/2021    Postgastrectomy malabsorption     Tachycardia     Ventricular pre-excitation syndrome     Vitamin D deficiency      Social History     Socioeconomic History    Marital status: /Civil Union     Spouse name: Not on file    Number of children: Not on file    Years of education: Not on file    Highest education level: Not on file   Occupational History    Not on file   Tobacco Use    Smoking status: Never Smoker    Smokeless tobacco: Never Used   Vaping Use    Vaping Use: Never used   Substance and Sexual Activity    Alcohol use: Never    Drug use: Never    Sexual activity: Not Currently   Other Topics Concern    Not on file   Social History Narrative    Not on file     Social Determinants of Health     Financial Resource Strain: Low Risk     Difficulty of Paying Living Expenses: Not hard at all   Food Insecurity: No Food Insecurity    Worried About Running Out of Food in the Last Year: Never true    [address] in the Last Year: Never true   Transportation Needs: No Transportation Needs    Lack of Transportation (Medical): No    Lack of Transportation (Non-Medical):  No   Physical Activity: Inactive    Days of Exercise per Week: 0 days    Minutes of Exercise per Session: 0 min   Stress: Not on file   Social Connections: Not on file   Intimate Partner Violence: Not on file   Housing Stability: Low Risk     Unable to Pay for Housing in the Last Year: No    Number of Places Lived in the Last Year: 1    Unstable Housing in the Last Year: No      Family History   Problem Relation Age of Onset    Congenital heart disease Mother     Asthma Father     Heart attack Sister         acute MI    Congenital heart disease Sister     Hypertension Sister     Breast cancer Cousin         ? age   Isai Pila No Known Problems Paternal Grandmother     No Known Problems Maternal Grandmother     No Known Problems Maternal Grandfather     No Known Problems Maternal Aunt     No Known Problems Maternal Aunt     No Known Problems Maternal Aunt     No Known Problems Maternal Aunt     No Known Problems Maternal Aunt     No Known Problems Maternal Aunt      Past Surgical History:   Procedure Laterality Date    CARDIAC CATHETERIZATION      post proc data:____ lesions successfully dilated    COLONOSCOPY      ESOPHAGOGASTRODUODENOSCOPY      GASTRIC BYPASS  02/08/2011    for morbid obesity     HYSTERECTOMY      @ age 38    OOPHORECTOMY Bilateral     @ age 38    MS EGD TRANSORAL BIOPSY SINGLE/MULTIPLE N/A 1/25/2017    Procedure: ESOPHAGOGASTRODUODENOSCOPY (EGD); Surgeon: Robert Mcclellan MD;  Location: [facility]; Service: Bariatrics    MS EGD TRANSORAL BIOPSY SINGLE/MULTIPLE N/A 5/3/2017    Procedure: ESOPHAGOGASTRODUODENOSCOPY (EGD) with balloon dilatation;  Surgeon: Robert Mcclellan MD;  Location: [facility]; Service: Bariatrics    MS EGD TRANSORAL BIOPSY SINGLE/MULTIPLE N/A 8/16/2017    Procedure: ESOPHAGOGASTRODUODENOSCOPY (EGD) with balloon dilation;  Surgeon: Robert Mcclellan MD;  Location: [facility];   Service: Bariatrics    UPPER GASTROINTESTINAL ENDOSCOPY         Current Outpatient Medications:     atorvastatin (LIPITOR) 20 mg tablet, Take 1 tablet by mouth once daily, Disp: 90 tablet, Rfl: 3    calcium gluconate 500 mg tablet, Take 500 mg by mouth daily, Disp: , Rfl:     Cholecalciferol (Vitamin D3) 50 MCG (2000 UT) TABS, Take 2 tablets by mouth once daily, Disp: 180 tablet, Rfl: 1    ergocalciferol (VITAMIN D2) 50,000 units, Take 1 capsule (50,000 Units total) by mouth once a week for 8 doses, Disp: 8 capsule, Rfl: 0    IRON, FERROUS GLUCONATE, PO, Take 1 tablet by mouth daily, Disp: , Rfl:     losartan-hydrochlorothiazide (HYZAAR) 100-25 MG per tablet, Take 1 tablet by mouth daily, Disp: 30 tablet, Rfl: 5    metFORMIN (GLUCOPHAGE-XR) 500 mg 24 hr tablet, Take 2 tablets (1,000 mg total) by mouth daily with dinner, Disp: 90 tablet, Rfl: 3    metoprolol succinate (TOPROL-XL) 25 mg 24 hr tablet, Take 1 tablet (25 mg total) by mouth 2 (two) times a day, Disp: 90 tablet, Rfl: 3    Multiple Vitamins-Minerals (ZINC PO), Take by mouth, Disp: , Rfl:     nystatin (MYCOSTATIN) powder, Apply topically 3 (three) times a day, Disp: 60 g, Rfl: 3    Omega-3 Fatty Acids (FISH OIL) 1,000 mg, Take 1,000 mg by mouth 2 (two) times a day, Disp: , Rfl:     omeprazole (PriLOSEC) 20 mg delayed release capsule, Take 1 capsule by mouth once daily, Disp: 90 capsule, Rfl: 3    triamcinolone (KENALOG) 0 1 % ointment, Apply topically 2 (two) times a day, Disp: 30 g, Rfl: 2    VITAMIN E PO, Take 1 tablet by mouth daily, Disp: , Rfl:   No Known Allergies    Labs:  Appointment on 05/29/2022   Component Date Value    Sodium 05/29/2022 141     Potassium 05/29/2022 4 1     Chloride 05/29/2022 107     CO2 05/29/2022 32     ANION GAP 05/29/2022 2 (A)    BUN 05/29/2022 15     Creatinine 05/29/2022 0 65     Glucose, Fasting 05/29/2022 105 (A)    Calcium 05/29/2022 8 9     AST 05/29/2022 29     ALT 05/29/2022 46     Alkaline Phosphatase 05/29/2022 87     Total Protein 05/29/2022 7 1     Albumin 05/29/2022 3 5     Total Bilirubin 05/29/2022 0 39     eGFR 05/29/2022 94     Hemoglobin A1C 05/29/2022 6 8 (A)    EAG 05/29/2022 148     Cholesterol 05/29/2022 147     Triglycerides 05/29/2022 141     HDL, Direct 05/29/2022 44 (A)    LDL Calculated 05/29/2022 75     WBC 05/29/2022 5 12     RBC 05/29/2022 4 37     Hemoglobin 05/29/2022 12 5     Hematocrit 05/29/2022 40 3     MCV 05/29/2022 92     MCH 05/29/2022 28 6     MCHC 05/29/2022 31 0 (A)    RDW 05/29/2022 13 1     MPV 05/29/2022 10 3     Platelets 77/21/7715 245     nRBC 05/29/2022 0     Neutrophils Relative 05/29/2022 50     Immat GRANS % 05/29/2022 1     Lymphocytes Relative 05/29/2022 35     Monocytes Relative 05/29/2022 12     Eosinophils Relative 05/29/2022 2     Basophils Relative 05/29/2022 0     Neutrophils Absolute 05/29/2022 2 58     Immature Grans Absolute 05/29/2022 0 03  Lymphocytes Absolute 05/29/2022 1 81     Monocytes Absolute 05/29/2022 0 59     Eosinophils Absolute 05/29/2022 0 09     Basophils Absolute 05/29/2022 0 02     Folate 05/29/2022 >20 0 (A)    PTH 05/29/2022 144 2 (A)    TSH 3RD GENERATON 05/29/2022 2 040     Vitamin A 05/29/2022 46 2     Vit D, 25-Hydroxy 05/29/2022 24 9 (A)    Vitamin B-12 05/29/2022 488     Vitamin B1, Whole Blood 05/29/2022 85 6     Iron Saturation 05/29/2022 20     TIBC 05/29/2022 311     Iron 05/29/2022 62     Ferritin 05/29/2022 44    Admission on 03/21/2022, Discharged on 03/21/2022   Component Date Value    WBC 03/21/2022 5 64     RBC 03/21/2022 4 43     Hemoglobin 03/21/2022 12 5     Hematocrit 03/21/2022 40 2     MCV 03/21/2022 91     MCH 03/21/2022 28 2     MCHC 03/21/2022 31 1 (A)    RDW 03/21/2022 13 4     MPV 03/21/2022 10 7     Platelets 04/67/5029 245     nRBC 03/21/2022 0     Neutrophils Relative 03/21/2022 54     Immat GRANS % 03/21/2022 0     Lymphocytes Relative 03/21/2022 33     Monocytes Relative 03/21/2022 12     Eosinophils Relative 03/21/2022 1     Basophils Relative 03/21/2022 0     Neutrophils Absolute 03/21/2022 2 95     Immature Grans Absolute 03/21/2022 0 02     Lymphocytes Absolute 03/21/2022 1 88     Monocytes Absolute 03/21/2022 0 70     Eosinophils Absolute 03/21/2022 0 07     Basophils Absolute 03/21/2022 0 02     Sodium 03/21/2022 142     Potassium 03/21/2022 4 9     Chloride 03/21/2022 111 (A)    CO2 03/21/2022 28     ANION GAP 03/21/2022 3 (A)    BUN 03/21/2022 14     Creatinine 03/21/2022 0 69     Glucose 03/21/2022 81     Calcium 03/21/2022 8 9     eGFR 03/21/2022 92     hs TnI 0hr 03/21/2022 5     hs TnI 2hr 03/21/2022 8     Delta 2hr hsTnI 03/21/2022 3     Ventricular Rate 03/21/2022 66     Atrial Rate 03/21/2022 66     MI Interval 03/21/2022 168     QRSD Interval 03/21/2022 80     QT Interval 03/21/2022 394     QTC Interval 03/21/2022 413     P Axis 03/21/2022 50     QRS Axis 03/21/2022 3     T Wave Axis 03/21/2022 43      Imaging: No results found  Review of Systems:  Review of Systems   Constitutional: Negative for activity change, fatigue, fever and unexpected weight change  HENT: Negative for congestion, nosebleeds, rhinorrhea and trouble swallowing  Eyes: Negative for visual disturbance  Respiratory: Negative for apnea, chest tightness, shortness of breath, wheezing and stridor  Cardiovascular: Positive for palpitations  Negative for chest pain and leg swelling  Gastrointestinal: Negative for abdominal pain, anal bleeding, blood in stool, constipation and diarrhea  Endocrine: Negative for cold intolerance and heat intolerance  Genitourinary: Negative for difficulty urinating, dysuria, frequency and hematuria  Musculoskeletal: Negative for arthralgias, back pain, gait problem and myalgias  Skin: Negative for pallor and rash  Allergic/Immunologic: Negative for immunocompromised state  Neurological: Negative for dizziness, tremors, syncope, facial asymmetry, speech difficulty, weakness, light-headedness and headaches  Hematological: Does not bruise/bleed easily  Psychiatric/Behavioral: Positive for sleep disturbance  Negative for decreased concentration  The patient is not nervous/anxious  Physical Exam:  Physical Exam  Vitals reviewed  Constitutional:       General: She is not in acute distress  Appearance: Normal appearance  She is normal weight  She is not ill-appearing, toxic-appearing or diaphoretic  HENT:      Head: Normocephalic  Eyes:      General: No scleral icterus  Conjunctiva/sclera: Conjunctivae normal    Neck:      Vascular: No carotid bruit  Cardiovascular:      Rate and Rhythm: Normal rate and regular rhythm  Pulses: Normal pulses  Heart sounds: Normal heart sounds  No murmur heard  No friction rub  No gallop     Pulmonary:      Effort: Pulmonary effort is normal  No respiratory distress  Breath sounds: Normal breath sounds  No stridor  No wheezing, rhonchi or rales  Abdominal:      General: Abdomen is flat  Bowel sounds are normal       Palpations: Abdomen is soft  Tenderness: There is no abdominal tenderness  Musculoskeletal:      Right lower leg: No edema  Left lower leg: No edema  Skin:     General: Skin is warm and dry  Capillary Refill: Capillary refill takes less than 2 seconds  Coloration: Skin is not jaundiced or pale  Findings: No bruising, erythema or lesion  Neurological:      General: No focal deficit present  Mental Status: She is alert and oriented to person, place, and time  Psychiatric:         Mood and Affect: Mood normal          Behavior: Behavior normal          Judgment: Judgment normal          Discussion/Summary:  The tachycardia  Nonsustained ventricular tachycardia short but fast   Associated palpitations but not with every meal the right with PVCs, no history of recurrence of pre-excitation status post ablation for her preexcitation or Gustabo-Parkinson-White  Favor noninvasive evaluation for structural disease and ischemia will continue beta-blocker therapy  This note was completed in part utilizing Mbaobao direct voice recognition software  Grammatical errors, random word insertion, spelling mistakes, and incomplete sentences may be an occasional consequence of the system secondary to software limitations, ambient noise and hardware issues  At the time of dictation, efforts were made to edit, clarify and /or correct errors  Please read the chart carefully and recognize, using context, where substitutions have occurred  If you have any questions or concerns about the context, text or information contained within the body of this dictation, please contact myself, the provider, for further clarification

## 2022-06-15 NOTE — RESULT ENCOUNTER NOTE
Your vitamin D is low-important for bone health  Please add an EXTRA 2000 IU  (over-the-counter) vitamin D3 in ADDITION to what you are currently taking

## 2022-06-30 ENCOUNTER — HOSPITAL ENCOUNTER (OUTPATIENT)
Dept: NON INVASIVE DIAGNOSTICS | Facility: CLINIC | Age: 62
Discharge: HOME | End: 2022-06-30
Payer: COMMERCIAL

## 2022-06-30 VITALS
DIASTOLIC BLOOD PRESSURE: 80 MMHG | BODY MASS INDEX: 27.99 KG/M2 | HEIGHT: 65 IN | HEART RATE: 63 BPM | SYSTOLIC BLOOD PRESSURE: 140 MMHG | OXYGEN SATURATION: 100 % | WEIGHT: 168 LBS

## 2022-06-30 VITALS
HEART RATE: 80 BPM | BODY MASS INDEX: 27.99 KG/M2 | DIASTOLIC BLOOD PRESSURE: 86 MMHG | WEIGHT: 168 LBS | HEIGHT: 65 IN | SYSTOLIC BLOOD PRESSURE: 120 MMHG

## 2022-06-30 DIAGNOSIS — I47.29 NSVT (NONSUSTAINED VENTRICULAR TACHYCARDIA): ICD-10-CM

## 2022-06-30 DIAGNOSIS — R94.31 ABNORMAL EKG: ICD-10-CM

## 2022-06-30 DIAGNOSIS — I45.6 WPW SYNDROME: ICD-10-CM

## 2022-06-30 DIAGNOSIS — I10 PRIMARY HYPERTENSION: ICD-10-CM

## 2022-06-30 DIAGNOSIS — Z98.890 HISTORY OF CARDIAC RADIOFREQUENCY ABLATION (RFA): ICD-10-CM

## 2022-06-30 LAB
AORTIC ROOT: 3 CM
APICAL FOUR CHAMBER EJECTION FRACTION: 56 %
ASCENDING AORTA: 4.2 CM
AV REGURGITATION PRESSURE HALF TIME: 684 MS
BASELINE ST DEPRESSION: 0 MM
E WAVE DECELERATION TIME: 234 MS
FRACTIONAL SHORTENING: 33 % (ref 28–44)
INTERVENTRICULAR SEPTUM IN DIASTOLE (PARASTERNAL SHORT AXIS VIEW): 1.1 CM
INTERVENTRICULAR SEPTUM: 1.1 CM (ref 0.6–1.1)
LEFT ATRIUM AREA SYSTOLE SINGLE PLANE A4C: 18.1 CM2
LEFT ATRIUM SIZE: 4.1 CM
LEFT INTERNAL DIMENSION IN SYSTOLE: 2.7 CM (ref 2.1–4)
LEFT VENTRICULAR INTERNAL DIMENSION IN DIASTOLE: 4 CM (ref 3.5–6)
LEFT VENTRICULAR POSTERIOR WALL IN END DIASTOLE: 1.1 CM
LEFT VENTRICULAR STROKE VOLUME: 41 ML
LVSV (TEICH): 41 ML
MAX HR PERCENT: 85 %
MAX HR: 134 BPM
MV E'TISSUE VEL-SEP: 7 CM/S
MV PEAK A VEL: 0.88 M/S
MV PEAK E VEL: 66 CM/S
MV STENOSIS PRESSURE HALF TIME: 68 MS
MV VALVE AREA P 1/2 METHOD: 3.24 CM2
RATE PRESSURE PRODUCT: NORMAL
RIGHT ATRIUM AREA SYSTOLE A4C: 13 CM2
RIGHT VENTRICLE ID DIMENSION: 3.1 CM
SL CV AV DECELERATION TIME RETROGRADE: 2360 MS
SL CV AV PEAK GRADIENT RETROGRADE: 72 MMHG
SL CV LV EF: 65
SL CV PED ECHO LEFT VENTRICLE DIASTOLIC VOLUME (MOD BIPLANE) 2D: 69 ML
SL CV PED ECHO LEFT VENTRICLE SYSTOLIC VOLUME (MOD BIPLANE) 2D: 28 ML
SL CV STRESS RECOVERY BP: NORMAL MMHG
SL CV STRESS RECOVERY HR: 81 BPM
SL CV STRESS RECOVERY O2 SAT: 100 %
SL CV STRESS STAGE REACHED: 3
STRESS ANGINA INDEX: 0
STRESS BASELINE BP: NORMAL MMHG
STRESS BASELINE HR: 63 BPM
STRESS DUKE TREADMILL SCORE: 8
STRESS O2 SAT REST: 100 %
STRESS PEAK HR: 134 BPM
STRESS POST ESTIMATED WORKLOAD: 10.1 METS
STRESS POST EXERCISE DUR MIN: 8 MIN
STRESS POST O2 SAT PEAK: 98 %
STRESS POST PEAK BP: 148 MMHG
STRESS ST DEPRESSION: 0 MM

## 2022-06-30 PROCEDURE — 93016 CV STRESS TEST SUPVJ ONLY: CPT | Performed by: INTERNAL MEDICINE

## 2022-06-30 PROCEDURE — 93018 CV STRESS TEST I&R ONLY: CPT | Performed by: INTERNAL MEDICINE

## 2022-06-30 PROCEDURE — 93017 CV STRESS TEST TRACING ONLY: CPT

## 2022-06-30 PROCEDURE — 93306 TTE W/DOPPLER COMPLETE: CPT | Performed by: INTERNAL MEDICINE

## 2022-06-30 PROCEDURE — 93306 TTE W/DOPPLER COMPLETE: CPT

## 2022-07-05 LAB
CHEST PAIN STATEMENT: NORMAL
MAX DIASTOLIC BP: 86 MMHG
MAX HEART RATE: 134 BPM
MAX PREDICTED HEART RATE: 157 BPM
MAX. SYSTOLIC BP: 150 MMHG
PROTOCOL NAME: NORMAL
REASON FOR TERMINATION: NORMAL
TARGET HR FORMULA: NORMAL
TEST INDICATION: NORMAL
TIME IN EXERCISE PHASE: NORMAL

## 2022-07-13 ENCOUNTER — TELEPHONE (OUTPATIENT)
Dept: INTERNAL MEDICINE CLINIC | Facility: CLINIC | Age: 62
End: 2022-07-13

## 2022-07-13 NOTE — TELEPHONE ENCOUNTER
On 6/1, patient had an appointment with you and you stated you would increase her metformin to 1,000 mg but never sent a new script  Patient called requesting new prescription for the Metformin 1,000 mg  Please send to pharmacy

## 2022-07-15 DIAGNOSIS — I10 ESSENTIAL HYPERTENSION: ICD-10-CM

## 2022-07-15 DIAGNOSIS — R73.03 PREDIABETES: ICD-10-CM

## 2022-07-15 RX ORDER — METFORMIN HYDROCHLORIDE 500 MG/1
1000 TABLET, EXTENDED RELEASE ORAL
Qty: 90 TABLET | Refills: 3 | Status: SHIPPED | OUTPATIENT
Start: 2022-07-15

## 2022-08-01 ENCOUNTER — CLINICAL SUPPORT (OUTPATIENT)
Dept: INTERNAL MEDICINE CLINIC | Facility: CLINIC | Age: 62
End: 2022-08-01

## 2022-08-01 DIAGNOSIS — Z23 NEED FOR HEPATITIS B VACCINATION: Primary | ICD-10-CM

## 2022-08-01 PROCEDURE — 90471 IMMUNIZATION ADMIN: CPT

## 2022-08-01 PROCEDURE — 90746 HEPB VACCINE 3 DOSE ADULT IM: CPT

## 2022-08-01 NOTE — PROGRESS NOTES
Patient came into the office for nurse visit for Hep B #2  1 0 ml was given in left deltoid   Patient to return in middle of December for #3

## 2022-08-03 ENCOUNTER — HOSPITAL ENCOUNTER (OUTPATIENT)
Dept: MAMMOGRAPHY | Facility: CLINIC | Age: 62
Discharge: HOME | End: 2022-08-03
Payer: MEDICARE

## 2022-08-03 ENCOUNTER — HOSPITAL ENCOUNTER (OUTPATIENT)
Dept: BONE DENSITY | Facility: CLINIC | Age: 62
Discharge: HOME | End: 2022-08-03
Payer: MEDICARE

## 2022-08-03 VITALS — HEIGHT: 65 IN | WEIGHT: 168 LBS | BODY MASS INDEX: 27.99 KG/M2

## 2022-08-03 DIAGNOSIS — Z13.820 SCREENING FOR OSTEOPOROSIS: ICD-10-CM

## 2022-08-03 DIAGNOSIS — E55.9 VITAMIN D DEFICIENCY: ICD-10-CM

## 2022-08-03 DIAGNOSIS — K91.2 POSTGASTRECTOMY MALABSORPTION: ICD-10-CM

## 2022-08-03 DIAGNOSIS — Z12.31 VISIT FOR SCREENING MAMMOGRAM: ICD-10-CM

## 2022-08-03 DIAGNOSIS — Z90.3 POSTGASTRECTOMY MALABSORPTION: ICD-10-CM

## 2022-08-03 PROCEDURE — 77063 BREAST TOMOSYNTHESIS BI: CPT

## 2022-08-03 PROCEDURE — 77067 SCR MAMMO BI INCL CAD: CPT

## 2022-08-03 PROCEDURE — 77080 DXA BONE DENSITY AXIAL: CPT

## 2022-08-05 DIAGNOSIS — M81.0 OSTEOPOROSIS: Primary | ICD-10-CM

## 2022-08-09 DIAGNOSIS — M81.0 OSTEOPOROSIS: Primary | ICD-10-CM

## 2022-08-09 RX ORDER — SODIUM CHLORIDE 9 MG/ML
20 INJECTION, SOLUTION INTRAVENOUS ONCE
Status: CANCELLED | OUTPATIENT
Start: 2022-08-23

## 2022-08-09 RX ORDER — ZOLEDRONIC ACID 5 MG/100ML
5 INJECTION, SOLUTION INTRAVENOUS ONCE
Status: CANCELLED | OUTPATIENT
Start: 2022-08-23

## 2022-08-09 NOTE — RESULT ENCOUNTER NOTE
Can you please call the patient (Macedonian interpretor required) to let her know that her bone scan indicates her bones are weak (osteoporosis) and she will require treatment for this  I am ordering her an infusion called Reclast, which she can get done at the [facility]  This is an annual infusion  After she gets this infusion done, we will check a urine test after a few months (around December) to assess her response to this medication   She needs to be taking 1200 mg of calcium a day (over the counter Tums is sufficient) and 2000 units of vitamin D daily with a meal

## 2022-08-10 ENCOUNTER — TELEPHONE (OUTPATIENT)
Dept: INTERNAL MEDICINE CLINIC | Facility: CLINIC | Age: 62
End: 2022-08-10

## 2022-08-19 ENCOUNTER — TELEPHONE (OUTPATIENT)
Dept: INTERNAL MEDICINE CLINIC | Facility: CLINIC | Age: 62
End: 2022-08-19

## 2022-08-19 NOTE — TELEPHONE ENCOUNTER
No Authorization Needed  The plan is unable to respond to this request electronically  Please look for a fax with further information regarding the outcome of this request or contact the plan for any questions    [employer] does not require a pre-service authorization at this time if being billed through the medical benefit  However post-service, pre-payment criteria may apply  This review will take place after administration  If you receive a rejected claim upon submission, please contact provider services at [phone]  That was response thru covermymeds, informed Bianca Katz from pre-encounter center  Bianca Katz stated she will call insurance to see what is going on

## 2022-08-19 NOTE — TELEPHONE ENCOUNTER
Fredrick Cardoza at pre-encounter [phone] called stated that the reclast infusion scheduled for 8/23/22 needs insurance authorization  Please check into this  Please call the patient if the reclast infusion needs to be rescheduled if the authorization is not received on time      thanks

## 2022-08-23 ENCOUNTER — HOSPITAL ENCOUNTER (OUTPATIENT)
Dept: INFUSION CENTER | Facility: HOSPITAL | Age: 62
Discharge: HOME | End: 2022-08-23
Payer: MEDICARE

## 2022-08-23 VITALS
BODY MASS INDEX: 28.34 KG/M2 | DIASTOLIC BLOOD PRESSURE: 83 MMHG | SYSTOLIC BLOOD PRESSURE: 137 MMHG | WEIGHT: 166.01 LBS | TEMPERATURE: 97.6 F | HEART RATE: 92 BPM | HEIGHT: 64 IN

## 2022-08-23 DIAGNOSIS — M81.0 OSTEOPOROSIS: Primary | ICD-10-CM

## 2022-08-23 DIAGNOSIS — M81.0 AGE-RELATED OSTEOPOROSIS WITHOUT CURRENT PATHOLOGICAL FRACTURE: ICD-10-CM

## 2022-08-23 LAB
ALBUMIN SERPL BCP-MCNC: 3.2 G/DL (ref 3.5–5)
ALP SERPL-CCNC: 87 U/L (ref 46–116)
ALT SERPL W P-5'-P-CCNC: 34 U/L (ref 12–78)
ANION GAP SERPL CALCULATED.3IONS-SCNC: 5 MMOL/L (ref 4–13)
AST SERPL W P-5'-P-CCNC: 29 U/L (ref 5–45)
BILIRUB SERPL-MCNC: 0.25 MG/DL (ref 0.2–1)
BUN SERPL-MCNC: 14 MG/DL (ref 5–25)
CALCIUM ALBUM COR SERPL-MCNC: 9 MG/DL (ref 8.3–10.1)
CALCIUM SERPL-MCNC: 8.4 MG/DL (ref 8.3–10.1)
CHLORIDE SERPL-SCNC: 108 MMOL/L (ref 96–108)
CO2 SERPL-SCNC: 25 MMOL/L (ref 21–32)
CREAT SERPL-MCNC: 0.92 MG/DL (ref 0.6–1.3)
GFR SERPL CREATININE-BSD FRML MDRD: 66 ML/MIN/1.73SQ M
GLUCOSE SERPL-MCNC: 131 MG/DL (ref 65–140)
POTASSIUM SERPL-SCNC: 3.9 MMOL/L (ref 3.5–5.3)
PROT SERPL-MCNC: 7.1 G/DL (ref 6.4–8.4)
SODIUM SERPL-SCNC: 138 MMOL/L (ref 135–147)

## 2022-08-23 PROCEDURE — 96365 THER/PROPH/DIAG IV INF INIT: CPT

## 2022-08-23 PROCEDURE — 80053 COMPREHEN METABOLIC PANEL: CPT | Performed by: STUDENT IN AN ORGANIZED HEALTH CARE EDUCATION/TRAINING PROGRAM

## 2022-08-23 RX ORDER — SODIUM CHLORIDE 9 MG/ML
20 INJECTION, SOLUTION INTRAVENOUS ONCE
OUTPATIENT
Start: 2023-08-23

## 2022-08-23 RX ORDER — ZOLEDRONIC ACID 5 MG/100ML
5 INJECTION, SOLUTION INTRAVENOUS ONCE
Status: COMPLETED | OUTPATIENT
Start: 2022-08-23 | End: 2022-08-23

## 2022-08-23 RX ORDER — SODIUM CHLORIDE 9 MG/ML
20 INJECTION, SOLUTION INTRAVENOUS ONCE
Status: COMPLETED | OUTPATIENT
Start: 2022-08-23 | End: 2022-08-23

## 2022-08-23 RX ORDER — ZOLEDRONIC ACID 5 MG/100ML
5 INJECTION, SOLUTION INTRAVENOUS ONCE
OUTPATIENT
Start: 2023-08-23

## 2022-08-23 RX ADMIN — SODIUM CHLORIDE 20 ML/HR: 0.9 INJECTION, SOLUTION INTRAVENOUS at 10:21

## 2022-08-23 RX ADMIN — ZOLEDRONIC ACID 5 MG: 0.05 INJECTION, SOLUTION INTRAVENOUS at 10:22

## 2022-08-23 NOTE — PROGRESS NOTES
Patient received Reclast infusion without incident  AVS printed and given prior to discharge  Patient left ambulatory in stable condition

## 2022-09-27 ENCOUNTER — APPOINTMENT (OUTPATIENT)
Dept: LAB | Facility: CLINIC | Age: 62
End: 2022-09-27
Payer: MEDICARE

## 2022-09-27 DIAGNOSIS — R73.03 PREDIABETES: ICD-10-CM

## 2022-09-27 LAB
EST. AVERAGE GLUCOSE BLD GHB EST-MCNC: 137 MG/DL
HBA1C MFR BLD: 6.4 %

## 2022-09-27 PROCEDURE — 83036 HEMOGLOBIN GLYCOSYLATED A1C: CPT

## 2022-09-27 PROCEDURE — 36415 COLL VENOUS BLD VENIPUNCTURE: CPT

## 2022-10-15 DIAGNOSIS — I10 ESSENTIAL HYPERTENSION: ICD-10-CM

## 2022-10-17 ENCOUNTER — TELEPHONE (OUTPATIENT)
Dept: INTERNAL MEDICINE CLINIC | Facility: CLINIC | Age: 62
End: 2022-10-17

## 2022-10-17 RX ORDER — METOPROLOL SUCCINATE 25 MG/1
TABLET, EXTENDED RELEASE ORAL
Qty: 180 TABLET | Refills: 1 | Status: SHIPPED | OUTPATIENT
Start: 2022-10-17

## 2022-10-17 NOTE — TELEPHONE ENCOUNTER
/Bhavin is calling to follow up on her lab results-A1C  They saw it in [place] but would like someone to call them regarding the results

## 2022-10-17 NOTE — TELEPHONE ENCOUNTER
I called patient and notified her that her A1c had improved to 6 4%  Advised her to continue with her current medication regimen and that she has an appointment in November where she can discuss with her physician on next steps in her Diabetes management

## 2022-11-05 ENCOUNTER — APPOINTMENT (OUTPATIENT)
Dept: LAB | Facility: CLINIC | Age: 62
End: 2022-11-05

## 2022-11-05 DIAGNOSIS — M81.0 OSTEOPOROSIS: ICD-10-CM

## 2022-11-08 LAB
COLLAGEN NTX UR-SCNC: 84 NMOL BCE
COLLAGEN NTX/CREAT UR-SRTO: 14 NM BCE/MM CR (ref 0–89)
CREAT UR-MCNC: 68.1 MG/DL
INTERPRETIVE GUIDE:: NORMAL

## 2022-11-11 ENCOUNTER — OFFICE VISIT (OUTPATIENT)
Dept: INTERNAL MEDICINE CLINIC | Facility: CLINIC | Age: 62
End: 2022-11-11

## 2022-11-11 ENCOUNTER — APPOINTMENT (OUTPATIENT)
Dept: LAB | Facility: CLINIC | Age: 62
End: 2022-11-11

## 2022-11-11 VITALS
SYSTOLIC BLOOD PRESSURE: 112 MMHG | BODY MASS INDEX: 28.51 KG/M2 | TEMPERATURE: 96.8 F | HEIGHT: 64 IN | OXYGEN SATURATION: 100 % | WEIGHT: 167 LBS | DIASTOLIC BLOOD PRESSURE: 77 MMHG | HEART RATE: 73 BPM

## 2022-11-11 DIAGNOSIS — M81.0 AGE-RELATED OSTEOPOROSIS WITHOUT CURRENT PATHOLOGICAL FRACTURE: ICD-10-CM

## 2022-11-11 DIAGNOSIS — Z00.00 ANNUAL PHYSICAL EXAM: Primary | ICD-10-CM

## 2022-11-11 DIAGNOSIS — E65 ABDOMINAL PANNUS: ICD-10-CM

## 2022-11-11 DIAGNOSIS — E55.9 VITAMIN D DEFICIENCY: ICD-10-CM

## 2022-11-11 DIAGNOSIS — Z23 NEED FOR PNEUMOCOCCAL VACCINE: ICD-10-CM

## 2022-11-11 DIAGNOSIS — E78.2 MIXED HYPERLIPIDEMIA: ICD-10-CM

## 2022-11-11 DIAGNOSIS — R73.03 PREDIABETES: ICD-10-CM

## 2022-11-11 DIAGNOSIS — I10 HYPERTENSION, UNSPECIFIED TYPE: ICD-10-CM

## 2022-11-11 DIAGNOSIS — Z23 NEED FOR COVID-19 VACCINE: ICD-10-CM

## 2022-11-11 DIAGNOSIS — Z98.84 BARIATRIC SURGERY STATUS: ICD-10-CM

## 2022-11-11 DIAGNOSIS — Z23 NEED FOR INFLUENZA VACCINATION: ICD-10-CM

## 2022-11-11 LAB
ALBUMIN SERPL BCP-MCNC: 3.7 G/DL (ref 3.5–5)
ALP SERPL-CCNC: 76 U/L (ref 46–116)
ALT SERPL W P-5'-P-CCNC: 34 U/L (ref 12–78)
ANION GAP SERPL CALCULATED.3IONS-SCNC: 1 MMOL/L (ref 4–13)
AST SERPL W P-5'-P-CCNC: 24 U/L (ref 5–45)
BILIRUB SERPL-MCNC: 0.38 MG/DL (ref 0.2–1)
BUN SERPL-MCNC: 10 MG/DL (ref 5–25)
CALCIUM SERPL-MCNC: 9.1 MG/DL (ref 8.3–10.1)
CHLORIDE SERPL-SCNC: 106 MMOL/L (ref 96–108)
CO2 SERPL-SCNC: 31 MMOL/L (ref 21–32)
CREAT SERPL-MCNC: 0.91 MG/DL (ref 0.6–1.3)
GFR SERPL CREATININE-BSD FRML MDRD: 67 ML/MIN/1.73SQ M
GLUCOSE P FAST SERPL-MCNC: 123 MG/DL (ref 65–99)
POTASSIUM SERPL-SCNC: 4.4 MMOL/L (ref 3.5–5.3)
PROT SERPL-MCNC: 7.7 G/DL (ref 6.4–8.4)
SODIUM SERPL-SCNC: 138 MMOL/L (ref 135–147)

## 2022-11-12 NOTE — PROGRESS NOTES
ADULT ANNUAL PHYSICAL  [address]    NAME: Idalia Pickard  AGE: 60 y o  SEX: female  : 1960     DATE: 2022     Assessment and Plan:     Problem List Items Addressed This Visit        Musculoskeletal and Integument    Osteoporosis    Relevant Orders    S/p zoledronic acid 2022, next dose is due in 2023  Comprehensive metabolic panel (Completed)  Fall risk reduction and precautions discussed today       Other    Mixed hyperlipidemia (Chronic)  Continue Lipitor 20, patient taking Omega 3    Vitamin D deficiency  To continue with vitamin-D supplementation    Prediabetes  Most recent A1c 6 4 2022  To continue with metformin 500 twice daily    Abdominal pannus  S/p bariatric surgery  Patient complains of her excess lower abdominal skin which is somewhat irritant and distressful, however no acute changes and benign abdominal exam    Was evaluated by Plastic surgery and potential plan for reduction however not done due to insurance issue  Patient noted having new insurance and like to discuss with Plastic surgery if the procedures covered under current insurance, explained the need for another referral patient to call and discuss with the plastic surgery office         Other Visit Diagnoses     Annual physical exam    -  Primary    Need for influenza vaccination        Relevant Orders    influenza vaccine, quadrivalent, recombinant, PF, 0 5 mL, for patients 18 yr+ (FLUBLOK) (Completed)    Need for pneumococcal vaccine        Relevant Orders    Pneumococcal Conjugate Vaccine 20-valent (Pcv20) (Completed)    Bariatric surgery status    To continue with bariatric vitamins, benefits explained and patient confirms adherence    Hypertension, unspecified type    Blood pressure was an issue before, now well controlled on Hyzaar 112 over 77 today    Counseled on low-sodium diet    Need for COVID-19 vaccine    Counseled to arrange 4th dose, interested and patient will arrange at pharmacy of her choice          Immunizations and preventive care screenings were discussed with patient today  Appropriate education was printed on patient's after visit summary  Counseling:  Alcohol/drug use:  Denies any alcohol use  Dental Health: Follow-up regularly with dentist denies any oral/dental symptoms  Sexual health: discussed sexually transmitted diseases, partner selection, use of condoms, avoidance of unintended pregnancy, and contraceptive alternatives  Monogamous, denies any symptoms or concerns  · Exercise: the importance of regular exercise/physical activity was discussed  Recommend exercise 3-5 times per week for at least 30 minutes  Return in about 6 months (around 5/11/2023) for Recheck  Chief Complaint:     Chief Complaint   Patient presents with   • Follow-up      History of Present Illness:     Adult Annual Physical   Patient here for a comprehensive physical exam  The patient reports no problems except her concern about her excess lower abdominal skin post bariatric surgery, see above A&P  Diet and Physical Activity  · Diet/Nutrition: well balanced diet  · Exercise: walking  Depression Screening  PHQ-2/9 Depression Screening    Little interest or pleasure in doing things: 0 - not at all  Feeling down, depressed, or hopeless: 1 - several days  PHQ-2 Score: 1  PHQ-2 Interpretation: Negative depression screen       General Health  · Sleep: sleeps well  · Hearing: normal - bilateral   · Vision: no vision problems  · Dental: regular dental visits  /GYN Health  · Patient is: postmenopausal  · Last menstrual period:  Not asked  · Contraceptive method:  Not asked  Review of Systems:     Review of Systems   - GENERAL: Negative for any nausea, vomiting, fevers, chills, or weight loss    - HEENT: Negative for any head/Neck trauma, pain, double/blurry vision, sinusitis, rhinitis, nose bleeding   - CARDIAC: Negative for any chest pain, palpitation, Dyspnea on exertion, peripheral edema  - PULMONARY: Negative for any SOB, cough, wheezing    - GASTROINTESTINAL: Negative for any abdominal pain, N/V/D/C, blood in stool    - GENITOURINARY: Negative for any dysuria, hematuria, incontinence   - NEUROLOGIC: Negative for any muscle weakness, numbness/tingling, memory changes  - MUSCULOSKELETAL: Negative for any joint pains/swelling, limited ROM  - INTEGUMENTARY:  Positive for excess lower abdominal skin as noted above denies any pain, erythema or discharge, however described as bothering and somewhat irritant when buttoning her pant but chronic and stable no new changes    - HEMATOLOGIC: Negative for any abnormal bruising, frequent infections or bleeding       Past Medical History:     Past Medical History:   Diagnosis Date   • Anastomotic stricture of gastrojejunostomy     s/p gastric bypass   • Anemia     iron deficiency resolved 11/14/14   • Anxiety     resolved 6/7/17   • Class 1 obesity due to excess calories with serious comorbidity and body mass index (BMI) of 30 0 to 30 9 in adult 9/18/2020   • Degenerative joint disease of ankle and foot, unspecified laterality     last assessed 5/17/13   • Depression     last assessed 11/7/12   • Diabetes mellitus ([facility], [place] [facility] )     type 2 uncomplicated, controlled - last assessed 1/31/14   • Dysphagia    • Gastric ulcer    • History of melena    • Hyperlipidemia    • Hypertension    • Insomnia    • Insomnia    • Obesity     resolved 11/14/14   • Overweight (BMI 25 0-29 9) 5/13/2021   • Postgastrectomy malabsorption    • Tachycardia    • Ventricular pre-excitation syndrome    • Vitamin D deficiency       Past Surgical History:     Past Surgical History:   Procedure Laterality Date   • CARDIAC CATHETERIZATION      post proc data:____ lesions successfully dilated   • COLONOSCOPY     • ESOPHAGOGASTRODUODENOSCOPY     • GASTRIC BYPASS  02/08/2011    for morbid obesity    • HYSTERECTOMY      @ age 38   • OOPHORECTOMY Bilateral     @ age 38   • [place] EGD TRANSORAL BIOPSY SINGLE/MULTIPLE N/A 1/25/2017    Procedure: ESOPHAGOGASTRODUODENOSCOPY (EGD); Surgeon: Navarro Kebede MD;  Location: [facility]; Service: Bariatrics   • [place] EGD TRANSORAL BIOPSY SINGLE/MULTIPLE N/A 5/3/2017    Procedure: ESOPHAGOGASTRODUODENOSCOPY (EGD) with balloon dilatation;  Surgeon: Navarro Kebede MD;  Location: [facility]; Service: Bariatrics   • [place] EGD TRANSORAL BIOPSY SINGLE/MULTIPLE N/A 8/16/2017    Procedure: ESOPHAGOGASTRODUODENOSCOPY (EGD) with balloon dilation;  Surgeon: Navarro Kebede MD;  Location: [facility]; Service: Bariatrics   • UPPER GASTROINTESTINAL ENDOSCOPY        Social History:     Social History     Socioeconomic History   • Marital status: /Civil Union     Spouse name: Not on file   • Number of children: Not on file   • Years of education: Not on file   • Highest education level: Not on file   Occupational History   • Not on file   Tobacco Use   • Smoking status: Never Smoker   • Smokeless tobacco: Never Used   Vaping Use   • Vaping Use: Never used   Substance and Sexual Activity   • Alcohol use: Never   • Drug use: Never   • Sexual activity: Not Currently   Other Topics Concern   • Not on file   Social History Narrative   • Not on file     Social Determinants of Health     Financial Resource Strain: Low Risk    • Difficulty of Paying Living Expenses: Not hard at all   Food Insecurity: No Food Insecurity   • Worried About Running Out of Food in the Last Year: Never true   • Ran Out of Food in the Last Year: Never true   Transportation Needs: No Transportation Needs   • Lack of Transportation (Medical): No   • Lack of Transportation (Non-Medical):  No   Physical Activity: Inactive   • Days of Exercise per Week: 0 days   • Minutes of Exercise per Session: 0 min   Stress: Not on file   Social Connections: Not on file   Intimate Partner Violence: Not on file   Housing Stability: Low Risk • Unable to Pay for Housing in the Last Year: No   • Number of Places Lived in the Last Year: 1   • Unstable Housing in the Last Year: No      Family History:     Family History   Problem Relation Age of Onset   • Congenital heart disease Mother    • Asthma Father    • Heart attack Sister         acute MI   • Congenital heart disease Sister    • Hypertension Sister    • No Known Problems Maternal Grandmother    • No Known Problems Maternal Grandfather    • No Known Problems Paternal Grandmother    • No Known Problems Maternal Aunt    • No Known Problems Maternal Aunt    • No Known Problems Maternal Aunt    • Breast cancer Cousin         ? age      Current Medications:     Current Outpatient Medications   Medication Sig Dispense Refill   • atorvastatin (LIPITOR) 20 mg tablet Take 1 tablet by mouth once daily 90 tablet 3   • calcium gluconate 500 mg tablet Take 500 mg by mouth daily     • Cholecalciferol (Vitamin D3) 50 MCG (2000 UT) TABS Take 2 tablets by mouth once daily 180 tablet 1   • ergocalciferol (VITAMIN D2) 50,000 units Take 1 capsule (50,000 Units total) by mouth once a week for 8 doses 8 capsule 0   • IRON, FERROUS GLUCONATE, PO Take 1 tablet by mouth daily     • losartan-hydrochlorothiazide (HYZAAR) 100-25 MG per tablet Take 1 tablet by mouth once daily 90 tablet 3   • metFORMIN (GLUCOPHAGE-XR) 500 mg 24 hr tablet Take 2 tablets (1,000 mg total) by mouth daily with dinner 90 tablet 3   • metoprolol succinate (TOPROL-XL) 25 mg 24 hr tablet Take 1 tablet by mouth twice daily 180 tablet 1   • Multiple Vitamins-Minerals (ZINC PO) Take by mouth     • nystatin (MYCOSTATIN) powder Apply topically 3 (three) times a day 60 g 3   • Omega-3 Fatty Acids (FISH OIL) 1,000 mg Take 1,000 mg by mouth 2 (two) times a day     • omeprazole (PriLOSEC) 20 mg delayed release capsule Take 1 capsule by mouth once daily 90 capsule 3   • triamcinolone (KENALOG) 0 1 % ointment Apply topically 2 (two) times a day 30 g 2   • VITAMIN E PO Take 1 tablet by mouth daily       No current facility-administered medications for this visit  Allergies:     No Known Allergies   Physical Exam:     /77 (BP Location: Left arm, Patient Position: Sitting, Cuff Size: Large)   Pulse 73   Temp (!) 96 8 °F (36 °C) (Temporal)   Ht 5' 4" (1 626 m)   Wt 75 8 kg (167 lb)   SpO2 100%   BMI 28 67 kg/m²     Physical Exam   - GEN: Appears well, alert and oriented x 3, pleasant and cooperative, in no acute distress  - HEENT: Anicteric, mucous membranes moist, PERRL and EOMI   - NECK: No lymphadenopathy, JVD or carotid bruits   - HEART: RRR, normal S1 and S2, no murmurs, clicks, gallops or rubs   - LUNGS: Clear to auscultation bilaterally; no wheezes, rales, or rhonchi  - ABDOMEN:  Lower abdominal pannus evident on exam, Normal bowel sounds, soft, no tenderness, no distention, no organomegaly or masses felt on exam    - EXTREMITIES: Peripheral pulses normal; no clubbing, cyanosis, or edema  - NEURO: No focal findings, CN II-XII are grossly intact  - Musculoskeletal: 5/5 strength, normal ROM, no swollen or erythematous joints     - SKIN: Normal without suspicious lesions on exposed skin    Nila Oleary, [address]

## 2022-11-12 NOTE — PATIENT INSTRUCTIONS

## 2022-12-02 ENCOUNTER — OFFICE VISIT (OUTPATIENT)
Dept: BARIATRICS | Facility: CLINIC | Age: 62
End: 2022-12-02

## 2022-12-02 VITALS
SYSTOLIC BLOOD PRESSURE: 116 MMHG | BODY MASS INDEX: 29.5 KG/M2 | HEIGHT: 63 IN | TEMPERATURE: 98.7 F | WEIGHT: 166.5 LBS | HEART RATE: 88 BPM | DIASTOLIC BLOOD PRESSURE: 76 MMHG

## 2022-12-02 DIAGNOSIS — K91.2 POSTSURGICAL MALABSORPTION: ICD-10-CM

## 2022-12-02 DIAGNOSIS — R10.13 EPIGASTRIC PAIN: ICD-10-CM

## 2022-12-02 DIAGNOSIS — Z48.815 ENCOUNTER FOR SURGICAL AFTERCARE FOLLOWING SURGERY OF DIGESTIVE SYSTEM: Primary | ICD-10-CM

## 2022-12-02 DIAGNOSIS — Z98.84 BARIATRIC SURGERY STATUS: ICD-10-CM

## 2022-12-02 RX ORDER — OMEPRAZOLE 20 MG/1
20 CAPSULE, DELAYED RELEASE ORAL 2 TIMES DAILY
Qty: 90 CAPSULE | Refills: 3 | Status: SHIPPED | OUTPATIENT
Start: 2022-12-02

## 2022-12-02 RX ORDER — SUCRALFATE ORAL 1 G/10ML
1 SUSPENSION ORAL 4 TIMES DAILY
Qty: 3600 ML | Refills: 2 | Status: SHIPPED | OUTPATIENT
Start: 2022-12-02 | End: 2023-03-02

## 2022-12-02 NOTE — PROGRESS NOTES
Assessment/Plan:     Patient ID: Chinedu Marcos is a 60 y o  female  Bariatric Surgery Status/epigastric pain    -s/p Carline-En-Y Gastric Bypass with Dr Angel Clark on 02/08/2011 with hx of anastomotic stricture with dilation on 11/17/2021  Presents to the office today for annual visit  Overall doing fair however reporting in the past month, she experieced nausea and vomiting with epigastric pain associated with food intake  She had experienced these symptoms before in the past prior to her EGD last year  She is able to tolerate small meals, however at times feels her "food is sitting there " Taking omeprazole daily at this time  Denies smoking, ETOH, nsaids use  Drinks approximately 4 oz of coffee in the morning  Denies buldging, blood in her vomitus or stool  Of note, from last visit with Dr Angel Clark, neither her nor her  was interested with undergoing a revision  PLAN:     - UGI order to evaluate anatomy  EGD order to further assess due to hx of stricture  F/U with surgeon after EGD    - Increase omeprazole to BID and add carafate although ulcerations is unlikely  - Routine follow up in 1 year for annual visit  - Continue with healthy lifestyle, adequate protein intake of 60 gm, fluid intake of at least 64 oz  - Continue with MVI daily  - Activity as tolerated  - Labs ordered and will adjust accordingly if any deficiency  - Follow up with RD and SW as needed  · Continued/Maintain healthy weight loss with good nutrition intakes  · Adequate hydration with at least 64oz  fluid intake  · Follow diet as discussed  · Follow vitamin and mineral recommendations as reviewed with you  · Exercise as tolerated  · Colonoscopy referral made: UTD  · Mammogram - UTD    · Follow-up in 1 year for annual visit  We kindly ask that your arrive 15 minutes before your scheduled appointment time with your provider to allow our staff to room you, get your vital signs and update your chart      · Get lab work done  Please call the office if you need a script  It is recommended to check with your insurance BEFORE getting labs done to make sure they are covered by your policy  · Call our office if you have any problems with abdominal pain especially associated with fever, chills, nausea, vomiting or any other concerns  · All  Post-bariatric surgery patients should be aware that very small quantities of any alcohol can cause impairment and it is very possible not to feel the effect  The effect can be in the system for several hours  It is also a stomach irritant  · It is advised to AVOID alcohol, Nonsteroidal antiinflammatory drugs (NSAIDS) and nicotine of all forms   Any of these can cause stomach irritation/pain  · Discussed the effects of alcohol on a bariatric patient and the increased impairment risk  · Keep up the good work! Postsurgical Malabsorption   -At risk for malabsorption of vitamins/minerals secondary to malabsorption and restriction of intake from bariatric surgery  -Currently taking adequate postop bariatric surgery vitamin supplementation  -Last set of bariatric labs completed on 05/2022 and showed low vitamin D  -Next set of bariatric labs ordered for approximately 6 months  -Patient received education about the importance of adhering to a lifelong supplementation regimen to avoid vitamin/mineral deficiencies      Diagnoses and all orders for this visit:    Encounter for surgical aftercare following surgery of digestive system  -     Zinc; Future  -     Vitamin D 25 hydroxy; Future  -     Vitamin B12; Future  -     Vitamin B1, whole blood; Future  -     Vitamin A; Future  -     PTH, intact; Future  -     CBC and Platelet; Future  -     Ferritin; Future  -     Folate; Future  -     Iron Saturation %; Future  -     sucralfate (CARAFATE) 1 g/10 mL suspension;  Take 10 mL (1 g total) by mouth 4 (four) times a day  -     FL UPPER GI UGI; Future    Bariatric surgery status  - Zinc; Future  -     Vitamin D 25 hydroxy; Future  -     Vitamin B12; Future  -     Vitamin B1, whole blood; Future  -     Vitamin A; Future  -     PTH, intact; Future  -     CBC and Platelet; Future  -     Ferritin; Future  -     Folate; Future  -     Iron Saturation %; Future  -     omeprazole (PriLOSEC) 20 mg delayed release capsule; Take 1 capsule (20 mg total) by mouth 2 (two) times a day  -     sucralfate (CARAFATE) 1 g/10 mL suspension; Take 10 mL (1 g total) by mouth 4 (four) times a day  -     FL UPPER GI UGI; Future    Postsurgical malabsorption  -     Zinc; Future  -     Vitamin D 25 hydroxy; Future  -     Vitamin B12; Future  -     Vitamin B1, whole blood; Future  -     Vitamin A; Future  -     PTH, intact; Future  -     CBC and Platelet; Future  -     Ferritin; Future  -     Folate; Future  -     Iron Saturation %; Future  -     sucralfate (CARAFATE) 1 g/10 mL suspension; Take 10 mL (1 g total) by mouth 4 (four) times a day  -     FL UPPER GI UGI; Future    Epigastric pain  -     Zinc; Future  -     Vitamin D 25 hydroxy; Future  -     Vitamin B12; Future  -     Vitamin B1, whole blood; Future  -     Vitamin A; Future  -     PTH, intact; Future  -     CBC and Platelet; Future  -     Ferritin; Future  -     Folate; Future  -     Iron Saturation %; Future  -     sucralfate (CARAFATE) 1 g/10 mL suspension; Take 10 mL (1 g total) by mouth 4 (four) times a day  -     FL UPPER GI UGI; Future    BMI 29 0-29 9,adult  -     Zinc; Future  -     Vitamin D 25 hydroxy; Future  -     Vitamin B12; Future  -     Vitamin B1, whole blood; Future  -     Vitamin A; Future  -     PTH, intact; Future  -     CBC and Platelet; Future  -     Ferritin; Future  -     Folate; Future  -     Iron Saturation %; Future  -     sucralfate (CARAFATE) 1 g/10 mL suspension; Take 10 mL (1 g total) by mouth 4 (four) times a day  -     FL UPPER GI UGI; Future         Subjective:      Patient ID: Rowdy Shah is a 60 y o  female    -s/p Carline-En-Y Gastric Bypass with Dr Giorgio Hairston on 02/08/2011 with hx of anastomotic stricture with dilation on 11/17/2021  Presents to the office today for annual visit  Overall doing fair however reporting in the past month, she experieced nausea and vomiting with epigastric pain associated with food intake  She had experienced these symptoms before in the past prior to her EGD last year  She is able to tolerate small meals, however at times feels her "food is sitting there " Taking omeprazole daily at this time  Denies smoking, ETOH, nsaids use  Drinks approximately 4 oz of coffee in the morning  Denies buldging, blood in her vomitus or stool  Of note, from last visit with Dr Giorgio Hairston, neither her nor her  was interested with undergoing a revision  Initial: 257 lbs  Current: 166 5 lbs  EWL: (Weight loss is ahead of schedule at this post surgical period )  Miguel: 150s  Current BMI is Body mass index is 29 31 kg/m²  · Tolerating a regular diet-yes  · Eating at least 60 grams of protein per day-yes  · Following 30/60 minute rule with liquids-yes  · Drinking at least 64 ounces of fluid per day-yes  · Drinking carbonated beverages-no  · Sufficient exercise-no but likes to walk   · Using NSAIDs regularly-no  · Using nicotine-no  · Using alcohol-no  · Supplements: Multivitamins, Calcium  and vitamin D    · EWL is 64%, which places the patient ahead of schedule for expected post surgical weight loss at this time  The following portions of the patient's history were reviewed and updated as appropriate: allergies, current medications, past family history, past medical history, past social history, past surgical history and problem list     Review of Systems   Constitutional: Negative  Respiratory: Negative  Cardiovascular: Negative  Gastrointestinal: Positive for abdominal pain, nausea and vomiting  Musculoskeletal: Negative  Neurological: Negative  Psychiatric/Behavioral: Negative  Objective:    /76   Pulse 88   Temp 98 7 °F (37 1 °C) (Tympanic)   Ht 5' 3 2" (1 605 m)   Wt 75 5 kg (166 lb 8 oz)   BMI 29 31 kg/m²      Physical Exam  Vitals and nursing note reviewed  Constitutional:       Appearance: Normal appearance  Cardiovascular:      Rate and Rhythm: Normal rate and regular rhythm  Pulses: Normal pulses  Heart sounds: Normal heart sounds  Pulmonary:      Effort: Pulmonary effort is normal       Breath sounds: Normal breath sounds  Abdominal:      General: Bowel sounds are normal       Palpations: Abdomen is soft  Tenderness: There is no abdominal tenderness  Musculoskeletal:         General: Normal range of motion  Skin:     General: Skin is warm and dry  Neurological:      General: No focal deficit present  Mental Status: She is alert and oriented to person, place, and time  Psychiatric:         Mood and Affect: Mood normal          Behavior: Behavior normal          Thought Content:  Thought content normal          Judgment: Judgment normal

## 2022-12-09 ENCOUNTER — HOSPITAL ENCOUNTER (OUTPATIENT)
Dept: RADIOLOGY | Facility: HOSPITAL | Age: 62
Discharge: HOME | End: 2022-12-09

## 2022-12-09 DIAGNOSIS — Z98.84 BARIATRIC SURGERY STATUS: ICD-10-CM

## 2022-12-09 DIAGNOSIS — K91.2 POSTSURGICAL MALABSORPTION: ICD-10-CM

## 2022-12-09 DIAGNOSIS — Z48.815 ENCOUNTER FOR SURGICAL AFTERCARE FOLLOWING SURGERY OF DIGESTIVE SYSTEM: ICD-10-CM

## 2022-12-09 DIAGNOSIS — R10.13 EPIGASTRIC PAIN: ICD-10-CM

## 2022-12-12 ENCOUNTER — TELEPHONE (OUTPATIENT)
Dept: BARIATRICS | Facility: CLINIC | Age: 62
End: 2022-12-12

## 2022-12-12 NOTE — TELEPHONE ENCOUNTER
Received call from [place] re: results  Reviewed results of Upper GI and CRNP instructions  Denis Rosales verbalized understanding and was in agreement with plan

## 2022-12-12 NOTE — TELEPHONE ENCOUNTER
----- Message from [employer] sent at 12/12/2022  1:07 PM EST -----  Please call patient to let her know her xray results are back - it shows significant amount of reflux  Continue with omeprazole use twice a day and please proceed with EGD as scheduled  F/U with surgeon after your EGD

## 2022-12-12 NOTE — TELEPHONE ENCOUNTER
----- Message from [employer] sent at 12/12/2022  1:07 PM EST -----  Please call patient to let her know her xray results are back - it shows significant amount of reflux  Continue with omeprazole use twice a day and please proceed with EGD as scheduled  F/U with surgeon after your EGD

## 2022-12-19 ENCOUNTER — CLINICAL SUPPORT (OUTPATIENT)
Dept: INTERNAL MEDICINE CLINIC | Facility: CLINIC | Age: 62
End: 2022-12-19

## 2022-12-19 ENCOUNTER — PREP FOR PROCEDURE (OUTPATIENT)
Dept: BARIATRICS | Facility: CLINIC | Age: 62
End: 2022-12-19

## 2022-12-19 DIAGNOSIS — Z98.84 BARIATRIC SURGERY STATUS: Primary | ICD-10-CM

## 2022-12-19 DIAGNOSIS — Z23 NEED FOR HEPATITIS B VACCINATION: Primary | ICD-10-CM

## 2022-12-19 NOTE — PROGRESS NOTES
Patient seen here on nurse schedule for hep b #3  Administered in patients left deltoid, patient tolerated well

## 2022-12-21 ENCOUNTER — OFFICE VISIT (OUTPATIENT)
Dept: PLASTIC SURGERY | Facility: CLINIC | Age: 62
End: 2022-12-21

## 2022-12-21 VITALS — HEIGHT: 63 IN | BODY MASS INDEX: 29.41 KG/M2 | WEIGHT: 166 LBS

## 2022-12-21 DIAGNOSIS — E65 ABDOMINAL PANNUS: Primary | ICD-10-CM

## 2022-12-21 NOTE — PROGRESS NOTES
Plastic Surgery Consult     Reason for visit: Further discussion for panniculectomy and has new insurance     HPI from 12/21/22  Patient presents for further discussion regarding panniculectomy and lipoabdominoplasty  She has obtained new insurance  She has been followed by PCP for her HTN and states that her WPW has resolved  She continues to have rashes with intertrigo and open sores that have healed underneath her pannuses  Her weight has been stable for the last 12 months  HPI from 3/21/22  Patient is a 60 y/ospanish speaking female who presents with  who speaks [employer] with excess overhanging abdominal skin after undergoing [place]-en-Y gastric bypass in 2011 by Dr London Kay with over 90 lb weight loss  Her starting weight was 270 and current weight is 170  Her weight has been stable for the last 12 months  Her primary complaint at this time are the rashes, blistering, open sores, foul smell and intertrigo that occurs under her panniculus  She states that she has used various creams and powders prescribed by bariatrics and PCP with minimal improvement  This has also hindered her ability to exercise as the area becomes sweaty, moist, and exacerbates her intertrigo, foul smell, and open sores   She does not currently have any rashes or open sores but does have photographs of when they do occur   Patient also states that foul smell makes her conscientious at work which interrupts her work for her to perform self-hygiene multiple times per day      Patient with no history of blood clots      ROS: 12 pt ROS negative, except as otherwise noted in HPI      PMH: HLD, WPW, ventricular pre-excitation syndrome  FamHx: non-contrib  SurgHx: hysterectomy, colonoscopy  SocHx: no tobacco, no ETOH  Meds: Metformin, no blood thinners, see epic for complete list  Allergies: NKDA         Vitals:     03/21/22 0840   BP: (!) 193/110   Pulse: 80   Temp: 97 6 °F (36 4 °C)         General: NC/AT, breathing comfortably on JENNIFER  Neuro: CN II-XII grossly intact, symmetric reflexes  HEENT: PERRLA, EOMI, external ears normal, no lesions or deformities, neck supple, trachea midline  Respiratory: CTAB, normal respiratory effort  Cardio: RRR, normal S1, S2, no murmur, rubs, gallops  GI: soft, non-tender, non-distended  MSK: normal alignment, mobility, gait  Skin: no rashes, lesions, subcutaneous nodules     BMI 28     Abdominal exam:  Moderate abdominal lipodystrophy particularly in epigastric region  Mild diastasis, no hernia  Patient has double panniculus  3 cm overhanging periumbilical pannus with open sores  6 cm overhanging abdominal panniculus overhanging pubic region, worse centrally than laterally  No current intertrigo or open sores  Under panniculus region is very moist     A/P: 63 y/o Ecuadorean speaking female massive weight loss from gastric bypass with stable weight now for over 12 months who presents with symptomatic overhanging double panniculus hanging over periumbilical area and mons   -Discussed with patient that she is a good candidate for panniculectomy  I explained that panniculectomy is a functional procedure, not cosmetic  The only thing that will be removed is the overhanging excess abdominal skin  The abdominal contour, diffuse lipodystrophy, particularly in the epigastric/central region, is not addressed  Also, loss of umbilicus is a possibility as well as inferiorly displaced umbilicus  Patient acknowledged  She understands that the surgery will only remove the excess overhanging skin causing her symptoms   -Due to her second double pannus in the periumbilical region and severe horizontal laxity in the area, patient would benefit from [id number] panniculectomy  Again, this is functional procedure only skin would be removed, not cosmetic    -I also informed her that her elevated BMI increases her risk of complications including but not limited to wound breakdown, infection, abscesses, delayed wound healing   Patient acknowledged   -I discussed possible, additional lipoabdominoplasty to address the central and upper abdominal lipodystrophy and improve the cosmetic contour  Will email quote  -Again reiterated that panniculectomy is functional (insurance) vs abdominoplasty is cosmetic (will pay out of pocket)  -Risks, benefits, procedure, and complications discussed for panniculectomy and abdominoplasty  Patient acknowledged   All questions answered, concerns addressed   -Photos taken, will submit for insurance approval for panniculectomy   -Instructed to follow-up with PCP due to HTN, asymptomatic at time of evaluation  -If approved, will require preop labs to be completed for preop appointment     -Will require medical (HTN) and cardiac clearance (WPW) , evaluation of metformin        Srinath De Dios MD   [address] [place] [address] and Reconstructive Surgery   [address], [place] [address] [place] [address], [address] [place] [address] Rd   Office: [phone]

## 2023-01-11 DIAGNOSIS — R73.03 PREDIABETES: ICD-10-CM

## 2023-01-12 RX ORDER — METFORMIN HYDROCHLORIDE 500 MG/1
1000 TABLET, EXTENDED RELEASE ORAL
Qty: 180 TABLET | Refills: 3 | Status: SHIPPED | OUTPATIENT
Start: 2023-01-12

## 2023-01-16 ENCOUNTER — PREP FOR PROCEDURE (OUTPATIENT)
Dept: PLASTIC SURGERY | Facility: CLINIC | Age: 63
End: 2023-01-16

## 2023-01-16 DIAGNOSIS — M79.3 PANNICULITIS: ICD-10-CM

## 2023-01-16 DIAGNOSIS — L98.7 EXCESS SKIN OF ABDOMEN: Primary | ICD-10-CM

## 2023-01-16 DIAGNOSIS — Z41.1 ENCOUNTER FOR COSMETIC SURGERY: ICD-10-CM

## 2023-01-16 DIAGNOSIS — L30.4 INTERTRIGO: ICD-10-CM

## 2023-01-17 ENCOUNTER — TELEPHONE (OUTPATIENT)
Dept: INTERNAL MEDICINE CLINIC | Facility: CLINIC | Age: 63
End: 2023-01-17

## 2023-01-17 NOTE — TELEPHONE ENCOUNTER
Patient has appointment on March 1, 2023 at [address] [place] [address] with Dr Ramakrishna Leone for preop clearance, surgery is scheduled for 3/20/23  Form will remain in purple clinical folder

## 2023-01-17 NOTE — TELEPHONE ENCOUNTER
Folder Color- Purple    Name of Form- [facility]     Form to be filled out by- Dr Hardwick     Form to be Faxed  [phone] or pt will take day of appointment     Patient made aware of 10 business day policy

## 2023-02-11 ENCOUNTER — HOSPITAL ENCOUNTER (EMERGENCY)
Facility: HOSPITAL | Age: 63
Discharge: HOME | End: 2023-02-11
Attending: EMERGENCY MEDICINE

## 2023-02-11 VITALS
SYSTOLIC BLOOD PRESSURE: 145 MMHG | TEMPERATURE: 97.8 F | HEART RATE: 81 BPM | OXYGEN SATURATION: 99 % | RESPIRATION RATE: 18 BRPM | DIASTOLIC BLOOD PRESSURE: 87 MMHG

## 2023-02-11 DIAGNOSIS — I48.91 TRANSIENT ATRIAL FIBRILLATION (HCC): ICD-10-CM

## 2023-02-11 DIAGNOSIS — R55 SYNCOPE: Primary | ICD-10-CM

## 2023-02-11 LAB
ANION GAP SERPL CALCULATED.3IONS-SCNC: 8 MMOL/L (ref 4–13)
ATRIAL RATE: 202 BPM
ATRIAL RATE: 80 BPM
BASOPHILS # BLD AUTO: 0.02 THOUSANDS/ÂΜL (ref 0–0.1)
BASOPHILS NFR BLD AUTO: 0 % (ref 0–1)
BUN SERPL-MCNC: 19 MG/DL (ref 5–25)
CALCIUM SERPL-MCNC: 8.8 MG/DL (ref 8.4–10.2)
CARDIAC TROPONIN I PNL SERPL HS: 3 NG/L
CHLORIDE SERPL-SCNC: 106 MMOL/L (ref 96–108)
CO2 SERPL-SCNC: 24 MMOL/L (ref 21–32)
CREAT SERPL-MCNC: 0.84 MG/DL (ref 0.6–1.3)
D DIMER PPP FEU-MCNC: <0.27 UG/ML FEU
EOSINOPHIL # BLD AUTO: 0.08 THOUSAND/ÂΜL (ref 0–0.61)
EOSINOPHIL NFR BLD AUTO: 1 % (ref 0–6)
ERYTHROCYTE [DISTWIDTH] IN BLOOD BY AUTOMATED COUNT: 12.9 % (ref 11.6–15.1)
GFR SERPL CREATININE-BSD FRML MDRD: 74 ML/MIN/1.73SQ M
GLUCOSE SERPL-MCNC: 86 MG/DL (ref 65–140)
HCT VFR BLD AUTO: 35.9 % (ref 34.8–46.1)
HGB BLD-MCNC: 11.2 G/DL (ref 11.5–15.4)
IMM GRANULOCYTES # BLD AUTO: 0.04 THOUSAND/UL (ref 0–0.2)
IMM GRANULOCYTES NFR BLD AUTO: 1 % (ref 0–2)
LYMPHOCYTES # BLD AUTO: 3.1 THOUSANDS/ÂΜL (ref 0.6–4.47)
LYMPHOCYTES NFR BLD AUTO: 37 % (ref 14–44)
MCH RBC QN AUTO: 30 PG (ref 26.8–34.3)
MCHC RBC AUTO-ENTMCNC: 31.2 G/DL (ref 31.4–37.4)
MCV RBC AUTO: 96 FL (ref 82–98)
MONOCYTES # BLD AUTO: 0.98 THOUSAND/ÂΜL (ref 0.17–1.22)
MONOCYTES NFR BLD AUTO: 12 % (ref 4–12)
NEUTROPHILS # BLD AUTO: 4.25 THOUSANDS/ÂΜL (ref 1.85–7.62)
NEUTS SEG NFR BLD AUTO: 49 % (ref 43–75)
NRBC BLD AUTO-RTO: 0 /100 WBCS
P AXIS: 56 DEGREES
PLATELET # BLD AUTO: 200 THOUSANDS/UL (ref 149–390)
PMV BLD AUTO: 12.2 FL (ref 8.9–12.7)
POTASSIUM SERPL-SCNC: 4 MMOL/L (ref 3.5–5.3)
PR INTERVAL: 272 MS
QRS AXIS: 19 DEGREES
QRS AXIS: 27 DEGREES
QRSD INTERVAL: 82 MS
QRSD INTERVAL: 84 MS
QT INTERVAL: 376 MS
QT INTERVAL: 378 MS
QTC INTERVAL: 430 MS
QTC INTERVAL: 433 MS
RBC # BLD AUTO: 3.73 MILLION/UL (ref 3.81–5.12)
SODIUM SERPL-SCNC: 138 MMOL/L (ref 135–147)
T WAVE AXIS: 30 DEGREES
T WAVE AXIS: 55 DEGREES
VENTRICULAR RATE: 78 BPM
VENTRICULAR RATE: 80 BPM
WBC # BLD AUTO: 8.47 THOUSAND/UL (ref 4.31–10.16)

## 2023-02-11 NOTE — ED PROVIDER NOTES
History  Chief Complaint   Patient presents with   • Syncope     Pt arrives to ED via EMS from home after having syncopal episode witnessed by her spouse  Pt states that she does not remember event  C/o leg pain and weakness  Pt with full strength during triage assessment  Denies CP, SOB  Alert and oriented upon arrival     61 yo F with HTN, hyperlipidemia, h/o gastric bypass, brought by ambulance from home after witnessed syncope by her   They were lying bed, and she c/o onset of bilateral leg pain, translating description, said she "couldn't feel my feet", and she also c/o pain of bilateral lower and upper legs  Her  was rubbing them, and she asked him to help her up to go the bathroom, and when they stood and were walking she passed out  He says she was very pale, unconscious for about 30 seconds  She returned to alertness quickly, but did not recall passing out   She denied any other prodrome besides the leg pain  She currently denies chest pain, shortness of breath, or headache  The leg discomfort has dissipated  I asked about atrial fibrillation history, which she and her spouse were not aware of, but asked about irregular rhythm, and that seemed familiar, said she has h/o palpitations  Record does not indicate any documentation of atrial fibrillation  History provided by:  Patient and spouse   used: Yes ([employer] [id number])        Prior to Admission Medications   Prescriptions Last Dose Informant Patient Reported? Taking?    Cholecalciferol (Vitamin D3) 50 MCG (2000 UT) TABS   No Yes   Sig: Take 2 tablets by mouth once daily   IRON, FERROUS GLUCONATE, PO   Yes Yes   Sig: Take 1 tablet by mouth daily   Multiple Vitamins-Minerals (ZINC PO)   Yes Yes   Sig: Take by mouth   Omega-3 Fatty Acids (FISH OIL) 1,000 mg   Yes Yes   Sig: Take 1,000 mg by mouth 2 (two) times a day   VITAMIN E PO   Yes Yes   Sig: Take 1 tablet by mouth daily   atorvastatin (LIPITOR) 20 mg tablet No Yes   Sig: Take 1 tablet by mouth once daily   calcium gluconate 500 mg tablet   Yes Yes   Sig: Take 500 mg by mouth daily   ergocalciferol (VITAMIN D2) 50,000 units   No Yes   Sig: Take 1 capsule (50,000 Units total) by mouth once a week for 8 doses   losartan-hydrochlorothiazide (HYZAAR) 100-25 MG per tablet   No Yes   Sig: Take 1 tablet by mouth once daily   metFORMIN (GLUCOPHAGE-XR) 500 mg 24 hr tablet   No Yes   Sig: Take 2 tablets (1,000 mg total) by mouth daily with dinner   metoprolol succinate (TOPROL-XL) 25 mg 24 hr tablet   No Yes   Sig: Take 1 tablet by mouth twice daily   nystatin (MYCOSTATIN) powder   No Yes   Sig: Apply topically 3 (three) times a day   omeprazole (PriLOSEC) 20 mg delayed release capsule   No Yes   Sig: Take 1 capsule (20 mg total) by mouth 2 (two) times a day   sucralfate (CARAFATE) 1 g/10 mL suspension   No Yes   Sig: Take 10 mL (1 g total) by mouth 4 (four) times a day   triamcinolone (KENALOG) 0 1 % ointment Not Taking  No No   Sig: Apply topically 2 (two) times a day   Patient not taking: Reported on 2/11/2023      Facility-Administered Medications: None       Past Medical History:   Diagnosis Date   • Anastomotic stricture of gastrojejunostomy     s/p gastric bypass   • Anemia     iron deficiency resolved 11/14/14   • Anxiety     resolved 6/7/17   • Class 1 obesity due to excess calories with serious comorbidity and body mass index (BMI) of 30 0 to 30 9 in adult 9/18/2020   • Degenerative joint disease of ankle and foot, unspecified laterality     last assessed 5/17/13   • Depression     last assessed 11/7/12   • Diabetes mellitus ([facility] )     type 2 uncomplicated, controlled - last assessed 1/31/14   • Dysphagia    • Gastric ulcer    • History of melena    • Hyperlipidemia    • Hypertension    • Insomnia    • Insomnia    • Obesity     resolved 11/14/14   • Overweight (BMI 25 0-29 9) 5/13/2021   • Postgastrectomy malabsorption    • Tachycardia    • Ventricular pre-excitation syndrome    • Vitamin D deficiency        Past Surgical History:   Procedure Laterality Date   • CARDIAC CATHETERIZATION      post proc data:____ lesions successfully dilated   • COLONOSCOPY     • ESOPHAGOGASTRODUODENOSCOPY     • GASTRIC BYPASS  02/08/2011    for morbid obesity    • HYSTERECTOMY      @ age 38   • OOPHORECTOMY Bilateral     @ age 38   • TX EGD TRANSORAL BIOPSY SINGLE/MULTIPLE N/A 1/25/2017    Procedure: ESOPHAGOGASTRODUODENOSCOPY (EGD); Surgeon: Caro Cruz MD;  Location: [facility]; Service: Bariatrics   • TX EGD TRANSORAL BIOPSY SINGLE/MULTIPLE N/A 5/3/2017    Procedure: ESOPHAGOGASTRODUODENOSCOPY (EGD) with balloon dilatation;  Surgeon: Caro Cruz MD;  Location: [facility]; Service: Bariatrics   • TX EGD TRANSORAL BIOPSY SINGLE/MULTIPLE N/A 8/16/2017    Procedure: ESOPHAGOGASTRODUODENOSCOPY (EGD) with balloon dilation;  Surgeon: Caro Cruz MD;  Location: [facility]; Service: Bariatrics   • UPPER GASTROINTESTINAL ENDOSCOPY         Family History   Problem Relation Age of Onset   • Congenital heart disease Mother    • Asthma Father    • Heart attack Sister         acute MI   • Congenital heart disease Sister    • Hypertension Sister    • No Known Problems Maternal Grandmother    • No Known Problems Maternal Grandfather    • No Known Problems Paternal Grandmother    • No Known Problems Maternal Aunt    • No Known Problems Maternal Aunt    • No Known Problems Maternal Aunt    • Breast cancer Cousin         ? age     I have reviewed and agree with the history as documented      E-Cigarette/Vaping   • E-Cigarette Use Never User      E-Cigarette/Vaping Substances   • Nicotine No    • THC No    • CBD No    • Flavoring No    • Other No    • Unknown No      Social History     Tobacco Use   • Smoking status: Never   • Smokeless tobacco: Never   Vaping Use   • Vaping Use: Never used   Substance Use Topics   • Alcohol use: Never   • Drug use: Never       Review of Systems   Constitutional: Negative for appetite change, chills and fever  HENT: Negative for sore throat  Respiratory: Negative for cough, shortness of breath and wheezing  Cardiovascular: Negative for chest pain and palpitations  Gastrointestinal: Negative for abdominal pain, diarrhea, nausea and vomiting  Genitourinary: Negative for dysuria and hematuria  Musculoskeletal: Positive for myalgias  Negative for neck pain  Skin: Negative for rash  Neurological: Positive for syncope  Negative for dizziness, weakness and headaches  Psychiatric/Behavioral: Negative for suicidal ideas  All other systems reviewed and are negative  Physical Exam  Physical Exam  Vitals and nursing note reviewed  Constitutional:       Appearance: She is well-developed  She is not toxic-appearing or diaphoretic  HENT:      Head: Normocephalic and atraumatic  Right Ear: Tympanic membrane and external ear normal       Left Ear: Tympanic membrane and external ear normal       Nose: Nose normal    Eyes:      Conjunctiva/sclera: Conjunctivae normal       Pupils: Pupils are equal, round, and reactive to light  Neck:      Meningeal: Brudzinski's sign and Kernig's sign absent  Cardiovascular:      Rate and Rhythm: Normal rate and regular rhythm  Pulses: Normal pulses  Heart sounds: Normal heart sounds  No murmur heard  Pulmonary:      Effort: Pulmonary effort is normal  No tachypnea or respiratory distress  Breath sounds: Normal breath sounds  No wheezing  Abdominal:      General: Bowel sounds are normal  There is no distension  Palpations: Abdomen is soft  Abdomen is not rigid  Tenderness: There is no abdominal tenderness  There is no guarding or rebound  Musculoskeletal:         General: Normal range of motion  Cervical back: Full passive range of motion without pain, normal range of motion and neck supple  Right lower leg: No swelling  Left lower leg: No swelling     Lymphadenopathy: Cervical: No cervical adenopathy  Skin:     General: Skin is warm and dry  Capillary Refill: Capillary refill takes less than 2 seconds  Coloration: Skin is not pale  Findings: No rash  Neurological:      Mental Status: She is alert and oriented to person, place, and time  GCS: GCS eye subscore is 4  GCS verbal subscore is 5  GCS motor subscore is 6  Cranial Nerves: No cranial nerve deficit  Sensory: No sensory deficit  Coordination: Coordination normal       Gait: Gait normal       Deep Tendon Reflexes: Reflexes are normal and symmetric  Psychiatric:         Speech: Speech normal          Behavior: Behavior normal          Thought Content: Thought content normal          Judgment: Judgment normal          Vital Signs  ED Triage Vitals   Temperature Pulse Respirations Blood Pressure SpO2   02/11/23 0343 02/11/23 0107 02/11/23 0107 02/11/23 0107 02/11/23 0107   97 8 °F (36 6 °C) 90 19 127/88 100 %      Temp Source Heart Rate Source Patient Position - Orthostatic VS BP Location FiO2 (%)   02/11/23 0343 02/11/23 0107 02/11/23 0107 02/11/23 0107 --   Oral Monitor Sitting Right arm       Pain Score       --                  Vitals:    02/11/23 0107 02/11/23 0343   BP: 127/88 145/87   Pulse: 90 81   Patient Position - Orthostatic VS: Sitting Lying         Visual Acuity      ED Medications  Medications - No data to display    Diagnostic Studies  Results Reviewed     Procedure Component Value Units Date/Time    D-Dimer [[id number]]  (Normal) Collected: 02/11/23 0233    Lab Status: Final result Specimen: Blood from Arm, Left Updated: 02/11/23 0358     D-Dimer, Quant <0 27 ug/ml FEU     Narrative: In the evaluation for possible pulmonary embolism, in the appropriate (Well's Score of 4 or less) patient, the age adjusted d-dimer cutoff for this patient can be calculated as:    Age x 0 01 (in ug/mL) for Age-adjusted D-dimer exclusion threshold for a patient over 50 years      HS Troponin 0hr (reflex protocol) [[id number]]  (Normal) Collected: 02/11/23 0233    Lab Status: Final result Specimen: Blood from Arm, Left Updated: 02/11/23 0313     hs TnI 0hr 3 ng/L     Basic metabolic panel [[id number]] Collected: 02/11/23 0233    Lab Status: Final result Specimen: Blood from Arm, Left Updated: 02/11/23 0310     Sodium 138 mmol/L      Potassium 4 0 mmol/L      Chloride 106 mmol/L      CO2 24 mmol/L      ANION GAP 8 mmol/L      BUN 19 mg/dL      Creatinine 0 84 mg/dL      Glucose 86 mg/dL      Calcium 8 8 mg/dL      eGFR 74 ml/min/1 73sq m     Narrative:      [place] guidelines for Chronic Kidney Disease (CKD):   •  Stage 1 with normal or high GFR (GFR > 90 mL/min/1 73 square meters)  •  Stage 2 Mild CKD (GFR = 60-89 mL/min/1 73 square meters)  •  Stage 3A Moderate CKD (GFR = 45-59 mL/min/1 73 square meters)  •  Stage 3B Moderate CKD (GFR = 30-44 mL/min/1 73 square meters)  •  Stage 4 Severe CKD (GFR = 15-29 mL/min/1 73 square meters)  •  Stage 5 End Stage CKD (GFR <15 mL/min/1 73 square meters)  Note: GFR calculation is accurate only with a steady state creatinine    CBC and differential [[id number]]  (Abnormal) Collected: 02/11/23 0233    Lab Status: Final result Specimen: Blood from Arm, Left Updated: 02/11/23 0246     WBC 8 47 Thousand/uL      RBC 3 73 Million/uL      Hemoglobin 11 2 g/dL      Hematocrit 35 9 %      MCV 96 fL      MCH 30 0 pg      MCHC 31 2 g/dL      RDW 12 9 %      MPV 12 2 fL      Platelets 511 Thousands/uL      nRBC 0 /100 WBCs      Neutrophils Relative 49 %      Immat GRANS % 1 %      Lymphocytes Relative 37 %      Monocytes Relative 12 %      Eosinophils Relative 1 %      Basophils Relative 0 %      Neutrophils Absolute 4 25 Thousands/µL      Immature Grans Absolute 0 04 Thousand/uL      Lymphocytes Absolute 3 10 Thousands/µL      Monocytes Absolute 0 98 Thousand/µL      Eosinophils Absolute 0 08 Thousand/µL      Basophils Absolute 0 02 Thousands/µL No orders to display              Procedures  ECG 12 Lead Documentation Only    Date/Time: 2/11/2023 1:48 AM  Performed by: Clovis Purcell MD  Authorized by: Clovis Purcell MD     Rate:     ECG rate:  78  Rhythm:     Rhythm: atrial fibrillation      ECG 12 Lead Documentation Only    Date/Time: 2/11/2023 4:09 AM  Performed by: Clovis Purcell MD  Authorized by: Clovis Purcell MD     Patient location:  ED  Previous ECG:     Previous ECG:  Compared to current    Comparison ECG info:  Spontaneous cardioversion to NSR    Similarity:  Changes noted  Interpretation:     Interpretation: normal    Rate:     ECG rate:  80  Rhythm:     Rhythm: sinus rhythm               ED Course  ED Course as of 02/11/23 0430   Sat Feb 11, 2023   0419 Reviewed results with patient at bedside and updated on the plan  She was briefly in afib, rate controlled, unclear if that is the source of  syncope  Ddimer is negative so I am no concerned about PE  I did recommend observation for telemetry monitoring, cardiology consultation, since this seemed like unprovoked syncope  However, she now feels well, and wants to be discharged, understanding that syncope or arrythmia may occur  She is actually seeing her cardiologist this week and is opting to be discharged  [id number] SCORE    Flowsheet Row Most Recent Value   [id number]    Age 1 Filed at: 02/11/2023 1122   Sex 1 Filed at: 02/11/2023 0324   CHF History 0 Filed at: 02/11/2023 0324   HTN History 1 Filed at: 02/11/2023 0324   Stroke or TIA Symptoms Previously 0 Filed at: 02/11/2023 0324   Vascular Disease History 0 Filed at: 02/11/2023 8345   Diabetes History 1 Filed at: 02/11/2023 0324   [id number] Score 4 Filed at: 02/11/2023 5538                              SBIRT 20yo+    Flowsheet Row Most Recent Value   SBIRT (25 yo +)    In order to provide better care to our patients, we are screening all of our patients for alcohol and drug use  Would it be okay to ask you these screening questions? No Filed at: 02/11/2023 6623          Wells' Criteria for PE    Flowsheet Row Most Recent Value   Wells' Criteria for PE    Clinical signs and symptoms of DVT 3 Filed at: 02/11/2023 2603   PE is primary diagnosis or equally likely 0 Filed at: 02/11/2023 0325   HR >100 0 Filed at: 02/11/2023 0325   Immobilization at least 3 days or Surgery in the previous 4 weeks 0 Filed at: 02/11/2023 0325   Previous, objectively diagnosed PE or DVT 0 Filed at: 02/11/2023 0325   Hemoptysis 0 Filed at: 02/11/2023 4127   Malignancy with treatment within 6 months or palliative 0 Filed at: 02/11/2023 3032   Rodriguez' Criteria Total 3 Filed at: 02/11/2023 0256                MDM    Disposition  Final diagnoses:   Syncope   Transient atrial fibrillation ([address] )     Time reflects when diagnosis was documented in both MDM as applicable and the Disposition within this note     Time User Action Codes Description Comment    2/11/2023  4:23 AM Bella SCHNEIDER Add [R55] Syncope     2/11/2023  4:24 AM Mina Hendrix Add [I48 91] Transient atrial fibrillation [facility])       ED Disposition     ED Disposition   Discharge    Condition   Good    Date/Time   Sat Feb 11, 2023  4:23 AM    Comment   Rose Selby discharge to home/self care                 Follow-up Information     Follow up With Specialties Details Why Contact Info Additional [facility] Go to  For followup as scheduled [facility] [id number]  [address], [address] [place], [place], [address] [phone]          Current Discharge Medication List      CONTINUE these medications which have NOT CHANGED    Details   atorvastatin (LIPITOR) 20 mg tablet Take 1 tablet by mouth once daily  Qty: 90 tablet, Refills: 3    Associated Diagnoses: Mixed hyperlipidemia      calcium gluconate 500 mg tablet Take 500 mg by mouth daily      Cholecalciferol (Vitamin D3) 50 MCG (2000 UT) TABS Take 2 tablets by mouth once daily  Qty: 180 tablet, Refills: 1    Associated Diagnoses: Vitamin D deficiency      ergocalciferol (VITAMIN D2) 50,000 units Take 1 capsule (50,000 Units total) by mouth once a week for 8 doses  Qty: 8 capsule, Refills: 0    Associated Diagnoses: Vitamin D deficiency; Postgastrectomy malabsorption      IRON, FERROUS GLUCONATE, PO Take 1 tablet by mouth daily      losartan-hydrochlorothiazide (HYZAAR) 100-25 MG per tablet Take 1 tablet by mouth once daily  Qty: 90 tablet, Refills: 3    Associated Diagnoses: Primary hypertension      metFORMIN (GLUCOPHAGE-XR) 500 mg 24 hr tablet Take 2 tablets (1,000 mg total) by mouth daily with dinner  Qty: 180 tablet, Refills: 3    Associated Diagnoses: Prediabetes      metoprolol succinate (TOPROL-XL) 25 mg 24 hr tablet Take 1 tablet by mouth twice daily  Qty: 180 tablet, Refills: 1    Associated Diagnoses: Essential hypertension      Multiple Vitamins-Minerals (ZINC PO) Take by mouth      nystatin (MYCOSTATIN) powder Apply topically 3 (three) times a day  Qty: 60 g, Refills: 3    Associated Diagnoses: Abdominal pannus;  Excess skin; Candidal skin infection      Omega-3 Fatty Acids (FISH OIL) 1,000 mg Take 1,000 mg by mouth 2 (two) times a day      omeprazole (PriLOSEC) 20 mg delayed release capsule Take 1 capsule (20 mg total) by mouth 2 (two) times a day  Qty: 90 capsule, Refills: 3    Associated Diagnoses: Bariatric surgery status      sucralfate (CARAFATE) 1 g/10 mL suspension Take 10 mL (1 g total) by mouth 4 (four) times a day  Qty: 3600 mL, Refills: 2    Associated Diagnoses: Encounter for surgical aftercare following surgery of digestive system; Bariatric surgery status; Postsurgical malabsorption; Epigastric pain; BMI 29 0-29 9,adult      VITAMIN E PO Take 1 tablet by mouth daily      triamcinolone (KENALOG) 0 1 % ointment Apply topically 2 (two) times a day  Qty: 30 g, Refills: 2 Associated Diagnoses: Abdominal pannus; Excess skin; Candidal skin infection             No discharge procedures on file      PDMP Review     None          ED Provider  Electronically Signed by           Benjamin Bahena MD  02/11/23 3942

## 2023-02-11 NOTE — DISCHARGE INSTRUCTIONS
Síncope  LO QUE NECESITAS SABER:  El síncope también se llama desmayo o desmayo  El síncope es trino pérdida repentina y temporal de la conciencia, seguida de trino caída desde trino posición de pie o sentada  El síncope varía de no grave a un signo de trino afección más grave que debe tratarse  Puede controlar algunas condiciones de debby que causan síncope  [place] proveedores de atención médica pueden ayudarlo a crear un plan para administrar el síncope y prevenir episodios  INSTRUCCIONES DE DESCARGA:  Busque atención de inmediato si:  ? Estás sangrando porque te golpeaste la maria esther cuando te desmayaste  ? De repente tiene visión doble, dificultad para hablar, adormecimiento y no [place] brazos o las piernas  ? Usted tiene [employer] pecho y dificultad para respirar  ? Vomitas cyndi o material que parece café molido  ? Usted ve cyndi en byrd movimiento intestinal   Comuníquese con byrd proveedor de atención médica si:  ? Tiene síntomas nuevos o que empeoran  ? Tienes otro episodio de síncope  Tiene dolor de maria esther, latidos cardíacos acelerados o se siente demasiado mareado para levantarse  ? Siéntate o acuéstate cuando sea necesario  [place] incluye cuando se siente mareado, byrd garganta se contrae y byrd visión cambia  Eleve [place] piernas por encima del nivel de byrd corazón  ? Respire lenta y profundamente si comienza a respirar más rápido con ansiedad o miedo  [place] puede ayudar a [employer] y la sensación de que podría [place]  ? Revise byrd presión arterial a menudo  [place] es importante si deanna medicamentos para bajar la presión arterial  Revise byrd presión arterial cuando esté acostado y cuando esté de pie  Pregunte con qué frecuencia debe revisar nieves el día  Mantenga un registro de [place] números de presión arterial  Byrd proveedor de atención médica puede usar el registro para ayudar a planificar byrd tratamiento          Muévase lentamente y permítase acostumbrarse a trino posición antes de moverse a otra posición  [place] es muy importante cuando cambia de trino posición [place] o sentada a trino posición de pie  Respire hondo antes de levantarse de trino posición [place]  [place] lentamente  Los movimientos repentinos pueden causar un desmayo  Siéntese en el costado de la cama o el sofá por unos minutos antes de levantarse  Si está en reposo en cama, trate de estar de pie nieves aproximadamente 2 horas cada día, o según las indicaciones  No se trabe las piernas si está de pie nieves un sameer período de [place]  Mueve tus piernas y dobla tus rodillas para mantener la cyndi fluyendo  ? Esté atento a los signos de bajo nivel de azúcar en la cyndi  Estos incluyen hambre, nerviosismo, sudoración y latidos cardíacos rápidos o agitados  Hable con byrd proveedor de [employer] de mantener estable byrd nivel de azúcar en la cyndi  ? No te estreses si estás estreñido  Puede desmayarse si se esfuerza por evacuar  Caminar es la mejor manera de  los intestinos  Coma alimentos ricos en fibra para facilitar la evacuación intestinal  [place] son los cereales ricos en fibra, los frijoles, las verduras y los panes integrales  El jugo de ciruela puede ayudar a que los movimientos intestinales gilmar [place]  ? Tenga cuidado en climas cálidos  El calor puede causar un episodio de síncope  Limite la actividad hecha afuera en marisa calurosos  La actividad física en climas cálidos puede conducir a la deshidratación  [place] puede causar un episodio  A-fib (fibrilación auricular)  LO QUE NECESITAS SABER:  La fibrilación auricular puede ir y venir, o puede ser Vilma Maw afección a sameer plazo  A-fib puede causar coágulos de cyndi, derrame cerebral o insuficiencia cardíaca  Estas condiciones pueden ser potencialmente mortales   Es importante tratar y controlar la fibrilación auricular para ayudar a prevenir un coágulo de cyndi, un derrame cerebral o trino insuficiencia cardíaca  INSTRUCCIONES DE DESCARGA:  Llame al 911 para cualquiera de los siguientes:   Tiene alguno de los siguientes signos de un ataque cardíaco:     Apriete, presión o dolor en el pecho que dura más de 5 minutos o regresa     Molestias o dolor en la espalda, el carlin, la [place], el estómago o el brazo  Dificultad para respirar     Náuseas o vómitos     Aturdimiento o sudor frío repentino, especialmente con dolor en el pecho o dificultad para respirar     Tiene alguno de los siguientes signos de un derrame cerebral:     Adormecimiento o caída en un lado de la lana  Debilidad en un brazo o pierna     Confusión o dificultad para hablar  Mareos, dolor de maria esther intenso o pérdida de visión  Regrese al departamento de emergencias si: Tiene alguno de los siguientes signos de un coágulo de cyndi: Te sientes mareado, te falta el aliento y tienes dolor en el pecho  Tose cyndi  Tiene hinchazón, enrojecimiento, dolor o calor en el brazo o la pierna  Póngase en contacto con byrd cardiólogo o proveedor de atención médica si:   Byrd frecuencia cardíaca objetivo no está en el rango que debería estar  Tiene hinchazón nueva o que [employer] piernas, los pies, los tobillos o el abdomen  Te falta el aliento, incluso en reposo  Tiene preguntas o inquietudes sobre byrd afección o atención    Medicamentos: puede necesitar cualquiera de los siguientes:   Medicamentos para el corazón     Anticoagulantes  Jim un seguimiento con byrd cardiólogo según las indicaciones

## 2023-02-14 ENCOUNTER — TELEPHONE (OUTPATIENT)
Dept: BARIATRICS | Facility: CLINIC | Age: 63
End: 2023-02-14

## 2023-02-14 NOTE — TELEPHONE ENCOUNTER
Spoke to pt  to ensure they knew pt EGD was scheduled for next week 2/22   Remind pt  that Dequan Orozco has nothing to eat or drink after midnight and to have a

## 2023-02-15 ENCOUNTER — OFFICE VISIT (OUTPATIENT)
Dept: DENTISTRY | Facility: CLINIC | Age: 63
End: 2023-02-15

## 2023-02-15 VITALS — HEART RATE: 67 BPM | SYSTOLIC BLOOD PRESSURE: 122 MMHG | DIASTOLIC BLOOD PRESSURE: 81 MMHG

## 2023-02-15 DIAGNOSIS — Z01.20 VISIT FOR DENTAL EXAMINATION: Primary | ICD-10-CM

## 2023-02-15 NOTE — DENTAL PROCEDURE DETAILS
Soni Talavera presents for a Comprehensive exam  Verbal consent for treatment given in addition to the forms  Reviewed health history - Patient is ASA II  Consents signed: Yes     Perio: Recession  Pain Scale: 0  Caries Assessment: Low  Radiographs: Complete mouth series     Oral Hygiene instruction reviewed and given  Recommended Hygiene recall visits with the [facility]  Comprehensive Exam, FMX, perio charting  Reviewed hhx  ASA II    CC: "I brought xrays with me and I know I need a crown and a filling  I would like to get that done  "    FMX obtained    Perio charting completed  Perio findings: areas of furcation involvement #14, 19, 30 with generalized recession and localized deep pockets  Pt said she had a cleaning 2 weeks ago- therefore pt is not due for cleaning at this time  Dr Ludivina Arriaza did exam:  Recommended #20-B5  #30- had recent rct done 1 year ago  Warned the patient we can do a crown but with guarded prognosis- due to bone loss around tooth and furcation involvement in the future she may loose this tooth  Pt is aware  Pt dismissed in good health, no complications and all questions answered  Diane Mullins RD    NV: filling #20  NV2: send pre auth for crown #30- when returned pt will schedule for crown prep    NV3: pt due for cleaning in 6 months, (last prophy was 2 weeks ago)

## 2023-02-17 ENCOUNTER — APPOINTMENT (OUTPATIENT)
Dept: LAB | Facility: CLINIC | Age: 63
End: 2023-02-17

## 2023-02-17 DIAGNOSIS — Z98.84 BARIATRIC SURGERY STATUS: ICD-10-CM

## 2023-02-17 DIAGNOSIS — R10.13 EPIGASTRIC PAIN: ICD-10-CM

## 2023-02-17 DIAGNOSIS — Z48.815 ENCOUNTER FOR SURGICAL AFTERCARE FOLLOWING SURGERY OF DIGESTIVE SYSTEM: ICD-10-CM

## 2023-02-17 DIAGNOSIS — K91.2 POSTSURGICAL MALABSORPTION: ICD-10-CM

## 2023-02-17 LAB
25(OH)D3 SERPL-MCNC: 23.2 NG/ML (ref 30–100)
ERYTHROCYTE [DISTWIDTH] IN BLOOD BY AUTOMATED COUNT: 12.8 % (ref 11.6–15.1)
FERRITIN SERPL-MCNC: 66 NG/ML (ref 8–388)
FOLATE SERPL-MCNC: >20 NG/ML (ref 3.1–17.5)
HCT VFR BLD AUTO: 38.1 % (ref 34.8–46.1)
HGB BLD-MCNC: 11.9 G/DL (ref 11.5–15.4)
IRON SATN MFR SERPL: 23 % (ref 15–50)
IRON SERPL-MCNC: 62 UG/DL (ref 50–170)
MCH RBC QN AUTO: 29.9 PG (ref 26.8–34.3)
MCHC RBC AUTO-ENTMCNC: 31.2 G/DL (ref 31.4–37.4)
MCV RBC AUTO: 96 FL (ref 82–98)
PLATELET # BLD AUTO: 281 THOUSANDS/UL (ref 149–390)
PMV BLD AUTO: 10.8 FL (ref 8.9–12.7)
PTH-INTACT SERPL-MCNC: 160.1 PG/ML (ref 18.4–80.1)
RBC # BLD AUTO: 3.98 MILLION/UL (ref 3.81–5.12)
TIBC SERPL-MCNC: 264 UG/DL (ref 250–450)
VIT B12 SERPL-MCNC: 825 PG/ML (ref 100–900)
WBC # BLD AUTO: 5.51 THOUSAND/UL (ref 4.31–10.16)

## 2023-02-20 ENCOUNTER — OFFICE VISIT (OUTPATIENT)
Dept: DENTISTRY | Facility: CLINIC | Age: 63
End: 2023-02-20

## 2023-02-20 VITALS — SYSTOLIC BLOOD PRESSURE: 115 MMHG | TEMPERATURE: 97.9 F | HEART RATE: 80 BPM | DIASTOLIC BLOOD PRESSURE: 76 MMHG

## 2023-02-20 DIAGNOSIS — K03.2 ABFRACTION: Primary | ICD-10-CM

## 2023-02-20 LAB — VIT B1 BLD-SCNC: 90.8 NMOL/L (ref 66.5–200)

## 2023-02-20 RX ORDER — SODIUM CHLORIDE 9 MG/ML
125 INJECTION, SOLUTION INTRAVENOUS CONTINUOUS
Status: CANCELLED | OUTPATIENT
Start: 2023-02-20

## 2023-02-20 NOTE — PROGRESS NOTES
Composite Filling    Ricarda Correa presents for composite filling #20 B  PMH reviewed, no changes  ASA 1, pain 0   service used  Discussed with patient need for RCT if pulp exposure occurs or in future if pulp is inflamed  Pt understands and consents  Applied topical benzocaine, administered 1 carp 4% septocaine 1:100k epi via buccal infiltration  Prepped tooth #20 buccal class 5 with 245 carbide on high speed  Isolation with cotton rolls and dri-angles  Etch with 37% H2PO4, rinse, dry  Applied Adhese with 20 second scrub once, gentle air dry and light cured for 10s  Restored with Tetric flow qasim shade A3 and light cured  Refined with finishing burs, polished with enhance point  Verified occlusion and contacts  Pt left satisfied      NV: crown #30 after pre-auth

## 2023-02-21 LAB — ZINC SERPL-MCNC: 64 UG/DL (ref 44–115)

## 2023-02-22 ENCOUNTER — ANESTHESIA (OUTPATIENT)
Dept: GASTROENTEROLOGY | Facility: HOSPITAL | Age: 63
End: 2023-02-22

## 2023-02-22 ENCOUNTER — ANESTHESIA EVENT (OUTPATIENT)
Dept: GASTROENTEROLOGY | Facility: HOSPITAL | Age: 63
End: 2023-02-22

## 2023-02-22 ENCOUNTER — HOSPITAL ENCOUNTER (OUTPATIENT)
Dept: GASTROENTEROLOGY | Facility: HOSPITAL | Age: 63
Setting detail: OUTPATIENT SURGERY
Discharge: HOME | End: 2023-02-22
Attending: SURGERY | Admitting: SURGERY

## 2023-02-22 VITALS
DIASTOLIC BLOOD PRESSURE: 80 MMHG | SYSTOLIC BLOOD PRESSURE: 124 MMHG | BODY MASS INDEX: 27.66 KG/M2 | HEIGHT: 64 IN | RESPIRATION RATE: 21 BRPM | TEMPERATURE: 97.6 F | HEART RATE: 73 BPM | OXYGEN SATURATION: 100 % | WEIGHT: 162 LBS

## 2023-02-22 DIAGNOSIS — Z98.84 BARIATRIC SURGERY STATUS: ICD-10-CM

## 2023-02-22 PROBLEM — E66.811 CLASS 1 OBESITY DUE TO EXCESS CALORIES WITH SERIOUS COMORBIDITY AND BODY MASS INDEX (BMI) OF 30.0 TO 30.9 IN ADULT: Status: RESOLVED | Noted: 2020-09-18 | Resolved: 2023-02-22

## 2023-02-22 PROBLEM — E66.09 CLASS 1 OBESITY DUE TO EXCESS CALORIES WITH SERIOUS COMORBIDITY AND BODY MASS INDEX (BMI) OF 30.0 TO 30.9 IN ADULT: Status: RESOLVED | Noted: 2020-09-18 | Resolved: 2023-02-22

## 2023-02-22 LAB — GLUCOSE SERPL-MCNC: 75 MG/DL (ref 65–140)

## 2023-02-22 RX ORDER — LIDOCAINE HYDROCHLORIDE 20 MG/ML
INJECTION, SOLUTION EPIDURAL; INFILTRATION; INTRACAUDAL; PERINEURAL AS NEEDED
Status: DISCONTINUED | OUTPATIENT
Start: 2023-02-22 | End: 2023-02-22

## 2023-02-22 RX ORDER — SODIUM CHLORIDE 9 MG/ML
125 INJECTION, SOLUTION INTRAVENOUS CONTINUOUS
Status: DISCONTINUED | OUTPATIENT
Start: 2023-02-22 | End: 2023-02-26 | Stop reason: HOSPADM

## 2023-02-22 RX ORDER — PROPOFOL 10 MG/ML
INJECTION, EMULSION INTRAVENOUS AS NEEDED
Status: DISCONTINUED | OUTPATIENT
Start: 2023-02-22 | End: 2023-02-22

## 2023-02-22 RX ADMIN — SODIUM CHLORIDE 125 ML/HR: 0.9 INJECTION, SOLUTION INTRAVENOUS at 07:46

## 2023-02-22 RX ADMIN — LIDOCAINE HYDROCHLORIDE 5 ML: 20 INJECTION, SOLUTION EPIDURAL; INFILTRATION; INTRACAUDAL; PERINEURAL at 08:41

## 2023-02-22 RX ADMIN — PROPOFOL 130 MG: 10 INJECTION, EMULSION INTRAVENOUS at 08:41

## 2023-02-22 NOTE — ANESTHESIA POSTPROCEDURE EVALUATION
Post-Op Assessment Note    CV Status:  Stable    Pain management: adequate     Mental Status:  Alert and awake   Hydration Status:  Euvolemic   PONV Controlled:  Controlled   Airway Patency:  Patent      Post Op Vitals Reviewed: Yes            No notable events documented      BP      Temp      Pulse     Resp      SpO2      /80   Pulse 73   Temp 97 6 °F (36 4 °C) (Temporal)   Resp 21   Ht 5' 4" (1 626 m)   Wt 73 5 kg (162 lb)   SpO2 100%   BMI 27 81 kg/m²

## 2023-02-22 NOTE — H&P
This is a 60 y o  female status post Carline-En-Y Gastric Bypass with me in 02/08/2011 with hx of anastomotic stricture with dilation on 11/17/2021  Presents to the office for annual visit  Overall doing fair however reporting in the past month, she experieced nausea and vomiting with epigastric pain associated with food intake  She had experienced these symptoms before in the past prior to her EGD last year    Physical Exam    /78   Pulse 66   Temp 97 6 °F (36 4 °C) (Temporal)   Resp 18   Ht 5' 4" (1 626 m)   Wt 73 5 kg (162 lb)   SpO2 96%   BMI 27 81 kg/m²    AAOx3  RRR  CTA B  Abdomen obese  Benign  A/P:    This is a 60 y o  female status post a gastric bypass in 2011 and a history of anastomotic stricture and successful dilatation in November 2021      Will proceed with the EGD and biopsies as well as potential dilatation      Zora Eaton MD  02/22/23  8:36 AM

## 2023-02-22 NOTE — ANESTHESIA PREPROCEDURE EVALUATION
Procedure:  EGD    Relevant Problems   CARDIO   (+) Mixed hyperlipidemia   (+) Primary hypertension   (+) Ventricular pre-excitation syndrome   (+) WPW syndrome      GI/HEPATIC   (+) H/O bariatric surgery      Cardiovascular and Mediastinum   (+) NSVT (nonsustained ventricular tachycardia)      Digestive   (+) Gastric anastomotic stricture      Other   (+) History of cardiac radiofrequency ablation (RFA)        Physical Exam    Airway    Mallampati score: II  TM Distance: >3 FB  Neck ROM: full     Dental   No notable dental hx     Cardiovascular  Rhythm: regular, Rate: normal, Cardiovascular exam normal    Pulmonary  Pulmonary exam normal Breath sounds clear to auscultation,     Other Findings        Anesthesia Plan  ASA Score- 3     Anesthesia Type- general with ASA Monitors  Additional Monitors:   Airway Plan:           Plan Factors-    Chart reviewed  Patient summary reviewed  Patient is not a current smoker  Patient not instructed to abstain from smoking on day of procedure  Patient did not smoke on day of surgery  There is medical exclusion for perioperative obstructive sleep apnea risk education  Induction- intravenous  Postoperative Plan-     Informed Consent- Anesthetic plan and risks discussed with patient and spouse Kimberly Hamilton

## 2023-02-23 ENCOUNTER — APPOINTMENT (OUTPATIENT)
Dept: LAB | Facility: CLINIC | Age: 63
End: 2023-02-23

## 2023-02-23 DIAGNOSIS — M79.3 PANNICULITIS: ICD-10-CM

## 2023-02-23 DIAGNOSIS — Z41.1 ENCOUNTER FOR COSMETIC SURGERY: ICD-10-CM

## 2023-02-23 DIAGNOSIS — L98.7 EXCESS SKIN OF ABDOMEN: ICD-10-CM

## 2023-02-23 DIAGNOSIS — L30.4 INTERTRIGO: ICD-10-CM

## 2023-02-23 LAB
ALBUMIN SERPL BCP-MCNC: 3.3 G/DL (ref 3.5–5)
ALP SERPL-CCNC: 72 U/L (ref 46–116)
ALT SERPL W P-5'-P-CCNC: 31 U/L (ref 12–78)
ANION GAP SERPL CALCULATED.3IONS-SCNC: 1 MMOL/L (ref 4–13)
AST SERPL W P-5'-P-CCNC: 26 U/L (ref 5–45)
BASOPHILS # BLD AUTO: 0.02 THOUSANDS/ÂΜL (ref 0–0.1)
BASOPHILS NFR BLD AUTO: 0 % (ref 0–1)
BILIRUB SERPL-MCNC: 0.35 MG/DL (ref 0.2–1)
BUN SERPL-MCNC: 11 MG/DL (ref 5–25)
CALCIUM ALBUM COR SERPL-MCNC: 9.3 MG/DL (ref 8.3–10.1)
CALCIUM SERPL-MCNC: 8.7 MG/DL (ref 8.3–10.1)
CHLORIDE SERPL-SCNC: 109 MMOL/L (ref 96–108)
CO2 SERPL-SCNC: 29 MMOL/L (ref 21–32)
CREAT SERPL-MCNC: 0.75 MG/DL (ref 0.6–1.3)
EOSINOPHIL # BLD AUTO: 0.08 THOUSAND/ÂΜL (ref 0–0.61)
EOSINOPHIL NFR BLD AUTO: 2 % (ref 0–6)
ERYTHROCYTE [DISTWIDTH] IN BLOOD BY AUTOMATED COUNT: 12.8 % (ref 11.6–15.1)
FERRITIN SERPL-MCNC: 59 NG/ML (ref 8–388)
FOLATE SERPL-MCNC: >20 NG/ML (ref 3.1–17.5)
GFR SERPL CREATININE-BSD FRML MDRD: 85 ML/MIN/1.73SQ M
GLUCOSE P FAST SERPL-MCNC: 89 MG/DL (ref 65–99)
HCT VFR BLD AUTO: 37 % (ref 34.8–46.1)
HGB BLD-MCNC: 11.2 G/DL (ref 11.5–15.4)
IMM GRANULOCYTES # BLD AUTO: 0.02 THOUSAND/UL (ref 0–0.2)
IMM GRANULOCYTES NFR BLD AUTO: 0 % (ref 0–2)
INR PPP: 0.93 (ref 0.84–1.19)
IRON SATN MFR SERPL: 26 % (ref 15–50)
IRON SERPL-MCNC: 64 UG/DL (ref 50–170)
LYMPHOCYTES # BLD AUTO: 1.72 THOUSANDS/ÂΜL (ref 0.6–4.47)
LYMPHOCYTES NFR BLD AUTO: 32 % (ref 14–44)
MCH RBC QN AUTO: 29.1 PG (ref 26.8–34.3)
MCHC RBC AUTO-ENTMCNC: 30.3 G/DL (ref 31.4–37.4)
MCV RBC AUTO: 96 FL (ref 82–98)
MONOCYTES # BLD AUTO: 0.57 THOUSAND/ÂΜL (ref 0.17–1.22)
MONOCYTES NFR BLD AUTO: 11 % (ref 4–12)
NEUTROPHILS # BLD AUTO: 2.9 THOUSANDS/ÂΜL (ref 1.85–7.62)
NEUTS SEG NFR BLD AUTO: 55 % (ref 43–75)
NRBC BLD AUTO-RTO: 0 /100 WBCS
PLATELET # BLD AUTO: 241 THOUSANDS/UL (ref 149–390)
PMV BLD AUTO: 11.6 FL (ref 8.9–12.7)
POTASSIUM SERPL-SCNC: 4.4 MMOL/L (ref 3.5–5.3)
PROT SERPL-MCNC: 6.7 G/DL (ref 6.4–8.4)
PROTHROMBIN TIME: 12.6 SECONDS (ref 11.6–14.5)
RBC # BLD AUTO: 3.85 MILLION/UL (ref 3.81–5.12)
SODIUM SERPL-SCNC: 139 MMOL/L (ref 135–147)
TIBC SERPL-MCNC: 242 UG/DL (ref 250–450)
VIT B12 SERPL-MCNC: 905 PG/ML (ref 100–900)
WBC # BLD AUTO: 5.31 THOUSAND/UL (ref 4.31–10.16)

## 2023-02-24 ENCOUNTER — DOCUMENTATION (OUTPATIENT)
Dept: CARDIOLOGY CLINIC | Facility: CLINIC | Age: 63
End: 2023-02-24

## 2023-02-24 ENCOUNTER — OFFICE VISIT (OUTPATIENT)
Dept: CARDIOLOGY CLINIC | Facility: CLINIC | Age: 63
End: 2023-02-24

## 2023-02-24 VITALS
DIASTOLIC BLOOD PRESSURE: 80 MMHG | BODY MASS INDEX: 28.7 KG/M2 | SYSTOLIC BLOOD PRESSURE: 126 MMHG | HEIGHT: 64 IN | WEIGHT: 168.1 LBS

## 2023-02-24 DIAGNOSIS — Z98.84 BARIATRIC SURGERY STATUS: Primary | ICD-10-CM

## 2023-02-24 DIAGNOSIS — E78.00 HYPERCHOLESTEROLEMIA: ICD-10-CM

## 2023-02-24 DIAGNOSIS — E55.9 VITAMIN D DEFICIENCY: ICD-10-CM

## 2023-02-24 DIAGNOSIS — M79.3 PANNICULITIS: ICD-10-CM

## 2023-02-24 DIAGNOSIS — Z01.810 PREOP CARDIOVASCULAR EXAM: ICD-10-CM

## 2023-02-24 DIAGNOSIS — K91.2 POSTSURGICAL MALABSORPTION: ICD-10-CM

## 2023-02-24 DIAGNOSIS — R79.89 HIGH SERUM PARATHYROID HORMONE (PTH): ICD-10-CM

## 2023-02-24 DIAGNOSIS — I47.29 NSVT (NONSUSTAINED VENTRICULAR TACHYCARDIA): ICD-10-CM

## 2023-02-24 DIAGNOSIS — Z41.1 ENCOUNTER FOR COSMETIC SURGERY: ICD-10-CM

## 2023-02-24 DIAGNOSIS — L98.7 EXCESS SKIN OF ABDOMEN: ICD-10-CM

## 2023-02-24 DIAGNOSIS — L30.4 INTERTRIGO: ICD-10-CM

## 2023-02-24 DIAGNOSIS — I45.6 WPW SYNDROME: ICD-10-CM

## 2023-02-24 DIAGNOSIS — I10 PRIMARY HYPERTENSION: Primary | ICD-10-CM

## 2023-02-24 DIAGNOSIS — I77.810 ASCENDING AORTA DILATATION (HCC): ICD-10-CM

## 2023-02-24 LAB — VIT A SERPL-MCNC: 39 UG/DL (ref 22–69.5)

## 2023-02-24 RX ORDER — ERGOCALCIFEROL 1.25 MG/1
50000 CAPSULE ORAL 2 TIMES WEEKLY
Qty: 24 CAPSULE | Refills: 0 | Status: SHIPPED | OUTPATIENT
Start: 2023-02-27 | End: 2023-05-22

## 2023-02-24 NOTE — PROGRESS NOTES
Cardiology Follow Up    Je [birth year]  1960  [id number]  [address] [place] [address],[place] [address] CATH LAB  [address] [address]  [phone]    1  Primary hypertension        2  WPW syndrome        3  NSVT (nonsustained ventricular tachycardia)        4  Hypercholesterolemia        5  Preop cardiovascular exam        6  Ascending aorta dilatation (HCC)        7  Excess skin of abdomen  Ambulatory referral to Cardiology      8  Panniculitis  Ambulatory referral to Cardiology      9  Intertrigo  Ambulatory referral to Cardiology      10  Encounter for cosmetic surgery  Ambulatory referral to Cardiology          Interval History: Preoperative cardiac clearance for panniculectomy  The patient is currently asymptomatic from a cardiac point of view denies any chest pain or dyspnea  No palpitations, she was recently seen in the emergency room after a syncopal event  He does have significant legs comfort  Spontaneous recovery     Initial electrocardiogram revealed atrial fibrillation with controlled ventricular response, there were no ST segment changes  She converted to normal sinus rhythm, EKG is read as a possible anteroseptal infarct,, previous EKGs reveal poor R wave progression across the precordium  Patient states been compliant with low-sodium diet, blood pressures been well controlled  Compliant with low-cholesterol diet, lipids last year glucose 147, HDL 44, LDL 75, adequate control on medium intensity statin therapy      Patient Active Problem List   Diagnosis   • Gastric anastomotic stricture   • Mixed hyperlipidemia   • Primary hypertension   • Postgastrectomy malabsorption   • Ventricular pre-excitation syndrome   • Vitamin D deficiency   • Colon cancer screening   • Preop cardiovascular exam   • Prediabetes   • WPW syndrome   • H/O bariatric surgery   • Abdominal pannus   • Candidal skin infection   • Excess skin   • NSVT (nonsustained ventricular tachycardia)   • History of cardiac radiofrequency ablation (RFA)   • Abnormal EKG   • Osteoporosis   • Hypercholesterolemia   • Ascending aorta dilatation (HCC)     Past Medical History:   Diagnosis Date   • Anastomotic stricture of gastrojejunostomy     s/p gastric bypass   • Anemia     iron deficiency resolved 11/14/14   • Anxiety     resolved 6/7/17   • Class 1 obesity due to excess calories with serious comorbidity and body mass index (BMI) of 30 0 to 30 9 in adult 9/18/2020   • Degenerative joint disease of ankle and foot, unspecified laterality     last assessed 5/17/13   • Depression     last assessed 11/7/12   • Diabetes mellitus ([facility] )     type 2 uncomplicated, controlled - last assessed 1/31/14   • Dysphagia    • Gastric ulcer    • History of melena    • Hyperlipidemia    • Hypertension    • Insomnia    • Insomnia    • Obesity     resolved 11/14/14   • Overweight (BMI 25 0-29 9) 5/13/2021   • Postgastrectomy malabsorption    • Tachycardia    • Ventricular pre-excitation syndrome    • Vitamin D deficiency      Social History     Socioeconomic History   • Marital status: /Civil Union     Spouse name: Not on file   • Number of children: Not on file   • Years of education: Not on file   • Highest education level: Not on file   Occupational History   • Not on file   Tobacco Use   • Smoking status: Never   • Smokeless tobacco: Never   Vaping Use   • Vaping Use: Never used   Substance and Sexual Activity   • Alcohol use: Never   • Drug use: Never   • Sexual activity: Not Currently   Other Topics Concern   • Not on file   Social History Narrative   • Not on file     Social Determinants of Health     Financial Resource Strain: Low Risk    • Difficulty of Paying Living Expenses: Not hard at all   Food Insecurity: No Food Insecurity   • Worried About Running Out of Food in the Last Year: Never true   • Ran Out of Food in the Last Year: Never true   Transportation Needs: No Transportation Needs   • Lack of Transportation (Medical): No   • Lack of Transportation (Non-Medical): No   Physical Activity: Not on file   Stress: Not on file   Social Connections: Not on file   Intimate Partner Violence: Not on file   Housing Stability: Not on file      Family History   Problem Relation Age of Onset   • Congenital heart disease Mother    • Asthma Father    • Heart attack Sister         acute MI   • Congenital heart disease Sister    • Hypertension Sister    • No Known Problems Maternal Grandmother    • No Known Problems Maternal Grandfather    • No Known Problems Paternal Grandmother    • No Known Problems Maternal Aunt    • No Known Problems Maternal Aunt    • No Known Problems Maternal Aunt    • Breast cancer Cousin         ? age     Past Surgical History:   Procedure Laterality Date   • CARDIAC CATHETERIZATION      post proc data:____ lesions successfully dilated   • COLONOSCOPY     • ESOPHAGOGASTRODUODENOSCOPY     • GASTRIC BYPASS  02/08/2011    for morbid obesity    • HYSTERECTOMY      @ age 38   • OOPHORECTOMY Bilateral     @ age 38   • [facility] EGD TRANSORAL BIOPSY SINGLE/MULTIPLE N/A 1/25/2017    Procedure: ESOPHAGOGASTRODUODENOSCOPY (EGD); Surgeon: Vamshi Vaughn MD;  Location: [facility]; Service: Bariatrics   • AL EGD TRANSORAL BIOPSY SINGLE/MULTIPLE N/A 5/3/2017    Procedure: ESOPHAGOGASTRODUODENOSCOPY (EGD) with balloon dilatation;  Surgeon: Vamshi Vaughn MD;  Location: [facility]; Service: Bariatrics   • AL EGD TRANSORAL BIOPSY SINGLE/MULTIPLE N/A 8/16/2017    Procedure: ESOPHAGOGASTRODUODENOSCOPY (EGD) with balloon dilation;  Surgeon: Vamshi Vaughn MD;  Location: [facility];   Service: Bariatrics   • UPPER GASTROINTESTINAL ENDOSCOPY         Current Outpatient Medications:   •  atorvastatin (LIPITOR) 20 mg tablet, Take 1 tablet by mouth once daily, Disp: 90 tablet, Rfl: 3  •  calcium gluconate 500 mg tablet, Take 500 mg by mouth daily, Disp: , Rfl:   •  Cholecalciferol (Vitamin D3) 50 MCG (2000 UT) TABS, Take 2 tablets by mouth once daily, Disp: 180 tablet, Rfl: 1  •  IRON, FERROUS GLUCONATE, PO, Take 1 tablet by mouth daily, Disp: , Rfl:   •  losartan-hydrochlorothiazide (HYZAAR) 100-25 MG per tablet, Take 1 tablet by mouth once daily, Disp: 90 tablet, Rfl: 3  •  metFORMIN (GLUCOPHAGE-XR) 500 mg 24 hr tablet, Take 2 tablets (1,000 mg total) by mouth daily with dinner, Disp: 180 tablet, Rfl: 3  •  metoprolol succinate (TOPROL-XL) 25 mg 24 hr tablet, Take 1 tablet by mouth twice daily, Disp: 180 tablet, Rfl: 1  •  Multiple Vitamins-Minerals (ZINC PO), Take by mouth, Disp: , Rfl:   •  nystatin (MYCOSTATIN) powder, Apply topically 3 (three) times a day, Disp: 60 g, Rfl: 3  •  Omega-3 Fatty Acids (FISH OIL) 1,000 mg, Take 1,000 mg by mouth 2 (two) times a day, Disp: , Rfl:   •  omeprazole (PriLOSEC) 20 mg delayed release capsule, Take 1 capsule (20 mg total) by mouth 2 (two) times a day, Disp: 90 capsule, Rfl: 3  •  sucralfate (CARAFATE) 1 g/10 mL suspension, Take 10 mL (1 g total) by mouth 4 (four) times a day, Disp: 3600 mL, Rfl: 2  •  VITAMIN E PO, Take 1 tablet by mouth daily, Disp: , Rfl:   •  ergocalciferol (VITAMIN D2) 50,000 units, Take 1 capsule (50,000 Units total) by mouth once a week for 8 doses, Disp: 8 capsule, Rfl: 0  •  triamcinolone (KENALOG) 0 1 % ointment, Apply topically 2 (two) times a day (Patient not taking: Reported on 2/11/2023), Disp: 30 g, Rfl: 2  No current facility-administered medications for this visit      Facility-Administered Medications Ordered in Other Visits:   •  sodium chloride 0 9 % infusion, 125 mL/hr, Intravenous, Continuous, [place] Cristo, DO, Last Rate: 125 mL/hr at 02/22/23 0836, Restarted at 02/22/23 0849  No Known Allergies    Labs:  Appointment on 02/23/2023   Component Date Value   • Sodium 02/23/2023 139    • Potassium 02/23/2023 4 4    • Chloride 02/23/2023 109 (H)    • CO2 02/23/2023 29    • ANION GAP 02/23/2023 1 (L)    • BUN 02/23/2023 11 • Creatinine 02/23/2023 0 75    • Glucose, Fasting 02/23/2023 89    • Calcium 02/23/2023 8 7    • Corrected Calcium 02/23/2023 9 3    • AST 02/23/2023 26    • ALT 02/23/2023 31    • Alkaline Phosphatase 02/23/2023 72    • Total Protein 02/23/2023 6 7    • Albumin 02/23/2023 3 3 (L)    • Total Bilirubin 02/23/2023 0 35    • eGFR 02/23/2023 85    • WBC 02/23/2023 5 31    • RBC 02/23/2023 3 85    • Hemoglobin 02/23/2023 11 2 (L)    • Hematocrit 02/23/2023 37 0    • MCV 02/23/2023 96    • MCH 02/23/2023 29 1    • MCHC 02/23/2023 30 3 (L)    • RDW 02/23/2023 12 8    • MPV 02/23/2023 11 6    • Platelets 15/27/8012 241    • nRBC 02/23/2023 0    • Neutrophils Relative 02/23/2023 55    • Immat GRANS % 02/23/2023 0    • Lymphocytes Relative 02/23/2023 32    • Monocytes Relative 02/23/2023 11    • Eosinophils Relative 02/23/2023 2    • Basophils Relative 02/23/2023 0    • Neutrophils Absolute 02/23/2023 2 90    • Immature Grans Absolute 02/23/2023 0 02    • Lymphocytes Absolute 02/23/2023 1 72    • Monocytes Absolute 02/23/2023 0 57    • Eosinophils Absolute 02/23/2023 0 08    • Basophils Absolute 02/23/2023 0 02    • Protime 02/23/2023 12 6    • INR 02/23/2023 0 93    • Folate 02/23/2023 >20 0 (H)    • Vitamin B-12 02/23/2023 905 (H)    • Iron Saturation 02/23/2023 26    • TIBC 02/23/2023 242 (L)    • Iron 02/23/2023 64    • Ferritin 02/23/2023 59    Hospital Outpatient Visit on 02/22/2023   Component Date Value   • POC Glucose 02/22/2023 75    Appointment on 02/17/2023   Component Date Value   • Zinc 02/17/2023 64    • Vit D, 25-Hydroxy 02/17/2023 23 2 (L)    • Vitamin B-12 02/17/2023 825    • Vitamin B1, Whole Blood 02/17/2023 90 8    • PTH 02/17/2023 160 1 (H)    • WBC 02/17/2023 5 51    • RBC 02/17/2023 3 98    • Hemoglobin 02/17/2023 11 9    • Hematocrit 02/17/2023 38 1    • MCV 02/17/2023 96    • MCH 02/17/2023 29 9    • MCHC 02/17/2023 31 2 (L)    • RDW 02/17/2023 12 8    • Platelets 68/59/4821 281    • MPV 02/17/2023 10 8    • Ferritin 02/17/2023 66    • Folate 02/17/2023 >20 0 (H)    • Iron Saturation 02/17/2023 23    • TIBC 02/17/2023 264    • Iron 02/17/2023 62    Admission on 02/11/2023, Discharged on 02/11/2023   Component Date Value   • Ventricular Rate 02/11/2023 78    • Atrial Rate 02/11/2023 202    • QRSD Interval 02/11/2023 84    • QT Interval 02/11/2023 378    • QTC Interval 02/11/2023 430    • QRS Axis 02/11/2023 19    • T Wave Axis 02/11/2023 30    • WBC 02/11/2023 8 47    • RBC 02/11/2023 3 73 (L)    • Hemoglobin 02/11/2023 11 2 (L)    • Hematocrit 02/11/2023 35 9    • MCV 02/11/2023 96    • MCH 02/11/2023 30 0    • MCHC 02/11/2023 31 2 (L)    • RDW 02/11/2023 12 9    • MPV 02/11/2023 12 2    • Platelets 92/35/4762 200    • nRBC 02/11/2023 0    • Neutrophils Relative 02/11/2023 49    • Immat GRANS % 02/11/2023 1    • Lymphocytes Relative 02/11/2023 37    • Monocytes Relative 02/11/2023 12    • Eosinophils Relative 02/11/2023 1    • Basophils Relative 02/11/2023 0    • Neutrophils Absolute 02/11/2023 4 25    • Immature Grans Absolute 02/11/2023 0 04    • Lymphocytes Absolute 02/11/2023 3 10    • Monocytes Absolute 02/11/2023 0 98    • Eosinophils Absolute 02/11/2023 0 08    • Basophils Absolute 02/11/2023 0 02    • Sodium 02/11/2023 138    • Potassium 02/11/2023 4 0    • Chloride 02/11/2023 106    • CO2 02/11/2023 24    • ANION GAP 02/11/2023 8    • BUN 02/11/2023 19    • Creatinine 02/11/2023 0 84    • Glucose 02/11/2023 86    • Calcium 02/11/2023 8 8    • eGFR 02/11/2023 74    • D-Dimer, Quant 02/11/2023 <0 27    • hs TnI 0hr 02/11/2023 3    • Ventricular Rate 02/11/2023 80    • Atrial Rate 02/11/2023 80    • OR Interval 02/11/2023 272    • QRSD Interval 02/11/2023 82    • QT Interval 02/11/2023 376    • QTC Interval 02/11/2023 433    • P Axis 02/11/2023 56    • QRS Axis 02/11/2023 27    • T Wave Axis 02/11/2023 55    Appointment on 11/11/2022   Component Date Value   • Sodium 11/11/2022 138    • Potassium 11/11/2022 4 4    • Chloride 11/11/2022 106    • CO2 11/11/2022 31    • ANION GAP 11/11/2022 1 (L)    • BUN 11/11/2022 10    • Creatinine 11/11/2022 0 91    • Glucose, Fasting 11/11/2022 123 (H)    • Calcium 11/11/2022 9 1    • AST 11/11/2022 24    • ALT 11/11/2022 34    • Alkaline Phosphatase 11/11/2022 76    • Total Protein 11/11/2022 7 7    • Albumin 11/11/2022 3 7    • Total Bilirubin 11/11/2022 0 38    • eGFR 11/11/2022 67    Appointment on 11/05/2022   Component Date Value   • N-TELEO URINE 11/05/2022 84    • Creatinine, Urine 11/05/2022 68 1    • N-telo/Creat  Ratio 11/05/2022 14    • Interpretive Guide: 11/05/2022 Comment    Appointment on 09/27/2022   Component Date Value   • Hemoglobin A1C 09/27/2022 6 4 (H)    • EAG 09/27/2022 137      Imaging: EGD    Result Date: 2/22/2023  Narrative: Table formatting from the original result was not included  [id number] [facility] [address] [place] [address] [phone] DATE OF SERVICE: 2/22/23 PHYSICIAN(S): Attending: Vik Shrestha MD Fellow: No Staff Documented INDICATION: Bariatric surgery status POST-OP DIAGNOSIS: See the impression below  PREPROCEDURE: Informed consent was obtained for the procedure, including sedation  Risks of perforation, hemorrhage, adverse drug reaction and aspiration were discussed  The patient was placed in the left lateral decubitus position  Patient was explained about the risks and benefits of the procedure  Risks including but not limited to bleeding, infection, and perforation were explained in detail  Also explained about less than 100% sensitivity with the exam and other alternatives  PROCEDURE: EGD DETAILS OF PROCEDURE: Patient was taken to the procedure room where a time out was performed to confirm correct patient and correct procedure   The patient underwent monitored anesthesia care, which was administered by an anesthesia professional  The patient's blood pressure, heart rate, level of consciousness, respirations and oxygen were monitored throughout the procedure  The scope was advanced to the proximal part of the jejunum  Prior to the procedure, the patient's H  Pylori status was negative  The patient experienced no blood loss  The procedure was not difficult  The patient tolerated the procedure well  There were no apparent complications  This is a 60 y o  female status post Carline-En-Y Gastric Bypass with me in 02/08/2011 with hx of anastomotic stricture with dilation on 11/17/2021  Presents to the office for annual visit  Overall doing fair however reporting in the past month, she experieced nausea and vomiting with epigastric pain associated with food intake  She had experienced these symptoms before in the past prior to her EGD last year  ANESTHESIA INFORMATION: ASA: III Anesthesia Type: General MEDICATIONS: sodium chloride 0 9 % infusion 100 mL*  *From user-documented volume (Totals for administrations occurring from 0215 to 0850 on 02/22/23) FINDINGS: Mild, generalized erythematous mucosa in the stomach; performed 2 cold forceps biopsies to rule out H  pylori Post-operative anastomotic intrinsic stricture (traversable) in the stomach; dilated with [facility] [employer] balloon dilator using guidewire from 15 mm starting size to 18 mm end size  Dilation caused bleeding, improved passage of the scope and improved lumen appearance; post-dilation bleeding was minimal SPECIMENS: ID Type Source Tests Collected by Time Destination 1 : r/o h pylori Tissue Stomach TISSUE Galindo Urena MD 2/22/2023 [id number]      Impression: Pouchitis with an anastomotic stricture of at least 1 cm as it was easily traversed by the scope  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  Successful balloon dilatation to 18 mm  RECOMMENDATION: Continue with the bariatric program process and follow up in the office  Biopsy results pending     Oneida Mccormick MD       Review of Systems:  Review of Systems   Eyes: Negative for visual disturbance  Respiratory: Negative for apnea, shortness of breath, wheezing and stridor  Cardiovascular: Negative for chest pain, palpitations and leg swelling  Gastrointestinal: Positive for abdominal distention  Negative for abdominal pain and anal bleeding  Neurological: Positive for syncope  Negative for dizziness, facial asymmetry, speech difficulty and weakness  Hematological: Does not bruise/bleed easily  Physical Exam:  Physical Exam  Vitals reviewed  Constitutional:       General: She is not in acute distress  Appearance: Normal appearance  She is normal weight  She is not ill-appearing, toxic-appearing or diaphoretic  Eyes:      General: No scleral icterus  Cardiovascular:      Rate and Rhythm: Normal rate and regular rhythm  Pulses: Normal pulses  Heart sounds: Normal heart sounds  No murmur heard  No friction rub  No gallop  Pulmonary:      Effort: Pulmonary effort is normal  No respiratory distress  Breath sounds: Normal breath sounds  No stridor  No wheezing, rhonchi or rales  Musculoskeletal:      Right lower leg: No edema  Left lower leg: No edema  Skin:     General: Skin is warm and dry  Coloration: Skin is not jaundiced or pale  Findings: No bruising or erythema  Neurological:      Mental Status: She is alert  Psychiatric:         Mood and Affect: Mood normal           Discussion/Summary: Atrial fibrillation, new diagnosis, he has had no syncopal event  With a spontaneous recovery  EKG is abnormal but unchanged  She did have a stress test last year she did 8 minutes on a Tom protocol achieving cardiac rate, there was no EKG cardiac ischemia there was no atrial fibrillation, PVCs were noted with triples  An echocardiogram revealed normal left diastolic function with stage I diastolic function    There was mild aortic sufficiency there was mild mitral and tricuspid insufficiency with estimated normal pulmonary pressures suggested by Doppler  ,  Ascending aorta mildly dilated at 42 mm  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 over a decade ago  We will repeat a 2-week monitor  Favor no anticoagulation at the present time  Patient is cleared for surgery  No testing needed    This note was completed in part utilizing m[employer] direct voice recognition software  Grammatical errors, random word insertion, spelling mistakes, and incomplete sentences may be an occasional consequence of the system secondary to software limitations, ambient noise and hardware issues  At the time of dictation, efforts were made to edit, clarify and /or correct errors  Please read the chart carefully and recognize, using context, where substitutions have occurred  If you have any questions or concerns about the context, text or information contained within the body of this dictation, please contact myself, the provider, for further clarification

## 2023-02-24 NOTE — PROGRESS NOTES
[facility] precert request form faxed for Basilio Cardoza of [id number]

## 2023-02-27 ENCOUNTER — OFFICE VISIT (OUTPATIENT)
Dept: PLASTIC SURGERY | Facility: CLINIC | Age: 63
End: 2023-02-27

## 2023-02-27 ENCOUNTER — TELEPHONE (OUTPATIENT)
Dept: BARIATRICS | Facility: CLINIC | Age: 63
End: 2023-02-27

## 2023-02-27 VITALS
TEMPERATURE: 98.6 F | HEIGHT: 64 IN | WEIGHT: 168 LBS | BODY MASS INDEX: 28.68 KG/M2 | SYSTOLIC BLOOD PRESSURE: 147 MMHG | DIASTOLIC BLOOD PRESSURE: 95 MMHG | HEART RATE: 74 BPM

## 2023-02-27 DIAGNOSIS — E65 ABDOMINAL PANNUS: Primary | ICD-10-CM

## 2023-02-27 RX ORDER — METHOCARBAMOL 500 MG/1
500 TABLET, FILM COATED ORAL 3 TIMES DAILY PRN
Qty: 15 TABLET | Refills: 0 | Status: SHIPPED | OUTPATIENT
Start: 2023-02-27

## 2023-02-27 RX ORDER — ONDANSETRON 4 MG/1
4 TABLET, FILM COATED ORAL EVERY 8 HOURS PRN
Qty: 20 TABLET | Refills: 0 | Status: SHIPPED | OUTPATIENT
Start: 2023-02-27

## 2023-02-27 RX ORDER — DOCUSATE SODIUM 100 MG/1
100 CAPSULE, LIQUID FILLED ORAL 2 TIMES DAILY
Qty: 20 CAPSULE | Refills: 0 | Status: SHIPPED | OUTPATIENT
Start: 2023-02-27

## 2023-02-27 RX ORDER — ACETAMINOPHEN 500 MG
500 TABLET ORAL EVERY 4 HOURS PRN
Qty: 30 TABLET | Refills: 2 | Status: SHIPPED | OUTPATIENT
Start: 2023-02-27

## 2023-02-27 RX ORDER — OXYCODONE HYDROCHLORIDE 5 MG/1
5 TABLET ORAL EVERY 4 HOURS PRN
Qty: 30 TABLET | Refills: 0 | Status: SHIPPED | OUTPATIENT
Start: 2023-02-27

## 2023-02-27 NOTE — PROGRESS NOTES
Plastic Surgery Consult     Reason for visit: preop for [id number] panniculectomy and lipoabdominoplasty    HPI from 2/27/23  Patient presents with  for preop for combined insurance ([id number] panniculectomy) and cosmetic case (lipoabdominoplasty)        Discussed risks, benefits, complications including but not limited to infection, bleeding, scarring, wound dehiscence, asymmetry, contour deformity, blood clots  Patient acknowledged  Patient does not have history of blood clots  Cardiac clearance obtained  Medical clearance pending  Labs reviewed  Alb 3 3     Discussed post-operative care with drains and garment  BUX 25     Abdominal exam:  Moderate abdominal lipodystrophy particularly in epigastric region  Mild diastasis, no hernia  Patient has double panniculus  3 cm overhanging periumbilical pannus with open sores  6 cm overhanging abdominal panniculus overhanging pubic region, worse centrally than laterally  No current intertrigo or open sores  Under panniculus region is very moist     A/P: 63 y/o Sami speaking female with massive weight loss from gastric bypass with stable weight now for over 12 months who presents for preop for [id number] panniculectomy and lipoabdominoplasty    -Discussed risks, benefits, procedure, complications of both xsfyp-op-dog panniculectomy and lipoabodminoplasty including but not limited to infection, bleeding, scarring, deep venous thrombosis, pulmonary embolism, fat necrosis, contour deformity, asymmetry   -Discussed increased risk of complications given elevated BMI     -Medical clearance pending  -Also discussed possibility of no vertical incision if after liposuction and horizontal excision, I can achieve satisfactory contour   -Garment fitted  -Prescriptions sent to [address]   [address] [place] [address] and Reconstructive Surgery   [address] [facility] [address], [address] [facility]   Office: [phone]        HPI from 12/21/22  Patient presents for further discussion regarding panniculectomy and lipoabdominoplasty  She has obtained new insurance  She has been followed by PCP for her HTN and states that her WPW has resolved  She continues to have rashes with intertrigo and open sores that have healed underneath her pannuses  Her weight has been stable for the last 12 months         HPI from 3/21/22  Patient is a 62 y/ospanish speaking female who presents with  who speaks Emaline Gang excess overhanging abdominal skin after undergoing virginia-en-Y gastric bypass in 2011 by Dr Angle Hernandes over 90 lb weight loss  Her starting weight was 270 and current weight is 170  Her weight has been stable for the last 12 months  Her primary complaint at this time are the rashes, blistering, open sores, foul smell and intertrigo that occurs under her panniculus  She states that she has used various creams and powders prescribed by bariatrics and PCP with minimal improvement  This has also hindered her ability to exercise as the area becomes sweaty, moist, and exacerbates her intertrigo, foul smell, and open sores   She does not currently have any rashes or open sores but does have photographs of when they do occur   Patient also states that foul smell makes her conscientious at work which interrupts her work for her to perform self-hygiene multiple times per day      Patient with no history of blood clots      ROS: 12 pt ROS negative, except as otherwise noted in HPI      PMH: HLD, WPW, ventricular pre-excitation syndrome  FamHx: non-contrib  SurgHx: hysterectomy, colonoscopy  SocHx: no tobacco, no ETOH  Meds: Metformin, no blood thinners, see epic for complete list  Allergies: NKDA           Vitals:     03/21/22 0840   BP: (!) 193/110   Pulse: 80   Temp: 97 6 °F (36 4 °C)         General: NC/AT, breathing comfortably on RA  Neuro: CN II-XII grossly intact, symmetric reflexes  HEENT: PERRLA, EOMI, external ears normal, no lesions or deformities, neck supple, trachea midline  Respiratory: CTAB, normal respiratory effort  Cardio: RRR, normal S1, S2, no murmur, rubs, gallops  GI: soft, non-tender, non-distended  MSK: normal alignment, mobility, gait  Skin: no rashes, lesions, subcutaneous nodules     LMB 04     Abdominal exam:  Moderate abdominal lipodystrophy particularly in epigastric region  Mild diastasis, no hernia  Patient has double panniculus  3 cm overhanging periumbilical pannus with open sores  6 cm overhanging abdominal panniculus overhanging pubic region, worse centrally than laterally  No current intertrigo or open sores  Under panniculus region is very moist     A/P: 63 y/o North Korean speaking female massive weight loss from gastric bypass with stable weight now for over 12 months who presents with symptomatic overhanging double panniculus hanging over periumbilical area and mons   -Discussed with patient that she is a good candidate for panniculectomy  I explained that panniculectomy is a functional procedure, not cosmetic  The only thing that will be removed is the overhanging excess abdominal skin  The abdominal contour, diffuse lipodystrophy, particularly in the epigastric/central region, is not addressed  Also, loss of umbilicus is a possibility as well as inferiorly displaced umbilicus  Patient acknowledged  She understands that the surgery will only remove the excess overhanging skin causing her symptoms   -Due to her second double pannus in the periumbilical region and severe horizontal laxity in the area, patient would benefit from dayes-as-ywg panniculectomy  Again, this is functional procedure only skin would be removed, not cosmetic    -I also informed her that her elevated BMI increases her risk of complications including but not limited to wound breakdown, infection, abscesses, delayed wound healing   Patient acknowledged   -I discussed possible, additional lipoabdominoplasty to address the central and upper abdominal lipodystrophy and improve the cosmetic contour  Will email quote  -Again reiterated that panniculectomy is functional (insurance) vs abdominoplasty is cosmetic (will pay out of pocket)  -Risks, benefits, procedure, and complications discussed for panniculectomy and abdominoplasty  Patient acknowledged   All questions answered, concerns addressed   -Photos taken, will submit for insurance approval for panniculectomy   -Instructed to follow-up with PCP due to HTN, asymptomatic at time of evaluation  -If approved, will require preop labs to be completed for preop appointment     -Will require medical (HTN) and cardiac clearance (WPW) , evaluation of metformin        Federico Guillen MD   [address] [place] [address] and Reconstructive Surgery   [address]   [place], [address]   Office: [phone]

## 2023-02-27 NOTE — TELEPHONE ENCOUNTER
----- Message from [employer] sent at 2/24/2023  3:13 PM EST -----  PATIENT [place] SPEAKING -     Please call patient with her results  All labs within limits EXCEPT FOR THE FOLLOWING:     - vitamin D level is low and parathyroid hormone is elevated which may indicate bone loss  I would like to start her on vitamin D2 - 22154 IU twice a week for 12 weeks  After this, please switch to a maintenance vitamin D3 2000 IU daily  Also, please make sure you are on calcium supplements typically it is 500 mg three times per day  Vitamin D3 and calcium supplements are over the counter      - folate is high but this is not harmful  Please continue to take her supplements as is along with the changes as stated  I would like to repeat her vitamin D and parathyroid levels in 3 months to see if there are any improvement       ANNE Hunter

## 2023-03-01 ENCOUNTER — CONSULT (OUTPATIENT)
Dept: INTERNAL MEDICINE CLINIC | Facility: CLINIC | Age: 63
End: 2023-03-01

## 2023-03-01 VITALS
SYSTOLIC BLOOD PRESSURE: 112 MMHG | OXYGEN SATURATION: 97 % | HEIGHT: 63 IN | WEIGHT: 169.8 LBS | DIASTOLIC BLOOD PRESSURE: 75 MMHG | TEMPERATURE: 97.3 F | HEART RATE: 69 BPM | BODY MASS INDEX: 30.09 KG/M2

## 2023-03-01 DIAGNOSIS — K91.2 POSTGASTRECTOMY MALABSORPTION: ICD-10-CM

## 2023-03-01 DIAGNOSIS — Z98.84 H/O BARIATRIC SURGERY: ICD-10-CM

## 2023-03-01 DIAGNOSIS — Z01.818 PREOPERATIVE CLEARANCE: Primary | ICD-10-CM

## 2023-03-01 DIAGNOSIS — I10 PRIMARY HYPERTENSION: ICD-10-CM

## 2023-03-01 DIAGNOSIS — Z90.3 POSTGASTRECTOMY MALABSORPTION: ICD-10-CM

## 2023-03-01 DIAGNOSIS — I48.0 PAROXYSMAL A-FIB (HCC): ICD-10-CM

## 2023-03-01 DIAGNOSIS — E11.9 TYPE 2 DIABETES MELLITUS WITHOUT COMPLICATION, WITHOUT LONG-TERM CURRENT USE OF INSULIN (HCC): ICD-10-CM

## 2023-03-01 DIAGNOSIS — Z59.9 FINANCIAL DIFFICULTIES: ICD-10-CM

## 2023-03-01 DIAGNOSIS — Z86.79 HISTORY OF WOLFF-PARKINSON-WHITE (WPW) SYNDROME: ICD-10-CM

## 2023-03-01 DIAGNOSIS — M81.0 AGE-RELATED OSTEOPOROSIS WITHOUT CURRENT PATHOLOGICAL FRACTURE: ICD-10-CM

## 2023-03-01 RX ORDER — IBUPROFEN 200 MG
1 CAPSULE ORAL DAILY
Qty: 90 TABLET | Refills: 1 | Status: SHIPPED | OUTPATIENT
Start: 2023-03-01

## 2023-03-01 SDOH — ECONOMIC STABILITY - INCOME SECURITY: PROBLEM RELATED TO HOUSING AND ECONOMIC CIRCUMSTANCES, UNSPECIFIED: Z59.9

## 2023-03-01 NOTE — PROGRESS NOTES
INTERNAL MEDICINE OFFICE VISIT  [facility]  [address] [place] [address]    NAME: Priscilla Atkins  AGE: 60 y o  SEX: female    DATE OF ENCOUNTER: 3/1/2023    Assessment and Plan     Problem List Items Addressed This Visit        Digestive    Postgastrectomy malabsorption    Relevant Medications    calcium citrate (CALCITRATE) 950 (200 Ca) MG tablet       Cardiovascular and Mediastinum    Primary hypertension    Paroxysmal A-fib (HCC)  - patient to continue follow up with Cardiology, discussed benefits and risks of anticoagulation   - CHADSVASC 3 points ; female , HTN & Type 2 DM  - patient agreeable to A/C as appropriate, had extended monitor ordered but today she was not aware that she need another monitor ; will communicate and arrange with Cardiology , and patient to continue f/u as instructed  Musculoskeletal and Integument    Osteoporosis    Relevant Medications    calcium citrate (CALCITRATE) 950 (200 Ca) MG tablet        Other    Preoperative clearance - Primary  - See pre-op clearance statement below ; panniculectomy 03/30/2023 with plastic surgery  - Instructed to hold Metformin 48 hours before and after , and to hold Losartan 24 hours before and after     Type 2 Diabetes    05/29/22 11:20   Hemoglobin A1C 6 8 (H)   (H): Data is abnormally high  On Metformin 1000 mg QD      H/O bariatric surgery    Relevant Medications    calcium citrate (CALCITRATE) 950 (200 Ca) MG tablet - for better absorption     Financial difficulties    Relevant Orders    Ambulatory Referral to Social Work Care Management Program   Other Visit Diagnoses     History of Gustabo-Parkinson-White (WPW) syndrome      S/p Radioactive ablation   In NSR ; but had event of Afib with syncope 02/11/2023           Orders Placed This Encounter   Procedures   • Ambulatory Referral to Social Work Care Management Program     Priscilla Atkins   is seen for pre-operative evaluation   Patient reports no sx of chest pain reported at rest or with exertion, dyspnea at rest or with exertion, PND, LE edema, claudication, or palpitations  No history of ischemic heart disease, CHF, cardiovascular disease, diabetes  No recent anticoagulant or antithrombotic use  Patient reports no history of stress test, cardiac cath, or coronary revascularization)  The patient is able to achieve >4 METs of activity during walking and house chores  Pt's RCRI is 0 points, correlating with Class I risk, which carries a 3 9 percent 30 day risk of death, MI, or cardiac arrest  Patient's other comorbid conditions include hx of WPW s/p ablation, one event of Afib with syncope , spontaneously converted to NSR, HTN, Prediabetes, Hypercholesteremia, osteoprosis  Patient was cleared by Cardiology , Dr Lucio Cortés, on 02/24/2023; has Zio patch ordered by cardiology; There is no need for further diagnostic testing prior to patient's scheduled procedure  Patient may proceed with surgery with understanding of perioperative risk in light of the benefits of possible surgery  - Counseling Documentation: patient was counseled regarding: risk factor reductions and risks and benefits of treatment options    Chief Complaint     No chief complaint on file  History of Present Illness     Soni Talavera is a 61 yo F , Pashto speaker , presented with her  today who help translate, hx remarkable for HTN, Type 2 DM, hypercholesteremia, s/p bariatric surgery, osteoporosis, mildly dilated ascending aorta at 42 mm, hx of WPW s/p radiofrequency ablation and recent syncope event 02/11/2023 found to have afib in the ED , spontaneously converted to NSR; here for pre-op clearance see above; EKG - NSR with stable old non specific changes ; instructed to hold Metformin and ARB as above       When discussed afib and her risk of stroke , CHADsVASC 3 , patient was not aware that she may need Anticoagulation and also she was not aware that she need to repeate extended monitor which was already ordered ; questions answered and advised to call back Dr Molly Vaughn office to clarify and we will communicate as well, further recs to follow and patient to continue follow with us and cardio as instructed , has next appt with me in April 2023  HPI    The following portions of the patient's history were reviewed and updated as appropriate: allergies, current medications, past family history, past medical history, past social history, past surgical history and problem list     Review of Systems     Review of Systems  - GENERAL: Negative for any nausea, vomiting, fevers, chills, or weight loss  - HEENT: Negative for any head/Neck trauma, pain, double/blurry vision, sinusitis, rhinitis, nose bleeding   - CARDIAC: Negative for any chest pain, palpitation, Dyspnea on exertion, peripheral edema  - PULMONARY: Negative for any SOB, cough, wheezing    - GASTROINTESTINAL: Negative for any abdominal pain, N/V/D/C, blood in stool    - GENITOURINARY: Negative for any dysuria, hematuria, incontinence   - NEUROLOGIC: had a brief abrupt syncope event 02/24/2023 but not recurrent; ROS Negative for any muscle weakness, numbness/tingling, memory changes  - MUSCULOSKELETAL: Negative for any joint pains/swelling, limited ROM  - INTEGUMENTARY: Negative for any rashes, cuts/ lesions   - HEMATOLOGIC: Negative for any abnormal bruising, frequent infections or bleeding      Active Problem List     Patient Active Problem List   Diagnosis   • Gastric anastomotic stricture   • Mixed hyperlipidemia   • Primary hypertension   • Postgastrectomy malabsorption   • Ventricular pre-excitation syndrome   • Vitamin D deficiency   • Colon cancer screening   • Preop cardiovascular exam   • Prediabetes   • WPW syndrome   • H/O bariatric surgery   • Abdominal pannus   • Candidal skin infection   • Excess skin   • NSVT (nonsustained ventricular tachycardia)   • History of cardiac radiofrequency ablation (RFA)   • Abnormal EKG   • Osteoporosis   • Hypercholesterolemia   • Ascending aorta dilatation (HCC)   • Paroxysmal A-fib (HCC)   • Preoperative clearance   • Financial difficulties       Objective     /75 (BP Location: Left arm, Patient Position: Sitting, Cuff Size: Large)   Pulse 69   Temp (!) 97 3 °F (36 3 °C) (Temporal)   Ht 5' 3" (1 6 m)   Wt 77 kg (169 lb 12 8 oz)   SpO2 97%   BMI 30 08 kg/m²     Physical Exam    Pertinent Laboratory/Diagnostic Studies:  CBC:   Lab Results   Component Value Date/Time    WBC 5 31 02/23/2023 09:53 AM    WBC 5 23 06/05/2014 11:19 AM    RBC 3 85 02/23/2023 09:53 AM    RBC 4 04 06/05/2014 11:19 AM    HGB 11 2 (L) 02/23/2023 09:53 AM    HGB 12 9 03/24/2015 04:34 PM    HCT 37 0 02/23/2023 09:53 AM    HCT 36 6 06/05/2014 11:19 AM    MCV 96 02/23/2023 09:53 AM    MCV 91 06/05/2014 11:19 AM    MCH 29 1 02/23/2023 09:53 AM    MCH 29 0 06/05/2014 11:19 AM    MCHC 30 3 (L) 02/23/2023 09:53 AM    MCHC 32 0 06/05/2014 11:19 AM    RDW 12 8 02/23/2023 09:53 AM    RDW 14 9 06/05/2014 11:19 AM    MPV 11 6 02/23/2023 09:53 AM    MPV 9 9 06/05/2014 11:19 AM     02/23/2023 09:53 AM     06/05/2014 11:19 AM    NRBC 0 02/23/2023 09:53 AM    NEUTOPHILPCT 55 02/23/2023 09:53 AM    NEUTOPHILPCT 54 06/05/2014 11:19 AM    LYMPHOPCT 32 02/23/2023 09:53 AM    LYMPHOPCT 36 06/05/2014 11:19 AM    MONOPCT 11 02/23/2023 09:53 AM    MONOPCT 9 06/05/2014 11:19 AM    EOSPCT 2 02/23/2023 09:53 AM    EOSPCT 1 06/05/2014 11:19 AM    BASOPCT 0 02/23/2023 09:53 AM    BASOPCT 0 06/05/2014 11:19 AM    NEUTROABS 2 90 02/23/2023 09:53 AM    NEUTROABS 2 82 06/05/2014 11:19 AM    LYMPHSABS 1 72 02/23/2023 09:53 AM    LYMPHSABS 1 88 06/05/2014 11:19 AM    MONOSABS 0 57 02/23/2023 09:53 AM    MONOSABS 0 47 06/05/2014 11:19 AM    EOSABS 0 08 02/23/2023 09:53 AM    EOSABS 0 05 06/05/2014 11:19 AM     Chemistry Profile:   Lab Results   Component Value Date/Time     07/26/2015 11:56 AM    K 4 4 02/23/2023 09:53 AM    K 4 4 07/26/2015 11:56 AM     (H) 02/23/2023 09:53 AM     07/26/2015 11:56 AM    CO2 29 02/23/2023 09:53 AM    CO2 31 07/26/2015 11:56 AM    ANIONGAP 3 (L) 07/26/2015 11:56 AM    BUN 11 02/23/2023 09:53 AM    BUN 15 07/26/2015 11:56 AM    CREATININE 0 75 02/23/2023 09:53 AM    CREATININE 0 59 (L) 07/26/2015 11:56 AM    GLUC 86 02/11/2023 02:33 AM    GLUF 89 02/23/2023 09:53 AM    GLUCOSE 94 07/26/2015 11:56 AM    CALCIUM 8 7 02/23/2023 09:53 AM    CALCIUM 8 4 07/26/2015 11:56 AM    CORRECTEDCA 9 3 02/23/2023 09:53 AM    AST 26 02/23/2023 09:53 AM    AST 27 07/26/2015 11:56 AM    ALT 31 02/23/2023 09:53 AM    ALT 45 07/26/2015 11:56 AM    ALKPHOS 72 02/23/2023 09:53 AM    ALKPHOS 80 07/26/2015 11:56 AM    PROT 6 6 07/26/2015 11:56 AM    BILITOT 0 27 07/26/2015 11:56 AM    EGFR 85 02/23/2023 09:53 AM     Cardiac Studies: No results found for: NTBNP, BNP, TROPONINI, POCTROP  Endocrine Studies:   Lab Results   Component Value Date/Time    HGBA1C 6 4 (H) 09/27/2022 09:25 AM    KRU5CBMJGJGD 2 040 05/29/2022 11:20 AM    MKL8VFVGGVLD 1 573 02/28/2014 10:38 AM    TRIG 141 05/29/2022 11:20 AM    TRIG 141 07/26/2015 11:56 AM    CHOL 225 07/26/2015 11:56 AM    CHOLESTEROL 147 05/29/2022 11:20 AM    HDL 44 (L) 05/29/2022 11:20 AM    HDL 57 07/26/2015 11:56 AM    LDLCALC 75 05/29/2022 11:20 AM    LDLCALC 140 (H) 07/26/2015 11:56 AM    YYPL89QFVIZL 23 2 (L) 02/17/2023 09:31 AM    YZLH43GYRBGK 35 3 02/15/2014 10:55 AM     1 (H) 02/17/2023 09:31 AM    PTH 39 6 02/15/2014 10:55 AM       Current Medications     Current Outpatient Medications:   •  acetaminophen (TYLENOL) 500 mg tablet, Take 1 tablet (500 mg total) by mouth every 4 (four) hours as needed for mild pain or moderate pain, Disp: 30 tablet, Rfl: 2  •  atorvastatin (LIPITOR) 20 mg tablet, Take 1 tablet by mouth once daily, Disp: 90 tablet, Rfl: 3  •  calcium citrate (CALCITRATE) 950 (200 Ca) MG tablet, Take 1 tablet (950 mg total) by mouth daily, Disp: 90 tablet, Rfl: 1  •  Cholecalciferol (Vitamin D3) 50 MCG (2000 UT) TABS, Take 2 tablets by mouth once daily, Disp: 180 tablet, Rfl: 1  •  docusate sodium (COLACE) 100 mg capsule, Take 1 capsule (100 mg total) by mouth 2 (two) times a day, Disp: 20 capsule, Rfl: 0  •  ergocalciferol (VITAMIN D2) 50,000 units, Take 1 capsule (50,000 Units total) by mouth 2 (two) times a week, Disp: 24 capsule, Rfl: 0  •  IRON, FERROUS GLUCONATE, PO, Take 1 tablet by mouth daily, Disp: , Rfl:   •  losartan-hydrochlorothiazide (HYZAAR) 100-25 MG per tablet, Take 1 tablet by mouth once daily, Disp: 90 tablet, Rfl: 3  •  metFORMIN (GLUCOPHAGE-XR) 500 mg 24 hr tablet, Take 2 tablets (1,000 mg total) by mouth daily with dinner, Disp: 180 tablet, Rfl: 3  •  methocarbamol (ROBAXIN) 500 mg tablet, Take 1 tablet (500 mg total) by mouth 3 (three) times a day as needed for muscle spasms, Disp: 15 tablet, Rfl: 0  •  metoprolol succinate (TOPROL-XL) 25 mg 24 hr tablet, Take 1 tablet by mouth twice daily, Disp: 180 tablet, Rfl: 1  •  Multiple Vitamins-Minerals (ZINC PO), Take by mouth, Disp: , Rfl:   •  nystatin (MYCOSTATIN) powder, Apply topically 3 (three) times a day, Disp: 60 g, Rfl: 3  •  Omega-3 Fatty Acids (FISH OIL) 1,000 mg, Take 1,000 mg by mouth 2 (two) times a day, Disp: , Rfl:   •  omeprazole (PriLOSEC) 20 mg delayed release capsule, Take 1 capsule (20 mg total) by mouth 2 (two) times a day, Disp: 90 capsule, Rfl: 3  •  ondansetron (ZOFRAN) 4 mg tablet, Take 1 tablet (4 mg total) by mouth every 8 (eight) hours as needed for nausea or vomiting, Disp: 20 tablet, Rfl: 0  •  oxyCODONE (Roxicodone) 5 immediate release tablet, Take 1 tablet (5 mg total) by mouth every 4 (four) hours as needed for severe pain Max Daily Amount: 30 mg, Disp: 30 tablet, Rfl: 0  •  VITAMIN E PO, Take 1 tablet by mouth daily, Disp: , Rfl:   •  sucralfate (CARAFATE) 1 g/10 mL suspension, Take 10 mL (1 g total) by mouth 4 (four) times a day, Disp: 3752 mL, Rfl: 2  •  triamcinolone (KENALOG) 0 1 % ointment, Apply topically 2 (two) times a day (Patient not taking: Reported on 2/11/2023), Disp: 30 g, Rfl: 2    Health Maintenance     Health Maintenance   Topic Date Due   • Dental Prophylaxis  Never done   • COVID-19 Vaccine (4 - Booster for Mirza Reap series) 01/31/2022   • Breast Cancer Screening: Mammogram  08/03/2023   • Dental Periodic Exam  08/16/2023   • Depression Screening  11/11/2023   • Annual Physical  11/11/2023   • BMI: Followup Plan  12/02/2023   • Dental X-Ray: Bitewings  02/16/2024   • BMI: Adult  03/01/2024   • Cervical Cancer Screening  03/19/2025   • Dental X-Ray: Full Mouth  02/16/2026   • DTaP,Tdap,and Td Vaccines (2 - Td or Tdap) 03/11/2030   • Colorectal Cancer Screening  07/27/2031   • HIV Screening  Completed   • Hepatitis C Screening  Completed   • Influenza Vaccine  Completed   • Pneumococcal Vaccine: Pediatrics (0 to 5 Years) and At-Risk Patients (6 to 59 Years)  Aged Out   • HIB Vaccine  Aged Out   • IPV Vaccine  Aged Out   • Hepatitis A Vaccine  Aged Out   • Meningococcal ACWY Vaccine  Aged Out   • HPV Vaccine  Aged [employer] History   Administered Date(s) Administered   • COVID-19 MODERNA VACC 0 5 ML IM 04/15/2021, 12/06/2021   • Hep B, adult 06/01/2022, 08/01/2022, 12/19/2022   • INFLUENZA 10/08/2015, 10/07/2016, 12/26/2017   • Influenza Quadrivalent Preservative Free 3 years and older IM 10/07/2016   • Influenza Quadrivalent, 6-35 Months IM 12/26/2017   • Influenza, recombinant, quadrivalent,injectable, preservative free 03/11/2020, 09/18/2020, 11/15/2021, 11/11/2022   • Influenza, seasonal, injectable 01/31/2014, 11/14/2014, 10/08/2015   • Pneumococcal Conjugate Vaccine 20-valent (Pcv20), Polysace 11/11/2022   • Pneumococcal Polysaccharide PPV23 01/31/2014   • Tdap 03/11/2020   • Tuberculin Skin Test-PPD Intradermal 03/24/2015       Sheela Simpson DO   Internal Medicine - PGY3  [facility]    3/1/2023 9:45 AM

## 2023-03-08 ENCOUNTER — OFFICE VISIT (OUTPATIENT)
Dept: BARIATRICS | Facility: CLINIC | Age: 63
End: 2023-03-08

## 2023-03-08 VITALS
BODY MASS INDEX: 29.15 KG/M2 | HEIGHT: 63 IN | WEIGHT: 164.5 LBS | SYSTOLIC BLOOD PRESSURE: 120 MMHG | TEMPERATURE: 98.8 F | HEART RATE: 90 BPM | DIASTOLIC BLOOD PRESSURE: 70 MMHG

## 2023-03-08 DIAGNOSIS — I10 PRIMARY HYPERTENSION: ICD-10-CM

## 2023-03-08 DIAGNOSIS — K92.9 GASTRIC ANASTOMOTIC STRICTURE: ICD-10-CM

## 2023-03-08 DIAGNOSIS — K31.89 GASTRIC ANASTOMOTIC STRICTURE: ICD-10-CM

## 2023-03-08 DIAGNOSIS — Z98.84 BARIATRIC SURGERY STATUS: ICD-10-CM

## 2023-03-08 DIAGNOSIS — K91.2 POSTSURGICAL MALABSORPTION: ICD-10-CM

## 2023-03-08 DIAGNOSIS — E11.9 TYPE 2 DIABETES MELLITUS WITHOUT COMPLICATION (HCC): ICD-10-CM

## 2023-03-08 DIAGNOSIS — E66.3 OVERWEIGHT: ICD-10-CM

## 2023-03-08 DIAGNOSIS — Z48.815 ENCOUNTER FOR SURGICAL AFTERCARE FOLLOWING SURGERY OF DIGESTIVE SYSTEM: Primary | ICD-10-CM

## 2023-03-08 NOTE — PROGRESS NOTES
Assessment/Plan:       Diagnoses and all orders for this visit:    Encounter for surgical aftercare following surgery of digestive system    Bariatric surgery status    Postsurgical malabsorption    Overweight    Gastric anastomotic stricture    Primary hypertension    Type 2 diabetes mellitus without complication ([facility] )        -s/p Carline-En-Y Gastric Bypass with Dr Khai Lucas on 02/08/2011 with hx of anastomotic stricture with dilation on 11/17/2021  Patient with recurrent nausea and vomiting with epigastric pain associated with food intake  She underwent EGD last month which showed:   IMPRESSION:  Pouchitis with an anastomotic stricture of at least 1 cm as it was easily traversed by the scope  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  Successful balloon dilatation to 18 mm  Final Diagnosis   A  Stomach:  -  Chronic inactive oxyntic gastritis  -  Negative for Helicobacter pylori, by H&E stain   -  Negative for intestinal metaplasia, dysplasia or carcinoma  Patient overall doing well since her EGD  States nausea and vomiting has improved  She continues on her PPI and Carafate  Advised patient to finish off her Carafate and then can continue on her daily PPI BID untl her next visit  She will monitor her symptoms and if ha new or worsening nausea or vomiting will call us sooner for evaluation  Subjective:      Patient ID: Soni Talavera is a 60 y o  female  HPI follow up EGD    The following portions of the patient's history were reviewed and updated as appropriate: allergies, current medications, past family history, past medical history, past social history, past surgical history and problem list     Review of Systems   Constitutional: Negative  Respiratory: Negative  Cardiovascular: Negative  Gastrointestinal: Positive for nausea (improving) and vomiting (improving)  Neurological: Negative  Psychiatric/Behavioral: Negative            Objective:      /70   Pulse 90   Temp 98 8 °F (37 1 °C) (Tympanic)   Ht 5' 3 2" (1 605 m)   Wt 74 6 kg (164 lb 8 oz)   BMI 28 96 kg/m²          Physical Exam  Vitals and nursing note reviewed  Constitutional:       Appearance: Normal appearance  She is obese  HENT:      Head: Normocephalic and atraumatic  Eyes:      Extraocular Movements: Extraocular movements intact  Pupils: Pupils are equal, round, and reactive to light  Cardiovascular:      Rate and Rhythm: Normal rate  Pulmonary:      Effort: Pulmonary effort is normal    Musculoskeletal:         General: Normal range of motion  Cervical back: Normal range of motion  Skin:     General: Skin is warm and dry  Neurological:      General: No focal deficit present  Mental Status: She is alert and oriented to person, place, and time     Psychiatric:         Mood and Affect: Mood normal

## 2023-03-24 ENCOUNTER — CLINICAL SUPPORT (OUTPATIENT)
Dept: CARDIOLOGY CLINIC | Facility: CLINIC | Age: 63
End: 2023-03-24

## 2023-03-24 DIAGNOSIS — I10 ESSENTIAL HYPERTENSION: ICD-10-CM

## 2023-03-24 DIAGNOSIS — I45.6 WPW SYNDROME: ICD-10-CM

## 2023-03-24 DIAGNOSIS — I47.29 NSVT (NONSUSTAINED VENTRICULAR TACHYCARDIA) (HCC): ICD-10-CM

## 2023-03-24 NOTE — PRE-PROCEDURE INSTRUCTIONS
Pre-Surgery Instructions:   Medication Instructions   • atorvastatin (LIPITOR) 20 mg tablet Continue to take as prescribed including DOS with a small sip of water, unless usually taken at night   • calcium citrate (CALCITRATE) 950 (200 Ca) MG tablet Avoid 1 week prior to surgery    • Cholecalciferol (Vitamin D3) 50 MCG (2000 UT) TABS Avoid 1 week prior to surgery    • ergocalciferol (VITAMIN D2) 50,000 units Avoid 1 week prior to surgery    • IRON, FERROUS GLUCONATE, PO continue as prescribed excluding DOS   • losartan-hydrochlorothiazide (HYZAAR) 100-25 MG per tablet continue as prescribed excluding DOS   • metFORMIN (GLUCOPHAGE-XR) 500 mg 24 hr tablet continue as prescribed excluding DOS   • metoprolol succinate (TOPROL-XL) 25 mg 24 hr tablet Continue to take as prescribed including DOS with a small sip of water, unless usually taken at night   • Multiple Vitamins-Minerals (ZINC PO) Avoid 1 week prior to surgery    • nystatin (MYCOSTATIN) powder continue as prescribed excluding DOS   • Omega-3 Fatty Acids (FISH OIL) 1,000 mg Avoid 1 week prior to surgery    • omeprazole (PriLOSEC) 20 mg delayed release capsule Continue to take as prescribed including DOS with a small sip of water, unless usually taken at night   • triamcinolone (KENALOG) 0 1 % ointment continue as prescribed excluding DOS   • VITAMIN E PO Avoid 1 week prior to surgery     Medication instructions for day surgery reviewed  Please use only a sip of water to take your instructed medications  Avoid all over the counter vitamins, supplements and NSAIDS for one week prior to surgery per anesthesia guidelines  Tylenol is ok to take as needed  You will receive a call one business day prior to surgery with an arrival time and hospital directions  If your surgery is scheduled on a Monday, the hospital will be calling you on the Friday prior to your surgery   If you have not heard from anyone by 8pm, please call the hospital supervisor through the hospital  at [phone]  Winter Ferrera [phone])  Do not eat or drink anything after midnight the night before your surgery, including candy, mints, lifesavers, or chewing gum  Do not drink alcohol 24hrs before your surgery  Try not to smoke at least 24hrs before your surgery  Follow the pre surgery showering instructions as listed in the [facility] Surgical Experience Booklet” or otherwise provided by your surgeon's office  Do not shave the surgical area 24 hours before surgery  Do not apply any lotions, creams, including makeup, cologne, deodorant, or perfumes after showering on the day of your surgery  No contact lenses, eye make-up, or artificial eyelashes  Remove nail polish, including gel polish, and any artificial, gel, or acrylic nails if possible  Remove all jewelry including rings and body piercing jewelry  Wear causal clothing that is easy to take on and off  Consider your type of surgery  Keep any valuables, jewelry, piercings at home  Please bring any specially ordered equipment (sling, braces) if indicated  Arrange for a responsible person to drive you to and from the hospital on the day of your surgery  Visitor Guidelines discussed  Call the surgeon's office with any new illnesses, exposures, or additional questions prior to surgery  Please reference your [facility] Surgical Experience Booklet” for additional information to prepare for your upcoming surgery      [facility]  [id number]

## 2023-03-27 RX ORDER — METOPROLOL SUCCINATE 25 MG/1
TABLET, EXTENDED RELEASE ORAL
Qty: 180 TABLET | Refills: 3 | Status: SHIPPED | OUTPATIENT
Start: 2023-03-27

## 2023-03-29 ENCOUNTER — ANESTHESIA EVENT (OUTPATIENT)
Dept: PERIOP | Facility: HOSPITAL | Age: 63
End: 2023-03-29

## 2023-03-29 NOTE — H&P
H&P - Plastic Surgery   Melanie Selby 58 y o  female MRN: [id number]  Unit/Bed#:  Encounter: [id number]           Assessment:  Excess skin of abdomen [L98 7]       Panniculitis [M79 3]       Intertrigo [L30 4]       Encounter for cosmetic surgery [Z41 1]  Plan: JENNY WINSTON PANNICULECTOMY (Abdomen)       LIPO ABDOMINOPLASTY (Abdomen)        HPI:   Harry Perez is a 58y o  year old female who presents with ***        REVIEW OF SYSTEMS    GENERAL/CONSTITUTIONAL: The patient denies fever, fatigue, weakness, weight gain or weight loss  HEAD, EYES, EARS, NOSE AND THROAT: Eyes - The patient denies pain, redness, loss of vision, double or blurred vision and denies wearing glasses  The patient denies ringing in the ears, nosebleeds sinusitis, post nasal drip  Also denies frequent sore throats, hoarseness, painful swallowing  CARDIOVASCULAR: The patient denies chest pain, irregular heartbeats, palpitations, shortness of breath, heart murmurs, high blood pressure, cramps in his legs with walking, pain in his feet or toes at night or varicose veins  RESPIRATORY: The patient denies chronic cough, wheezing or night sweats  GASTROINTESTINAL: The patient denies decreased appetite, nausea, vomiting, diarrhea, constipation, blood in the stools  GENITOURINARY: The patient denies difficult urination, pain or burning with urination, blood in the urine  MUSCULOSKELETAL: The patient denies arm, thigh or calf cramps  No joint or muscle pain  No muscle weakness or tenderness  No joint swelling, neck pain, back pain or major orthopedic injuries  SKIN AND BREASTS: The patient denies easy bruising, skin redness, skin rash, hives  NEUROLOGIC: The patient denies headache, dizziness, fainting, memory loss  PSYCHIATRIC: The patient denies depression anxiety    ENDOCRINE: The patient denies intolerance to hot or cold temperature, flushing, fingernail changes, increased thirst, increased salt intake or decreased sexual desire  HEMATOLOGIC/LYMPHATIC: The patient denies anemia, bleeding tendency or clotting tendency  ALLERGIC/IMMUNOLOGIC: The patient denies rhinitis, asthma, skin sensitivity, latex allergies or sensitivity  Historical Information   Past Medical History:   Diagnosis Date   • Anastomotic stricture of gastrojejunostomy     s/p gastric bypass   • Anemia     iron deficiency resolved 11/14/14   • Anxiety     resolved 6/7/17   • Class 1 obesity due to excess calories with serious comorbidity and body mass index (BMI) of 30 0 to 30 9 in adult 09/18/2020   • Degenerative joint disease of ankle and foot, unspecified laterality     last assessed 5/17/13   • Depression     last assessed 11/7/12   • Diabetes mellitus ([facility] )     type 2 uncomplicated, controlled - last assessed 1/31/14   • Dysphagia    • Gastric ulcer    • History of melena    • History of transfusion    • Hyperlipidemia    • Hypertension    • Insomnia    • Insomnia    • Obesity     resolved 11/14/14   • Overweight (BMI 25 0-29 9) 05/13/2021   • Postgastrectomy malabsorption    • Tachycardia    • Ventricular pre-excitation syndrome    • Vitamin D deficiency      Past Surgical History:   Procedure Laterality Date   • CARDIAC CATHETERIZATION      post proc data:____ lesions successfully dilated   • COLONOSCOPY     • ESOPHAGOGASTRODUODENOSCOPY     • GASTRIC BYPASS  02/08/2011    for morbid obesity    • HYSTERECTOMY      @ age 38   • OOPHORECTOMY Bilateral     @ age 38   • MD EGD TRANSORAL BIOPSY SINGLE/MULTIPLE N/A 1/25/2017    Procedure: ESOPHAGOGASTRODUODENOSCOPY (EGD); Surgeon: Pepe Jensen MD;  Location: [facility]; Service: Bariatrics   • MD EGD TRANSORAL BIOPSY SINGLE/MULTIPLE N/A 5/3/2017    Procedure: ESOPHAGOGASTRODUODENOSCOPY (EGD) with balloon dilatation;  Surgeon: Pepe Jensen MD;  Location: [facility];   Service: Bariatrics   • MD EGD TRANSORAL BIOPSY SINGLE/MULTIPLE N/A 8/16/2017    Procedure: ESOPHAGOGASTRODUODENOSCOPY (EGD) with balloon dilation;  Surgeon: Mario Valdez MD;  Location: [facility]; Service: Bariatrics   • UPPER GASTROINTESTINAL ENDOSCOPY       Social History   Social History     Substance and Sexual Activity   Alcohol Use Never     Social History     Substance and Sexual Activity   Drug Use Never     Social History     Tobacco Use   Smoking Status Never   Smokeless Tobacco Never     Family History: { IP FAM HISTORY SMARTLIST:959218633}    Meds/Allergies   { IP UWAA:983068408}      Objective     {Exam, Complete:52542}    Lab Results:   Lab Results   Component Value Date    WBC 5 31 02/23/2023    HGB 11 2 (L) 02/23/2023    HCT 37 0 02/23/2023    MCV 96 02/23/2023     02/23/2023          No results found for: TISSUECULT, WOUNDCULT      Imaging Studies:   No results found  EKG, Pathology, and Other Studies:   Lab Results   Component Value Date/Time    [facility]  02/22/2023 08:49 AM     A  Stomach:  -  Chronic inactive oxyntic gastritis  -  Negative for Helicobacter pylori, by H&E stain   -  Negative for intestinal metaplasia, dysplasia or carcinoma           No intake or output data in the 24 hours ending 03/29/23 0834    Invasive Devices     None                 VTE Prophylaxis: {DVT/VTE Prophylaxis:537762954}    Code Status: Prior  Advance Directive and Living Will:      Power of :

## 2023-03-30 ENCOUNTER — ANESTHESIA (OUTPATIENT)
Dept: PERIOP | Facility: HOSPITAL | Age: 63
End: 2023-03-30

## 2023-03-30 ENCOUNTER — HOSPITAL ENCOUNTER (OUTPATIENT)
Facility: HOSPITAL | Age: 63
Setting detail: OUTPATIENT SURGERY
Discharge: HOME | End: 2023-03-30
Attending: STUDENT IN AN ORGANIZED HEALTH CARE EDUCATION/TRAINING PROGRAM | Admitting: STUDENT IN AN ORGANIZED HEALTH CARE EDUCATION/TRAINING PROGRAM

## 2023-03-30 VITALS
SYSTOLIC BLOOD PRESSURE: 151 MMHG | HEART RATE: 84 BPM | DIASTOLIC BLOOD PRESSURE: 85 MMHG | TEMPERATURE: 98.9 F | OXYGEN SATURATION: 93 % | RESPIRATION RATE: 18 BRPM

## 2023-03-30 DIAGNOSIS — L30.4 INTERTRIGO: ICD-10-CM

## 2023-03-30 DIAGNOSIS — Z41.1 ENCOUNTER FOR COSMETIC SURGERY: ICD-10-CM

## 2023-03-30 DIAGNOSIS — M79.3 PANNICULITIS: ICD-10-CM

## 2023-03-30 DIAGNOSIS — L98.7 EXCESS SKIN OF ABDOMEN: ICD-10-CM

## 2023-03-30 LAB
GLUCOSE SERPL-MCNC: 180 MG/DL (ref 65–140)
GLUCOSE SERPL-MCNC: 93 MG/DL (ref 65–140)

## 2023-03-30 RX ORDER — FENTANYL CITRATE/PF 50 MCG/ML
50 SYRINGE (ML) INJECTION
Status: DISCONTINUED | OUTPATIENT
Start: 2023-03-30 | End: 2023-03-30 | Stop reason: HOSPADM

## 2023-03-30 RX ORDER — GLYCOPYRROLATE 0.2 MG/ML
INJECTION INTRAMUSCULAR; INTRAVENOUS AS NEEDED
Status: DISCONTINUED | OUTPATIENT
Start: 2023-03-30 | End: 2023-03-30

## 2023-03-30 RX ORDER — GABAPENTIN 300 MG/1
300 CAPSULE ORAL ONCE
Status: COMPLETED | OUTPATIENT
Start: 2023-03-30 | End: 2023-03-30

## 2023-03-30 RX ORDER — HYDROMORPHONE HCL/PF 1 MG/ML
SYRINGE (ML) INJECTION AS NEEDED
Status: DISCONTINUED | OUTPATIENT
Start: 2023-03-30 | End: 2023-03-30

## 2023-03-30 RX ORDER — HYDROMORPHONE HCL/PF 1 MG/ML
0.25 SYRINGE (ML) INJECTION
Status: DISCONTINUED | OUTPATIENT
Start: 2023-03-30 | End: 2023-03-30 | Stop reason: HOSPADM

## 2023-03-30 RX ORDER — ONDANSETRON 2 MG/ML
INJECTION INTRAMUSCULAR; INTRAVENOUS AS NEEDED
Status: DISCONTINUED | OUTPATIENT
Start: 2023-03-30 | End: 2023-03-30

## 2023-03-30 RX ORDER — PROPOFOL 10 MG/ML
INJECTION, EMULSION INTRAVENOUS AS NEEDED
Status: DISCONTINUED | OUTPATIENT
Start: 2023-03-30 | End: 2023-03-30

## 2023-03-30 RX ORDER — CEFAZOLIN SODIUM 1 G/3ML
INJECTION, POWDER, FOR SOLUTION INTRAMUSCULAR; INTRAVENOUS AS NEEDED
Status: DISCONTINUED | OUTPATIENT
Start: 2023-03-30 | End: 2023-03-30

## 2023-03-30 RX ORDER — NEOSTIGMINE METHYLSULFATE 1 MG/ML
INJECTION INTRAVENOUS AS NEEDED
Status: DISCONTINUED | OUTPATIENT
Start: 2023-03-30 | End: 2023-03-30

## 2023-03-30 RX ORDER — ACETAMINOPHEN 325 MG/1
975 TABLET ORAL ONCE
Status: COMPLETED | OUTPATIENT
Start: 2023-03-30 | End: 2023-03-30

## 2023-03-30 RX ORDER — MAGNESIUM HYDROXIDE 1200 MG/15ML
LIQUID ORAL AS NEEDED
Status: DISCONTINUED | OUTPATIENT
Start: 2023-03-30 | End: 2023-03-30 | Stop reason: HOSPADM

## 2023-03-30 RX ORDER — LIDOCAINE HYDROCHLORIDE 10 MG/ML
INJECTION, SOLUTION EPIDURAL; INFILTRATION; INTRACAUDAL; PERINEURAL AS NEEDED
Status: DISCONTINUED | OUTPATIENT
Start: 2023-03-30 | End: 2023-03-30

## 2023-03-30 RX ORDER — BUPIVACAINE HYDROCHLORIDE AND EPINEPHRINE 2.5; 5 MG/ML; UG/ML
INJECTION, SOLUTION EPIDURAL; INFILTRATION; INTRACAUDAL; PERINEURAL AS NEEDED
Status: DISCONTINUED | OUTPATIENT
Start: 2023-03-30 | End: 2023-03-30 | Stop reason: HOSPADM

## 2023-03-30 RX ORDER — ROCURONIUM BROMIDE 10 MG/ML
INJECTION, SOLUTION INTRAVENOUS AS NEEDED
Status: DISCONTINUED | OUTPATIENT
Start: 2023-03-30 | End: 2023-03-30

## 2023-03-30 RX ORDER — EPHEDRINE SULFATE 50 MG/ML
INJECTION INTRAVENOUS AS NEEDED
Status: DISCONTINUED | OUTPATIENT
Start: 2023-03-30 | End: 2023-03-30

## 2023-03-30 RX ORDER — MINERAL OIL
OIL (ML) MISCELLANEOUS AS NEEDED
Status: DISCONTINUED | OUTPATIENT
Start: 2023-03-30 | End: 2023-03-30 | Stop reason: HOSPADM

## 2023-03-30 RX ORDER — SODIUM CHLORIDE 9 MG/ML
INJECTION INTRAVENOUS AS NEEDED
Status: DISCONTINUED | OUTPATIENT
Start: 2023-03-30 | End: 2023-03-30 | Stop reason: HOSPADM

## 2023-03-30 RX ORDER — OXYCODONE HYDROCHLORIDE 5 MG/1
5 TABLET ORAL EVERY 4 HOURS PRN
Status: DISCONTINUED | OUTPATIENT
Start: 2023-03-30 | End: 2023-03-30 | Stop reason: HOSPADM

## 2023-03-30 RX ORDER — SODIUM CHLORIDE, SODIUM LACTATE, POTASSIUM CHLORIDE, CALCIUM CHLORIDE 600; 310; 30; 20 MG/100ML; MG/100ML; MG/100ML; MG/100ML
125 INJECTION, SOLUTION INTRAVENOUS CONTINUOUS
Status: DISCONTINUED | OUTPATIENT
Start: 2023-03-30 | End: 2023-03-30 | Stop reason: HOSPADM

## 2023-03-30 RX ORDER — BUPIVACAINE HYDROCHLORIDE 2.5 MG/ML
INJECTION, SOLUTION EPIDURAL; INFILTRATION; INTRACAUDAL AS NEEDED
Status: DISCONTINUED | OUTPATIENT
Start: 2023-03-30 | End: 2023-03-30 | Stop reason: HOSPADM

## 2023-03-30 RX ORDER — ONDANSETRON 2 MG/ML
4 INJECTION INTRAMUSCULAR; INTRAVENOUS ONCE AS NEEDED
Status: DISCONTINUED | OUTPATIENT
Start: 2023-03-30 | End: 2023-03-30 | Stop reason: HOSPADM

## 2023-03-30 RX ORDER — MIDAZOLAM HYDROCHLORIDE 2 MG/2ML
INJECTION, SOLUTION INTRAMUSCULAR; INTRAVENOUS AS NEEDED
Status: DISCONTINUED | OUTPATIENT
Start: 2023-03-30 | End: 2023-03-30

## 2023-03-30 RX ORDER — PROMETHAZINE HYDROCHLORIDE 25 MG/ML
12.5 INJECTION, SOLUTION INTRAMUSCULAR; INTRAVENOUS
Status: DISCONTINUED | OUTPATIENT
Start: 2023-03-30 | End: 2023-03-30 | Stop reason: HOSPADM

## 2023-03-30 RX ORDER — SODIUM CHLORIDE, SODIUM LACTATE, POTASSIUM CHLORIDE, CALCIUM CHLORIDE 600; 310; 30; 20 MG/100ML; MG/100ML; MG/100ML; MG/100ML
100 INJECTION, SOLUTION INTRAVENOUS CONTINUOUS
Status: DISCONTINUED | OUTPATIENT
Start: 2023-03-30 | End: 2023-03-30 | Stop reason: HOSPADM

## 2023-03-30 RX ORDER — FENTANYL CITRATE 50 UG/ML
INJECTION, SOLUTION INTRAMUSCULAR; INTRAVENOUS AS NEEDED
Status: DISCONTINUED | OUTPATIENT
Start: 2023-03-30 | End: 2023-03-30

## 2023-03-30 RX ORDER — CEFAZOLIN SODIUM 1 G/50ML
1000 SOLUTION INTRAVENOUS ONCE
Status: COMPLETED | OUTPATIENT
Start: 2023-03-30 | End: 2023-03-30

## 2023-03-30 RX ORDER — DEXAMETHASONE SODIUM PHOSPHATE 10 MG/ML
INJECTION, SOLUTION INTRAMUSCULAR; INTRAVENOUS AS NEEDED
Status: DISCONTINUED | OUTPATIENT
Start: 2023-03-30 | End: 2023-03-30

## 2023-03-30 RX ADMIN — DEXAMETHASONE SODIUM PHOSPHATE 10 MG: 10 INJECTION, SOLUTION INTRAMUSCULAR; INTRAVENOUS at 10:07

## 2023-03-30 RX ADMIN — HYDROMORPHONE HYDROCHLORIDE 0.25 MG: 1 INJECTION, SOLUTION INTRAMUSCULAR; INTRAVENOUS; SUBCUTANEOUS at 11:36

## 2023-03-30 RX ADMIN — ONDANSETRON 4 MG: 2 INJECTION INTRAMUSCULAR; INTRAVENOUS at 09:55

## 2023-03-30 RX ADMIN — LIDOCAINE HYDROCHLORIDE 50 MG: 10 INJECTION, SOLUTION EPIDURAL; INFILTRATION; INTRACAUDAL at 09:59

## 2023-03-30 RX ADMIN — ROCURONIUM BROMIDE 40 MG: 50 INJECTION INTRAVENOUS at 09:59

## 2023-03-30 RX ADMIN — ROCURONIUM BROMIDE 40 MG: 50 INJECTION INTRAVENOUS at 11:10

## 2023-03-30 RX ADMIN — CEFAZOLIN 1000 MG: 330 INJECTION, POWDER, FOR SOLUTION INTRAMUSCULAR; INTRAVENOUS at 13:55

## 2023-03-30 RX ADMIN — GLYCOPYRROLATE 0.4 MG: 0.4 INJECTION INTRAMUSCULAR; INTRAVENOUS at 15:00

## 2023-03-30 RX ADMIN — HYDROMORPHONE HYDROCHLORIDE 0.5 MG: 1 INJECTION, SOLUTION INTRAMUSCULAR; INTRAVENOUS; SUBCUTANEOUS at 13:04

## 2023-03-30 RX ADMIN — PROPOFOL 150 MG: 10 INJECTION, EMULSION INTRAVENOUS at 09:59

## 2023-03-30 RX ADMIN — HYDROMORPHONE HYDROCHLORIDE 0.25 MG: 1 INJECTION, SOLUTION INTRAMUSCULAR; INTRAVENOUS; SUBCUTANEOUS at 11:43

## 2023-03-30 RX ADMIN — CEFAZOLIN SODIUM 1000 MG: 1 SOLUTION INTRAVENOUS at 09:55

## 2023-03-30 RX ADMIN — ROCURONIUM BROMIDE 10 MG: 50 INJECTION INTRAVENOUS at 10:34

## 2023-03-30 RX ADMIN — FENTANYL CITRATE 50 MCG: 50 INJECTION, SOLUTION INTRAMUSCULAR; INTRAVENOUS at 13:03

## 2023-03-30 RX ADMIN — EPHEDRINE SULFATE 5 MG: 50 INJECTION INTRAVENOUS at 10:44

## 2023-03-30 RX ADMIN — EPHEDRINE SULFATE 5 MG: 50 INJECTION INTRAVENOUS at 12:46

## 2023-03-30 RX ADMIN — SODIUM CHLORIDE, SODIUM LACTATE, POTASSIUM CHLORIDE, AND CALCIUM CHLORIDE: .6; .31; .03; .02 INJECTION, SOLUTION INTRAVENOUS at 14:08

## 2023-03-30 RX ADMIN — SODIUM CHLORIDE, SODIUM LACTATE, POTASSIUM CHLORIDE, AND CALCIUM CHLORIDE: .6; .31; .03; .02 INJECTION, SOLUTION INTRAVENOUS at 09:45

## 2023-03-30 RX ADMIN — NEOSTIGMINE METHYLSULFATE 3 MG: 1 INJECTION INTRAVENOUS at 15:00

## 2023-03-30 RX ADMIN — FENTANYL CITRATE 50 MCG: 50 INJECTION, SOLUTION INTRAMUSCULAR; INTRAVENOUS at 09:59

## 2023-03-30 RX ADMIN — ACETAMINOPHEN 975 MG: 325 TABLET ORAL at 08:24

## 2023-03-30 RX ADMIN — MIDAZOLAM 2 MG: 1 INJECTION INTRAMUSCULAR; INTRAVENOUS at 09:55

## 2023-03-30 RX ADMIN — OXYCODONE HYDROCHLORIDE 5 MG: 5 TABLET ORAL at 17:59

## 2023-03-30 RX ADMIN — SODIUM CHLORIDE, SODIUM LACTATE, POTASSIUM CHLORIDE, AND CALCIUM CHLORIDE: .6; .31; .03; .02 INJECTION, SOLUTION INTRAVENOUS at 10:34

## 2023-03-30 RX ADMIN — GABAPENTIN 300 MG: 300 CAPSULE ORAL at 08:24

## 2023-03-30 RX ADMIN — SODIUM CHLORIDE, SODIUM LACTATE, POTASSIUM CHLORIDE, AND CALCIUM CHLORIDE: .6; .31; .03; .02 INJECTION, SOLUTION INTRAVENOUS at 11:56

## 2023-03-30 NOTE — ANESTHESIA PREPROCEDURE EVALUATION
Medical History  History Comments   Dysphagia    Postgastrectomy malabsorption    Gastric ulcer    History of melena    Anxiety resolved 6/7/17   Depression last assessed 11/7/12   Hypertension    Insomnia    Ventricular pre-excitation syndrome    Anastomotic stricture of gastrojejunostomy s/p gastric bypass   Insomnia    Vitamin D deficiency    Hyperlipidemia    Anemia iron deficiency resolved 11/14/14   Degenerative joint disease of ankle and foot, unspecified laterality last assessed 5/17/13   Diabetes mellitus ([facility] ) type 2 uncomplicated, controlled - last assessed 1/31/14   Obesity resolved 11/14/14   Tachycardia    Class 1 obesity due to excess calories with serious comorbidity and body mass index (BMI) of 30 0 to 30 9 in adult    Overweight (BMI 25 0-29  9)    History of transfusion      Procedure: JENNY DE LIS PANNICULECTOMY (Abdomen)  LIPO ABDOMINOPLASTY (Abdomen)    Relevant Problems   ANESTHESIA (within normal limits)      CARDIO   (+) Ascending aorta dilatation (HCC)   (+) Hypercholesterolemia   (+) Mixed hyperlipidemia   (+) Paroxysmal A-fib (HCC)   (+) Primary hypertension   (+) Ventricular pre-excitation syndrome   (+) WPW syndrome      GI/HEPATIC   (+) H/O bariatric surgery        Physical Exam    Airway    Mallampati score: II  TM Distance: <3 FB  Neck ROM: full     Dental       Cardiovascular  Rate: normal,     Pulmonary  Pulmonary exam normal     Other Findings  Per pt denies anything remaining that is loose or removeable      Anesthesia Plan  ASA Score- 3     Anesthesia Type- general with ASA Monitors  Additional Monitors:   Airway Plan: ETT  Plan Factors-Exercise tolerance (METS): >4 METS  Chart reviewed  Patient summary reviewed  Patient is not a current smoker  Induction- intravenous  Postoperative Plan- Plan for postoperative opioid use  Informed Consent- Anesthetic plan and risks discussed with patient  I personally reviewed this patient with the CRNA  Discussed and agreed on the Anesthesia Plan with the AMMY Mejia Prim

## 2023-03-30 NOTE — DISCHARGE INSTR - AVS FIRST PAGE
Discharge Instructions    -Take tylenol 500 mg as scheduled for pain  For severe breakthrough pain, take oxycodone 5 mg as scheduled   -Do not drive or operate heavy machinery when taking narcotic pain medicine as it can cause drowsiness   -No showering for 48 hours  After 48 hours, can remove all dressings (remove yellow gauze from belly button and all other gauze and pads), and shower  Shower with garment, as the swelling makes it difficult to remove and put back on  No baths, hottubs, pools, or soaking incision   -After shower, pat incisions dry  Towel dry garment  Dress incisions with gauze  Incision may ooze for first few days, this is normal  Dressing may need to be changed more frequently if this occurs  -Strip, empty, and record CHRISTOPHER drain output  Bring logbook to clinic as this determines when the drains can be removed  Again, the first 24 hours, will have higher amount of bloody drainage and should become less and lighter in color over the next 48 hours   -No strenuous activity, no heavy lifting, (nothing over 5 lbs), no reaching above shoulder or head as this can pull on incisions   -Resume all home medications  -Resume regular diet  -Wear abdominal girdle at all times   -If need to cough, brace a pillow against the abdomen for support  -If acute-onset unilateral calf pain and swelling or acute onset shortness of breath, feeling of about to pass out, or losing consciousness, go to emergency room immediately    -Call [address] [place] [address] to make follow-up appointment with Dr Narda Casey physician assistant in 7-10 days      Roger Macario MD   [facility] Plastic and Reconstructive Surgery   [address]   Office: [phone]

## 2023-03-30 NOTE — H&P
Plastic Surgery Attending    H&P reviewed, no new changes  Daisy Phalen, MD   [facility] Plastic and Reconstructive Surgery   [address], [address]   Office: [phone]    Plastic Surgery Consult     Reason for visit: preop for [id number] panniculectomy and lipoabdominoplasty     HPI from 2/27/23  Patient presents with  for preop for combined insurance ([id number] panniculectomy) and cosmetic case (lipoabdominoplasty)        Discussed risks, benefits, complications including but not limited to infection, bleeding, scarring, wound dehiscence, asymmetry, contour deformity, blood clots  Patient acknowledged      Patient does not have history of blood clots  Cardiac clearance obtained  Medical clearance pending      Labs reviewed  Alb 3 3     Discussed post-operative care with drains and garment      QUQ 55     Abdominal exam:  Moderate abdominal lipodystrophy particularly in epigastric region  Mild diastasis, no hernia  Patient has double panniculus  3 cm overhanging periumbilical pannus with open sores  6 cm overhanging abdominal panniculus overhanging pubic region, worse centrally than laterally  No current intertrigo or open sores  Under panniculus region is very moist     A/P: 63 y/o Citizen of [place] speaking female with massive weight loss from gastric bypass with stable weight now for over 12 months who presents for preop for [id number] panniculectomy and lipoabdominoplasty    -Discussed risks, benefits, procedure, complications of both nkbke-zz-[id number] panniculectomy and lipoabodminoplasty including but not limited to infection, bleeding, scarring, deep venous thrombosis, pulmonary embolism, fat necrosis, contour deformity, asymmetry   -Discussed increased risk of complications given elevated BMI     -Medical clearance pending  -Also discussed possibility of no vertical incision if after liposuction and horizontal excision, I can achieve satisfactory contour   -Garment fitted  -Prescriptions sent to [address], MD   [address] [place] [address] and Reconstructive Surgery   [address], [address]    Office: [phone]           HPI from 12/21/22  Patient presents for further discussion regarding panniculectomy and lipoabdominoplasty  She has obtained new insurance  She has been followed by PCP for her HTN and states that her WPW has resolved  She continues to have rashes with intertrigo and open sores that have healed underneath her pannuses  Her weight has been stable for the last 12 months         HPI from 3/21/22  Patient is a 62 y/ospanish speaking female who presents with  who speaks Sophie Tanisha excess overhanging abdominal skin after undergoing virgin[place]-en-Y gastric bypass in 2011 by Dr Kenn Garcia over 90 lb weight loss  Her starting weight was 270 and current weight is 170  Her weight has been stable for the last 12 months  Her primary complaint at this time are the rashes, blistering, open sores, foul smell and intertrigo that occurs under her panniculus  She states that she has used various creams and powders prescribed by bariatrics and PCP with minimal improvement  This has also hindered her ability to exercise as the area becomes sweaty, moist, and exacerbates her intertrigo, foul smell, and open sores   She does not currently have any rashes or open sores but does have photographs of when they do occur   Patient also states that foul smell makes her conscientious at work which interrupts her work for her to perform self-hygiene multiple times per day      Patient with no history of blood clots      ROS: 12 pt ROS negative, except as otherwise noted in HPI      PMH: HLD, WPW, ventricular pre-excitation syndrome  FamHx: non-contrib  SurgHx: hysterectomy, colonoscopy  SocHx: no tobacco, no ETOH  Meds: Metformin, no blood thinners, see epic for complete list  Allergies: NKDA           Vitals:     03/21/22 0840   BP: (!) 193/110 Pulse: 80   Temp: 97 6 °F (36 4 °C)         General: NC/AT, breathing comfortably on RA  Neuro: CN II-XII grossly intact, symmetric reflexes  HEENT: PERRLA, EOMI, external ears normal, no lesions or deformities, neck supple, trachea midline  Respiratory: CTAB, normal respiratory effort  Cardio: RRR, normal S1, S2, no murmur, rubs, gallops  GI: soft, non-tender, non-distended  MSK: normal alignment, mobility, gait  Skin: no rashes, lesions, subcutaneous nodules     HZR 39     Abdominal exam:  Moderate abdominal lipodystrophy particularly in epigastric region  Mild diastasis, no hernia  Patient has double panniculus  3 cm overhanging periumbilical pannus with open sores  6 cm overhanging abdominal panniculus overhanging pubic region, worse centrally than laterally  No current intertrigo or open sores  Under panniculus region is very moist     A/P: 63 y/o Dutch speaking female massive weight loss from gastric bypass with stable weight now for over 12 months who presents with symptomatic overhanging double panniculus hanging over periumbilical area and mons   -Discussed with patient that she is a good candidate for panniculectomy  I explained that panniculectomy is a functional procedure, not cosmetic  The only thing that will be removed is the overhanging excess abdominal skin  The abdominal contour, diffuse lipodystrophy, particularly in the epigastric/central region, is not addressed  Also, loss of umbilicus is a possibility as well as inferiorly displaced umbilicus  Patient acknowledged  She understands that the surgery will only remove the excess overhanging skin causing her symptoms   -Due to her second double pannus in the periumbilical region and severe horizontal laxity in the area, patient would benefit from [id number]-um-fkj panniculectomy   Again, this is functional procedure only skin would be removed, not cosmetic    -I also informed her that her elevated BMI increases her risk of complications including but not limited to wound breakdown, infection, abscesses, delayed wound healing  Patient acknowledged   -I discussed possible, additional lipoabdominoplasty to address the central and upper abdominal lipodystrophy and improve the cosmetic contour  Will email quote  -Again reiterated that panniculectomy is functional (insurance) vs abdominoplasty is cosmetic (will pay out of pocket)  -Risks, benefits, procedure, and complications discussed for panniculectomy and abdominoplasty  Patient acknowledged   All questions answered, concerns addressed   -Photos taken, will submit for insurance approval for panniculectomy   -Instructed to follow-up with PCP due to HTN, asymptomatic at time of evaluation  -If approved, will require preop labs to be completed for preop appointment     -Will require medical (HTN) and cardiac clearance (WPW) , evaluation of metformin        Dickson Saldana MD   [address] [place] [address] and Reconstructive Surgery   [address]   Office: [phone]

## 2023-03-30 NOTE — OP NOTE
OPERATIVE REPORT  PATIENT NAME: Samir Dailey    :  1960  MRN: [id number]  Pt Location:  OR ROOM 11    SURGERY DATE: 3/30/2023    Surgeon(s) and Role:     * Nish Acosta MD - Primary     * Yumi Paez PA-C - Assisting    Preop Diagnosis:  Excess skin of abdomen [L98 7]  Panniculitis [M79 3]  Intertrigo [L30 4]  Encounter for cosmetic surgery [Z41 1]    Post-Op Diagnosis Codes:     * Excess skin of abdomen [L98 7]     * Panniculitis [M79 3]     * Intertrigo [L30 4]     * Encounter for cosmetic surgery [Z41 1]    Procedure(s):  1  Wguao-fj-jkm panniculectomy (insurance, CPT [id number], [id number])  2  Lipoabdominoplasty (cosmetic)    Specimen(s):  ID Type Source Tests Collected by Time Destination   1 : Abdominal Pannus Tissue Abdominal TISSUE EXAM Nish Acosta MD 3/30/2023  2:58 PM        Estimated Blood Loss:   50 mL    Drains:  Closed/Suction Drain RLQ Bulb 19 Fr  (Active)   Number of days: 0       Closed/Suction Drain LLQ Bulb 19 Fr  (Active)   Number of days: 0       Urethral Catheter Latex 16 Fr  (Active)   Number of days: 0       Anesthesia Type:   General    Operative Indications:  Excess skin of abdomen [L98 7]  Panniculitis [M79 3]  Intertrigo [L30 4]  Encounter for cosmetic surgery [Z41 1]    Operative Findings:  Abdominal pannus 4974 g  Total tumescence 1800 cc  Total lipoaspirate 1400 cc  Exparel local block (20 cc exparel, 10 cc of 0 25% marcaine plain, 10 cc of injectable saline)    Complications:   None    Procedure and Technique:  Patient was brought to the operating room, transferred to the operating table in supine fashion  After undergoing general anesthesia, a timeout was performed at which point all patient identifiers were deemed to be correct  The chest and abdomen were prepped and draped in the normal sterile fashion  I first began by tumescing the abdomen, lateral abdomen, flanks, and bilateral lower back with a total of 1800 cc of the normal tumescent solution   After allowing for the tumescent to take effect, I proceeded with liposuction of abdomen, flanks, and lower back until the contour was satisfactory  Total lipoaspirate was 1400 cc  After I was satisfied with contour, I made my inferior abdominal incision down to fascia and raised my abdominal flap to proceed with the panniculectomy  The umbilicus was incised around  I proceeded superiorly with dissection of suprafascial tunnel in the midline to the xiphoid  After, I performed two layer plication with 1-0 stratafix above and below the umbilicus  I then performed local nerve block with 20 cc of exparel diluted with 10 cc of 0 25% marcaine plain, with 10 cc injectable saline  The umbilicus was tacked to the fascia with 3-0 monocryl sutures  I then irrigated the field and hemostasis was meticulously achieved  I then placed two 19 [place] kirit drains and secured with 3-0 nylon  The abdomen was flexed and the abdominal flap was advanced to closure  The excess abdominal pannus was excised  The remainder of the incision was tailortacked  I then proceeded with the vertical portion of the gaoku-gq-fim panniculectomy  The horizontal excess was manually palpated and marked and subsequently excised down to fascia  I then proceeded with closure  0-vicryl was use at the triple point  2-0 PDS was used to reapproximate the fascia in the vertical portion followed by insorb for the dermis and 3-0 stratafix for the skin  I then used 1-0 stratafix to reapproximate scarpas on the horizontal incision, followed by insorb stapler for the dermis, and 3-0 stratafix for the skin  The umbilicus was brought out at it's new position and secured with 3-0 monocryl for the dermis, and 4-0 running nylon for the skin  Prineo tape and skin glue was applied to the incision  Patient was placed in garment  This concluded the procedure  Patient tolerated the procedure well without complications   At the end of the case, all sponge, needle, and instrument counts were correct  Patient was awakened from anesthesia and taken to the PACU in stable condition  I was present for the entire procedure, A qualified resident physician was not available and A physician assistant was required during the procedure for retraction, tissue handling, dissection and suturing        Patient Disposition:  PACU         SIGNATURE: Yaya Gonzales MD  DATE: March 30, 2023  TIME: 3:00 PM

## 2023-03-30 NOTE — ANESTHESIA POSTPROCEDURE EVALUATION
Post-Op Assessment Note    CV Status:  Stable  Pain Score: 0    Pain management: adequate     Mental Status:  Alert and awake   Hydration Status:  Euvolemic   PONV Controlled:  Controlled   Airway Patency:  Patent      Post Op Vitals Reviewed: Yes      Staff: Anesthesiologist, CRNA         There were no known notable events for this encounter      /74 (03/30/23 1530)    Temp   97 6   Pulse 84 (03/30/23 1530)   Resp 17 (03/30/23 1530)    SpO2 99 % (03/30/23 1530)

## 2023-03-30 NOTE — NURSING NOTE
Ambulated to the bathroom with assist of staff  Voided  Pain level decreased to 5/10 after medication was given  Currently getting dressed to go home  Black compression garment in place

## 2023-04-06 ENCOUNTER — OFFICE VISIT (OUTPATIENT)
Dept: PLASTIC SURGERY | Facility: CLINIC | Age: 63
End: 2023-04-06

## 2023-04-06 DIAGNOSIS — Z41.1 ENCOUNTER FOR COSMETIC SURGERY: ICD-10-CM

## 2023-04-06 DIAGNOSIS — Z98.890 S/P PANNICULECTOMY: Primary | ICD-10-CM

## 2023-04-06 NOTE — PROGRESS NOTES
Assessment/Plan:     Patient is a 30-year-old female who is status post [id number] panniculectomy and Lipo abdominoplasty with Dr Flora Sosa on 3/30/2023  Please see HPI  Patient presents to the office today for first postoperative visit and drain check  Patient has had approximately 10 cc of drain output daily from her right drain, 15 to 25 cc of drain output from her left drain  Right drain was pulled today  Patient will continue to wear a supportive garment and track and record her drain output  She will return to our office in approximately 1 week for drain check and umbilical suture removal or sooner with any questions or concerns  Diagnoses and all orders for this visit:    S/P panniculectomy          Subjective:     Patient ID: Samir Dailey is a 58 y o  female  HPI     Patient reports that she has been doing well  She has no issues or concerns today  She is eager for drain removal     Review of Systems    See HPI    Objective:     Physical Exam      Transverse and horizontal abdominal incisions are clean, dry and intact  Drains are serosanguineous bilaterally

## 2023-04-11 DIAGNOSIS — E65 ABDOMINAL PANNUS: ICD-10-CM

## 2023-04-24 ENCOUNTER — VBI (OUTPATIENT)
Dept: ADMINISTRATIVE | Facility: OTHER | Age: 63
End: 2023-04-24

## 2023-04-25 ENCOUNTER — OFFICE VISIT (OUTPATIENT)
Dept: INTERNAL MEDICINE CLINIC | Facility: CLINIC | Age: 63
End: 2023-04-25

## 2023-04-25 VITALS
BODY MASS INDEX: 27.11 KG/M2 | HEIGHT: 63 IN | SYSTOLIC BLOOD PRESSURE: 106 MMHG | HEART RATE: 76 BPM | TEMPERATURE: 97.4 F | WEIGHT: 153 LBS | DIASTOLIC BLOOD PRESSURE: 74 MMHG

## 2023-04-25 DIAGNOSIS — I48.0 PAROXYSMAL A-FIB (HCC): ICD-10-CM

## 2023-04-25 DIAGNOSIS — E11.9 COMPREHENSIVE DIABETIC FOOT EXAMINATION, TYPE 2 DM, ENCOUNTER FOR (HCC): ICD-10-CM

## 2023-04-25 DIAGNOSIS — Z23 NEED FOR COVID-19 VACCINE: ICD-10-CM

## 2023-04-25 DIAGNOSIS — R20.8 LOSS OF PROTECTIVE SENSATION OF SKIN OF FOOT: ICD-10-CM

## 2023-04-25 DIAGNOSIS — E11.9 TYPE 2 DIABETES MELLITUS WITHOUT COMPLICATION, WITHOUT LONG-TERM CURRENT USE OF INSULIN (HCC): Primary | ICD-10-CM

## 2023-04-25 DIAGNOSIS — I51.89 GRADE I DIASTOLIC DYSFUNCTION: ICD-10-CM

## 2023-04-25 DIAGNOSIS — D64.9 NORMOCYTIC ANEMIA: ICD-10-CM

## 2023-04-25 NOTE — PROGRESS NOTES
INTERNAL MEDICINE OFFICE VISIT  [facility]  [address] [facility] [place] [address]    NAME: Dulce Maria Luna  AGE: 58 y o  SEX: female    DATE OF ENCOUNTER: 4/25/2023    Assessment and Plan     1  Type 2 diabetes mellitus without complication, without long-term current use of insulin (HCC)  - Hemoglobin A1C; Future  - Microalbumin / creatinine urine ratio  - Ambulatory Referral to Ophthalmology; Future    2  Comprehensive diabetic foot examination, type 2 DM, encounter for [facility])  See exam below  Loss of protective sensation distal midfoot bilaterally  Ambulatory referral to podiatry    3  Paroxysmal A-fib (HCC)  1 event of syncope and paroxysmal A-fib February 2023  History of WPW s/p ablation  Follows up with cardiology  [id number] 3   recs per cardiology to continue monitor off anticoagulation   Had 2 weeks extended monitor March 24 with unsustained V  tach event SVTs, second-degree AV block and mild ventricular ectopy  Denies any symptoms/palpitation or chest pain    4  Grade I diastolic dysfunction  Evaluated exam no JVD, no lower extremity edema  On Toprol-XL 25 daily    5  Need for COVID-19 vaccine  Patient confirms she took 4 vaccines , will bring card next time to update    6  Normocytic anemia  Iron panel consistent with anemia of chronic disease  X92 and folic acid WNL  Taking daily vitamins, advised to switch to bariatric vitamins, patient agreeable  No symptoms or signs of bleeding, hemoglobin stable    7  Loss of protective sensation of skin of foot  - Ambulatory Referral to Podiatry; Future    No orders of the defined types were placed in this encounter       - Counseling Documentation: patient was counseled regarding: diagnostic results, instructions for management and risk factor reductions    Chief Complaint     No chief complaint on file        History of Present Illness     Dulce Maria Luna is a 58years old female with past medical history remarkable for hypertension, type 2 diabetes, paroxysmal A-fib with a syncope event February 2023, history of WPW status post ablation, presenting today for follow-up on chronic conditions as listed above, with her  at bedside,    Has a panniculectomy surgery March 30, with mild and improving pain at the surgical scar, has follow-up with surgery tomorrow    Denies any new symptoms or concern otherwise    Discussed above assessment and plan in details with the patient and she verbalized understanding and agreement including referral to diabetic eye exam and podiatry eval    To follow-up in 6 months    HPI    The following portions of the patient's history were reviewed and updated as appropriate: allergies, current medications, past family history, past medical history, past social history, past surgical history and problem list     Review of Systems     Review of Systems  - GENERAL: Negative for any nausea, vomiting, fevers, chills, or weight loss  - HEENT: Negative for any head/Neck trauma, pain, double/blurry vision, sinusitis, rhinitis, nose bleeding   - CARDIAC: Negative for any chest pain, palpitation, Dyspnea on exertion, peripheral edema  - PULMONARY: Negative for any SOB, cough, wheezing    - GASTROINTESTINAL: Mild and improving abdominal surgical scar site , Negative for any N/V/D/C, blood in stool    - GENITOURINARY: Negative for any dysuria, hematuria, incontinence   - NEUROLOGIC: Negative for any muscle weakness, numbness/tingling, memory changes  - MUSCULOSKELETAL: Negative for any joint pains/swelling, limited ROM  - INTEGUMENTARY: Negative for any rashes, cuts/ lesions   - HEMATOLOGIC: Negative for any abnormal bruising, frequent infections or bleeding      Active Problem List     Patient Active Problem List   Diagnosis   • Gastric anastomotic stricture   • Mixed hyperlipidemia   • Primary hypertension   • Postgastrectomy malabsorption   • Ventricular pre-excitation syndrome   • Vitamin D deficiency   • Colon cancer screening   • Preop cardiovascular exam   • Prediabetes   • WPW syndrome   • H/O bariatric surgery   • Abdominal pannus   • Candidal skin infection   • Excess skin   • NSVT (nonsustained ventricular tachycardia) (HCC)   • History of cardiac radiofrequency ablation (RFA)   • Abnormal EKG   • Osteoporosis   • Hypercholesterolemia   • Ascending aorta dilatation (HCC)   • Paroxysmal A-fib (HCC)   • Preoperative clearance   • Financial difficulties   • Excess skin of abdomen   • Panniculitis   • Intertrigo   • Encounter for cosmetic surgery   • S/P panniculectomy   • Grade I diastolic dysfunction   • Type 2 diabetes mellitus without complication, without long-term current use of insulin ([facility] [address] )   • Need for COVID-19 vaccine   • Normocytic anemia       Objective     There were no vitals taken for this visit  Physical Exam  Cardiovascular:      Pulses: no weak pulses          Dorsalis pedis pulses are 1+ on the right side and 1+ on the left side  Posterior tibial pulses are 1+ on the right side and 1+ on the left side  Feet:      Right foot:      Skin integrity: No ulcer, skin breakdown, erythema, warmth, callus or dry skin  Left foot:      Skin integrity: No ulcer, skin breakdown, erythema, warmth, callus or dry skin  - GEN: Appears well, alert and oriented x 3, pleasant and cooperative, in no acute distress  - HEENT: Anicteric, mucous membranes moist, PERRL and EOMI   - NECK: No lymphadenopathy, JVD or carotid bruits   - HEART: RRR, normal S1 and S2, no murmurs, clicks, gallops or rubs   - LUNGS: Clear to auscultation bilaterally; no wheezes, rales, or rhonchi  - ABDOMEN: Normal bowel sounds, soft, no tenderness, no distention, no organomegaly or masses felt on exam    - EXTREMITIES: Peripheral pulses normal; no clubbing, cyanosis, or edema  - NEURO: No focal findings, CN II-XII are grossly intact  - Musculoskeletal: 5/5 strength, normal ROM, no swollen or erythematous joints     - SKIN: Normal without suspicious lesions on exposed skin    Diabetic Foot Exam    Patient's shoes and socks removed  Right Foot/Ankle   Right Foot Inspection  Skin Exam: skin normal  Skin not intact, no dry skin, no warmth, no callus, no erythema, no maceration, no abnormal color, no pre-ulcer, no ulcer and no callus  Toe Exam: ROM and strength within normal limits  No swelling, no tenderness, erythema and  no right toe deformity    Sensory   Proprioception: intact  Monofilament testing: diminished    Vascular  Capillary refills: < 3 seconds  The right DP pulse is 1+  The right PT pulse is 1+  Left Foot/Ankle  Left Foot Inspection  Skin Exam: skin normal and skin intact  No dry skin, no warmth, no erythema, no maceration, normal color, no pre-ulcer, no ulcer and no callus  Toe Exam: ROM and strength within normal limits  No swelling, no tenderness, no erythema and no left toe deformity  Sensory   Proprioception: intact  Monofilament testing: diminished    Vascular  Capillary refills: < 3 seconds  The left DP pulse is 1+  The left PT pulse is 1+       Assign Risk Category  No deformity present  Loss of protective sensation  No weak pulses  Risk: 1           Pertinent Laboratory/Diagnostic Studies:  CBC:   Lab Results   Component Value Date/Time    WBC 5 31 02/23/2023 09:53 AM    WBC 5 23 06/05/2014 11:19 AM    RBC 3 85 02/23/2023 09:53 AM    RBC 4 04 06/05/2014 11:19 AM    HGB 11 2 (L) 02/23/2023 09:53 AM    HGB 12 9 03/24/2015 04:34 PM    HCT 37 0 02/23/2023 09:53 AM    HCT 36 6 06/05/2014 11:19 AM    MCV 96 02/23/2023 09:53 AM    MCV 91 06/05/2014 11:19 AM    MCH 29 1 02/23/2023 09:53 AM    MCH 29 0 06/05/2014 11:19 AM    MCHC 30 3 (L) 02/23/2023 09:53 AM    MCHC 32 0 06/05/2014 11:19 AM    RDW 12 8 02/23/2023 09:53 AM    RDW 14 9 06/05/2014 11:19 AM    MPV 11 6 02/23/2023 09:53 AM    MPV 9 9 06/05/2014 11:19 AM     02/23/2023 09:53 AM     06/05/2014 11:19 AM    NRBC 0 02/23/2023 09:53 AM    NEUTOPHILPCT 55 02/23/2023 09:53 AM    NEUTOPHILPCT 54 06/05/2014 11:19 AM    LYMPHOPCT 32 02/23/2023 09:53 AM    LYMPHOPCT 36 06/05/2014 11:19 AM    MONOPCT 11 02/23/2023 09:53 AM    MONOPCT 9 06/05/2014 11:19 AM    EOSPCT 2 02/23/2023 09:53 AM    EOSPCT 1 06/05/2014 11:19 AM    BASOPCT 0 02/23/2023 09:53 AM    BASOPCT 0 06/05/2014 11:19 AM    NEUTROABS 2 90 02/23/2023 09:53 AM    NEUTROABS 2 82 06/05/2014 11:19 AM    LYMPHSABS 1 72 02/23/2023 09:53 AM    LYMPHSABS 1 88 06/05/2014 11:19 AM    MONOSABS 0 57 02/23/2023 09:53 AM    MONOSABS 0 47 06/05/2014 11:19 AM    EOSABS 0 08 02/23/2023 09:53 AM    EOSABS 0 05 06/05/2014 11:19 AM     Chemistry Profile:   Lab Results   Component Value Date/Time     07/26/2015 11:56 AM    K 4 4 02/23/2023 09:53 AM    K 4 4 07/26/2015 11:56 AM     (H) 02/23/2023 09:53 AM     07/26/2015 11:56 AM    CO2 29 02/23/2023 09:53 AM    CO2 31 07/26/2015 11:56 AM    ANIONGAP 3 (L) 07/26/2015 11:56 AM    BUN 11 02/23/2023 09:53 AM    BUN 15 07/26/2015 11:56 AM    CREATININE 0 75 02/23/2023 09:53 AM    CREATININE 0 59 (L) 07/26/2015 11:56 AM    GLUC 86 02/11/2023 02:33 AM    GLUF 89 02/23/2023 09:53 AM    GLUCOSE 94 07/26/2015 11:56 AM    CALCIUM 8 7 02/23/2023 09:53 AM    CALCIUM 8 4 07/26/2015 11:56 AM    CORRECTEDCA 9 3 02/23/2023 09:53 AM    AST 26 02/23/2023 09:53 AM    AST 27 07/26/2015 11:56 AM    ALT 31 02/23/2023 09:53 AM    ALT 45 07/26/2015 11:56 AM    ALKPHOS 72 02/23/2023 09:53 AM    ALKPHOS 80 07/26/2015 11:56 AM    PROT 6 6 07/26/2015 11:56 AM    BILITOT 0 27 07/26/2015 11:56 AM    EGFR 85 02/23/2023 09:53 AM     Endocrine Studies:   Lab Results   Component Value Date/Time    HGBA1C 6 4 (H) 09/27/2022 09:25 AM    AGC9XGZPABZM 2 040 05/29/2022 11:20 AM    [id number] 1 573 02/28/2014 10:38 AM    TRIG 141 05/29/2022 11:20 AM    TRIG 141 07/26/2015 11:56 AM    CHOL 225 07/26/2015 11:56 AM    CHOLESTEROL 147 05/29/2022 11:20 AM    HDL 44 (L) 05/29/2022 11:20 AM    HDL 57 07/26/2015 11:56 AM    LDLCALC 75 05/29/2022 11:20 AM    LDLCALC 140 (H) 07/26/2015 11:56 AM    [id number] 23 2 (L) 02/17/2023 09:31 AM    XYBI25MZFYJA 35 3 02/15/2014 10:55 AM     1 (H) 02/17/2023 09:31 AM    PTH 39 6 02/15/2014 10:55 AM     Iron Studies:   Lab Results   Component Value Date/Time    LABIRON 14 06/05/2014 11:19 AM    IRON 64 02/23/2023 09:53 AM    IRON 37 (L) 06/05/2014 11:19 AM    TIBC 242 (L) 02/23/2023 09:53 AM    TIBC 257 06/05/2014 11:19 AM    FERRITIN 59 02/23/2023 09:53 AM    FERRITIN 32 5 06/05/2014 11:19 AM       Current Medications     Current Outpatient Medications:   •  acetaminophen (TYLENOL) 500 mg tablet, Take 1 tablet (500 mg total) by mouth every 4 (four) hours as needed for mild pain or moderate pain, Disp: 30 tablet, Rfl: 2  •  atorvastatin (LIPITOR) 20 mg tablet, Take 1 tablet by mouth once daily, Disp: 90 tablet, Rfl: 3  •  calcium citrate (CALCITRATE) 950 (200 Ca) MG tablet, Take 1 tablet (950 mg total) by mouth daily, Disp: 90 tablet, Rfl: 1  •  Cholecalciferol (Vitamin D3) 50 MCG (2000 UT) TABS, Take 2 tablets by mouth once daily, Disp: 180 tablet, Rfl: 1  •  docusate sodium (COLACE) 100 mg capsule, Take 1 capsule (100 mg total) by mouth 2 (two) times a day, Disp: 20 capsule, Rfl: 0  •  ergocalciferol (VITAMIN D2) 50,000 units, Take 1 capsule (50,000 Units total) by mouth 2 (two) times a week, Disp: 24 capsule, Rfl: 0  •  IRON, FERROUS GLUCONATE, PO, Take 1 tablet by mouth daily, Disp: , Rfl:   •  losartan-hydrochlorothiazide (HYZAAR) 100-25 MG per tablet, Take 1 tablet by mouth once daily, Disp: 90 tablet, Rfl: 3  •  metFORMIN (GLUCOPHAGE-XR) 500 mg 24 hr tablet, Take 2 tablets (1,000 mg total) by mouth daily with dinner, Disp: 180 tablet, Rfl: 3  •  methocarbamol (ROBAXIN) 500 mg tablet, Take 1 tablet (500 mg total) by mouth 3 (three) times a day as needed for muscle spasms, Disp: 15 tablet, Rfl: 0  •  metoprolol succinate (TOPROL-XL) 25 mg 24 hr tablet, Take 1 tablet by mouth twice daily, Disp: 180 tablet, Rfl: 3  •  Multiple Vitamins-Minerals (ZINC PO), Take by mouth, Disp: , Rfl:   •  nystatin (MYCOSTATIN) powder, Apply topically 3 (three) times a day, Disp: 60 g, Rfl: 3  •  Omega-3 Fatty Acids (FISH OIL) 1,000 mg, Take 1,000 mg by mouth 2 (two) times a day, Disp: , Rfl:   •  omeprazole (PriLOSEC) 20 mg delayed release capsule, Take 1 capsule (20 mg total) by mouth 2 (two) times a day, Disp: 90 capsule, Rfl: 3  •  ondansetron (ZOFRAN) 4 mg tablet, Take 1 tablet (4 mg total) by mouth every 8 (eight) hours as needed for nausea or vomiting, Disp: 20 tablet, Rfl: 0  •  oxyCODONE (Roxicodone) 5 immediate release tablet, Take 1 tablet (5 mg total) by mouth every 4 (four) hours as needed for severe pain Max Daily Amount: 30 mg, Disp: 10 tablet, Rfl: 0  •  VITAMIN E PO, Take 1 tablet by mouth daily, Disp: , Rfl:     Health Maintenance     Health Maintenance   Topic Date Due   • Diabetic Foot Exam  Never done   • DM Eye Exam  Never done   • Kidney Health Evaluation: Microalbumin/Creatinine Ratio  02/18/2020   • COVID-19 Vaccine (4 - Booster for Moderna series) 01/31/2022   • HEMOGLOBIN A1C  03/27/2023   • Breast Cancer Screening: Mammogram  08/03/2023   • Depression Screening  11/11/2023   • Annual Physical  11/11/2023   • Kidney Health Evaluation: GFR  02/23/2024   • BMI: Followup Plan  03/08/2024   • BMI: Adult  03/08/2024   • Cervical Cancer Screening  03/19/2025   • DTaP,Tdap,and Td Vaccines (2 - Td or Tdap) 03/11/2030   • Colorectal Cancer Screening  07/27/2031   • HIV Screening  Completed   • Hepatitis C Screening  Completed   • Pneumococcal Vaccine: Pediatrics (0 to 5 Years) and At-Risk Patients (6 to 59 Years)  Completed   • Influenza Vaccine  Completed   • HIB Vaccine  Aged Out   • IPV Vaccine  Aged Out   • Hepatitis A Vaccine  Aged Out   • Meningococcal ACWY Vaccine  Aged Out   • HPV Vaccine  Aged Out     Immunization History   Administered Date(s) Administered   • COVID-19 MODERNA VACC 0 5 ML IM 04/15/2021, 12/06/2021   • Hep B, adult 06/01/2022, 08/01/2022, 12/19/2022   • INFLUENZA 10/08/2015, 10/07/2016, 12/26/2017   • Influenza Quadrivalent Preservative Free 3 years and older IM 10/07/2016   • Influenza Quadrivalent, 6-35 Months IM 12/26/2017   • Influenza, recombinant, quadrivalent,injectable, preservative free 03/11/2020, 09/18/2020, 11/15/2021, 11/11/2022   • Influenza, seasonal, injectable 01/31/2014, 11/14/2014, 10/08/2015   • Pneumococcal Conjugate Vaccine 20-valent (Pcv20), Polysace 11/11/2022   • Pneumococcal Polysaccharide PPV23 01/31/2014   • Tdap 03/11/2020   • Tuberculin Skin Test-PPD Intradermal 03/24/2015       Ru Harkins,    Internal [address] [place] [address] Road    4/25/2023 1:16 PM

## 2023-04-26 ENCOUNTER — OFFICE VISIT (OUTPATIENT)
Dept: PLASTIC SURGERY | Facility: CLINIC | Age: 63
End: 2023-04-26

## 2023-04-26 DIAGNOSIS — Z98.890 S/P PANNICULECTOMY: ICD-10-CM

## 2023-04-26 DIAGNOSIS — E65 ABDOMINAL PANNUS: Primary | ICD-10-CM

## 2023-04-29 NOTE — PROGRESS NOTES
[facility]  [address] [facility], [address], [address]  [phone]    Patient Identification: Jamee Box is a 58 y o  female     History of Present Illness: The patient is a 58y o   year-old female  who presents to the office for a 1 month post-op check  Patient is 27 days s/p Sara De Lis Panniculectomy and Lipo Abdominoplasty  on 3/30/2023 by Dr Gilbert Shown   Pt states she is experiencing less pain and is wearing her abdominal binder at all times  She has noticed an area of incisional break down at the abdominal T junction within the past few days  She denies any fluid drainage from this area or signs of infection  She denies fevers, chills or any other complaints at this time        Past Medical History:   Diagnosis Date   • Anastomotic stricture of gastrojejunostomy     s/p gastric bypass   • Anemia     iron deficiency resolved 11/14/14   • Anxiety     resolved 6/7/17   • Class 1 obesity due to excess calories with serious comorbidity and body mass index (BMI) of 30 0 to 30 9 in adult 09/18/2020   • Degenerative joint disease of ankle and foot, unspecified laterality     last assessed 5/17/13   • Depression     last assessed 11/7/12   • [facility]     type 2 uncomplicated, controlled - last assessed 1/31/14   • Dysphagia    • Gastric ulcer    • History of melena    • History of transfusion    • Hyperlipidemia    • Hypertension    • Insomnia    • Insomnia    • Obesity     resolved 11/14/14   • Overweight (BMI 25 0-29 9) 05/13/2021   • Postgastrectomy malabsorption    • Tachycardia    • Ventricular pre-excitation syndrome    • Vitamin D deficiency         Patient Active Problem List   Diagnosis   • Gastric anastomotic stricture   • Mixed hyperlipidemia   • Primary hypertension   • Postgastrectomy malabsorption   • Ventricular pre-excitation syndrome   • Vitamin D deficiency   • Colon cancer screening   • Preop cardiovascular exam   • Prediabetes   • WPW syndrome   • H/O bariatric surgery   • Abdominal pannus   • Candidal skin infection   • Excess skin   • NSVT (nonsustained ventricular tachycardia) (HCC)   • History of cardiac radiofrequency ablation (RFA)   • Abnormal EKG   • Osteoporosis   • Hypercholesterolemia   • Ascending aorta dilatation (HCC)   • Paroxysmal A-fib (HCC)   • Preoperative clearance   • Financial difficulties   • Excess skin of abdomen   • Panniculitis   • Intertrigo   • Encounter for cosmetic surgery   • S/P panniculectomy   • Grade I diastolic dysfunction   • Type 2 diabetes mellitus without complication, without long-term current use of insulin (HCC)   • Need for COVID-19 vaccine   • Normocytic anemia        Past Surgical History:   Procedure Laterality Date   • CARDIAC CATHETERIZATION      post proc data:____ lesions successfully dilated   • COLONOSCOPY     • ESOPHAGOGASTRODUODENOSCOPY     • GASTRIC BYPASS  02/08/2011    for morbid obesity    • HYSTERECTOMY      @ age 38   • OOPHORECTOMY Bilateral     @ age 38   • MD EGD TRANSORAL BIOPSY SINGLE/MULTIPLE N/A 1/25/2017    Procedure: ESOPHAGOGASTRODUODENOSCOPY (EGD); Surgeon: Mayra Marsh MD;  Location: [facility]; Service: Bariatrics   • MD EGD TRANSORAL BIOPSY SINGLE/MULTIPLE N/A 5/3/2017    Procedure: ESOPHAGOGASTRODUODENOSCOPY (EGD) with balloon dilatation;  Surgeon: Mayra Marsh MD;  Location: [facility]; Service: Bariatrics   • MD EGD TRANSORAL BIOPSY SINGLE/MULTIPLE N/A 8/16/2017    Procedure: ESOPHAGOGASTRODUODENOSCOPY (EGD) with balloon dilation;  Surgeon: Mayra Marsh MD;  Location: [facility]; Service: Bariatrics   • MD EXCISION EXCESSIVE SKIN & SUBQ TISSUE ABDOMEN N/A 3/30/2023    Procedure: LIPO ABDOMINOPLASTY;  Surgeon: Eliana Constantino MD;  Location: [facility] OR;  Service: Plastics   • MD EXCISION SKIN ABD INFRAUMBILICAL PANNICULECTOMY N/A 3/30/2023    Procedure:  JENNY WINSTON PANNICULECTOMY;  Surgeon: Eliana Constantino MD;  Location: [facility] MAIN OR;  Service: Plastics   • UPPER GASTROINTESTINAL ENDOSCOPY          No Known Allergies     Current Outpatient Medications on File Prior to Visit   Medication Sig Dispense Refill   • acetaminophen (TYLENOL) 500 mg tablet Take 1 tablet (500 mg total) by mouth every 4 (four) hours as needed for mild pain or moderate pain 30 tablet 2   • atorvastatin (LIPITOR) 20 mg tablet Take 1 tablet by mouth once daily 90 tablet 3   • calcium citrate (CALCITRATE) 950 (200 Ca) MG tablet Take 1 tablet (950 mg total) by mouth daily 90 tablet 1   • Cholecalciferol (Vitamin D3) 50 MCG (2000 UT) TABS Take 2 tablets by mouth once daily 180 tablet 1   • docusate sodium (COLACE) 100 mg capsule Take 1 capsule (100 mg total) by mouth 2 (two) times a day 20 capsule 0   • ergocalciferol (VITAMIN D2) 50,000 units Take 1 capsule (50,000 Units total) by mouth 2 (two) times a week 24 capsule 0   • IRON, FERROUS GLUCONATE, PO Take 1 tablet by mouth daily     • losartan-hydrochlorothiazide (HYZAAR) 100-25 MG per tablet Take 1 tablet by mouth once daily 90 tablet 3   • metFORMIN (GLUCOPHAGE-XR) 500 mg 24 hr tablet Take 2 tablets (1,000 mg total) by mouth daily with dinner 180 tablet 3   • methocarbamol (ROBAXIN) 500 mg tablet Take 1 tablet (500 mg total) by mouth 3 (three) times a day as needed for muscle spasms 15 tablet 0   • metoprolol succinate (TOPROL-XL) 25 mg 24 hr tablet Take 1 tablet by mouth twice daily 180 tablet 3   • Multiple Vitamins-Minerals (ZINC PO) Take by mouth     • nystatin (MYCOSTATIN) powder Apply topically 3 (three) times a day 60 g 3   • Omega-3 Fatty Acids (FISH OIL) 1,000 mg Take 1,000 mg by mouth 2 (two) times a day     • omeprazole (PriLOSEC) 20 mg delayed release capsule Take 1 capsule (20 mg total) by mouth 2 (two) times a day 90 capsule 3   • ondansetron (ZOFRAN) 4 mg tablet Take 1 tablet (4 mg total) by mouth every 8 (eight) hours as needed for nausea or vomiting 20 tablet 0   • oxyCODONE (Roxicodone) 5 immediate release tablet Take 1 tablet (5 mg total) by mouth every 4 (four) hours as needed for severe pain Max Daily Amount: 30 mg 10 tablet 0   • VITAMIN E PO Take 1 tablet by mouth daily       No current facility-administered medications on file prior to visit  Tobacco Use: Low Risk    • Smoking Tobacco Use: Never   • Smokeless Tobacco Use: Never   • Passive Exposure: Not on file      Review of Systems  Constitutional: Denies fevers, chills or pain  Skin: Denies any warmth, erythema, or mucopurulent drainage  Wound break down at T zone  Physical Exam     Abdomen: Area of wound dehiscence at T zone upon vertical and horizontal abdominal incision  Superficial to the dermis, no sign of infection  Remainder of surgical incisions are clean, dry, and intact  Skin perfusion is intact  Belly button is viable  There are no signs of infection, hematoma, seroma or fat necrosis  Assessment and Plan:  The patient is an 58y o   year-old female who presents to the office for `1 month post-op check  Patient is 27 days s/p Sara Mcnamara Panniculectomy and Lipo Abdominoplasty  on 3/30/2023 by Dr Delfin Osler    -At today's visit the area of wound dehiscence was covered with Xeroform and gauze  Patient was given some to take home, and is to change this daily  Area should be expected to heal within 1-2 weeks   -Patient is to continue wearing abdominal binder/faja until 8-12 weeks post-op  -The patient is to return in 1 week to assess wound healing   - The patient is to call the office with any questions or concerns  All of the patient's questions were answered at this time and they agree with the plan of care      Tanya Jim PA-C  [facility]

## 2023-05-05 RX ORDER — METHOCARBAMOL 500 MG/1
TABLET, FILM COATED ORAL
Qty: 15 TABLET | Refills: 0 | Status: SHIPPED | OUTPATIENT
Start: 2023-05-05

## 2023-05-11 ENCOUNTER — HOME HEALTH ADMISSION (OUTPATIENT)
Dept: HOME HEALTH SERVICES | Facility: HOME HEALTHCARE | Age: 63
End: 2023-05-11
Payer: COMMERCIAL

## 2023-05-11 DIAGNOSIS — Z98.890 S/P PANNICULECTOMY: Primary | ICD-10-CM

## 2023-05-13 ENCOUNTER — HOME CARE VISIT (OUTPATIENT)
Dept: HOME HEALTH SERVICES | Facility: HOME HEALTHCARE | Age: 63
End: 2023-05-13

## 2023-05-13 VITALS
OXYGEN SATURATION: 99 % | SYSTOLIC BLOOD PRESSURE: 122 MMHG | TEMPERATURE: 97.2 F | HEART RATE: 72 BPM | RESPIRATION RATE: 18 BRPM | DIASTOLIC BLOOD PRESSURE: 68 MMHG

## 2023-05-14 ENCOUNTER — HOME CARE VISIT (OUTPATIENT)
Dept: HOME HEALTH SERVICES | Facility: HOME HEALTHCARE | Age: 63
End: 2023-05-14

## 2023-05-14 NOTE — CASE COMMUNICATION
Lu's A has admitted your patient to [address] [place] [address] service with the following disciplines:      SN  This report is informational only, no responses is needed  Primary focus of home health care  INTEGUMENTARY  Patient stated goals of care  WOUND TO HEAL  Anticipated visit pattern and next visit date  2W2  2W7  NEXT VISIT MONDAY  See medication list - meds in home differ from AVS  ALL MEDS AT HOME  Significant clinical findings  WEA KNESS, FATIGUE  Potential barriers to goal achievement  NA  Other pertinent information    WOUND VAC IS NOT DELIVERED DURING SOC  SN CALLED [employer] THEY INFORM SN THAT THEY ARE WAITING FOR INSURANCE AUTHORIZATION  -SN GOT A VERBAL TO PACK WOUND WITH WET TO DRY BID BY DR SAMANO    Thank you for allowing us to participate in the care of your patient

## 2023-05-15 ENCOUNTER — CONSULT (OUTPATIENT)
Dept: MULTI SPECIALTY CLINIC | Facility: CLINIC | Age: 63
End: 2023-05-15

## 2023-05-15 ENCOUNTER — HOME CARE VISIT (OUTPATIENT)
Dept: HOME HEALTH SERVICES | Facility: HOME HEALTHCARE | Age: 63
End: 2023-05-15

## 2023-05-15 VITALS
WEIGHT: 151 LBS | DIASTOLIC BLOOD PRESSURE: 76 MMHG | SYSTOLIC BLOOD PRESSURE: 111 MMHG | BODY MASS INDEX: 26.75 KG/M2 | TEMPERATURE: 98.2 F | HEART RATE: 71 BPM

## 2023-05-15 DIAGNOSIS — E11.9 TYPE 2 DIABETES MELLITUS WITHOUT COMPLICATION, UNSPECIFIED WHETHER LONG TERM INSULIN USE (HCC): ICD-10-CM

## 2023-05-15 NOTE — PROGRESS NOTES
[facility] 58 y o  female MRN: [id number]  Encounter: [id number]      Assessment/Plan        Diagnoses and all orders for this visit:    Type 2 diabetes mellitus without complication, unspecified whether long term insulin use ([facility]  -     Ambulatory Referral to Podiatry           Plan:  • Patient was seen/examined  All questions and concerns addressed  • Annual diabetic foot evaluation performed  • Advised patient to maintain strict glycemic control, moisturize feet, and check feet daily to monitor for any open wounds or lesions  • Labs reviewed  HbA1c of 6 4% on 9/27/22, down from 6 8% on 5/29/22  • RTC in 1 year(s)    Dr Bashir Mcgill was present during this entire procedure  History of Present Illness     Opal Luque is a 58year old female patient with past medical history of T2DM presenting for annual diabetic foot evaluation  She is here with her  who also serves as  as patient is Romansh speaking only  She states that her PCP recommended that she visit podiatry  She states that she takes all her medications as instructed and tries to control her blood sugar levels  She does not report any pain to her feet  She denies numbness, tingling, burning to her feet  Denies N/V/F/CP/SOB  Review of Systems   Constitutional: Negative  HENT: Negative  Eyes: Negative  Respiratory: Negative  Cardiovascular: Negative  Gastrointestinal: Negative  Musculoskeletal: negative  Skin: Negative  Neurological: Negative         Historical Information   Past Medical History:   Diagnosis Date   • Anastomotic stricture of gastrojejunostomy     s/p gastric bypass   • Anemia     iron deficiency resolved 11/14/14   • Anxiety     resolved 6/7/17   • Class 1 obesity due to excess calories with serious comorbidity and body mass index (BMI) of 30 0 to 30 9 in adult 09/18/2020   • Degenerative joint disease of ankle and foot, unspecified laterality     last assessed 5/17/13   • Depression     last assessed 11/7/12   • Diabetes mellitus ([facility] )     type 2 uncomplicated, controlled - last assessed 1/31/14   • Dysphagia    • Gastric ulcer    • History of melena    • History of transfusion    • Hyperlipidemia    • Hypertension    • Insomnia    • Insomnia    • Obesity     resolved 11/14/14   • Overweight (BMI 25 0-29 9) 05/13/2021   • Postgastrectomy malabsorption    • Tachycardia    • Ventricular pre-excitation syndrome    • Vitamin D deficiency      Past Surgical History:   Procedure Laterality Date   • CARDIAC CATHETERIZATION      post proc data:____ lesions successfully dilated   • COLONOSCOPY     • ESOPHAGOGASTRODUODENOSCOPY     • GASTRIC BYPASS  02/08/2011    for morbid obesity    • HYSTERECTOMY      @ age 38   • OOPHORECTOMY Bilateral     @ age 38   • WA EGD TRANSORAL BIOPSY SINGLE/MULTIPLE N/A 1/25/2017    Procedure: ESOPHAGOGASTRODUODENOSCOPY (EGD); Surgeon: Stephen Benítez MD;  Location: [facility]; Service: Bariatrics   • WA EGD TRANSORAL BIOPSY SINGLE/MULTIPLE N/A 5/3/2017    Procedure: ESOPHAGOGASTRODUODENOSCOPY (EGD) with balloon dilatation;  Surgeon: Stephen Benítez MD;  Location: [facility]; Service: Bariatrics   • WA EGD TRANSORAL BIOPSY SINGLE/MULTIPLE N/A 8/16/2017    Procedure: ESOPHAGOGASTRODUODENOSCOPY (EGD) with balloon dilation;  Surgeon: Stephen Benítez MD;  Location: [facility]; Service: Bariatrics   • WA EXCISION EXCESSIVE SKIN & SUBQ TISSUE ABDOMEN N/A 3/30/2023    Procedure: LIPO ABDOMINOPLASTY;  Surgeon: Otf Benz MD;  Location: [address] [place] [address] OR;  Service: Plastics   • WA EXCISION SKIN ABD INFRAUMBILICAL PANNICULECTOMY N/A 3/30/2023    Procedure:  JENNY WINSTON PANNICULECTOMY;  Surgeon: Otf Benz MD;  Location: [address] [place] [address] OR;  Service: Plastics   • UPPER GASTROINTESTINAL ENDOSCOPY       Social History   Social History     Substance and Sexual Activity   Alcohol Use Never     Social History     Substance and Sexual Activity   Drug Use Never     Social History Tobacco Use   Smoking Status Never   Smokeless Tobacco Never     Family History:   Family History   Problem Relation Age of Onset   • Congenital heart disease Mother    • Asthma Father    • Heart attack Sister         acute MI   • Congenital heart disease Sister    • Hypertension Sister    • No Known Problems Maternal Grandmother    • No Known Problems Maternal Grandfather    • No Known Problems Paternal Grandmother    • No Known Problems Maternal Aunt    • No Known Problems Maternal Aunt    • No Known Problems Maternal Aunt    • Breast cancer Cousin         ? age       Meds/Allergies   (Not in a hospital admission)    No Known Allergies    Objective     Current Vitals:   Blood Pressure: 111/76 (05/15/23 1554)  Pulse: 71 (05/15/23 1554)  Temperature: 98 2 °F (36 8 °C) (05/15/23 1554)  Temp Source: Temporal (05/15/23 1554)  Weight - Scale: 68 5 kg (151 lb) (05/15/23 1554)        /76 (BP Location: Left arm, Patient Position: Sitting, Cuff Size: Large)   Pulse 71   Temp 98 2 °F (36 8 °C) (Temporal)   Wt 68 5 kg (151 lb)   BMI 26 75 kg/m²       Lower Extremity Exam:    Vascular: Right foot DP/PT +2                   Left foot DP/PT +2                   There is no lower extremity edema bilateral     Musculoskeletal: There is 5/5 strength throughout the bilateral lower extremity  full ankle range of motion with well maintained subtalar range of motion  There is no tenderness over the foot and ankle  There is not foot deformities  Neurological: Sensation to 5 07 [place] nylon filament: diminished bilaterally      Vibratory sense to distal Foot  intact bilaterally      Sharp/Dull sense is intact bilaterally      Dermatology: Skin Condition:  decreased hair growth, dryness, no evidence of bleeding or bruising and temperature normal     There is not evidence of macerated tissue between toe spaces         Nail Exam: not examined due to nail polish     Open ulcerations: No     Calluses: No  Patient's shoes and socks removed  Right Foot/Ankle   Right Foot Inspection  Skin Exam: skin intact and dry skin  No callus, no erythema, no maceration, no pre-ulcer, no ulcer and no callus  Toe Exam: ROM and strength within normal limits  Sensory   Vibration: intact  Proprioception: intact  Monofilament testing: diminished    Vascular  Capillary refills: < 3 seconds  The right DP pulse is 2+  The right PT pulse is 2+  Left Foot/Ankle  Left Foot Inspection  Skin Exam: skin intact and dry skin  No erythema, no maceration, no pre-ulcer, no ulcer and no callus  Toe Exam: ROM and strength within normal limits  Sensory   Vibration: intact  Proprioception: intact  Monofilament testing: diminished    Vascular  Capillary refills: < 3 seconds  The left DP pulse is 2+  The left PT pulse is 2+       Assign Risk Category  No deformity present  Loss of protective sensation  No weak pulses  Risk: 1

## 2023-05-15 NOTE — CASE COMMUNICATION
Ship to xx Pt  Home      Saline 100ml [id number]    1      Gauze 4x4 N/S 200 4x4s per unit  [id number]     1   Gauze 4x4 ST [id number]    6  Hydrocellular Foam Adh, 4x4 [id number]     1 box

## 2023-05-16 VITALS
OXYGEN SATURATION: 96 % | DIASTOLIC BLOOD PRESSURE: 72 MMHG | SYSTOLIC BLOOD PRESSURE: 118 MMHG | RESPIRATION RATE: 20 BRPM | HEART RATE: 72 BPM | TEMPERATURE: 97.3 F

## 2023-05-17 ENCOUNTER — HOME CARE VISIT (OUTPATIENT)
Dept: HOME HEALTH SERVICES | Facility: HOME HEALTHCARE | Age: 63
End: 2023-05-17

## 2023-05-17 VITALS
DIASTOLIC BLOOD PRESSURE: 78 MMHG | SYSTOLIC BLOOD PRESSURE: 120 MMHG | RESPIRATION RATE: 16 BRPM | HEART RATE: 69 BPM | TEMPERATURE: 96.7 F | OXYGEN SATURATION: 94 %

## 2023-05-18 PROCEDURE — 10330064 SPONGE, GAUZE 12PLY STR 4"X4" (1/PK 50/B

## 2023-05-18 PROCEDURE — 10330064 SALINE, IRR SOL STR 100ML (48/CS) MGM37

## 2023-05-18 NOTE — CASE COMMUNICATION
Ship to [facility] caritas    Wound 1 mid abdomen      Saline 100ml [id number] 1  Gauze 4x4 ST [id number] 6

## 2023-05-18 NOTE — CASE COMMUNICATION
Ship to   home    Insurance [facility] caritas    Wound 1 md abdomen        Gauze 4x4 ST [id number] 28  ABD 5x9 [id number] 14  Adapt skin barrier no sting wipes 50 per box  [id number] 1

## 2023-05-19 ENCOUNTER — HOME CARE VISIT (OUTPATIENT)
Dept: HOME HEALTH SERVICES | Facility: HOME HEALTHCARE | Age: 63
End: 2023-05-19

## 2023-05-19 VITALS
RESPIRATION RATE: 20 BRPM | HEART RATE: 78 BPM | DIASTOLIC BLOOD PRESSURE: 76 MMHG | OXYGEN SATURATION: 100 % | TEMPERATURE: 98 F | SYSTOLIC BLOOD PRESSURE: 142 MMHG

## 2023-05-22 ENCOUNTER — HOME CARE VISIT (OUTPATIENT)
Dept: HOME HEALTH SERVICES | Facility: HOME HEALTHCARE | Age: 63
End: 2023-05-22

## 2023-05-22 VITALS
TEMPERATURE: 98.2 F | HEART RATE: 78 BPM | DIASTOLIC BLOOD PRESSURE: 60 MMHG | OXYGEN SATURATION: 100 % | RESPIRATION RATE: 18 BRPM | SYSTOLIC BLOOD PRESSURE: 120 MMHG

## 2023-05-23 ENCOUNTER — TELEPHONE (OUTPATIENT)
Dept: PLASTIC SURGERY | Facility: CLINIC | Age: 63
End: 2023-05-23

## 2023-05-23 NOTE — TELEPHONE ENCOUNTER
LVM for patient to return call regarding antibiotic, Ciprofloxacin, that was placed by Melba Marshall PA-C, that she is to start taking today  Asked Linda to leave a message in Albanian for patient as well  Message left in [address]

## 2023-05-24 ENCOUNTER — HOME CARE VISIT (OUTPATIENT)
Dept: HOME HEALTH SERVICES | Facility: HOME HEALTHCARE | Age: 63
End: 2023-05-24

## 2023-05-24 VITALS
SYSTOLIC BLOOD PRESSURE: 110 MMHG | DIASTOLIC BLOOD PRESSURE: 78 MMHG | TEMPERATURE: 97.8 F | RESPIRATION RATE: 16 BRPM | OXYGEN SATURATION: 94 % | HEART RATE: 70 BPM

## 2023-05-26 ENCOUNTER — HOME CARE VISIT (OUTPATIENT)
Dept: HOME HEALTH SERVICES | Facility: HOME HEALTHCARE | Age: 63
End: 2023-05-26

## 2023-05-26 VITALS
TEMPERATURE: 98.1 F | OXYGEN SATURATION: 100 % | HEART RATE: 84 BPM | DIASTOLIC BLOOD PRESSURE: 60 MMHG | SYSTOLIC BLOOD PRESSURE: 130 MMHG | RESPIRATION RATE: 22 BRPM

## 2023-05-29 ENCOUNTER — HOME CARE VISIT (OUTPATIENT)
Dept: HOME HEALTH SERVICES | Facility: HOME HEALTHCARE | Age: 63
End: 2023-05-29

## 2023-05-30 VITALS
RESPIRATION RATE: 18 BRPM | HEART RATE: 68 BPM | OXYGEN SATURATION: 95 % | TEMPERATURE: 97.8 F | DIASTOLIC BLOOD PRESSURE: 56 MMHG | SYSTOLIC BLOOD PRESSURE: 100 MMHG

## 2023-05-31 ENCOUNTER — HOME CARE VISIT (OUTPATIENT)
Dept: HOME HEALTH SERVICES | Facility: HOME HEALTHCARE | Age: 63
End: 2023-05-31

## 2023-06-02 ENCOUNTER — HOME CARE VISIT (OUTPATIENT)
Dept: HOME HEALTH SERVICES | Facility: HOME HEALTHCARE | Age: 63
End: 2023-06-02

## 2023-06-02 VITALS
SYSTOLIC BLOOD PRESSURE: 110 MMHG | OXYGEN SATURATION: 99 % | TEMPERATURE: 98 F | RESPIRATION RATE: 20 BRPM | DIASTOLIC BLOOD PRESSURE: 60 MMHG | HEART RATE: 78 BPM

## 2023-06-05 ENCOUNTER — HOME CARE VISIT (OUTPATIENT)
Dept: HOME HEALTH SERVICES | Facility: HOME HEALTHCARE | Age: 63
End: 2023-06-05
Payer: COMMERCIAL

## 2023-06-05 VITALS
SYSTOLIC BLOOD PRESSURE: 108 MMHG | OXYGEN SATURATION: 95 % | HEART RATE: 78 BPM | RESPIRATION RATE: 18 BRPM | DIASTOLIC BLOOD PRESSURE: 72 MMHG | TEMPERATURE: 97.4 F

## 2023-06-05 PROCEDURE — G0299 HHS/HOSPICE OF RN EA 15 MIN: HCPCS

## 2023-06-07 ENCOUNTER — HOME CARE VISIT (OUTPATIENT)
Dept: HOME HEALTH SERVICES | Facility: HOME HEALTHCARE | Age: 63
End: 2023-06-07
Payer: COMMERCIAL

## 2023-06-07 VITALS
TEMPERATURE: 98.6 F | OXYGEN SATURATION: 100 % | DIASTOLIC BLOOD PRESSURE: 60 MMHG | RESPIRATION RATE: 22 BRPM | SYSTOLIC BLOOD PRESSURE: 120 MMHG | HEART RATE: 78 BPM

## 2023-06-07 PROCEDURE — G0300 HHS/HOSPICE OF LPN EA 15 MIN: HCPCS

## 2023-06-09 ENCOUNTER — HOME CARE VISIT (OUTPATIENT)
Dept: HOME HEALTH SERVICES | Facility: HOME HEALTHCARE | Age: 63
End: 2023-06-09
Payer: COMMERCIAL

## 2023-06-09 VITALS
TEMPERATURE: 97.4 F | HEART RATE: 88 BPM | DIASTOLIC BLOOD PRESSURE: 64 MMHG | OXYGEN SATURATION: 95 % | SYSTOLIC BLOOD PRESSURE: 100 MMHG | RESPIRATION RATE: 18 BRPM

## 2023-06-09 PROCEDURE — G0299 HHS/HOSPICE OF RN EA 15 MIN: HCPCS

## 2023-06-12 ENCOUNTER — HOME CARE VISIT (OUTPATIENT)
Dept: HOME HEALTH SERVICES | Facility: HOME HEALTHCARE | Age: 63
End: 2023-06-12
Payer: COMMERCIAL

## 2023-06-12 VITALS
RESPIRATION RATE: 16 BRPM | TEMPERATURE: 97.5 F | HEART RATE: 73 BPM | DIASTOLIC BLOOD PRESSURE: 60 MMHG | SYSTOLIC BLOOD PRESSURE: 98 MMHG | OXYGEN SATURATION: 95 %

## 2023-06-12 PROCEDURE — 10330064 WIPE, SKIN GEL PROT DRSNG (50/BX)

## 2023-06-12 PROCEDURE — 10330064 POWDER, STOMAHESIVE PROTECTIVE1OZ BOTTLE

## 2023-06-12 PROCEDURE — G0299 HHS/HOSPICE OF RN EA 15 MIN: HCPCS

## 2023-06-12 PROCEDURE — 10330064 PAD, ABD 5X9" STR LF (1/PK 20PK/BX) MGM1

## 2023-06-12 NOTE — CASE COMMUNICATION
Ship to [facility] caritas    Wound 1 lower abdomen             ABD 5x9 [id number] 28  Adapt skin barrier no sting wipes 50 per box  [id number] 1  Stomahesive powder  NOT STOCKED [id number] 1

## 2023-06-13 DIAGNOSIS — E65 ABDOMINAL PANNUS: ICD-10-CM

## 2023-06-14 ENCOUNTER — HOME CARE VISIT (OUTPATIENT)
Dept: HOME HEALTH SERVICES | Facility: HOME HEALTHCARE | Age: 63
End: 2023-06-14
Payer: COMMERCIAL

## 2023-06-14 VITALS
DIASTOLIC BLOOD PRESSURE: 60 MMHG | OXYGEN SATURATION: 100 % | RESPIRATION RATE: 18 BRPM | HEART RATE: 78 BPM | TEMPERATURE: 98 F | SYSTOLIC BLOOD PRESSURE: 110 MMHG

## 2023-06-14 PROCEDURE — G0300 HHS/HOSPICE OF LPN EA 15 MIN: HCPCS

## 2023-06-16 ENCOUNTER — HOME CARE VISIT (OUTPATIENT)
Dept: HOME HEALTH SERVICES | Facility: HOME HEALTHCARE | Age: 63
End: 2023-06-16
Payer: COMMERCIAL

## 2023-06-16 VITALS
OXYGEN SATURATION: 98 % | HEART RATE: 76 BPM | TEMPERATURE: 97 F | RESPIRATION RATE: 20 BRPM | DIASTOLIC BLOOD PRESSURE: 68 MMHG | SYSTOLIC BLOOD PRESSURE: 120 MMHG

## 2023-06-16 PROCEDURE — G0299 HHS/HOSPICE OF RN EA 15 MIN: HCPCS

## 2023-06-19 ENCOUNTER — HOME CARE VISIT (OUTPATIENT)
Dept: HOME HEALTH SERVICES | Facility: HOME HEALTHCARE | Age: 63
End: 2023-06-19
Payer: COMMERCIAL

## 2023-06-19 ENCOUNTER — OFFICE VISIT (OUTPATIENT)
Dept: PLASTIC SURGERY | Facility: CLINIC | Age: 63
End: 2023-06-19

## 2023-06-19 DIAGNOSIS — E65 ABDOMINAL PANNUS: ICD-10-CM

## 2023-06-19 DIAGNOSIS — T81.89XD NONHEALING SURGICAL WOUND, SUBSEQUENT ENCOUNTER: ICD-10-CM

## 2023-06-19 DIAGNOSIS — Z98.890 S/P PANNICULECTOMY: Primary | ICD-10-CM

## 2023-06-19 PROCEDURE — RECHECK: Performed by: PHYSICIAN ASSISTANT

## 2023-06-20 ENCOUNTER — HOME CARE VISIT (OUTPATIENT)
Dept: HOME HEALTH SERVICES | Facility: HOME HEALTHCARE | Age: 63
End: 2023-06-20
Payer: COMMERCIAL

## 2023-06-20 PROBLEM — T81.89XD NONHEALING SURGICAL WOUND, SUBSEQUENT ENCOUNTER: Status: ACTIVE | Noted: 2023-06-20

## 2023-06-20 RX ORDER — METHOCARBAMOL 500 MG/1
500 TABLET, FILM COATED ORAL 3 TIMES DAILY PRN
Qty: 30 TABLET | Refills: 0 | Status: SHIPPED | OUTPATIENT
Start: 2023-06-20

## 2023-06-20 NOTE — PROGRESS NOTES
Assessment/Plan:     Patient is a 74-year-old female who is status post cdzkn-lv-szd panniculectomy and lipoabdominoplasty with Dr Claribel Otero on 3/30/2023  Please see HPI  Patient returns to the office today for [facility] change and wound check  Upon evaluation, the transverse abdominal wound is nearly completely healed  Wound VAC not reapplied  Please see photo in media  The patient will apply wet to dry dressing to the wound with daily dressing changes  She will return to the office in 10-14 days for a wound check  Diagnoses and all orders for this visit:    S/P panniculectomy    Nonhealing surgical wound, subsequent encounter          Subjective:     Patient ID: Mena Andujar is a 58 y o  female  HPI     Patient reports no issues or concerns with exception of malodor from the wound VAC  Review of Systems    See HPI    Objective:     Physical Exam      As for his abdominal wound is nearly completely healed  Is open superficially only  Please see photo in media

## 2023-06-21 ENCOUNTER — HOME CARE VISIT (OUTPATIENT)
Dept: HOME HEALTH SERVICES | Facility: HOME HEALTHCARE | Age: 63
End: 2023-06-21
Payer: COMMERCIAL

## 2023-06-21 VITALS
RESPIRATION RATE: 18 BRPM | DIASTOLIC BLOOD PRESSURE: 68 MMHG | TEMPERATURE: 98.6 F | OXYGEN SATURATION: 100 % | SYSTOLIC BLOOD PRESSURE: 120 MMHG | HEART RATE: 88 BPM

## 2023-06-21 PROCEDURE — G0300 HHS/HOSPICE OF LPN EA 15 MIN: HCPCS

## 2023-06-23 ENCOUNTER — HOME CARE VISIT (OUTPATIENT)
Dept: HOME HEALTH SERVICES | Facility: HOME HEALTHCARE | Age: 63
End: 2023-06-23
Payer: COMMERCIAL

## 2023-06-23 VITALS
RESPIRATION RATE: 20 BRPM | TEMPERATURE: 98 F | OXYGEN SATURATION: 97 % | DIASTOLIC BLOOD PRESSURE: 70 MMHG | HEART RATE: 76 BPM | SYSTOLIC BLOOD PRESSURE: 110 MMHG

## 2023-06-23 PROCEDURE — G0299 HHS/HOSPICE OF RN EA 15 MIN: HCPCS

## 2023-06-24 ENCOUNTER — APPOINTMENT (OUTPATIENT)
Dept: LAB | Facility: CLINIC | Age: 63
End: 2023-06-24
Payer: COMMERCIAL

## 2023-06-24 ENCOUNTER — PATIENT OUTREACH (OUTPATIENT)
Dept: INTERNAL MEDICINE CLINIC | Facility: CLINIC | Age: 63
End: 2023-06-24

## 2023-06-24 DIAGNOSIS — K91.2 HYPOGLYCEMIA FOLLOWING GASTROINTESTINAL SURGERY: ICD-10-CM

## 2023-06-24 DIAGNOSIS — Z98.84 BARIATRIC SURGERY STATUS: ICD-10-CM

## 2023-06-24 DIAGNOSIS — R79.89 HIGH SERUM PARATHYROID HORMONE (PTH): ICD-10-CM

## 2023-06-24 DIAGNOSIS — K91.2 POSTSURGICAL MALABSORPTION: ICD-10-CM

## 2023-06-24 DIAGNOSIS — E55.9 VITAMIN D DEFICIENCY: ICD-10-CM

## 2023-06-24 DIAGNOSIS — E11.9 TYPE 2 DIABETES MELLITUS WITHOUT COMPLICATION, WITHOUT LONG-TERM CURRENT USE OF INSULIN (HCC): ICD-10-CM

## 2023-06-24 LAB
25(OH)D3 SERPL-MCNC: 29 NG/ML (ref 30–100)
CALCIUM SERPL-MCNC: 9.1 MG/DL (ref 8.3–10.1)
CREAT UR-MCNC: 135 MG/DL
EST. AVERAGE GLUCOSE BLD GHB EST-MCNC: 131 MG/DL
HBA1C MFR BLD: 6.2 %
MICROALBUMIN UR-MCNC: 10.9 MG/L (ref 0–20)
MICROALBUMIN/CREAT 24H UR: 8 MG/G CREATININE (ref 0–30)
PTH-INTACT SERPL-MCNC: 148.1 PG/ML (ref 12–88)

## 2023-06-24 PROCEDURE — 36415 COLL VENOUS BLD VENIPUNCTURE: CPT

## 2023-06-24 PROCEDURE — 83970 ASSAY OF PARATHORMONE: CPT

## 2023-06-24 PROCEDURE — 83036 HEMOGLOBIN GLYCOSYLATED A1C: CPT

## 2023-06-24 PROCEDURE — 82310 ASSAY OF CALCIUM: CPT

## 2023-06-24 PROCEDURE — 82306 VITAMIN D 25 HYDROXY: CPT

## 2023-06-24 NOTE — PROGRESS NOTES
[facility] in regard to pt identified as possibly having financial difficulties  I was unable to reach pt and a message was left in Indonesian to please return [facility] call  SWCM will remain available

## 2023-06-26 ENCOUNTER — TELEPHONE (OUTPATIENT)
Dept: INTERNAL MEDICINE CLINIC | Facility: CLINIC | Age: 63
End: 2023-06-26

## 2023-06-26 ENCOUNTER — TELEPHONE (OUTPATIENT)
Dept: BARIATRICS | Facility: CLINIC | Age: 63
End: 2023-06-26

## 2023-06-26 DIAGNOSIS — E55.9 VITAMIN D DEFICIENCY: ICD-10-CM

## 2023-06-26 DIAGNOSIS — Z98.84 BARIATRIC SURGERY STATUS: Primary | ICD-10-CM

## 2023-06-26 DIAGNOSIS — K91.2 POSTSURGICAL MALABSORPTION: ICD-10-CM

## 2023-06-26 DIAGNOSIS — R79.89 HIGH SERUM PARATHYROID HORMONE (PTH): ICD-10-CM

## 2023-06-26 NOTE — TELEPHONE ENCOUNTER
----- Message from Floyd Gisele sent at 6/26/2023  9:34 AM EDT -----  Please call patient to let her know that her vitamin D level has improved but is still low  Please add vitamin D3 4000 IU daily - if she is on vitamin D3 2000 IU please double this for now  Please also add calcium 500 mg three times per day  Repeat labs in 3 months       Thank you  ANNE Coulter

## 2023-06-26 NOTE — TELEPHONE ENCOUNTER
The urine and A1C were ordered by Dr Ariana Sky and done 6/24/23    Please have PCP review and call the patient with the results.     thanks

## 2023-06-26 NOTE — TELEPHONE ENCOUNTER
Reached out to Pt re: labs  Reviewed results/ recommendations with Pt's  (Pt does not speak [place])  Pt's  verbalized understanding and was agreeable to plan

## 2023-06-28 ENCOUNTER — OFFICE VISIT (OUTPATIENT)
Dept: PLASTIC SURGERY | Facility: CLINIC | Age: 63
End: 2023-06-28

## 2023-06-28 DIAGNOSIS — Z98.890 S/P PANNICULECTOMY: ICD-10-CM

## 2023-06-28 DIAGNOSIS — T81.89XD NONHEALING SURGICAL WOUND, SUBSEQUENT ENCOUNTER: Primary | ICD-10-CM

## 2023-06-29 ENCOUNTER — HOME CARE VISIT (OUTPATIENT)
Dept: HOME HEALTH SERVICES | Facility: HOME HEALTHCARE | Age: 63
End: 2023-06-29
Payer: COMMERCIAL

## 2023-06-29 VITALS
RESPIRATION RATE: 18 BRPM | TEMPERATURE: 97.6 F | SYSTOLIC BLOOD PRESSURE: 106 MMHG | DIASTOLIC BLOOD PRESSURE: 60 MMHG | HEART RATE: 76 BPM | OXYGEN SATURATION: 93 %

## 2023-06-29 PROCEDURE — G0299 HHS/HOSPICE OF RN EA 15 MIN: HCPCS

## 2023-06-29 NOTE — PROGRESS NOTES
Assessment/Plan:     Patient is a 25-year-old female who is status post cdaxd-aa-gde panniculectomy and lipoabdominoplasty with Dr Gretta Solis on 3/30/2023   Postoperative course was complicated by abdominal wound dehiscence requiring wound VAC which has subsequently been removed  Please see HPI  Patient returns to the office today for a wound check  Patient's abdominal wound is shallow  There is a large piece of nylon suture present in the wound bed which was removed today  Patient will apply calcium alginate to her transverse abdominal wound and cover with an ABD  She will return to our office in 10 to 14 days for wound check or sooner with any questions or concerns  There are no diagnoses linked to this encounter  Subjective:     Patient ID: Arun Whyte is a 58 y o  female  HPI    Patient reports that she has been doing well  She has found several pieces of spitting suture in her wound since previous visit  Review of Systems    See HPI    Objective:     Physical Exam      Transverse abdominal wound is shallow  Measures approximately 7 cm  Please see photo in media

## 2023-06-29 NOTE — CASE COMMUNICATION
Ship to    Home   Insurance [facility] caritas     Wound 1 lower abdomen    ABD 5x9 [id number] 28      Medipore Tape 1    Alginate with Silver [id number] 6

## 2023-06-29 NOTE — TELEPHONE ENCOUNTER
I attempted to call the patient today and left a brief message; please try to call the patient again ; her A1C overall stable 6.2 , prediabetes range still , and her urine test is unremarkable, within normal range . Thank you.

## 2023-07-05 ENCOUNTER — HOME CARE VISIT (OUTPATIENT)
Dept: HOME HEALTH SERVICES | Facility: HOME HEALTHCARE | Age: 63
End: 2023-07-05
Payer: COMMERCIAL

## 2023-07-05 VITALS
OXYGEN SATURATION: 98 % | DIASTOLIC BLOOD PRESSURE: 72 MMHG | RESPIRATION RATE: 18 BRPM | TEMPERATURE: 96.9 F | HEART RATE: 76 BPM | SYSTOLIC BLOOD PRESSURE: 128 MMHG

## 2023-07-05 DIAGNOSIS — E65 ABDOMINAL PANNUS: ICD-10-CM

## 2023-07-05 PROCEDURE — G0299 HHS/HOSPICE OF RN EA 15 MIN: HCPCS

## 2023-07-05 RX ORDER — DOCUSATE SODIUM 100 MG/1
100 CAPSULE, LIQUID FILLED ORAL 2 TIMES DAILY
Qty: 20 CAPSULE | Refills: 1 | Status: SHIPPED | OUTPATIENT
Start: 2023-07-05

## 2023-07-05 NOTE — TELEPHONE ENCOUNTER
Pt called stating she is constipated and needs a refill of DOCUSATE SODIUM (COLECE) 100MG. Pharmacy verified with pt and it's [facility] in [address] [place] [address].

## 2023-07-10 ENCOUNTER — OFFICE VISIT (OUTPATIENT)
Dept: PLASTIC SURGERY | Facility: CLINIC | Age: 63
End: 2023-07-10

## 2023-07-10 ENCOUNTER — PATIENT OUTREACH (OUTPATIENT)
Dept: INTERNAL MEDICINE CLINIC | Facility: CLINIC | Age: 63
End: 2023-07-10

## 2023-07-10 DIAGNOSIS — Z98.890 S/P PANNICULECTOMY: ICD-10-CM

## 2023-07-10 DIAGNOSIS — T81.89XD NONHEALING SURGICAL WOUND, SUBSEQUENT ENCOUNTER: Primary | ICD-10-CM

## 2023-07-10 PROCEDURE — 87205 SMEAR GRAM STAIN: CPT | Performed by: PHYSICIAN ASSISTANT

## 2023-07-10 PROCEDURE — 87147 CULTURE TYPE IMMUNOLOGIC: CPT | Performed by: PHYSICIAN ASSISTANT

## 2023-07-10 PROCEDURE — 87070 CULTURE OTHR SPECIMN AEROBIC: CPT | Performed by: PHYSICIAN ASSISTANT

## 2023-07-10 NOTE — PROGRESS NOTES
SW has reached out to pt again via  ID # [id number] to offered assistance with SDOH but was unable to reach pt again. SW has mailed out an Out Reach letter to pt the date. SW returned call to Mr Yasmeen Hutson who attempted to return call for pt. He shares pt was trying to find a dentist and she may have some bills. Since pt does have [employer] she could go to any providers who accept it and he calls the Customer # Service on the back of her card for Providers. She can also go to to the [facility] [phone]. Pt'shusaancs rellatrss pt oi lknown to [facility]. SW also noted pt now has [employer] eso th shoul help with a 20 % discount on cost.  SW has aslo advised if pthas hosrtail bills she can aslo f/u jeni or the [facility] fo r info on [employer] .  to cvcall asn will f/u wit AUDRA ifneeded. has

## 2023-07-10 NOTE — LETTER
07/10/23    Estimado/a Inocente Oliveros un coordinador de la atención médica en [address] [facility]  [address] [facility] [address] [phone]. Intentamos comunicarnos con usted por teléfono varias veces. Es importante que se comunique con nosotros al Dept: [phone] para que podamos ofrecerle ayuda con leia necesidades de [facility]. Atentamente.          Tila Kim

## 2023-07-11 ENCOUNTER — HOME CARE VISIT (OUTPATIENT)
Dept: HOME HEALTH SERVICES | Facility: HOME HEALTHCARE | Age: 63
End: 2023-07-11
Payer: COMMERCIAL

## 2023-07-11 VITALS — RESPIRATION RATE: 18 BRPM

## 2023-07-11 PROCEDURE — G0299 HHS/HOSPICE OF RN EA 15 MIN: HCPCS

## 2023-07-12 ENCOUNTER — TELEPHONE (OUTPATIENT)
Dept: PLASTIC SURGERY | Facility: CLINIC | Age: 63
End: 2023-07-12

## 2023-07-12 DIAGNOSIS — T81.49XA SURGICAL WOUND INFECTION: Primary | ICD-10-CM

## 2023-07-12 LAB
BACTERIA WND AEROBE CULT: ABNORMAL
BACTERIA WND AEROBE CULT: ABNORMAL
GRAM STN SPEC: ABNORMAL

## 2023-07-12 RX ORDER — AMOXICILLIN 500 MG/1
500 CAPSULE ORAL EVERY 8 HOURS SCHEDULED
Qty: 30 CAPSULE | Refills: 0 | Status: SHIPPED | OUTPATIENT
Start: 2023-07-12 | End: 2023-07-22

## 2023-07-12 NOTE — TELEPHONE ENCOUNTER
Called patient to inform her that wound culture results were as follows:     2+ Growth of Beta Hemolytic Streptococcus Group A Abnormal     This organism is intrinsically susceptible to Penicillin. If sensitivites to other antibiotics are required, "Provider/Physician" please call the Microbiology Department at [phone] within 5 days. Will initiate antibiotic therapy. Orders placed today.

## 2023-07-13 ENCOUNTER — HOME CARE VISIT (OUTPATIENT)
Dept: HOME HEALTH SERVICES | Facility: HOME HEALTHCARE | Age: 63
End: 2023-07-13
Payer: COMMERCIAL

## 2023-07-13 VITALS
SYSTOLIC BLOOD PRESSURE: 102 MMHG | TEMPERATURE: 97.5 F | DIASTOLIC BLOOD PRESSURE: 68 MMHG | RESPIRATION RATE: 18 BRPM | HEART RATE: 72 BPM | OXYGEN SATURATION: 96 %

## 2023-07-13 PROCEDURE — G0299 HHS/HOSPICE OF RN EA 15 MIN: HCPCS

## 2023-07-13 PROCEDURE — 400014 VN F/U

## 2023-07-18 ENCOUNTER — TELEPHONE (OUTPATIENT)
Dept: INTERNAL MEDICINE CLINIC | Facility: CLINIC | Age: 63
End: 2023-07-18

## 2023-07-18 ENCOUNTER — HOME CARE VISIT (OUTPATIENT)
Dept: HOME HEALTH SERVICES | Facility: HOME HEALTHCARE | Age: 63
End: 2023-07-18
Payer: COMMERCIAL

## 2023-07-18 PROCEDURE — G0300 HHS/HOSPICE OF LPN EA 15 MIN: HCPCS

## 2023-07-18 NOTE — CASE COMMUNICATION
Home Health need continues for: wound care  Current level of functional ability: requires minimal assist. has decrease endurance post op. is a fall risk  Homebound status and living arrangements: has decreased balance and endurance, can't ambulate far distances without stopping to take a break.  pain  Goals accomplished and/or measurable progress toward unmet goals in past 60 days: goal is for wound to heal  Focus of care for next 60 day s for each discipline ordered: visits for wound care to lower abdomen  Skin integrity/wound status: wound on lower abdomen  Code status:   Most recent fall risk: high

## 2023-07-18 NOTE — TELEPHONE ENCOUNTER
Patient called and left a VM stating she would a referral for a MAMMOGRAM placed.       Please review and place order

## 2023-07-19 VITALS
TEMPERATURE: 98.5 F | OXYGEN SATURATION: 94 % | HEART RATE: 60 BPM | DIASTOLIC BLOOD PRESSURE: 60 MMHG | SYSTOLIC BLOOD PRESSURE: 96 MMHG | RESPIRATION RATE: 12 BRPM

## 2023-07-20 ENCOUNTER — OFFICE VISIT (OUTPATIENT)
Dept: PLASTIC SURGERY | Facility: CLINIC | Age: 63
End: 2023-07-20

## 2023-07-20 ENCOUNTER — TELEPHONE (OUTPATIENT)
Dept: HOME HEALTH SERVICES | Facility: HOME HEALTHCARE | Age: 63
End: 2023-07-20

## 2023-07-20 DIAGNOSIS — T81.89XD NONHEALING SURGICAL WOUND, SUBSEQUENT ENCOUNTER: Primary | ICD-10-CM

## 2023-07-20 DIAGNOSIS — Z12.31 SCREENING MAMMOGRAM FOR BREAST CANCER: Primary | ICD-10-CM

## 2023-07-20 NOTE — PROGRESS NOTES
Assessment/Plan:     Patient is a 78-year-old female who is status post dijke-uj-dlg panniculectomy and lipoabdominoplasty with Dr. Marvin Husain on 3/30/2023.  Postoperative course was complicated by abdominal wound dehiscence requiring wound VAC which has subsequently been removed.  Please see HPI. Patient's lower abdominal wound was cultured during previous visit, which later grew beta-hemolytic strep A, initiated on antibiotics. Patient returns to the office today for wound check. Wound is now superficial only. Please see photo in media. Patient may discontinue wet-to-dry dressings. She should apply bacitracin and Xeroform to the wound site with daily changes. She will return to our office in approximately 2 weeks for wound check or sooner with any questions or concerns. There are no diagnoses linked to this encounter. Subjective:     Patient ID: Nav Barber is a 58 y.o. female. HPI     Patient states that she has been doing well. She is pleased with the appearance of her abdominal wound. Review of Systems    See HPI    Objective:     Physical Exam      Wound is almost completely healed. It is shallow and measuring approximately 4 cm in length, 0.5 cm wide. Please see photo in media. No tracking/tunneling/purulence.

## 2023-07-21 ENCOUNTER — HOME CARE VISIT (OUTPATIENT)
Dept: HOME HEALTH SERVICES | Facility: HOME HEALTHCARE | Age: 63
End: 2023-07-21
Payer: COMMERCIAL

## 2023-07-25 ENCOUNTER — HOME CARE VISIT (OUTPATIENT)
Dept: HOME HEALTH SERVICES | Facility: HOME HEALTHCARE | Age: 63
End: 2023-07-25
Payer: COMMERCIAL

## 2023-07-25 VITALS
OXYGEN SATURATION: 97 % | TEMPERATURE: 97.4 F | SYSTOLIC BLOOD PRESSURE: 100 MMHG | HEART RATE: 72 BPM | DIASTOLIC BLOOD PRESSURE: 62 MMHG

## 2023-07-25 PROCEDURE — G0299 HHS/HOSPICE OF RN EA 15 MIN: HCPCS

## 2023-07-26 ENCOUNTER — OFFICE VISIT (OUTPATIENT)
Dept: DENTISTRY | Facility: CLINIC | Age: 63
End: 2023-07-26

## 2023-07-26 VITALS — SYSTOLIC BLOOD PRESSURE: 119 MMHG | DIASTOLIC BLOOD PRESSURE: 78 MMHG | HEART RATE: 80 BPM

## 2023-07-26 DIAGNOSIS — Z01.20 DENTAL EXAMINATION: Primary | ICD-10-CM

## 2023-07-26 PROCEDURE — 10330064 DRESSING, XEROFORM STR 5"X9" (50/BX)

## 2023-07-26 PROCEDURE — D0191 ASSESSMENT OF A PATIENT: HCPCS

## 2023-07-26 NOTE — CASE COMMUNICATION
Ship to Clinician  Insurance [facility] CARITAS    Wound 1 LOWER ABDOMEN    Gauze Xeroform 5x9 [id number] 1

## 2023-07-26 NOTE — PROGRESS NOTES
59 y/o, Gurpreet Mansfield, presented to clinic for planned #30 crown prep.  #[id number]. ASA II    Pain: 0    While using  services, explained to patient we will begin #30 crown prep today despite guarded prognosis due to periodontal disease and recession. Reminded patient that crown does not mean the tooth will be fully absolved from periodontal disease and furcation involvement. Patient replied, "I thought I was getting the cleaning done before the crown." I explained that according to previous note, patient had cleaning 2 weeks before her new patient exam in February. Since it has not been 6 months, insurance would not cover it. Patient denied and stated she received a cleaning 1 year prior to today at outside office and that she needed one. Confirmed a second time with . Patient again stated her last cleaning was 1 year ago at a separate office. Explained that if patient was in fact due for cleaning today, we could either do the cleaning or crown prep. Offered patient the option. Patient opted to do cleaning today. After consulting , it was confirmed that patient was not due for prophy until early August. Full prophy was not completed at this appointment. Due to limited time from explanation using  services, patient was comfortable rescheduling #30 crown and prophy. During explanation using  services,  required clarification several times because "pt responses were not clear". Recommend for future providers to use  services to ensure patient and provider are in total agreement throughout every step of dental treatment. Took pre-op impression using blue bite prior to dismissing the patient. Placed in Dr. Bhakti Sanchez. Again, reminded patient of guarded prognosis #30 due to periodontal disease and furcation involvement. Patient is aware of #30 condition.     Patient left comfortable and ambulatory.     Attending: Dr. Orlando Garcia    NV: [email or web address] (due early August. confirm with )  NVV: #30 crown prep

## 2023-07-26 NOTE — CASE COMMUNICATION
Ship to  Pt.  Home   Insurance [facility] CARITAS    Wound 1 LOWER ABDOMEN      ABD 5x9 [id number] 19

## 2023-07-27 ENCOUNTER — HOME CARE VISIT (OUTPATIENT)
Dept: HOME HEALTH SERVICES | Facility: HOME HEALTHCARE | Age: 63
End: 2023-07-27
Payer: COMMERCIAL

## 2023-07-27 ENCOUNTER — TELEPHONE (OUTPATIENT)
Dept: HOME HEALTH SERVICES | Facility: HOME HEALTHCARE | Age: 63
End: 2023-07-27

## 2023-08-01 ENCOUNTER — HOME CARE VISIT (OUTPATIENT)
Dept: HOME HEALTH SERVICES | Facility: HOME HEALTHCARE | Age: 63
End: 2023-08-01
Payer: COMMERCIAL

## 2023-08-01 VITALS
OXYGEN SATURATION: 96 % | TEMPERATURE: 96.7 F | HEART RATE: 81 BPM | SYSTOLIC BLOOD PRESSURE: 102 MMHG | RESPIRATION RATE: 18 BRPM | DIASTOLIC BLOOD PRESSURE: 64 MMHG

## 2023-08-01 PROCEDURE — G0299 HHS/HOSPICE OF RN EA 15 MIN: HCPCS

## 2023-08-02 ENCOUNTER — OFFICE VISIT (OUTPATIENT)
Dept: PLASTIC SURGERY | Facility: CLINIC | Age: 63
End: 2023-08-02

## 2023-08-02 DIAGNOSIS — T81.89XD NONHEALING SURGICAL WOUND, SUBSEQUENT ENCOUNTER: Primary | ICD-10-CM

## 2023-08-02 NOTE — PROGRESS NOTES
Assessment/Plan:     Patient is a 39-year-old female who is status post tchuf-kr-umd panniculectomy and lipoabdominoplasty with Dr. Val Ball on 3/30/2023.  Postoperative course was complicated by abdominal wound dehiscence requiring wound VAC which has subsequently been removed.  Please see HPI. Patient returns to the office today for a  wound check. She has finished her course of antibiotics for beta-hemolytic strep A. Wound is almost completely closed, there is 1 small area of hypergranulation which was debrided with silver nitrate today. Patient will return to the office in approximately 1 month for wound check. She is interested in discussing potential thighplasty at that time. There are no diagnoses linked to this encounter. Subjective:     Patient ID: Franco Gu is a 58 y.o. female. HPI     Reports no issues or concerns today. She is very pleased that her wound is now healed. Review of Systems    See HPI    Objective:     Physical Exam      Patient is completely healed, clean and dry. Minimal scarring, with exception of 1 small area of hypergranulation. Please see photo in media.

## 2023-08-03 ENCOUNTER — HOME CARE VISIT (OUTPATIENT)
Dept: HOME HEALTH SERVICES | Facility: HOME HEALTHCARE | Age: 63
End: 2023-08-03
Payer: COMMERCIAL

## 2023-08-03 VITALS
OXYGEN SATURATION: 97 % | TEMPERATURE: 97.3 F | RESPIRATION RATE: 18 BRPM | SYSTOLIC BLOOD PRESSURE: 120 MMHG | HEART RATE: 68 BPM | DIASTOLIC BLOOD PRESSURE: 70 MMHG

## 2023-08-03 PROCEDURE — G0299 HHS/HOSPICE OF RN EA 15 MIN: HCPCS

## 2023-08-04 NOTE — CASE COMMUNICATION
Pt is being discharged from [facility] services as of today with goals met. Pt's wound is considered to be healed and pt is covering small area of hypergranulation with a bandaid now.

## 2023-08-11 ENCOUNTER — OFFICE VISIT (OUTPATIENT)
Dept: DENTISTRY | Facility: CLINIC | Age: 63
End: 2023-08-11

## 2023-08-11 DIAGNOSIS — K03.6 DENTAL PLAQUE: Primary | ICD-10-CM

## 2023-08-11 PROCEDURE — D1110 PROPHYLAXIS - ADULT: HCPCS

## 2023-08-11 NOTE — DENTAL PROCEDURE DETAILS
Raz Amos presents to [facility]. She believes she is here for #30 crown prep. Previous note indicates that prophy should be done prior to #30 crown prep. Questionable prognosis of #30, even with crown has already been discussed with patient and she understood and elected to proceed with crown. ASA II  Pain 0/10    PA taken #30. Significant bone loss to the furcation present #30. No PARL. Explained to patient that crown preparation would be more successful if prophy was done first with a thorough scaling of roots of #30. Patient understood. Prophylaxis completed with ultrasonic  and hand instrumentation. Soft plaque removed and supragingival calculus removed from teeth. Polished with prophy cup and paste. Flossed and provided Oral Health Instructions. Demonstrated proper brushing and flossing technique. Patient left satisfied and ambulatory. Deep scaling completed #30 with Cavitron.      NV: #30 crown prep

## 2023-08-14 DIAGNOSIS — Z98.84 BARIATRIC SURGERY STATUS: ICD-10-CM

## 2023-08-16 ENCOUNTER — HOSPITAL ENCOUNTER (OUTPATIENT)
Dept: MAMMOGRAPHY | Facility: CLINIC | Age: 63
Discharge: HOME | End: 2023-08-16
Payer: COMMERCIAL

## 2023-08-16 DIAGNOSIS — Z12.31 SCREENING MAMMOGRAM FOR BREAST CANCER: ICD-10-CM

## 2023-08-16 PROCEDURE — 77067 SCR MAMMO BI INCL CAD: CPT

## 2023-08-16 PROCEDURE — 77063 BREAST TOMOSYNTHESIS BI: CPT

## 2023-08-16 RX ORDER — OMEPRAZOLE 20 MG/1
20 CAPSULE, DELAYED RELEASE ORAL 2 TIMES DAILY
Qty: 60 CAPSULE | Refills: 3 | Status: SHIPPED | OUTPATIENT
Start: 2023-08-16

## 2023-09-01 ENCOUNTER — OFFICE VISIT (OUTPATIENT)
Dept: DENTISTRY | Facility: CLINIC | Age: 63
End: 2023-09-01

## 2023-09-01 DIAGNOSIS — Z01.20 ENCOUNTER FOR DENTAL EXAMINATION: Primary | ICD-10-CM

## 2023-09-01 DIAGNOSIS — E65 ABDOMINAL PANNUS: ICD-10-CM

## 2023-09-01 PROCEDURE — D0120 PERIODIC ORAL EVALUATION - ESTABLISHED PATIENT: HCPCS

## 2023-09-01 RX ORDER — METHOCARBAMOL 500 MG/1
500 TABLET, FILM COATED ORAL 3 TIMES DAILY PRN
Qty: 30 TABLET | Refills: 0 | OUTPATIENT
Start: 2023-09-01

## 2023-09-01 NOTE — DENTAL PROCEDURE DETAILS
Edy Archer presents for a Periodic exam. Verbal consent for treatment given in addition to the forms. [facility] tranlation : [id number]    CC: Patient is scheduled 9-5-23 for crown prep #30. She has a cleaning on 8-11-23 here. Reviewed health history - Patient is ASA II  Consents signed: Yes     Perio: 3B  Pain Scale: 0  Caries Assessment: Medium  Radiographs: None    Dr Gael Guillaume Ex: Dr Nimisha Sanchez assessed  #30 Furcation involvement B and L class III. Due to bone 50% bone loss and furcation involvement a crown on #30 is not recommended. Gave patient 2 options for #30  1) Flap surgery on #30 to preserve the tooth as long as possible without a crown. Then continue monitor. 2)  Ext #30 w/ BSP to evaluate for future implant. Discussed options w/ patient, she decided on option 2. Preauth for ext and BSP sent today. Per our billing dept, we will keep appt on 9-5-23 for ext w/ bsp #30. Next Visit:  9/5/2023 Brigid Ramirez DMD for ext #30 w/ BSP to evaluate for future implant.

## 2023-09-01 NOTE — TELEPHONE ENCOUNTER
Im unsure what you mean by can't be delegated. However, this is an inappropriate refill for this patient re: post op pain. She should be seen by PCP if she continues to have muscle-related pain.

## 2023-09-01 NOTE — TELEPHONE ENCOUNTER
Medication methocarbamol (ROBAXIN) 500 mg tablet    Quantity [address] [place]    PCP/Specialty Plastic Surgery    Enough for 3 days No

## 2023-09-08 DIAGNOSIS — M81.0 AGE-RELATED OSTEOPOROSIS WITHOUT CURRENT PATHOLOGICAL FRACTURE: ICD-10-CM

## 2023-09-08 DIAGNOSIS — K91.2 POSTGASTRECTOMY MALABSORPTION: ICD-10-CM

## 2023-09-08 DIAGNOSIS — Z98.84 H/O BARIATRIC SURGERY: ICD-10-CM

## 2023-09-08 DIAGNOSIS — Z90.3 POSTGASTRECTOMY MALABSORPTION: ICD-10-CM

## 2023-09-08 RX ORDER — CALCIUM CITRATE 200 MG (950 MG) TABLET 200(950)MG
1 TABLET ORAL DAILY
Qty: 90 TABLET | Refills: 0 | Status: SHIPPED | OUTPATIENT
Start: 2023-09-08

## 2023-09-11 ENCOUNTER — OFFICE VISIT (OUTPATIENT)
Dept: PLASTIC SURGERY | Facility: CLINIC | Age: 63
End: 2023-09-11

## 2023-09-11 DIAGNOSIS — T81.89XD NONHEALING SURGICAL WOUND, SUBSEQUENT ENCOUNTER: Primary | ICD-10-CM

## 2023-09-11 DIAGNOSIS — Z41.1 ENCOUNTER FOR COSMETIC SURGERY: ICD-10-CM

## 2023-09-11 RX ORDER — METHOCARBAMOL 500 MG/1
TABLET, FILM COATED ORAL
Qty: 15 TABLET | Refills: 0 | OUTPATIENT
Start: 2023-09-11

## 2023-09-11 NOTE — PROGRESS NOTES
Assessment/Plan:     Patient is a 69-year-old female who is status post ucehr-[id number]-[id number] panniculectomy and lipoabdominoplasty with Dr. Alisa Pantoja on 3/30/2023.  Postoperative course was complicated by abdominal wound dehiscence requiring wound VAC which has subsequently been removed.  Please see HPI. Incision check. All incisional wounds have completely healed. Patient is concerned over some "hardness" and pulling sensation in her abdomen, but is otherwise doing well. Patient will return to the office in approximately 6 to 8 weeks for routine postoperative check and to discuss potential thighplasty with Dr. Alisa Pantoja.     There are no diagnoses linked to this encounter. Subjective:     Patient ID: Eri Sharpe is a 58 y.o. female. HPI     Patient reports no issues or concerns except as above. She is overall very pleased with her aesthetic results. Review of Systems    See HPI     Objective:     Physical Exam      All incisions are clean, dry and intact. No areas of opening/incisional wounds.

## 2023-09-26 ENCOUNTER — TELEPHONE (OUTPATIENT)
Age: 63
End: 2023-09-26

## 2023-09-26 NOTE — TELEPHONE ENCOUNTER
called to speak to someone in the office in regards to his wifes muscle pain. He wanted to speak to someone who speaks Kyr[place]. I forwarded the message to [facility] and she said she would call the pt and she said she would.

## 2023-09-27 ENCOUNTER — OFFICE VISIT (OUTPATIENT)
Dept: PLASTIC SURGERY | Facility: CLINIC | Age: 63
End: 2023-09-27

## 2023-09-27 DIAGNOSIS — T81.89XD NONHEALING SURGICAL WOUND, SUBSEQUENT ENCOUNTER: Primary | ICD-10-CM

## 2023-09-27 DIAGNOSIS — Z98.890 S/P PANNICULECTOMY: ICD-10-CM

## 2023-09-28 NOTE — PROGRESS NOTES
Assessment/Plan:     Patient is a 58-year-old female who is status post [id number]-mt-[id number] panniculectomy and lipoabdominoplasty with Dr. Kaiden Lemus on 3/30/2023.  Postoperative course was complicated by abdominal wound dehiscence requiring wound VAC which has subsequently been removed.  Please see HPI.     Patient returns to the office today due to concerns about "hardness" and pain in her bilateral mid abdomen. She states that this is intermittent. It is not directly related to her incision lines. The patient reports that it is severe enough that at times she is unable to sleep. We will send the patient for ultrasound for evaluation and have her return to the office after completion. Diagnoses and all orders for this visit:    Nonhealing surgical wound, subsequent encounter  -     US MSK limited; Future    S/P panniculectomy  -     US MSK limited; Future          Subjective:     Patient ID: Rox Bamberger is a 58 y.o. female. HPI     Patient reports intermittent pain of her abdomen as above. She has had no further issues with her incision sites, no fever chills, nausea, vomiting, systemic symptoms. Review of Systems    See HPI     Objective:     Physical Exam      Incision sites are clean, dry and intact. Patient does have some indurated areas in her bilateral mid abdomen, this does appear to be consistent with muscle. No open areas, no fluctuance, no erythema.

## 2023-10-01 ENCOUNTER — HOSPITAL ENCOUNTER (OUTPATIENT)
Dept: RADIOLOGY | Facility: HOSPITAL | Age: 63
Discharge: HOME | End: 2023-10-01

## 2023-10-01 DIAGNOSIS — Z98.890 S/P PANNICULECTOMY: ICD-10-CM

## 2023-10-01 DIAGNOSIS — T81.89XD NONHEALING SURGICAL WOUND, SUBSEQUENT ENCOUNTER: ICD-10-CM

## 2023-10-01 PROCEDURE — 76705 ECHO EXAM OF ABDOMEN: CPT

## 2023-10-02 DIAGNOSIS — I10 PRIMARY HYPERTENSION: ICD-10-CM

## 2023-10-02 RX ORDER — LOSARTAN POTASSIUM AND HYDROCHLOROTHIAZIDE 25; 100 MG/1; MG/1
TABLET ORAL
Qty: 90 TABLET | Refills: 0 | Status: SHIPPED | OUTPATIENT
Start: 2023-10-02

## 2023-10-03 ENCOUNTER — TELEPHONE (OUTPATIENT)
Dept: PLASTIC SURGERY | Facility: CLINIC | Age: 63
End: 2023-10-03

## 2023-10-04 NOTE — TELEPHONE ENCOUNTER
Called patient to discuss ultrasound results. She will return to our office later this week for in person discussion and next steps.

## 2023-10-06 ENCOUNTER — OFFICE VISIT (OUTPATIENT)
Dept: PLASTIC SURGERY | Facility: CLINIC | Age: 63
End: 2023-10-06

## 2023-10-06 DIAGNOSIS — Z98.890 S/P PANNICULECTOMY: Primary | ICD-10-CM

## 2023-10-06 PROCEDURE — RECHECK: Performed by: PHYSICIAN ASSISTANT

## 2023-10-06 RX ORDER — GABAPENTIN 100 MG/1
100 CAPSULE ORAL 3 TIMES DAILY
Qty: 90 CAPSULE | Refills: 0 | Status: SHIPPED | OUTPATIENT
Start: 2023-10-06 | End: 2023-11-05

## 2023-10-06 RX ORDER — GABAPENTIN 300 MG/1
300 CAPSULE ORAL 3 TIMES DAILY
Qty: 90 CAPSULE | Refills: 0 | Status: SHIPPED | OUTPATIENT
Start: 2023-10-06 | End: 2023-10-06

## 2023-10-10 NOTE — PROGRESS NOTES
Assessment/Plan:     Patient is a 69-year-old female who is status post epdzi-kw-foo panniculectomy and lipoabdominoplasty with Dr. Anu Carlos on 3/30/2023.  Postoperative course was complicated by abdominal wound dehiscence requiring wound VAC which has subsequently been removed.  Please see HPI.     The patient reports she continues to have pain as previously described. We will attempt physical therapy and gabapentin. The patient may benefit from excision of scar tissue in the future. She will return to our office in 1 month for a routine postoperative check. Diagnoses and all orders for this visit:    S/P panniculectomy  -     Discontinue: gabapentin (Neurontin) 300 mg capsule; Take 1 capsule (300 mg total) by mouth 3 (three) times a day  -     Ambulatory Referral to Physical Therapy; Future  -     gabapentin (Neurontin) 100 mg capsule; Take 1 capsule (100 mg total) by mouth 3 (three) times a day          Subjective:     Patient ID: Kirby Little is a 58 y.o. female. HPI    She reports continued pain and hardness of her lower abdomen. Review of Systems    See HPI    Objective:     Physical Exam      All incisions are completely healed, clean and dry. There is a minimal amount of redundant tissue/swelling/potential fat necrosis in the central transverse abdominal incision line.

## 2023-10-18 ENCOUNTER — EVALUATION (OUTPATIENT)
Dept: PHYSICAL THERAPY | Facility: REHABILITATION | Age: 63
End: 2023-10-18
Payer: COMMERCIAL

## 2023-10-18 DIAGNOSIS — R10.84 GENERALIZED ABDOMINAL PAIN: Primary | ICD-10-CM

## 2023-10-18 DIAGNOSIS — Z98.890 S/P PANNICULECTOMY: ICD-10-CM

## 2023-10-18 PROCEDURE — 97162 PT EVAL MOD COMPLEX 30 MIN: CPT | Performed by: PHYSICAL THERAPIST

## 2023-10-18 PROCEDURE — 97112 NEUROMUSCULAR REEDUCATION: CPT | Performed by: PHYSICAL THERAPIST

## 2023-10-18 NOTE — PROGRESS NOTES
PT Evaluation     Today's date: 10/18/2023  Patient name: Sharyle Schmid  : 1960  MRN: [id number]  Referring provider: Nasima Hawthorne*  Dx:   Encounter Diagnosis     ICD-10-CM    1. Generalized abdominal pain  R10.84       2. S/P panniculectomy  Z98.890 Ambulatory Referral to Physical Therapy                     Assessment  Assessment details: Sharyle Schmid is a pleasant 58 y.o. female who presents with chronic pain in her abdominal region secondary to a liposuction procedure she had performed this past March. The patient states her pain will come on at random, and sometimes will hurt more with physical activity, especially when working around her home. She states her abdomen feels hard at times, and that is also painful. Upon her movement exam, her symptoms were reproduced with lumbar and thoracic spine AROM, particularly into extension and rotation. She did have mild tenderness to palpation, and evidence of increased scar tissue around her abdomen secondary to her liposuction procedure. No red flags were present during the exam.     At this time, the patient would benefit from a further consult with a massage therapist or therapist with experience in treating abdominal related pathology  The patient and her daughter who was also present for the evaluation were educated that the patient would receive better treatment from an individual experienced in treatments following liposuction procedures. Patient and daughter verbalized understanding. At this time, the patient will not proceed with treatment at this facility. She was given information for consultation with therapists in the network who have more experience in this area of treatment.    Impairments: abnormal coordination, abnormal muscle firing, abnormal muscle tone, abnormal or restricted ROM, abnormal movement, activity intolerance, impaired physical strength, pain with function, poor posture  and poor body mechanics    Symptom irritability: moderate  Plan  Plan details: Patient will follow up for consult with massage therapist or physical therapist with experience treating abdominal/viscera related pathology. Therapy options: Massage therapy. Referral necessary: No  Planned therapy interventions: home exercise program, self care and patient education  Treatment plan discussed with: patient        Subjective Evaluation    History of Present Illness  Mechanism of injury: HPI From 2022: overhanging abdominal skin after undergoing [place]-en-Y gastric bypass in 2011 by Dr Asif Malagon with over 90 lb weight loss. Her starting weight was 270 and current weight is 170. Her weight has been stable for the last 12 months. Her primary complaint at this time are the rashes, blistering, open sores, foul smell and intertrigo that occurs under her panniculus. She states that she has used various creams and powders prescribed by bariatrics and PCP with minimal improvement. This has also hindered her ability to exercise as the area becomes sweaty, moist, and exacerbates her intertrigo, foul smell, and open sores. She does not currently have any rashes or open sores but does have photographs of when they do occur. Patient also states that foul smell makes her conscientious at work which interrupts her work for her to perform self-hygiene multiple times per day. The patient did have a liposuction procedure this past March, and since then she has been having pain in her stomach and lower abdomen from the surgery. When I go to touch the area, it feels very hard and painful. When I am trying to cook, walk, and do things around the home, I get the pain In my stomach. The medications I take sometimes helps me.      Past Medical History: Heart bypass, gastric bypass, liposuction procedure          Not a recurrent problem   Patient Goals  Patient goals for therapy: decreased pain, increased motion, independence with ADLs/IADLs, return to sport/leisure activities and increased strength    Pain  Current pain ratin  At best pain ratin  At worst pain ratin  Quality: dull ache, tight and discomfort    Treatments  Previous treatment: medication        Objective     General Comments:      Lumbar Comments  Lumbar Spine AROM:   Flexion: WNL   Extension: Moderate limitation with pain in abdomen   Right Side Glide: WNL   Left Side Glide:  WNL     Palpation: Diffuse tenderness in abdominal region     Thoracic Spine AROM:   Flexion: WNL   Extension: Moderate limitation with pain in abdomen  Right Rotation: Moderate limitation   Left Rotation: Moderate limitation     Lower Quarter Screen: Unremarkable     ASLR: Able to perform without reproduction of symptoms    Hip Mobility: WNL Bilaterally     Abdominal Crunch Hold: No reproduction of symptoms     Red Flags: Unremarkable                  Precautions: gastric bypass, liposuction, heart bypass      Manuals 10/18                                                                Neuro Re-Ed             Patient Ed 25'                                                                                           Ther Ex                                                                                                                     Ther Activity                                       Gait Training                                       Modalities

## 2023-10-26 DIAGNOSIS — Z98.890 S/P PANNICULECTOMY: ICD-10-CM

## 2023-10-26 RX ORDER — GABAPENTIN 100 MG/1
100 CAPSULE ORAL 3 TIMES DAILY
Qty: 90 CAPSULE | Refills: 0 | Status: SHIPPED | OUTPATIENT
Start: 2023-10-26

## 2023-10-30 ENCOUNTER — EVALUATION (OUTPATIENT)
Dept: PHYSICAL THERAPY | Facility: CLINIC | Age: 63
End: 2023-10-30

## 2023-10-30 DIAGNOSIS — Z98.890 S/P PANNICULECTOMY: Primary | ICD-10-CM

## 2023-10-30 DIAGNOSIS — R10.84 GENERALIZED ABDOMINAL PAIN: ICD-10-CM

## 2023-10-30 NOTE — PROGRESS NOTES
PT Evaluation     Today's date: 10/30/2023  Patient name: Bere William  : 1960  MRN: [id number]  Referring provider: Teresa Galvin*  Dx:   Encounter Diagnosis     ICD-10-CM    1. S/P panniculectomy  Z98.890       2. Generalized abdominal pain  R10.84                      Assessment  Assessment details: Patient is a 58 y.o. female who presents to physical therapy with physician diagnosis of S/P panniculectomy  (primary encounter diagnosis)  Generalized abdominal pain. Patient would benefit from skilled physical therapy intervention to address her impairments and to maximize function. Thank you for your referral and please feel free to contact me at [phone]. Understanding of Dx/Px/POC: good   Prognosis: good    Goals  LTG 8 weeks  Decrease pain by 50%  Improve mobility by 50%    Plan  Frequency: 2x week  Duration in weeks: 8  Treatment plan discussed with: patient and family      Subjective Evaluation    History of Present Illness  Mechanism of injury: Per PT on 10/18/2023    History of Present Illness  Mechanism of injury: HPI From : overhanging abdominal skin after undergoing virgin[place]-en-Y gastric bypass in  by Dr Maximino Castaneda with over 90 lb weight loss. Her starting weight was 270 and current weight is 170. Her weight has been stable for the last 12 months. Her primary complaint at this time are the rashes, blistering, open sores, foul smell and intertrigo that occurs under her panniculus. She states that she has used various creams and powders prescribed by bariatrics and PCP with minimal improvement. This has also hindered her ability to exercise as the area becomes sweaty, moist, and exacerbates her intertrigo, foul smell, and open sores. She does not currently have any rashes or open sores but does have photographs of when they do occur.   Patient also states that foul smell makes her conscientious at work which interrupts her work for her to perform self-hygiene multiple times per day.      The patient did have a liposuction procedure this past March, and since then she has been having pain in her stomach and lower abdomen from the surgery. When I go to touch the area, it feels very hard and painful. When I am trying to cook, walk, and do things around the home, I get the pain In my stomach. The medications I take sometimes helps me.       Past Medical History: Heart bypass, gastric bypass, liposuction procedure        Objective     Static Posture     Comments  Severe lower abdominal incision hypomobility and adhesions all planes             Precautions: none      Manuals 10/30            STM to abdomen and VM ND                                                   Neuro Re-Ed                                                                                                        Ther Ex             Educated on self STM with LTR ND                                                                                                       Ther Activity                                       Gait Training                                       Modalities

## 2023-11-02 PROCEDURE — 97162 PT EVAL MOD COMPLEX 30 MIN: CPT | Performed by: PHYSICAL THERAPIST

## 2023-11-08 ENCOUNTER — OFFICE VISIT (OUTPATIENT)
Dept: PHYSICAL THERAPY | Facility: CLINIC | Age: 63
End: 2023-11-08

## 2023-11-08 DIAGNOSIS — Z98.890 S/P PANNICULECTOMY: ICD-10-CM

## 2023-11-08 DIAGNOSIS — R10.84 GENERALIZED ABDOMINAL PAIN: Primary | ICD-10-CM

## 2023-11-08 PROCEDURE — 97112 NEUROMUSCULAR REEDUCATION: CPT | Performed by: PHYSICAL THERAPIST

## 2023-11-08 NOTE — PROGRESS NOTES
Daily Note     Today's date: 2023  Patient name: Patrisha Hatchet  : 1960  MRN: [id number]  Referring provider: Rich Luevano*  Dx:   Encounter Diagnosis     ICD-10-CM    1. Generalized abdominal pain  R10.84       2. S/P panniculectomy  Z98.890                      Subjective: Reports continued pain and some increased soreness after last session      Objective: See treatment diary below      Assessment: Tolerated treatment well. Patient would benefit from continued PT      Plan: Continue per plan of care.       Precautions: none      Manuals             STM to abdomen and VM ND                                                     Neuro Re-Ed                                                                                                        Ther Ex             Educated on self STM with LTR ND                                                                                                         Ther Activity                                       Gait Training                                       Modalities

## 2023-11-13 ENCOUNTER — OFFICE VISIT (OUTPATIENT)
Dept: PLASTIC SURGERY | Facility: CLINIC | Age: 63
End: 2023-11-13

## 2023-11-13 DIAGNOSIS — Z98.890 S/P PANNICULECTOMY: ICD-10-CM

## 2023-11-13 DIAGNOSIS — T81.89XD NONHEALING SURGICAL WOUND, SUBSEQUENT ENCOUNTER: Primary | ICD-10-CM

## 2023-11-13 DIAGNOSIS — Z98.890 S/P COSMETIC PLASTIC SURGERY: ICD-10-CM

## 2023-11-13 RX ORDER — METHOCARBAMOL 500 MG/1
500 TABLET, FILM COATED ORAL 3 TIMES DAILY
Qty: 30 TABLET | Refills: 0 | Status: SHIPPED | OUTPATIENT
Start: 2023-11-13

## 2023-11-13 NOTE — PROGRESS NOTES
Assessment/Plan:     Patient is a 16-year-old female who is status post [id number] panniculectomy and lipoabdominoplasty with Dr. Ely Nichols on 3/30/2023. Postoperative course was complicated by abdominal wound dehiscence requiring wound VAC which has subsequently been removed. Please see HPI. Patient returns to the office today to again discuss diffuse abdominal pain which she has been experiencing since surgery. Patient describes the pain as intermittent and cramping/burning. It is largely located in her bilateral upper quadrants as well near the T point of her panniculectomy incision. Patient will continue with PT exercises at home for scar desensitization for suspected scar adhesion formation at her liposuction sites. We will trial muscle relaxers for her abdominal cramping. Patient will return to the office in approximately 2 weeks for a postop check. Diagnoses and all orders for this visit:    Nonhealing surgical wound, subsequent encounter  -     methocarbamol (ROBAXIN) 500 mg tablet; Take 1 tablet (500 mg total) by mouth 3 (three) times a day    S/P panniculectomy    S/P cosmetic plastic surgery          Subjective:     Patient ID: Nay Canales is a 58 y.o. female. HPI    Patient reports no significant relief with PT. Pain has been persistent. Review of Systems    See HIP     Objective:     Physical Exam      All incisions are completely healed, clean and dry. There is no distinct areas of fat necrosis. Patient does have firm edema at her T point.

## 2023-11-20 ENCOUNTER — OFFICE VISIT (OUTPATIENT)
Dept: INTERNAL MEDICINE CLINIC | Facility: CLINIC | Age: 63
End: 2023-11-20

## 2023-11-20 VITALS
TEMPERATURE: 97.2 F | HEART RATE: 76 BPM | SYSTOLIC BLOOD PRESSURE: 110 MMHG | OXYGEN SATURATION: 99 % | DIASTOLIC BLOOD PRESSURE: 77 MMHG | WEIGHT: 143.6 LBS | BODY MASS INDEX: 25.44 KG/M2

## 2023-11-20 DIAGNOSIS — I10 PRIMARY HYPERTENSION: ICD-10-CM

## 2023-11-20 DIAGNOSIS — I48.0 PAROXYSMAL A-FIB (HCC): ICD-10-CM

## 2023-11-20 DIAGNOSIS — Z86.79 HISTORY OF WOLFF-PARKINSON-WHITE (WPW) SYNDROME: ICD-10-CM

## 2023-11-20 DIAGNOSIS — M81.0 AGE-RELATED OSTEOPOROSIS WITHOUT CURRENT PATHOLOGICAL FRACTURE: ICD-10-CM

## 2023-11-20 DIAGNOSIS — G47.33 OSA (OBSTRUCTIVE SLEEP APNEA): ICD-10-CM

## 2023-11-20 DIAGNOSIS — Z00.00 ANNUAL PHYSICAL EXAM: Primary | ICD-10-CM

## 2023-11-20 DIAGNOSIS — E11.9 TYPE 2 DIABETES MELLITUS WITHOUT COMPLICATION, WITHOUT LONG-TERM CURRENT USE OF INSULIN (HCC): ICD-10-CM

## 2023-11-20 PROCEDURE — 99213 OFFICE O/P EST LOW 20 MIN: CPT | Performed by: INTERNAL MEDICINE

## 2023-11-20 PROCEDURE — 99396 PREV VISIT EST AGE 40-64: CPT | Performed by: INTERNAL MEDICINE

## 2023-11-20 NOTE — PROGRESS NOTES
ADULT ANNUAL PHYSICAL  [facility]    NAME: Teresa Kwan  AGE: 58 y.o. SEX: female  : 1960     DATE: 2023     Assessment and Plan:     Problem List Items Addressed This Visit          Endocrine    Type 2 diabetes mellitus without complication, without long-term current use of insulin ([address])    Relevant Orders    Ambulatory Referral to Ophthalmology       Cardiovascular and Mediastinum    Primary hypertension    Paroxysmal A-fib ([address])       Musculoskeletal and Integument    Osteoporosis     Other Visit Diagnoses       Annual physical exam    -  Primary    History of Gustabo-Parkinson-White (WPW) syndrome        CANDIDO (obstructive sleep apnea)        Relevant Orders    Ambulatory Referral to Sleep Medicine            #DM2  23 A1c 6.2, on metformin 1000mg qhs  Albumin Cr ratio done 23 and normal  Foot exam performed 5/15/23, follows with podiatry  Needs eye exam, will reorder. #Paroxysmal Afib  #Hx of Sueanne Duane White  Chadvasc of 3  Holter monitor ordered by cardiology earlier this year, showed NSVT and SVT, Wenchebach phenomenon  Previously seen by cardiology earlier this year for preop clearance, AC for afib was deferred at that time, however now that she is postop will send reassessment regarding need for [facility] given Chadvasc score. #Osteoporosis   DXA 8/3/2022, left femoral neck -2.8  s/p 5mg zoledronic acid infusion 22 with NTx urine test 3 months later of 14 suggestive of appropriate response   Due for next zoledronic acid infusion  Will reprint infusion request and provide phone number to call  Continue with Calcitrate 950mg, vit D 2000U qd    Immunizations and preventive care screenings were discussed with patient today. Appropriate education was printed on patient's after visit summary.     Colonoscopy performed 2021 with findings of ascending and sigmoid diverticula, otherwise normal colonoscopy, repeat in 10 years. Mammogram performed 8/16/2023 and normal  Pap last performed 3/2020 and normal with recommendations for no further testing    Counseling:  Dental Health: discussed importance of regular tooth brushing, flossing, and dental visits. Injury prevention: discussed safety/seat belts, safety helmets, smoke detectors, carbon dioxide detectors, and smoking near bedding or upholstery. Exercise: the importance of regular exercise/physical activity was discussed. Recommend exercise 3-5 times per week for at least 30 minutes. Depression Screening and Follow-up Plan: Patient with no symptoms of depression        Return in about 6 months (around 5/20/2024) for Recheck. Chief Complaint:     Chief Complaint   Patient presents with    Follow-up      History of Present Illness:     Adult Annual Physical   Patient here for a comprehensive physical exam. The patient reports problems - abdominal discomfort that is long standing, no other concerns . Diet and Physical Activity  Diet/Nutrition: well balanced diet. Exercise: no formal exercise. Depression Screening  PHQ-2/9 Depression Screening    Little interest or pleasure in doing things: 0 - not at all  Feeling down, depressed, or hopeless: 0 - not at all  PHQ-2 Score: 0  PHQ-2 Interpretation: Negative depression screen       General Health  Sleep: sleeps poorly and snores loudly. Hx of CANDIDO, followed with sleep medicine but lost to follow up  Hearing: normal - bilateral.  Vision: goes for regular eye exams. Dental: regular dental visits. /GYN Health  Follows with gynecology? yes   Patient is: postmenopausal  Last menstrual period: s/p hysterectomy  Contraceptive method: s/p hysterectomy and BSO     Advanced Care Planning  Do you have an advanced directive? no  Do you have a durable medical power of ? no     Review of Systems:     Review of Systems   Constitutional:  Negative for chills and fever.    HENT:  Negative for ear pain and sore throat. Eyes:  Negative for pain and visual disturbance. Respiratory:  Negative for cough and shortness of breath. Cardiovascular:  Negative for chest pain and palpitations. Gastrointestinal:  Positive for abdominal pain. Negative for vomiting. Genitourinary:  Negative for dysuria and hematuria. Musculoskeletal:  Negative for arthralgias and back pain. Skin:  Negative for color change and rash. Neurological:  Negative for seizures and syncope. All other systems reviewed and are negative. Past Medical History:     Past Medical History:   Diagnosis Date    Anastomotic stricture of gastrojejunostomy     s/p gastric bypass    Anemia     iron deficiency resolved 11/14/14    Anxiety     resolved 6/7/17    Class 1 obesity due to excess calories with serious comorbidity and body mass index (BMI) of 30.0 to 30.9 in adult 09/18/2020    Degenerative joint disease of ankle and foot, unspecified laterality     last assessed 5/17/13    Depression     last assessed 11/7/12    Diabetes mellitus ([address] [facility])     type 2 uncomplicated, controlled - last assessed 1/31/14    Dysphagia     Gastric ulcer     History of melena     History of transfusion     Hyperlipidemia     Hypertension     Insomnia     Insomnia     Obesity     resolved 11/14/14    Overweight (BMI 25.0-29.9) 05/13/2021    Postgastrectomy malabsorption     Tachycardia     Ventricular pre-excitation syndrome     Vitamin D deficiency       Past Surgical History:     Past Surgical History:   Procedure Laterality Date    CARDIAC CATHETERIZATION      post proc data:____ lesions successfully dilated    COLONOSCOPY      ESOPHAGOGASTRODUODENOSCOPY      GASTRIC BYPASS  02/08/2011    for morbid obesity     HYSTERECTOMY      @ age 38    OOPHORECTOMY Bilateral     @ age 38    MT EGD TRANSORAL BIOPSY SINGLE/MULTIPLE N/A 1/25/2017    Procedure: ESOPHAGOGASTRODUODENOSCOPY (EGD); Surgeon: Ankit Keene MD;  Location: [facility];   Service: Bariatrics    MT EGD TRANSORAL BIOPSY SINGLE/MULTIPLE N/A 5/3/2017    Procedure: ESOPHAGOGASTRODUODENOSCOPY (EGD) with balloon dilatation;  Surgeon: Sushant Mondragon MD;  Location: [facility]; Service: Bariatrics    NE EGD TRANSORAL BIOPSY SINGLE/MULTIPLE N/A 8/16/2017    Procedure: ESOPHAGOGASTRODUODENOSCOPY (EGD) with balloon dilation;  Surgeon: Sushant Mondragon MD;  Location: [facility]; Service: Bariatrics    NE EXCISION EXCESSIVE SKIN & SUBQ TISSUE ABDOMEN N/A 3/30/2023    Procedure: LIPO ABDOMINOPLASTY;  Surgeon: Jon Villegas MD;  Location: [facility] MA[facility] OR;  Service: Plastics    NE EXCISION SKIN ABD INFRAUMBILICAL PANNICULECTOMY N/A 3/30/2023    Procedure: JENNY WHALEN LIS PANNICULECTOMY;  Surgeon: Jon Villegas MD;  Location: [facility] MAIN OR;  Service: Plastics    UPPER GASTROINTESTINAL ENDOSCOPY        Social History:     Social History     Socioeconomic History    Marital status: /Civil Union     Spouse name: None    Number of children: None    Years of education: None    Highest education level: None   Occupational History    None   Tobacco Use    Smoking status: Never    Smokeless tobacco: Never   Vaping Use    Vaping Use: Never used   Substance and Sexual Activity    Alcohol use: Never    Drug use: Never    Sexual activity: Not Currently   Other Topics Concern    None   Social History Narrative    None     Social Determinants of Health     Financial Resource Strain: Medium Risk (3/1/2023)    Overall Financial Resource Strain (CARDIA)     Difficulty of Paying Living Expenses: Somewhat hard   Food Insecurity: No Food Insecurity (3/1/2023)    Hunger Vital Sign     Worried About Running Out of Food in the Last Year: Never true     Ran Out of Food in the Last Year: Never true   Transportation Needs: No Transportation Needs (3/1/2023)    PRAPARE - Transportation     Lack of Transportation (Medical): No     Lack of Transportation (Non-Medical):  No   Physical Activity: Inactive (2/8/2022)    Exercise Vital Sign     Days of Exercise per Week: 0 days     Minutes of Exercise per Session: 0 min   Stress: No Stress Concern Present (3/11/2020)    [facility]     Feeling of Stress : Not at all   Social Connections: Not on file   Intimate Partner Violence: Not on file   Housing Stability: Low Risk  (2/8/2022)    Housing Stability Vital Sign     Unable to Pay for Housing in the Last Year: No     Number of [address] in the Last Year: 1     Unstable Housing in the Last Year: No      Family History:     Family History   Problem Relation Age of Onset    Congenital heart disease Mother     Asthma Father     Heart attack Sister         acute MI    Congenital heart disease Sister     Hypertension Sister     No Known Problems Maternal Grandmother     No Known Problems Maternal Grandfather     No Known Problems Paternal Grandmother     No Known Problems Maternal Aunt     No Known Problems Maternal Aunt     No Known Problems Maternal Aunt     Breast cancer Cousin         ? age      Current Medications:     Current Outpatient Medications   Medication Sig Dispense Refill    acetaminophen (TYLENOL) 500 mg tablet Take 1 tablet (500 mg total) by mouth every 4 (four) hours as needed for mild pain or moderate pain 30 tablet 2    atorvastatin (LIPITOR) 20 mg tablet Take 1 tablet by mouth once daily 90 tablet 3    Calcitrate 950 (200 Ca) MG tablet Take 1 tablet by mouth once daily 90 tablet 0    Cholecalciferol (Vitamin D3) 50 MCG (2000 UT) TABS Take 2 tablets by mouth once daily 180 tablet 1    docusate sodium (COLACE) 100 mg capsule Take 1 capsule (100 mg total) by mouth 2 (two) times a day 20 capsule 1    ergocalciferol (VITAMIN D2) 50,000 units Take 1 capsule (50,000 Units total) by mouth 2 (two) times a week 24 capsule 0    gabapentin (NEURONTIN) 100 mg capsule TAKE 1 CAPSULE BY MOUTH THREE TIMES DAILY 90 capsule 0    IRON, FERROUS GLUCONATE, PO Take 1 tablet by mouth daily losartan-hydrochlorothiazide (HYZAAR) 100-25 MG per tablet Take 1 tablet by mouth once daily 90 tablet 0    metFORMIN (GLUCOPHAGE-XR) 500 mg 24 hr tablet Take 2 tablets (1,000 mg total) by mouth daily with dinner 180 tablet 3    methocarbamol (ROBAXIN) 500 mg tablet Take 1 tablet (500 mg total) by mouth 3 (three) times a day 30 tablet 0    metoprolol succinate (TOPROL-XL) 25 mg 24 hr tablet Take 1 tablet by mouth twice daily 180 tablet 3    Multiple Vitamins-Minerals (ZINC PO) Take by mouth      nystatin (MYCOSTATIN) powder Apply topically 3 (three) times a day 60 g 3    Omega-3 Fatty Acids (FISH OIL) 1,000 mg Take 1,000 mg by mouth 2 (two) times a day      omeprazole (PriLOSEC) 20 mg delayed release capsule Take 1 capsule (20 mg total) by mouth 2 (two) times a day 60 capsule 3    ondansetron (ZOFRAN) 4 mg tablet Take 1 tablet (4 mg total) by mouth every 8 (eight) hours as needed for nausea or vomiting 20 tablet 0    VITAMIN E PO Take 1 tablet by mouth daily      oxyCODONE (Roxicodone) 5 immediate release tablet Take 1 tablet (5 mg total) by mouth every 4 (four) hours as needed for severe pain Max Daily Amount: 30 mg (Patient not taking: Reported on 5/13/2023) 10 tablet 0     No current facility-administered medications for this visit. Allergies:     No Known Allergies   Physical Exam:     /77 (BP Location: Left arm, Patient Position: Sitting, Cuff Size: Standard)   Pulse 76   Temp (!) 97.2 °F (36.2 °C) (Temporal)   Wt 65.1 kg (143 lb 9.6 oz)   SpO2 99%   BMI 25.44 kg/m²     Physical Exam  Vitals and nursing note reviewed. Constitutional:       General: She is not in acute distress. Appearance: Normal appearance. She is well-developed. She is not ill-appearing. HENT:      Head: Normocephalic and atraumatic.       Right Ear: External ear normal.      Left Ear: External ear normal.      Mouth/Throat:      Mouth: Mucous membranes are moist.   Eyes:      Extraocular Movements: Extraocular movements intact. Conjunctiva/sclera: Conjunctivae normal.      Pupils: Pupils are equal, round, and reactive to light. Cardiovascular:      Rate and Rhythm: Normal rate and regular rhythm. Pulses: Normal pulses. Heart sounds: Normal heart sounds. No murmur heard. No friction rub. No gallop. Comments: Euvolemic on exam  Pulmonary:      Effort: Pulmonary effort is normal. No respiratory distress. Breath sounds: Normal breath sounds. No wheezing, rhonchi or rales. Abdominal:      General: Bowel sounds are normal. There is no distension. Palpations: Abdomen is soft. Tenderness: There is abdominal tenderness (mild epigastric). There is no guarding. Hernia: No hernia is present. Musculoskeletal:         General: No swelling or deformity. Cervical back: Neck supple. Right lower leg: No edema. Left lower leg: No edema. Skin:     General: Skin is warm and dry. Coloration: Skin is not jaundiced. Findings: No bruising, lesion or rash. Neurological:      General: No focal deficit present. Mental Status: She is alert and oriented to person, place, and time.           Valeria Hardwick MD  [address] [facility]

## 2023-11-21 DIAGNOSIS — Z98.890 S/P PANNICULECTOMY: ICD-10-CM

## 2023-11-21 RX ORDER — GABAPENTIN 100 MG/1
100 CAPSULE ORAL 3 TIMES DAILY
Qty: 90 CAPSULE | Refills: 0 | Status: SHIPPED | OUTPATIENT
Start: 2023-11-21

## 2023-11-22 ENCOUNTER — APPOINTMENT (OUTPATIENT)
Dept: PHYSICAL THERAPY | Facility: CLINIC | Age: 63
End: 2023-11-22

## 2023-11-22 ENCOUNTER — OFFICE VISIT (OUTPATIENT)
Dept: PLASTIC SURGERY | Facility: CLINIC | Age: 63
End: 2023-11-22

## 2023-11-22 DIAGNOSIS — Z98.890 S/P COSMETIC PLASTIC SURGERY: ICD-10-CM

## 2023-11-22 DIAGNOSIS — Z98.890 S/P PANNICULECTOMY: Primary | ICD-10-CM

## 2023-11-22 NOTE — PROGRESS NOTES
Assessment/Plan:     Patient is a 79-year-old female who is status post [id number] panniculectomy and lipoabdominoplasty with Dr. Amanda Howell on 3/30/2023. Postoperative course was complicated by abdominal wound dehiscence requiring wound VAC which has subsequently been removed. Patient also with recurrent muscular abdominal pain. Please see HPI. Patient reports no significant relief of abdominal cramping/ pain. Patient will explore massage during her upcoming visit to the [facility]. She will call the office upon return to discuss potential scar revision of her transverse abdominal scar. Diagnoses and all orders for this visit:    S/P panniculectomy    S/P cosmetic plastic surgery          Subjective:     Patient ID: Grant Fagan is a 58 y.o. female. HPI    Patient reports continued cramping abdominal pain as above. No other issues or concerns. Review of Systems    See HPI     Objective:     Physical Exam      All incisions are completely healed. Abdomen is soft. There is some firmness anterior transverse abdominal scar, potentially representative of fat necrosis versus edema.

## 2023-12-03 DIAGNOSIS — Z98.84 BARIATRIC SURGERY STATUS: ICD-10-CM

## 2023-12-04 ENCOUNTER — OFFICE VISIT (OUTPATIENT)
Dept: BARIATRICS | Facility: CLINIC | Age: 63
End: 2023-12-04
Payer: COMMERCIAL

## 2023-12-04 VITALS
WEIGHT: 142 LBS | BODY MASS INDEX: 25.16 KG/M2 | HEART RATE: 84 BPM | DIASTOLIC BLOOD PRESSURE: 60 MMHG | HEIGHT: 63 IN | TEMPERATURE: 97.7 F | SYSTOLIC BLOOD PRESSURE: 98 MMHG

## 2023-12-04 DIAGNOSIS — K92.9 GASTRIC ANASTOMOTIC STRICTURE: ICD-10-CM

## 2023-12-04 DIAGNOSIS — K91.2 POSTSURGICAL MALABSORPTION: ICD-10-CM

## 2023-12-04 DIAGNOSIS — Z48.815 ENCOUNTER FOR SURGICAL AFTERCARE FOLLOWING SURGERY OF DIGESTIVE SYSTEM: Primary | ICD-10-CM

## 2023-12-04 DIAGNOSIS — K31.89 GASTRIC ANASTOMOTIC STRICTURE: ICD-10-CM

## 2023-12-04 DIAGNOSIS — R11.2 NAUSEA AND VOMITING: ICD-10-CM

## 2023-12-04 DIAGNOSIS — Z98.84 BARIATRIC SURGERY STATUS: ICD-10-CM

## 2023-12-04 DIAGNOSIS — M81.0 AGE-RELATED OSTEOPOROSIS WITHOUT CURRENT PATHOLOGICAL FRACTURE: Primary | ICD-10-CM

## 2023-12-04 PROCEDURE — 99214 OFFICE O/P EST MOD 30 MIN: CPT | Performed by: NURSE PRACTITIONER

## 2023-12-04 RX ORDER — OMEPRAZOLE 20 MG/1
20 CAPSULE, DELAYED RELEASE ORAL 2 TIMES DAILY
Qty: 60 CAPSULE | Refills: 0 | Status: SHIPPED | OUTPATIENT
Start: 2023-12-04

## 2023-12-04 RX ORDER — ZOLEDRONIC ACID 5 MG/100ML
5 INJECTION, SOLUTION INTRAVENOUS ONCE
Status: SHIPPED | OUTPATIENT
Start: 2023-12-05

## 2023-12-04 NOTE — PROGRESS NOTES
Assessment/Plan:     Patient ID: Main Grijalva is a 58 y.o. female. Bariatric Surgery Status/gastric anastomotic stricture/nausea and vomiting    -s/p Carline-En-Y Gastric Bypass with Dr. Carol Nazario on 02/08/2011. Presents to the office with her daughter today for annual visit. She returns with concerns of recurrence of a stricture. Felt relief after her EGD but shortly after approximately a month, she started noticing recurrence of her symptoms of nausea and vomiting with food intake. Has increased pressure of the epigastric region. Tolerating fluids and soft foods for now. Currently on omeprazole 2 times a day. Per chart review, last EGD on 2/22/2023 showed:   "FINDINGS:  Mild, generalized erythematous mucosa in the stomach; performed 2 cold forceps biopsies to rule out H. pylori  Post-operative anastomotic intrinsic stricture (traversable) in the stomach; dilated with [facility] Scientific balloon dilator using guidewire from 15 mm starting size to 18 mm end size. Dilation caused bleeding, improved passage of the scope and improved lumen appearance; post-dilation bleeding was minimal        SPECIMENS:  ID Type Source Tests Collected by Time Destination   1 : r/o h pylori Tissue Stomach TISSUE Catina Sanderson MD 2/22/2023 0754              IMPRESSION:  Pouchitis with an anastomotic stricture of at least 1 cm as it was easily traversed by the scope. 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. Successful balloon dilatation to 18 mm."      PLAN:     -We order UGI, EGD. Suspect recurrence of stricture.  -Continue with omeprazole twice daily for now. -Follow-up after EGD with surgeon to discuss further interventions if she has another recurrence of her stricture. - Routine follow up in 1 year for annual.   - Continue with healthy lifestyle, adequate protein intake of 60 gm, fluid intake of at least 64 oz. - Continue with MVI daily. - Activity as tolerated.    - Labs ordered and will adjust accordingly if any deficiency. - Follow up with RD and SW as needed. Continued/Maintain healthy weight loss with good nutrition intakes. Adequate hydration with at least 64oz. fluid intake. Follow diet as discussed. Follow vitamin and mineral recommendations as reviewed with you. Exercise as tolerated. Colonoscopy referral made: UTD  Mammogram- UTD    Follow-up after EGD to review with surgeon; f/u in 1 year for routine annua. We kindly ask that your arrive 15 minutes before your scheduled appointment time with your provider to allow our staff to room you, get your vital signs and update your chart. Get lab work done prior to annual visit. Please call the office if you need a script. It is recommended to check with your insurance BEFORE getting labs done to make sure they are covered by your policy. Call our office if you have any problems with abdominal pain especially associated with fever, chills, nausea, vomiting or any other concerns. All  Post-bariatric surgery patients should be aware that very small quantities of any alcohol can cause impairment and it is very possible not to feel the effect. The effect can be in the system for several hours. It is also a stomach irritant. It is advised to AVOID alcohol, Nonsteroidal antiinflammatory drugs (NSAIDS) and nicotine of all forms . Any of these can cause stomach irritation/pain. Discussed the effects of alcohol on a bariatric patient and the increased impairment risk. Keep up the good work!      Postsurgical Malabsorption   -At risk for malabsorption of vitamins/minerals secondary to malabsorption and restriction of intake from bariatric surgery  -NOT Currently taking adequate postop bariatric surgery vitamin supplementation  -Last set of bariatric labs completed on 02/17/2023 and showed WNL   -Next set of bariatric labs ordered for approximately 2 weeks  -Patient received education about the importance of adhering to a lifelong supplementation regimen to avoid vitamin/mineral deficiencies      Diagnoses and all orders for this visit:    Encounter for surgical aftercare following surgery of digestive system  -     FL UPPER GI UGI; Future  -     CBC and Platelet; Future  -     Comprehensive metabolic panel; Future  -     Ferritin; Future  -     Folate; Future  -     PTH, intact; Future  -     TIBC Panel (incl. Iron, TIBC, % Iron Saturation); Future  -     Vitamin A; Future  -     Vitamin B1, whole blood; Future  -     Vitamin B12; Future  -     Vitamin D 25 hydroxy; Future  -     Zinc; Future    Bariatric surgery status  -     FL UPPER GI UGI; Future  -     CBC and Platelet; Future  -     Comprehensive metabolic panel; Future  -     Ferritin; Future  -     Folate; Future  -     PTH, intact; Future  -     TIBC Panel (incl. Iron, TIBC, % Iron Saturation); Future  -     Vitamin A; Future  -     Vitamin B1, whole blood; Future  -     Vitamin B12; Future  -     Vitamin D 25 hydroxy; Future  -     Zinc; Future    Postsurgical malabsorption  -     FL UPPER GI UGI; Future  -     CBC and Platelet; Future  -     Comprehensive metabolic panel; Future  -     Ferritin; Future  -     Folate; Future  -     PTH, intact; Future  -     TIBC Panel (incl. Iron, TIBC, % Iron Saturation); Future  -     Vitamin A; Future  -     Vitamin B1, whole blood; Future  -     Vitamin B12; Future  -     Vitamin D 25 hydroxy; Future  -     Zinc; Future    BMI 25.0-25.9,adult  -     FL UPPER GI UGI; Future  -     CBC and Platelet; Future  -     Comprehensive metabolic panel; Future  -     Ferritin; Future  -     Folate; Future  -     PTH, intact; Future  -     TIBC Panel (incl. Iron, TIBC, % Iron Saturation); Future  -     Vitamin A; Future  -     Vitamin B1, whole blood; Future  -     Vitamin B12; Future  -     Vitamin D 25 hydroxy; Future  -     Zinc; Future    Nausea and vomiting  -     FL UPPER GI UGI; Future  -     CBC and Platelet;  Future  -     Comprehensive metabolic panel; Future  -     Ferritin; Future  -     Folate; Future  -     PTH, intact; Future  -     TIBC Panel (incl. Iron, TIBC, % Iron Saturation); Future  -     Vitamin A; Future  -     Vitamin B1, whole blood; Future  -     Vitamin B12; Future  -     Vitamin D 25 hydroxy; Future  -     Zinc; Future    Gastric anastomotic stricture         Subjective:      Patient ID: Alexandre Osman is a 58 y.o. female. -s/p Carline-En-Y Gastric Bypass with Dr. Richa Haley on 02/08/2011. Presents to the office today for annual visit. She returns with concerns of recurrence of a stricture. Felt relief after her EGD but shortly after approximately a month, she started noticing recurrence of her symptoms of nausea and vomiting with food intake. Has increased pressure of the epigastric region. Tolerating fluids and soft foods for now. Currently on omeprazole 2 times a day. Initial: 257 lbs  Current: 142 lbs   EWL: (Weight loss is ahead of schedule at this post surgical period.)  Miguel: 150s  Current BMI is Body mass index is 25 kg/m². Tolerating a regular diet-yes  Eating at least 60 grams of protein per day-yes  Following 30/60 minute rule with liquids-yes  Drinking at least 64 ounces of fluid per day-yes  Drinking carbonated beverages-no  Sufficient exercise-no  Using NSAIDs regularly-no  Using nicotine-no  Using alcohol-no  Supplements: multivitamin, calcium, vitamin D -taking gummy vitamins    EWL is 104%, which places the patient ahead of schedule for expected post surgical weight loss at this time. The following portions of the patient's history were reviewed and updated as appropriate: allergies, current medications, past family history, past medical history, past social history, past surgical history and problem list.    Review of Systems   Constitutional: Negative. Respiratory: Negative. Cardiovascular: Negative. Gastrointestinal:  Positive for nausea and vomiting.         Regurgitation   Musculoskeletal: Negative. Neurological: Negative. Psychiatric/Behavioral: Negative. Objective:    BP 98/60   Pulse 84   Temp 97.7 °F (36.5 °C) (Tympanic)   Ht 5' 3.2" (1.605 m)   Wt 64.4 kg (142 lb)   BMI 25.00 kg/m²      Physical Exam  Vitals and nursing note reviewed. Constitutional:       Appearance: Normal appearance. She is normal weight. Cardiovascular:      Rate and Rhythm: Normal rate and regular rhythm. Pulses: Normal pulses. Heart sounds: Normal heart sounds. Pulmonary:      Effort: Pulmonary effort is normal.      Breath sounds: Normal breath sounds. Abdominal:      General: Bowel sounds are normal.      Palpations: Abdomen is soft. Tenderness: There is no abdominal tenderness. Musculoskeletal:         General: Normal range of motion. Skin:     General: Skin is warm and dry. Neurological:      General: No focal deficit present. Mental Status: She is alert and oriented to person, place, and time. Psychiatric:         Mood and Affect: Mood normal.         Behavior: Behavior normal.         Thought Content:  Thought content normal.         Judgment: Judgment normal.

## 2023-12-05 ENCOUNTER — HOSPITAL ENCOUNTER (OUTPATIENT)
Dept: INFUSION CENTER | Facility: HOSPITAL | Age: 63
Discharge: HOME | End: 2023-12-05

## 2023-12-05 ENCOUNTER — TELEPHONE (OUTPATIENT)
Dept: INFUSION CENTER | Facility: HOSPITAL | Age: 63
End: 2023-12-05

## 2023-12-05 ENCOUNTER — TELEPHONE (OUTPATIENT)
Dept: INTERNAL MEDICINE CLINIC | Facility: CLINIC | Age: 63
End: 2023-12-05

## 2023-12-05 ENCOUNTER — APPOINTMENT (OUTPATIENT)
Dept: LAB | Facility: CLINIC | Age: 63
End: 2023-12-05
Payer: COMMERCIAL

## 2023-12-05 DIAGNOSIS — K91.2 POSTSURGICAL MALABSORPTION: ICD-10-CM

## 2023-12-05 DIAGNOSIS — Z48.815 ENCOUNTER FOR SURGICAL AFTERCARE FOLLOWING SURGERY OF DIGESTIVE SYSTEM: ICD-10-CM

## 2023-12-05 DIAGNOSIS — R11.2 NAUSEA AND VOMITING: ICD-10-CM

## 2023-12-05 DIAGNOSIS — Z98.84 BARIATRIC SURGERY STATUS: ICD-10-CM

## 2023-12-05 LAB
25(OH)D3 SERPL-MCNC: 26.1 NG/ML (ref 30–100)
ALBUMIN SERPL BCP-MCNC: 4.3 G/DL (ref 3.5–5)
ALP SERPL-CCNC: 67 U/L (ref 34–104)
ALT SERPL W P-5'-P-CCNC: 25 U/L (ref 7–52)
ANION GAP SERPL CALCULATED.3IONS-SCNC: 10 MMOL/L
AST SERPL W P-5'-P-CCNC: 32 U/L (ref 13–39)
BILIRUB SERPL-MCNC: 0.28 MG/DL (ref 0.2–1)
BUN SERPL-MCNC: 14 MG/DL (ref 5–25)
CALCIUM SERPL-MCNC: 9.3 MG/DL (ref 8.4–10.2)
CHLORIDE SERPL-SCNC: 107 MMOL/L (ref 96–108)
CO2 SERPL-SCNC: 27 MMOL/L (ref 21–32)
CREAT SERPL-MCNC: 0.87 MG/DL (ref 0.6–1.3)
ERYTHROCYTE [DISTWIDTH] IN BLOOD BY AUTOMATED COUNT: 13.4 % (ref 11.6–15.1)
FERRITIN SERPL-MCNC: 11 NG/ML (ref 11–307)
FOLATE SERPL-MCNC: >22.3 NG/ML
GFR SERPL CREATININE-BSD FRML MDRD: 70 ML/MIN/1.73SQ M
GLUCOSE SERPL-MCNC: 105 MG/DL (ref 65–140)
HCT VFR BLD AUTO: 37.9 % (ref 34.8–46.1)
HGB BLD-MCNC: 11.5 G/DL (ref 11.5–15.4)
IRON SATN MFR SERPL: 19 % (ref 15–50)
IRON SERPL-MCNC: 67 UG/DL (ref 50–212)
MCH RBC QN AUTO: 27.8 PG (ref 26.8–34.3)
MCHC RBC AUTO-ENTMCNC: 30.3 G/DL (ref 31.4–37.4)
MCV RBC AUTO: 92 FL (ref 82–98)
PLATELET # BLD AUTO: 315 THOUSANDS/UL (ref 149–390)
PMV BLD AUTO: 10.9 FL (ref 8.9–12.7)
POTASSIUM SERPL-SCNC: 4.7 MMOL/L (ref 3.5–5.3)
PROT SERPL-MCNC: 7.1 G/DL (ref 6.4–8.4)
PTH-INTACT SERPL-MCNC: 101.2 PG/ML (ref 12–88)
RBC # BLD AUTO: 4.14 MILLION/UL (ref 3.81–5.12)
SODIUM SERPL-SCNC: 144 MMOL/L (ref 135–147)
TIBC SERPL-MCNC: 360 UG/DL (ref 250–450)
UIBC SERPL-MCNC: 293 UG/DL (ref 155–355)
VIT B12 SERPL-MCNC: 831 PG/ML (ref 180–914)
WBC # BLD AUTO: 6.5 THOUSAND/UL (ref 4.31–10.16)

## 2023-12-05 PROCEDURE — 84590 ASSAY OF VITAMIN A: CPT

## 2023-12-05 PROCEDURE — 82728 ASSAY OF FERRITIN: CPT

## 2023-12-05 PROCEDURE — 80053 COMPREHEN METABOLIC PANEL: CPT

## 2023-12-05 PROCEDURE — 83550 IRON BINDING TEST: CPT

## 2023-12-05 PROCEDURE — 83970 ASSAY OF PARATHORMONE: CPT

## 2023-12-05 PROCEDURE — 85027 COMPLETE CBC AUTOMATED: CPT

## 2023-12-05 PROCEDURE — 84425 ASSAY OF VITAMIN B-1: CPT

## 2023-12-05 PROCEDURE — 36415 COLL VENOUS BLD VENIPUNCTURE: CPT

## 2023-12-05 PROCEDURE — 82306 VITAMIN D 25 HYDROXY: CPT

## 2023-12-05 PROCEDURE — 82746 ASSAY OF FOLIC ACID SERUM: CPT

## 2023-12-05 PROCEDURE — 83540 ASSAY OF IRON: CPT

## 2023-12-05 PROCEDURE — 82607 VITAMIN B-12: CPT

## 2023-12-05 PROCEDURE — 84630 ASSAY OF ZINC: CPT

## 2023-12-05 NOTE — TELEPHONE ENCOUNTER
Contacted patient on cell number and patient's  answered the phone. Patient's  informed that patient's appointment need to be rescheduled due to labs not being done. Appointment rescheduled to Thursday Dec. 7 at 3 pm. Communicated to  labs should be done today.  gave verbal confirmation of above information. Also discussed with patient's  if and when patient had dental work done. Patient's  stated that patient got fitted for a crown 3 months ago and doesn't have any upcoming dental work.

## 2023-12-05 NOTE — TELEPHONE ENCOUNTER
Patient had an appt scheduled today for infusion. They cancelled her appt due to her not having labs done. There are no labs ordered in the computer and I am unsure of what labs need to be done. Can you place appropriate orders and we will contact patient to have them done and reschedule her infusion.

## 2023-12-06 ENCOUNTER — TELEPHONE (OUTPATIENT)
Dept: INFUSION CENTER | Facility: HOSPITAL | Age: 63
End: 2023-12-06

## 2023-12-06 NOTE — TELEPHONE ENCOUNTER
Called [facility] nish left voicemail and callback number about to on for 12/7 we needed plan reviewed and date changed to 12/7.  Asked for someone to call back

## 2023-12-07 ENCOUNTER — HOSPITAL ENCOUNTER (OUTPATIENT)
Dept: INFUSION CENTER | Facility: HOSPITAL | Age: 63
Discharge: HOME | End: 2023-12-07
Payer: COMMERCIAL

## 2023-12-07 VITALS
DIASTOLIC BLOOD PRESSURE: 74 MMHG | SYSTOLIC BLOOD PRESSURE: 116 MMHG | HEART RATE: 68 BPM | RESPIRATION RATE: 18 BRPM | TEMPERATURE: 97.6 F

## 2023-12-07 DIAGNOSIS — M81.0 AGE-RELATED OSTEOPOROSIS WITHOUT CURRENT PATHOLOGICAL FRACTURE: Primary | ICD-10-CM

## 2023-12-07 PROCEDURE — 96365 THER/PROPH/DIAG IV INF INIT: CPT

## 2023-12-07 RX ORDER — ZOLEDRONIC ACID 5 MG/100ML
5 INJECTION, SOLUTION INTRAVENOUS ONCE
Status: COMPLETED | OUTPATIENT
Start: 2023-12-07 | End: 2023-12-07

## 2023-12-07 RX ORDER — SODIUM CHLORIDE 9 MG/ML
20 INJECTION, SOLUTION INTRAVENOUS ONCE
Status: CANCELLED | OUTPATIENT
Start: 2023-12-07

## 2023-12-07 RX ORDER — ZOLEDRONIC ACID 5 MG/100ML
5 INJECTION, SOLUTION INTRAVENOUS ONCE
Status: CANCELLED | OUTPATIENT
Start: 2023-12-07

## 2023-12-07 RX ORDER — ZOLEDRONIC ACID 5 MG/100ML
5 INJECTION, SOLUTION INTRAVENOUS ONCE
OUTPATIENT
Start: 2024-12-06

## 2023-12-07 RX ORDER — SODIUM CHLORIDE 9 MG/ML
20 INJECTION, SOLUTION INTRAVENOUS ONCE
OUTPATIENT
Start: 2024-12-06

## 2023-12-07 RX ORDER — SODIUM CHLORIDE 9 MG/ML
20 INJECTION, SOLUTION INTRAVENOUS ONCE
Status: COMPLETED | OUTPATIENT
Start: 2023-12-07 | End: 2023-12-07

## 2023-12-07 RX ADMIN — SODIUM CHLORIDE 20 ML/HR: 0.9 INJECTION, SOLUTION INTRAVENOUS at 14:23

## 2023-12-07 RX ADMIN — ZOLEDRONIC ACID 5 MG: 0.05 INJECTION, SOLUTION INTRAVENOUS at 14:22

## 2023-12-07 NOTE — PROGRESS NOTES
Ricarda Selby  tolerated treatment well with no complications. Grant Fagan is aware of future appt on 12/05/2024 at 2:00pm.     AVS printed and given to Grant Fagan:  No (Declined by Grant Fagan) Appt card provided.

## 2023-12-09 LAB — VIT B1 BLD-SCNC: 114.3 NMOL/L (ref 66.5–200)

## 2023-12-11 ENCOUNTER — PREP FOR PROCEDURE (OUTPATIENT)
Dept: BARIATRICS | Facility: CLINIC | Age: 63
End: 2023-12-11

## 2023-12-11 DIAGNOSIS — Z98.84 BARIATRIC SURGERY STATUS: Primary | ICD-10-CM

## 2023-12-11 LAB — VIT A SERPL-MCNC: 49 UG/DL (ref 22–69.5)

## 2023-12-12 DIAGNOSIS — M81.0 AGE-RELATED OSTEOPOROSIS WITHOUT CURRENT PATHOLOGICAL FRACTURE: Primary | ICD-10-CM

## 2023-12-14 ENCOUNTER — TELEPHONE (OUTPATIENT)
Dept: BARIATRICS | Facility: CLINIC | Age: 63
End: 2023-12-14

## 2023-12-14 DIAGNOSIS — K91.2 POSTSURGICAL MALABSORPTION: ICD-10-CM

## 2023-12-14 DIAGNOSIS — Z98.84 BARIATRIC SURGERY STATUS: ICD-10-CM

## 2023-12-14 DIAGNOSIS — R79.0 LOW FERRITIN: Primary | ICD-10-CM

## 2023-12-14 DIAGNOSIS — R79.89 HIGH SERUM PARATHYROID HORMONE (PTH): ICD-10-CM

## 2023-12-14 DIAGNOSIS — E55.9 VITAMIN D DEFICIENCY: ICD-10-CM

## 2023-12-14 LAB — ZINC SERPL-MCNC: 89 UG/DL (ref 44–115)

## 2023-12-14 RX ORDER — ERGOCALCIFEROL 1.25 MG/1
50000 CAPSULE ORAL 2 TIMES WEEKLY
Qty: 24 CAPSULE | Refills: 0 | Status: SHIPPED | OUTPATIENT
Start: 2023-12-14 | End: 2024-03-07

## 2023-12-14 NOTE — TELEPHONE ENCOUNTER
----- Message from [employer] sent at 12/14/2023  8:45 AM EST -----  Pt is [place] speaking -     Please call pt to remember to start on bariatric multivitamins as stated in the bariatric vitamin list I've provided her. I would recommend to make sure the vitamin contains 45 mg of iron in there. Take calcium 3 times per day if it is 500 mg dose. If she does viactiv calcium chews then she can take two per day for now. Her vitamin D level is still low. I'm going to send her a prescription for vitamin D. After she completes this, please switch back to Vitamin D3 4000 IU daily. I would like to repeat her iron, calcium/pth levels and vitamin D levels in 3 months. The rest of her vitamins are within limits.    Stephanie Gray

## 2023-12-20 DIAGNOSIS — R73.03 PREDIABETES: ICD-10-CM

## 2023-12-21 RX ORDER — METFORMIN HYDROCHLORIDE 500 MG/1
TABLET, EXTENDED RELEASE ORAL
Qty: 180 TABLET | Refills: 0 | Status: SHIPPED | OUTPATIENT
Start: 2023-12-21 | End: 2023-12-26

## 2023-12-26 DIAGNOSIS — R73.03 PREDIABETES: ICD-10-CM

## 2023-12-26 RX ORDER — METFORMIN HYDROCHLORIDE 500 MG/1
TABLET, EXTENDED RELEASE ORAL
Qty: 180 TABLET | Refills: 0 | Status: SHIPPED | OUTPATIENT
Start: 2023-12-26

## 2023-12-27 DIAGNOSIS — Z98.890 S/P PANNICULECTOMY: ICD-10-CM

## 2023-12-27 RX ORDER — GABAPENTIN 100 MG/1
100 CAPSULE ORAL 3 TIMES DAILY
Qty: 90 CAPSULE | Refills: 0 | Status: SHIPPED | OUTPATIENT
Start: 2023-12-27

## 2024-02-07 ENCOUNTER — TELEPHONE (OUTPATIENT)
Dept: BARIATRICS | Facility: CLINIC | Age: 64
End: 2024-02-07

## 2024-02-07 NOTE — TELEPHONE ENCOUNTER
Patient called and cancelled both appointments on 2/21 EGD and 3/8 review with Dr Diaz as [place] is going out of the [place] and uncertain when she'll be returning. Will call the office when she returns to get EGD and EGD review appointments rescheduled.  (Diabetic pt)

## 2024-02-21 PROBLEM — Z12.11 COLON CANCER SCREENING: Status: RESOLVED | Noted: 2018-07-02 | Resolved: 2024-02-21

## 2024-03-05 DIAGNOSIS — R79.89 HIGH SERUM PARATHYROID HORMONE (PTH): ICD-10-CM

## 2024-03-05 DIAGNOSIS — E55.9 VITAMIN D DEFICIENCY: ICD-10-CM

## 2024-03-05 DIAGNOSIS — Z98.84 BARIATRIC SURGERY STATUS: ICD-10-CM

## 2024-03-05 DIAGNOSIS — K91.2 POSTSURGICAL MALABSORPTION: ICD-10-CM

## 2024-03-05 RX ORDER — ERGOCALCIFEROL 1.25 MG/1
50000 CAPSULE ORAL 2 TIMES WEEKLY
Qty: 24 CAPSULE | Refills: 0 | OUTPATIENT
Start: 2024-03-07

## 2024-03-05 NOTE — TELEPHONE ENCOUNTER
Spoke to Pt spouse, let him know she need to do lab work before provider sent for refill  for Vitamin D .

## 2024-03-31 DIAGNOSIS — E78.2 MIXED HYPERLIPIDEMIA: ICD-10-CM

## 2024-03-31 DIAGNOSIS — Z98.84 BARIATRIC SURGERY STATUS: ICD-10-CM

## 2024-04-01 RX ORDER — OMEPRAZOLE 20 MG/1
20 CAPSULE, DELAYED RELEASE ORAL 2 TIMES DAILY
Qty: 180 CAPSULE | Refills: 1 | Status: SHIPPED | OUTPATIENT
Start: 2024-04-01

## 2024-04-01 RX ORDER — ATORVASTATIN CALCIUM 20 MG/1
TABLET, FILM COATED ORAL
Qty: 90 TABLET | Refills: 0 | Status: SHIPPED | OUTPATIENT
Start: 2024-04-01

## 2024-06-13 DIAGNOSIS — R73.03 PREDIABETES: ICD-10-CM

## 2024-06-14 RX ORDER — METFORMIN HYDROCHLORIDE 500 MG/1
TABLET, EXTENDED RELEASE ORAL
Qty: 180 TABLET | Refills: 0 | Status: SHIPPED | OUTPATIENT
Start: 2024-06-14

## 2024-06-17 ENCOUNTER — OFFICE VISIT (OUTPATIENT)
Dept: PLASTIC SURGERY | Facility: CLINIC | Age: 64
End: 2024-06-17

## 2024-06-17 ENCOUNTER — TELEPHONE (OUTPATIENT)
Dept: BARIATRICS | Facility: CLINIC | Age: 64
End: 2024-06-17

## 2024-06-17 ENCOUNTER — TELEPHONE (OUTPATIENT)
Age: 64
End: 2024-06-17

## 2024-06-17 DIAGNOSIS — Z98.890 S/P COSMETIC PLASTIC SURGERY: Primary | ICD-10-CM

## 2024-06-17 DIAGNOSIS — E78.2 MIXED HYPERLIPIDEMIA: ICD-10-CM

## 2024-06-17 PROCEDURE — RECHECK: Performed by: PHYSICIAN ASSISTANT

## 2024-06-17 NOTE — TELEPHONE ENCOUNTER
Pt's spouse Bhavin calling for follow up from [facility] surgery March 30 2023    Pt is experiencing a lot of pain and would like to be seen ASAP    Placed Bhavin on hold to obtain permission to schedule with another provider, possibly today    Lost call, attempted to call him back, it went straight to     Gayathri advised she would have Tyleen call them back to schedule, en espanol

## 2024-06-17 NOTE — PROGRESS NOTES
Patient is a 63-year-old female who is status post [id number] panniculectomy and lipoabdominoplasty with Dr. Rivera on 3/30/2023.  Postoperative course was complicated by abdominal wound dehiscence requiring wound VAC which has subsequently been removed.  Patient also with recurrent muscular abdominal pain.     Patient again comes to the office today with concerns for uncontrolled abdominal pain.  This is unchanged from previous.  No abnormal findings on physical exam.  Will have patient return to the office later this week with Dr. Rivera is in the office for reevaluation.

## 2024-06-17 NOTE — TELEPHONE ENCOUNTER
Ricarda,    To confirm your scheduled appointments for EGD on 7/3/24 (the hospital calls you afternoon before to provide time) and EGD Review on 7/18/24 at 10:15 AM with Dr Diaz at the [address] [place] [address], [address] , [place] location.      Date of Procedure:    Wednesday, 7/3/24      UPPER ENDOSCOPY INSTRUCTIONS (EGD)  TIME: The hospital will call you the afternoon before your procedure with an arrival time   LOCATION: [address] [place] [address]    REQUIREMENTS: You must have a ; otherwise, your procedure will need to be rescheduled. You will need to provide your drivers contact information upon arrival at the hospital this includes, name, relation, phone #, distance.   NO professional driving service is allowed.  **This includes Uber, Lyft, Taxi Service, and Public Transportation (bus) unless accompanied by a friend or family member**    FASTING: Please make sure you have nothing to eat or drink after midnight. This includes gum, hard candy, mints and water.    DURATION: You will be at the hospital about 2 ½- 3 hours total.    MEDICATIONS:  If you take any DIABETIC Medications or any DIURETICS please hold until after the procedure. If you take any blood thinners such as Coumadin, Plavix, Effient, Savaysa, Brilanta, Pradaxa, Eliquis, Xarelto, please consult with your ordering physician as we request that you hold any of these medications for 7 days.     If you take any weight loss medication, please stop 7 days prior to EGD date.    IF YOU TAKE BLOOD PRESSURE MEDICATION, PLEASE TAKE IT IN THE MORNING WITH A SIP OF WATER

## 2024-06-18 RX ORDER — ATORVASTATIN CALCIUM 20 MG/1
TABLET, FILM COATED ORAL
Qty: 90 TABLET | Refills: 0 | Status: SHIPPED | OUTPATIENT
Start: 2024-06-18

## 2024-06-21 ENCOUNTER — OFFICE VISIT (OUTPATIENT)
Dept: PLASTIC SURGERY | Facility: CLINIC | Age: 64
End: 2024-06-21
Payer: COMMERCIAL

## 2024-06-21 VITALS
HEART RATE: 70 BPM | WEIGHT: 139.06 LBS | DIASTOLIC BLOOD PRESSURE: 95 MMHG | SYSTOLIC BLOOD PRESSURE: 153 MMHG | HEIGHT: 65 IN | TEMPERATURE: 97.9 F | BODY MASS INDEX: 23.17 KG/M2

## 2024-06-21 DIAGNOSIS — L90.5 SYMPTOMATIC SCAR OF SKIN: Primary | ICD-10-CM

## 2024-06-21 PROCEDURE — 99212 OFFICE O/P EST SF 10 MIN: CPT | Performed by: PHYSICIAN ASSISTANT

## 2024-06-21 NOTE — PROGRESS NOTES
H&P Exam - Ricarda Selby 64 y.o. female MRN: [id number]    Unit/Bed#:  Encounter: [id number]    Assessment/ Plan:    Patient is a 63-year-old female who is status post [id number] panniculectomy and lipoabdominoplasty with Dr. Rivera on 3/30/2023.  Postoperative course was complicated by abdominal wound dehiscence requiring wound VAC which has subsequently been removed.  Patient also with recurrent muscular abdominal pain.      Dr. Rivera was also present during the visit today. He discussed scar revision of vertical and horizontal abdominal scar with complex closure.  Patient understood and agreed.  This will be done under general anesthesia.  Discussed options, including forgoing surgery, as well as benefits and risks of surgery including but not limited to anesthesia, bleeding, infection, scarring and potential need for additional procedures.  Consent was obtained and all questions answered to their satisfaction.  We will plan for surgery at their earliest convenience.        History of Present Illness     Patient again describes a cramping, bulging pain of the abdomen.  She was examined by Dr. Rivera.  We will proceed with surgical scar revision.    Review of Systems  A 12 point review of systems was completed and is negative except as per HPI    Historical Information   Past Medical History:   Diagnosis Date    Anastomotic stricture of gastrojejunostomy     s/p gastric bypass    Anemia     iron deficiency resolved 11/14/14    Anxiety     resolved 6/7/17    Class 1 obesity due to excess calories with serious comorbidity and body mass index (BMI) of 30.0 to 30.9 in adult 09/18/2020    Degenerative joint disease of ankle and foot, unspecified laterality     last assessed 5/17/13    Depression     last assessed 11/7/12    Diabetes mellitus (HCC)     type 2 uncomplicated, controlled - last assessed 1/31/14    Dysphagia     Gastric ulcer     History of melena     History of transfusion     Hyperlipidemia     Hypertension      Insomnia     Insomnia     Obesity     resolved 11/14/14    Overweight (BMI 25.0-29.9) 05/13/2021    Postgastrectomy malabsorption     Tachycardia     Ventricular pre-excitation syndrome     Vitamin D deficiency      Past Surgical History:   Procedure Laterality Date    CARDIAC CATHETERIZATION      post proc data:____ lesions successfully dilated    COLONOSCOPY      ESOPHAGOGASTRODUODENOSCOPY      GASTRIC BYPASS  02/08/2011    for morbid obesity     HYSTERECTOMY      @ age 35    OOPHORECTOMY Bilateral     @ age 35    HI EGD TRANSORAL BIOPSY SINGLE/MULTIPLE N/A 1/25/2017    Procedure: ESOPHAGOGASTRODUODENOSCOPY (EGD);  Surgeon: Washington Diaz MD;  Location: [facility];  Service: Bariatrics    HI EGD TRANSORAL BIOPSY SINGLE/MULTIPLE N/A 5/3/2017    Procedure: ESOPHAGOGASTRODUODENOSCOPY (EGD) with balloon dilatation;  Surgeon: Washington Diaz MD;  Location: [facility];  Service: Bariatrics    HI EGD TRANSORAL BIOPSY SINGLE/MULTIPLE N/A 8/16/2017    Procedure: ESOPHAGOGASTRODUODENOSCOPY (EGD) with balloon dilation;  Surgeon: Washington Diaz MD;  Location: [facility];  Service: Bariatrics    HI EXCISION EXCESSIVE SKIN & SUBQ TISSUE ABDOMEN N/A 3/30/2023    Procedure: LIPO ABDOMINOPLASTY;  Surgeon: Xander Rivera MD;  Location:  MAIN OR;  Service: Plastics    HI EXCISION SKIN ABD INFRAUMBILICAL PANNICULECTOMY N/A 3/30/2023    Procedure: JENNY WINSTON PANNICULECTOMY;  Surgeon: Xander Rivera MD;  Location:  MAIN OR;  Service: Plastics    UPPER GASTROINTESTINAL ENDOSCOPY       Social History   Social History     Substance and Sexual Activity   Alcohol Use Never     Social History     Substance and Sexual Activity   Drug Use Never     Social History     Tobacco Use   Smoking Status Never    Passive exposure: Never   Smokeless Tobacco Never     E-Cigarette Use: Never User     E-Cigarette/Vaping Substances    Nicotine No     THC No     CBD No     Flavoring No     Other No     Unknown No        Family History:  "non-contributory    Meds/Allergies   all medications and allergies reviewed  No Known Allergies    Objective   First Vitals:       Current Vitals:   Blood Pressure: 153/95 (06/21/24 1357)  Pulse: 70 (06/21/24 1357)  Temperature: 97.9 °F (36.6 °C) (06/21/24 1357)  Height: 5' 5\" (165.1 cm) (06/21/24 1357)  Weight - Scale: 63.1 kg (139 lb 1 oz) (06/21/24 1357)    [unfilled]    Invasive Devices       None                   Physical Exam  Vitals and nursing note reviewed.   Constitutional:       General: She is not in acute distress.     Appearance: Normal appearance. She is normal weight. She is not ill-appearing or toxic-appearing.   HENT:      Head: Normocephalic and atraumatic.      Nose: Nose normal.      Mouth/Throat:      Mouth: Mucous membranes are moist.   Eyes:      General:         Right eye: No discharge.         Left eye: No discharge.      Extraocular Movements: Extraocular movements intact.      Conjunctiva/sclera: Conjunctivae normal.      Pupils: Pupils are equal, round, and reactive to light.   Cardiovascular:      Rate and Rhythm: Normal rate and regular rhythm.      Pulses: Normal pulses.      Heart sounds: Normal heart sounds.   Pulmonary:      Effort: Pulmonary effort is normal. No respiratory distress.      Breath sounds: Normal breath sounds.   Abdominal:      General: Abdomen is flat.      Palpations: Abdomen is soft.   Musculoskeletal:         General: No swelling, tenderness, deformity or signs of injury. Normal range of motion.      Right lower leg: No edema.      Left lower leg: No edema.   Skin:     General: Skin is warm and dry.      Comments: See HPI    Neurological:      General: No focal deficit present.      Mental Status: She is alert and oriented to person, place, and time.      Cranial Nerves: No cranial nerve deficit.      Sensory: No sensory deficit.      Motor: No weakness.   Psychiatric:         Mood and Affect: Mood normal.         Behavior: Behavior normal.           "

## 2024-06-24 ENCOUNTER — APPOINTMENT (OUTPATIENT)
Dept: LAB | Facility: CLINIC | Age: 64
End: 2024-06-24
Payer: COMMERCIAL

## 2024-06-24 DIAGNOSIS — R79.0 LOW FERRITIN: ICD-10-CM

## 2024-06-24 DIAGNOSIS — Z98.84 BARIATRIC SURGERY STATUS: ICD-10-CM

## 2024-06-24 DIAGNOSIS — E55.9 VITAMIN D DEFICIENCY: ICD-10-CM

## 2024-06-24 DIAGNOSIS — R79.89 HIGH SERUM PARATHYROID HORMONE (PTH): ICD-10-CM

## 2024-06-24 DIAGNOSIS — I10 PRIMARY HYPERTENSION: ICD-10-CM

## 2024-06-24 DIAGNOSIS — K91.2 POSTSURGICAL MALABSORPTION: ICD-10-CM

## 2024-06-24 LAB
25(OH)D3 SERPL-MCNC: 24.7 NG/ML (ref 30–100)
CALCIUM SERPL-MCNC: 8.4 MG/DL (ref 8.4–10.2)
ERYTHROCYTE [DISTWIDTH] IN BLOOD BY AUTOMATED COUNT: 13.2 % (ref 11.6–15.1)
FERRITIN SERPL-MCNC: 16 NG/ML (ref 11–307)
HCT VFR BLD AUTO: 33.3 % (ref 34.8–46.1)
HGB BLD-MCNC: 10.3 G/DL (ref 11.5–15.4)
IRON SATN MFR SERPL: 13 % (ref 15–50)
IRON SERPL-MCNC: 33 UG/DL (ref 50–212)
MCH RBC QN AUTO: 29.3 PG (ref 26.8–34.3)
MCHC RBC AUTO-ENTMCNC: 30.9 G/DL (ref 31.4–37.4)
MCV RBC AUTO: 95 FL (ref 82–98)
PLATELET # BLD AUTO: 315 THOUSANDS/UL (ref 149–390)
PMV BLD AUTO: 11.1 FL (ref 8.9–12.7)
PTH-INTACT SERPL-MCNC: 98.4 PG/ML (ref 12–88)
RBC # BLD AUTO: 3.51 MILLION/UL (ref 3.81–5.12)
TIBC SERPL-MCNC: 256 UG/DL (ref 250–450)
UIBC SERPL-MCNC: 223 UG/DL (ref 155–355)
WBC # BLD AUTO: 5.77 THOUSAND/UL (ref 4.31–10.16)

## 2024-06-24 PROCEDURE — 83540 ASSAY OF IRON: CPT

## 2024-06-24 PROCEDURE — 82728 ASSAY OF FERRITIN: CPT

## 2024-06-24 PROCEDURE — 85027 COMPLETE CBC AUTOMATED: CPT

## 2024-06-24 PROCEDURE — 82306 VITAMIN D 25 HYDROXY: CPT

## 2024-06-24 PROCEDURE — 36415 COLL VENOUS BLD VENIPUNCTURE: CPT

## 2024-06-24 PROCEDURE — 83550 IRON BINDING TEST: CPT

## 2024-06-24 PROCEDURE — 83970 ASSAY OF PARATHORMONE: CPT

## 2024-06-24 RX ORDER — LOSARTAN POTASSIUM AND HYDROCHLOROTHIAZIDE 25; 100 MG/1; MG/1
TABLET ORAL
Qty: 90 TABLET | Refills: 1 | Status: SHIPPED | OUTPATIENT
Start: 2024-06-24 | End: 2024-06-27 | Stop reason: SDUPTHER

## 2024-06-27 ENCOUNTER — OFFICE VISIT (OUTPATIENT)
Dept: INTERNAL MEDICINE CLINIC | Facility: CLINIC | Age: 64
End: 2024-06-27

## 2024-06-27 VITALS
HEIGHT: 65 IN | DIASTOLIC BLOOD PRESSURE: 83 MMHG | WEIGHT: 134.5 LBS | OXYGEN SATURATION: 95 % | BODY MASS INDEX: 22.41 KG/M2 | SYSTOLIC BLOOD PRESSURE: 131 MMHG | TEMPERATURE: 97.9 F | HEART RATE: 69 BPM

## 2024-06-27 DIAGNOSIS — I10 ESSENTIAL HYPERTENSION: ICD-10-CM

## 2024-06-27 DIAGNOSIS — I10 PRIMARY HYPERTENSION: ICD-10-CM

## 2024-06-27 DIAGNOSIS — T81.89XD NONHEALING SURGICAL WOUND, SUBSEQUENT ENCOUNTER: ICD-10-CM

## 2024-06-27 DIAGNOSIS — E65 ABDOMINAL PANNUS: ICD-10-CM

## 2024-06-27 DIAGNOSIS — K91.2 POSTGASTRECTOMY MALABSORPTION: ICD-10-CM

## 2024-06-27 DIAGNOSIS — Z98.84 BARIATRIC SURGERY STATUS: ICD-10-CM

## 2024-06-27 DIAGNOSIS — Z90.3 POSTGASTRECTOMY MALABSORPTION: ICD-10-CM

## 2024-06-27 DIAGNOSIS — R73.03 PREDIABETES: ICD-10-CM

## 2024-06-27 DIAGNOSIS — D64.9 NORMOCYTIC ANEMIA: ICD-10-CM

## 2024-06-27 DIAGNOSIS — Z98.890 S/P PANNICULECTOMY: ICD-10-CM

## 2024-06-27 DIAGNOSIS — K91.2 POSTSURGICAL MALABSORPTION: ICD-10-CM

## 2024-06-27 DIAGNOSIS — E11.9 TYPE 2 DIABETES MELLITUS WITHOUT COMPLICATION, WITHOUT LONG-TERM CURRENT USE OF INSULIN (HCC): ICD-10-CM

## 2024-06-27 DIAGNOSIS — E55.9 VITAMIN D DEFICIENCY: ICD-10-CM

## 2024-06-27 DIAGNOSIS — R79.89 HIGH SERUM PARATHYROID HORMONE (PTH): ICD-10-CM

## 2024-06-27 DIAGNOSIS — M81.0 AGE-RELATED OSTEOPOROSIS WITHOUT CURRENT PATHOLOGICAL FRACTURE: ICD-10-CM

## 2024-06-27 DIAGNOSIS — Z98.84 H/O BARIATRIC SURGERY: Primary | ICD-10-CM

## 2024-06-27 DIAGNOSIS — Z12.31 ENCOUNTER FOR SCREENING MAMMOGRAM FOR BREAST CANCER: ICD-10-CM

## 2024-06-27 LAB — SL AMB POCT HEMOGLOBIN AIC: 5.6 (ref ?–6.5)

## 2024-06-27 PROCEDURE — 83036 HEMOGLOBIN GLYCOSYLATED A1C: CPT | Performed by: HOSPITALIST

## 2024-06-27 PROCEDURE — 99214 OFFICE O/P EST MOD 30 MIN: CPT | Performed by: HOSPITALIST

## 2024-06-27 RX ORDER — GABAPENTIN 100 MG/1
300 CAPSULE ORAL 3 TIMES DAILY
Qty: 90 CAPSULE | Refills: 0 | Status: SHIPPED | OUTPATIENT
Start: 2024-06-27

## 2024-06-27 RX ORDER — METHOCARBAMOL 500 MG/1
500 TABLET, FILM COATED ORAL 3 TIMES DAILY
Qty: 30 TABLET | Refills: 0 | Status: SHIPPED | OUTPATIENT
Start: 2024-06-27

## 2024-06-27 RX ORDER — METFORMIN HYDROCHLORIDE 500 MG/1
500 TABLET, EXTENDED RELEASE ORAL
Qty: 180 TABLET | Refills: 0 | Status: SHIPPED | OUTPATIENT
Start: 2024-06-27

## 2024-06-27 RX ORDER — ACETAMINOPHEN 500 MG
500 TABLET ORAL EVERY 4 HOURS PRN
Qty: 30 TABLET | Refills: 2 | Status: SHIPPED | OUTPATIENT
Start: 2024-06-27

## 2024-06-27 RX ORDER — METOPROLOL SUCCINATE 25 MG/1
25 TABLET, EXTENDED RELEASE ORAL 2 TIMES DAILY
Qty: 180 TABLET | Refills: 0 | Status: SHIPPED | OUTPATIENT
Start: 2024-06-27

## 2024-06-27 RX ORDER — FERROUS SULFATE 324(65)MG
324 TABLET, DELAYED RELEASE (ENTERIC COATED) ORAL
Qty: 14 TABLET | Refills: 0 | Status: SHIPPED | OUTPATIENT
Start: 2024-06-27 | End: 2024-07-11

## 2024-06-27 RX ORDER — LOSARTAN POTASSIUM AND HYDROCHLOROTHIAZIDE 25; 100 MG/1; MG/1
1 TABLET ORAL DAILY
Qty: 90 TABLET | Refills: 1 | Status: SHIPPED | OUTPATIENT
Start: 2024-06-27

## 2024-06-27 RX ORDER — CHOLECALCIFEROL (VITAMIN D3) 125 MCG
4000 CAPSULE ORAL DAILY
Qty: 180 TABLET | Refills: 1 | Status: SHIPPED | OUTPATIENT
Start: 2024-06-27

## 2024-06-27 NOTE — PROGRESS NOTES
[facility]  INTERNAL MEDICINE OFFICE VISIT     PATIENT INFORMATION     Ricarda Selby   64 y.o. female   MRN: [id number]    ASSESSMENT/PLAN     Problem List Items Addressed This Visit       Primary hypertension    Relevant Medications    losartan-hydrochlorothiazide (HYZAAR) 100-25 MG per tablet    metoprolol succinate (TOPROL-XL) 25 mg 24 hr tablet    Postgastrectomy malabsorption    Vitamin D deficiency    Relevant Medications    Cholecalciferol (Vitamin D3) 50 MCG (2000 UT) TABS    Prediabetes    Relevant Medications    metFORMIN (GLUCOPHAGE-XR) 500 mg 24 hr tablet    H/O bariatric surgery - Primary    Relevant Medications    ferrous sulfate 324 (65 Fe) mg    Abdominal pannus    Relevant Medications    acetaminophen (TYLENOL) 500 mg tablet    Osteoporosis    S/P panniculectomy    Relevant Medications    gabapentin (NEURONTIN) 100 mg capsule    Type 2 diabetes mellitus without complication, without long-term current use of insulin (HCC)    Relevant Medications    metFORMIN (GLUCOPHAGE-XR) 500 mg 24 hr tablet    Other Relevant Orders    Albumin / creatinine urine ratio    Comprehensive metabolic panel    POCT hemoglobin A1c (Completed)    Normocytic anemia    Relevant Medications    ferrous sulfate 324 (65 Fe) mg    Nonhealing surgical wound, subsequent encounter    Relevant Medications    methocarbamol (ROBAXIN) 500 mg tablet     Other Visit Diagnoses       Bariatric surgery status        Postsurgical malabsorption        High serum parathyroid hormone (PTH)        Essential hypertension        Relevant Medications    losartan-hydrochlorothiazide (HYZAAR) 100-25 MG per tablet    metoprolol succinate (TOPROL-XL) 25 mg 24 hr tablet    Encounter for screening mammogram for breast cancer        Relevant Orders    Mammo screening bilateral w 3d & cad          Schedule a follow-up appointment in 6 months for annual.    Screening  -Due for DXA and mammogram    2. T2DM, improved A1C to  5.6    -Continue lifestyle modifications  -Continue Metformin 500 mg daily  -Alb/cr ratio, diabetic foot exam on annual  -Check kidney function    3. Carline En Y Bypass complicated by malabsorption, [id number] panniculectomy and lipoabdominoplasty complicated by wound dehiscence with VAC    -Continue follow up with surgical team  -Vitamin D, calcium, and iron supplementation  -Continue robaxin and tylenol for pain  -Prilosec 20 mg BID    4. PAF with Hx of WPW s/p ablation  5. HTN  6. HLD  Recent 2023 Holter    -Continue Cardiology follow up in August  -Continue metoprolol  -Continue lipitor  -Losartan -HCTZ 100-25 mg      HEALTH MAINTENANCE     Immunization History   Administered Date(s) Administered    COVID-19 MODERNA VACC 0.5 ML IM 04/15/2021, 12/06/2021    COVID-19 Moderna Vac BIVALENT 12 Yr+ IM 0.5 ML 11/26/2022    COVID-19 Moderna mRNA Vaccine 12 Yr+ 50 mcg/0.5 mL (Spikevax) 12/06/2023    Hep B, adult 06/01/2022, 08/01/2022, 12/19/2022    INFLUENZA 10/08/2015, 10/07/2016, 12/26/2017    Influenza Quadrivalent Preservative Free 3 years and older IM 10/07/2016    Influenza Quadrivalent, 6-35 Months IM 12/26/2017    Influenza, recombinant, quadrivalent,injectable, preservative free 03/11/2020, 09/18/2020, 11/15/2021, 11/11/2022    Influenza, seasonal, injectable 01/31/2014, 11/14/2014, 10/08/2015    Pneumococcal Conjugate Vaccine 20-valent (Pcv20), Polysace 11/11/2022    Pneumococcal Polysaccharide PPV23 01/31/2014    Tdap 03/11/2020    Tuberculin Skin Test-PPD Intradermal 03/24/2015     CHIEF COMPLAINT     Chief Complaint   Patient presents with    Follow-up     Pain in abdomen 10 pain scale      HISTORY OF PRESENT ILLNESS     64 yr old F with PMH Carline En Y Bypass complicated by malabsorption, [id number] panniculectomy and lipoabdominoplasty complicated by wound dehiscence with VAC, T2DM, HTN, Hx WPW PAF,PAD, osteoporosis on Zoledronic presents for follow up.    Patient reports ongoing nonspecific abd pain,  "near the surgical site and wrapping around, somewhat muscular.  States the pain is about a 5 out of 10.  Otherwise patient denies nausea or vomiting, melena or hematochezia, constipation or diarrhea.  Patient follows with surgery and plastics since complications of her previous surgery with malabsorption.  Planned for EGD dilation on July 3.  Follows with cardiology with next appointment in August.  Continues to take metoprolol, but denies palpitations or chest pain.        REVIEW OF SYSTEMS     Review of Systems   Cardiovascular:  Negative for chest pain, palpitations and leg swelling.   Gastrointestinal:  Positive for abdominal pain. Negative for blood in stool, constipation, diarrhea, nausea, rectal pain and vomiting.   All other systems reviewed and are negative.    OBJECTIVE     Vitals:    06/27/24 0921   BP: 131/83   BP Location: Right arm   Patient Position: Sitting   Cuff Size: Standard   Pulse: 69   Temp: 97.9 °F (36.6 °C)   TempSrc: Temporal   SpO2: 95%   Weight: 61 kg (134 lb 8 oz)   Height: 5' 5\" (1.651 m)     Physical Exam  Vitals reviewed.   Constitutional:       General: She is not in acute distress.  HENT:      Head: Normocephalic and atraumatic.   Eyes:      Conjunctiva/sclera: Conjunctivae normal.   Cardiovascular:      Rate and Rhythm: Normal rate and regular rhythm.      Pulses: Normal pulses.      Heart sounds: Normal heart sounds.   Pulmonary:      Effort: Pulmonary effort is normal.      Breath sounds: Normal breath sounds.   Abdominal:      General: Bowel sounds are normal. There is no distension.      Palpations: Abdomen is soft.      Tenderness: There is abdominal tenderness (mild diffuse).      Comments: Healed midline surgical scar with horizontal scar from previous dehiscence   Musculoskeletal:         General: Normal range of motion.   Neurological:      Mental Status: She is alert and oriented to person, place, and time.       CURRENT MEDICATIONS     Current Outpatient Medications:     " acetaminophen (TYLENOL) 500 mg tablet, Take 1 tablet (500 mg total) by mouth every 4 (four) hours as needed for mild pain or moderate pain, Disp: 30 tablet, Rfl: 2    Cholecalciferol (Vitamin D3) 50 MCG (2000 UT) TABS, Take 2 tablets (4,000 Units total) by mouth daily, Disp: 180 tablet, Rfl: 1    ferrous sulfate 324 (65 Fe) mg, Take 1 tablet (324 mg total) by mouth daily before breakfast for 14 days, Disp: 14 tablet, Rfl: 0    gabapentin (NEURONTIN) 100 mg capsule, Take 3 capsules (300 mg total) by mouth 3 (three) times a day, Disp: 90 capsule, Rfl: 0    losartan-hydrochlorothiazide (HYZAAR) 100-25 MG per tablet, Take 1 tablet by mouth daily, Disp: 90 tablet, Rfl: 1    metFORMIN (GLUCOPHAGE-XR) 500 mg 24 hr tablet, Take 1 tablet (500 mg total) by mouth daily with breakfast, Disp: 180 tablet, Rfl: 0    methocarbamol (ROBAXIN) 500 mg tablet, Take 1 tablet (500 mg total) by mouth 3 (three) times a day, Disp: 30 tablet, Rfl: 0    metoprolol succinate (TOPROL-XL) 25 mg 24 hr tablet, Take 1 tablet (25 mg total) by mouth 2 (two) times a day, Disp: 180 tablet, Rfl: 0    atorvastatin (LIPITOR) 20 mg tablet, Take 1 tablet by mouth once daily, Disp: 90 tablet, Rfl: 0    Calcitrate 950 (200 Ca) MG tablet, Take 1 tablet by mouth once daily, Disp: 90 tablet, Rfl: 0    docusate sodium (COLACE) 100 mg capsule, Take 1 capsule (100 mg total) by mouth 2 (two) times a day, Disp: 20 capsule, Rfl: 1    ergocalciferol (VITAMIN D2) 50,000 units, Take 1 capsule (50,000 Units total) by mouth 2 (two) times a week, Disp: 24 capsule, Rfl: 0    IRON, FERROUS GLUCONATE, PO, Take 1 tablet by mouth daily, Disp: , Rfl:     Multiple Vitamins-Minerals (ZINC PO), Take by mouth, Disp: , Rfl:     nystatin (MYCOSTATIN) powder, Apply topically 3 (three) times a day, Disp: 60 g, Rfl: 3    Omega-3 Fatty Acids (FISH OIL) 1,000 mg, Take 1,000 mg by mouth 2 (two) times a day, Disp: , Rfl:     omeprazole (PriLOSEC) 20 mg delayed release capsule, Take 1 capsule  by mouth twice daily, Disp: 180 capsule, Rfl: 1    ondansetron (ZOFRAN) 4 mg tablet, Take 1 tablet (4 mg total) by mouth every 8 (eight) hours as needed for nausea or vomiting (Patient not taking: Reported on 6/21/2024), Disp: 20 tablet, Rfl: 0    VITAMIN E PO, Take 1 tablet by mouth daily, Disp: , Rfl:     Current Facility-Administered Medications:     zoledronic acid (RECLAST) IV infusion (premix) 5 mg, 5 mg, Intravenous, Once, Ricarda Goodwin MD    PAST MEDICAL & SURGICAL HISTORY     Past Medical History:   Diagnosis Date    Anastomotic stricture of gastrojejunostomy     s/p gastric bypass    Anemia     iron deficiency resolved 11/14/14    Anxiety     resolved 6/7/17    Class 1 obesity due to excess calories with serious comorbidity and body mass index (BMI) of 30.0 to 30.9 in adult 09/18/2020    Degenerative joint disease of ankle and foot, unspecified laterality     last assessed 5/17/13    Depression     last assessed 11/7/12    Diabetes mellitus (HCC)     type 2 uncomplicated, controlled - last assessed 1/31/14    Dysphagia     Gastric ulcer     History of melena     History of transfusion     Hyperlipidemia     Hypertension     Insomnia     Insomnia     Obesity     resolved 11/14/14    Overweight (BMI 25.0-29.9) 05/13/2021    Postgastrectomy malabsorption     Tachycardia     Ventricular pre-excitation syndrome     Vitamin D deficiency      Past Surgical History:   Procedure Laterality Date    CARDIAC CATHETERIZATION      post proc data:____ lesions successfully dilated    COLONOSCOPY      ESOPHAGOGASTRODUODENOSCOPY      GASTRIC BYPASS  02/08/2011    for morbid obesity     HYSTERECTOMY      @ age 35    OOPHORECTOMY Bilateral     @ age 35    DE EGD TRANSORAL BIOPSY SINGLE/MULTIPLE N/A 1/25/2017    Procedure: ESOPHAGOGASTRODUODENOSCOPY (EGD);  Surgeon: Washington Diaz MD;  Location: [facility];  Service: Bariatrics    DE EGD TRANSORAL BIOPSY SINGLE/MULTIPLE N/A 5/3/2017    Procedure: ESOPHAGOGASTRODUODENOSCOPY  (EGD) with balloon dilatation;  Surgeon: Washington Diaz MD;  [facility]: [facility];  Service: Bariatrics    HI EGD TRANSORAL BIOPSY SINGLE/MULTIPLE N/A 8/16/2017    Procedure: ESOPHAGOGASTRODUODENOSCOPY (EGD) with balloon dilation;  Surgeon: Washington Diaz MD;  Location: [facility];  Service: Bariatrics    HI EXCISION EXCESSIVE SKIN & SUBQ TISSUE ABDOMEN N/A 3/30/2023    Procedure: LIPO ABDOMINOPLASTY;  Surgeon: Xander Rivera MD;  Location:  MAIN OR;  Service: Plastics    HI EXCISION SKIN ABD INFRAUMBILICAL PANNICULECTOMY N/A 3/30/2023    Procedure: JENNY DE LIS PANNICULECTOMY;  Surgeon: Xander Rivera MD;  Location:  MAIN OR;  Service: Plastics    UPPER GASTROINTESTINAL ENDOSCOPY       SOCIAL & FAMILY HISTORY     Social History     Socioeconomic History    Marital status: /Civil Union     Spouse name: Not on file    Number of children: Not on file    Years of education: Not on file    Highest education level: Not on file   Occupational History    Not on file   Tobacco Use    Smoking status: Never     Passive exposure: Never    Smokeless tobacco: Never   Vaping Use    Vaping status: Never Used   Substance and Sexual Activity    Alcohol use: Never    Drug use: Never    Sexual activity: Not Currently   Other Topics Concern    Not on file   Social History Narrative    Not on file     Social Determinants of Health     Financial Resource Strain: Low Risk  (6/27/2024)    Overall Financial Resource Strain (CARDIA)     Difficulty of Paying Living Expenses: Not very hard   Food Insecurity: No Food Insecurity (6/27/2024)    Hunger Vital Sign     Worried About Running Out of Food in the Last Year: Never true     Ran Out of Food in the Last Year: Never true   Transportation Needs: No Transportation Needs (6/27/2024)    PRAPARE - Transportation     Lack of Transportation (Medical): No     Lack of Transportation (Non-Medical): No   Physical Activity: Inactive (2/8/2022)    Exercise Vital Sign     Days of Exercise  per Week: 0 days     Minutes of Exercise per Session: 0 min   Stress: No Stress Concern Present (3/11/2020)    [employer] - Occupational Stress Questionnaire     Feeling of Stress : Not at all   Social Connections: Not on file   Intimate Partner Violence: Not on file   Housing Stability: Low Risk  (6/27/2024)    Housing Stability Vital Sign     Unable to Pay for Housing in the Last Year: No     Number of Times Moved in the Last Year: 1     Homeless in the Last Year: No     Social History     Substance and Sexual Activity   Alcohol Use Never       Social History     Substance and Sexual Activity   Drug Use Never     Social History     Tobacco Use   Smoking Status Never    Passive exposure: Never   Smokeless Tobacco Never     Family History   Problem Relation Age of Onset    Congenital heart disease Mother     Asthma Father     Heart attack Sister         acute MI    Congenital heart disease Sister     Hypertension Sister     No Known Problems Maternal Grandmother     No Known Problems Maternal Grandfather     No Known Problems Paternal Grandmother     No Known Problems Maternal Aunt     No Known Problems Maternal Aunt     No Known Problems Maternal Aunt     Breast cancer Cousin         ? age           Depression Screening and Follow-up Plan: Patient was screened for depression during today's encounter. They screened negative with a PHQ-2 score of 0.         ==  Kranthi Napier MD  PGY-2  [facility]    [address], [address]  [place] [address]  Office: [phone]  Fax: [phone]

## 2024-06-28 ENCOUNTER — PREP FOR PROCEDURE (OUTPATIENT)
Dept: BARIATRICS | Facility: CLINIC | Age: 64
End: 2024-06-28

## 2024-06-28 DIAGNOSIS — Z98.84 BARIATRIC SURGERY STATUS: Primary | ICD-10-CM

## 2024-07-01 ENCOUNTER — TELEPHONE (OUTPATIENT)
Dept: BARIATRICS | Facility: CLINIC | Age: 64
End: 2024-07-01

## 2024-07-01 RX ORDER — SODIUM CHLORIDE 9 MG/ML
125 INJECTION, SOLUTION INTRAVENOUS CONTINUOUS
Status: CANCELLED | OUTPATIENT
Start: 2024-07-01

## 2024-07-03 ENCOUNTER — ANESTHESIA (OUTPATIENT)
Dept: GASTROENTEROLOGY | Facility: HOSPITAL | Age: 64
End: 2024-07-03

## 2024-07-03 ENCOUNTER — ANESTHESIA EVENT (OUTPATIENT)
Dept: GASTROENTEROLOGY | Facility: HOSPITAL | Age: 64
End: 2024-07-03

## 2024-07-03 ENCOUNTER — HOSPITAL ENCOUNTER (OUTPATIENT)
Dept: GASTROENTEROLOGY | Facility: HOSPITAL | Age: 64
Setting detail: OUTPATIENT SURGERY
Discharge: HOME | End: 2024-07-03
Attending: SURGERY
Payer: COMMERCIAL

## 2024-07-03 VITALS
TEMPERATURE: 97.1 F | HEART RATE: 67 BPM | WEIGHT: 132 LBS | OXYGEN SATURATION: 100 % | SYSTOLIC BLOOD PRESSURE: 144 MMHG | RESPIRATION RATE: 18 BRPM | HEIGHT: 65 IN | BODY MASS INDEX: 21.99 KG/M2 | DIASTOLIC BLOOD PRESSURE: 85 MMHG

## 2024-07-03 DIAGNOSIS — K92.9 GASTRIC ANASTOMOTIC STRICTURE: Primary | ICD-10-CM

## 2024-07-03 DIAGNOSIS — Z98.84 BARIATRIC SURGERY STATUS: ICD-10-CM

## 2024-07-03 DIAGNOSIS — K31.89 GASTRIC ANASTOMOTIC STRICTURE: Primary | ICD-10-CM

## 2024-07-03 LAB — GLUCOSE SERPL-MCNC: 80 MG/DL (ref 65–140)

## 2024-07-03 PROCEDURE — 88305 TISSUE EXAM BY PATHOLOGIST: CPT | Performed by: PATHOLOGY

## 2024-07-03 PROCEDURE — 82948 REAGENT STRIP/BLOOD GLUCOSE: CPT

## 2024-07-03 PROCEDURE — C1726 CATH, BAL DIL, NON-VASCULAR: HCPCS

## 2024-07-03 PROCEDURE — 43239 EGD BIOPSY SINGLE/MULTIPLE: CPT | Performed by: SURGERY

## 2024-07-03 PROCEDURE — 43245 EGD DILATE STRICTURE: CPT | Performed by: SURGERY

## 2024-07-03 RX ORDER — LIDOCAINE HYDROCHLORIDE 20 MG/ML
INJECTION, SOLUTION EPIDURAL; INFILTRATION; INTRACAUDAL; PERINEURAL AS NEEDED
Status: DISCONTINUED | OUTPATIENT
Start: 2024-07-03 | End: 2024-07-03

## 2024-07-03 RX ORDER — PROPOFOL 10 MG/ML
INJECTION, EMULSION INTRAVENOUS AS NEEDED
Status: DISCONTINUED | OUTPATIENT
Start: 2024-07-03 | End: 2024-07-03

## 2024-07-03 RX ORDER — SUCRALFATE ORAL 1 G/10ML
1 SUSPENSION ORAL 4 TIMES DAILY
Qty: 840 ML | Refills: 0 | Status: SHIPPED | OUTPATIENT
Start: 2024-07-03 | End: 2024-09-01

## 2024-07-03 RX ORDER — SODIUM CHLORIDE 9 MG/ML
125 INJECTION, SOLUTION INTRAVENOUS CONTINUOUS
Status: DISCONTINUED | OUTPATIENT
Start: 2024-07-03 | End: 2024-07-07 | Stop reason: HOSPADM

## 2024-07-03 RX ADMIN — SODIUM CHLORIDE 125 ML/HR: 0.9 INJECTION, SOLUTION INTRAVENOUS at 07:13

## 2024-07-03 RX ADMIN — PROPOFOL 150 MG: 10 INJECTION, EMULSION INTRAVENOUS at 08:13

## 2024-07-03 RX ADMIN — LIDOCAINE HYDROCHLORIDE 50 MG: 20 INJECTION, SOLUTION EPIDURAL; INFILTRATION; INTRACAUDAL at 08:13

## 2024-07-03 RX ADMIN — PROPOFOL 50 MG: 10 INJECTION, EMULSION INTRAVENOUS at 08:17

## 2024-07-03 NOTE — ANESTHESIA POSTPROCEDURE EVALUATION
Post-Op Assessment Note    CV Status:  Stable  Pain Score: 0    Pain management: adequate       Mental Status:  Alert and awake   Hydration Status:  Euvolemic   PONV Controlled:  Controlled   Airway Patency:  Patent     Post Op Vitals Reviewed: Yes    No anethesia notable event occurred.    Staff: Anesthesiologist, CRNA   Comments: Report given to recovering RN, VSS, Pt resting comfortably            /82 (07/03/24 0828)    Temp (!) 97.1 °F (36.2 °C) (07/03/24 0828)    Pulse 65 (07/03/24 0828)   Resp 16 (07/03/24 0828)    SpO2 100 % (07/03/24 0828)

## 2024-07-03 NOTE — H&P
"This is a 64 y.o. female status post Carline-En-Y Gastric Bypass with me in 02/08/2011 with hx of anastomotic stricture with dilation on 11/17/2021 and another one in 2023 to 18 mm. Presents to the office for annual visit. Overall doing fair however reporting in the past month, she experieced nausea and vomiting with epigastric pain associated with food intake. She had experienced these symptoms before in the past prior to her EGD.   Here for an EGD to evaluate the anatomy of the GI tract.    Physical Exam    /76   Pulse 58   Temp (!) 97.2 °F (36.2 °C) (Temporal)   Resp 16   Ht 5' 5\" (1.651 m)   Wt 59.9 kg (132 lb)   SpO2 99%   BMI 21.97 kg/m²    AAOx3  RRR  CTA B  Abdomen obese. Benign.      A/P:    This is a 64 y.o. female status post a gastric bypass in 2011 and history of anastomotic stricture requiring dilatation.    Will proceed with the EGD and biopsies.      Washington Diaz MD  07/03/24  8:03 AM  "

## 2024-07-03 NOTE — ANESTHESIA PREPROCEDURE EVALUATION
Procedure:  EGD    Relevant Problems   CARDIO   (+) Ascending aorta dilatation (HCC)   (+) Hypercholesterolemia   (+) Mixed hyperlipidemia   (+) Paroxysmal A-fib (HCC)   (+) Primary hypertension   (+) Ventricular pre-excitation syndrome   (+) WPW syndrome      ENDO   (+) Type 2 diabetes mellitus without complication, without long-term current use of insulin (HCC)      GI/HEPATIC   (+) H/O bariatric surgery      HEMATOLOGY   (+) Normocytic anemia        Physical Exam    Airway    Mallampati score: I  TM Distance: >3 FB  Neck ROM: full     Dental       Cardiovascular  Cardiovascular exam normal    Pulmonary  Pulmonary exam normal     Other Findings  post-pubertal.      Anesthesia Plan  ASA Score- 3     Anesthesia Type- IV sedation with anesthesia with ASA Monitors.         Additional Monitors:     Airway Plan:            Plan Factors-Exercise tolerance (METS): >4 METS.    Chart reviewed.   Existing labs reviewed. Patient summary reviewed.    Patient is not a current smoker.      Obstructive sleep apnea risk education given perioperatively.        Induction- intravenous.    Postoperative Plan-     Perioperative Resuscitation Plan - Level 1 - Full Code.       Informed Consent- Anesthetic plan and risks discussed with patient.  I personally reviewed this patient with the CRNA. Discussed and agreed on the Anesthesia Plan with the CRNA..

## 2024-07-08 PROCEDURE — 88305 TISSUE EXAM BY PATHOLOGIST: CPT | Performed by: PATHOLOGY

## 2024-07-17 ENCOUNTER — OFFICE VISIT (OUTPATIENT)
Dept: BARIATRICS | Facility: CLINIC | Age: 64
End: 2024-07-17
Payer: COMMERCIAL

## 2024-07-17 VITALS
HEART RATE: 81 BPM | SYSTOLIC BLOOD PRESSURE: 102 MMHG | TEMPERATURE: 97.8 F | WEIGHT: 135 LBS | BODY MASS INDEX: 22.49 KG/M2 | HEIGHT: 65 IN | DIASTOLIC BLOOD PRESSURE: 66 MMHG

## 2024-07-17 DIAGNOSIS — Z98.84 BARIATRIC SURGERY STATUS: Primary | ICD-10-CM

## 2024-07-17 DIAGNOSIS — K31.89 GASTRIC ANASTOMOTIC STRICTURE: ICD-10-CM

## 2024-07-17 DIAGNOSIS — K92.9 GASTRIC ANASTOMOTIC STRICTURE: ICD-10-CM

## 2024-07-17 PROCEDURE — 99213 OFFICE O/P EST LOW 20 MIN: CPT | Performed by: SURGERY

## 2024-07-17 NOTE — PROGRESS NOTES
Date of surgery: 2/8/2011  Procedure:  RNY   Performing surgeon:  Joe     Initial Weight -  257  Current Weight - 135  Total Body Weight Loss (EWL)-  121.9  EWL% -  114 %  TWB % -   47 %

## 2024-07-17 NOTE — PROGRESS NOTES
OFFICE VISIT - BARIATRIC SURGERY  Ricarda Selby 64 y.o. female MRN: [id number]  Unit/Bed#:  Encounter: [id number]      HPI:  Ricarda Selby is a 64 y.o. female status post RYGB by Dr. Diaz on 2011. Comes to the office today for review of EGD    Subjective     Prior to EGD patient had been having issues with intermittent food intolerance. She has a history of GJ stricture requiring dilation. She underwent EGD and dilation on 7/3 up to 20mm without any issues. States that since her EGD she feels better and has been tolerating food better than prior to dilation.  She is still on carafate and omeprazole    Review of Systems   All other systems reviewed and are negative.      Historical Information   Past Medical History:   Diagnosis Date    Anastomotic stricture of gastrojejunostomy     s/p gastric bypass    Anemia     iron deficiency resolved 11/14/14    Anxiety     resolved 6/7/17    Class 1 obesity due to excess calories with serious comorbidity and body mass index (BMI) of 30.0 to 30.9 in adult 09/18/2020    Degenerative joint disease of ankle and foot, unspecified laterality     last assessed 5/17/13    Depression     last assessed 11/7/12    Diabetes mellitus (HCC)     type 2 uncomplicated, controlled - last assessed 1/31/14    Dysphagia     Gastric ulcer     History of melena     History of transfusion     Hyperlipidemia     Hypertension     Insomnia     Insomnia     Obesity     resolved 11/14/14    Overweight (BMI 25.0-29.9) 05/13/2021    Postgastrectomy malabsorption     Tachycardia     Ventricular pre-excitation syndrome     Vitamin D deficiency      Past Surgical History:   Procedure Laterality Date    CARDIAC CATHETERIZATION      post proc data:____ lesions successfully dilated    COLONOSCOPY      ESOPHAGOGASTRODUODENOSCOPY      GASTRIC BYPASS  02/08/2011    for morbid obesity     HYSTERECTOMY      @ age 35    OOPHORECTOMY Bilateral     @ age 35    MO EGD TRANSORAL BIOPSY SINGLE/MULTIPLE N/A 1/25/2017     "Procedure: ESOPHAGOGASTRODUODENOSCOPY (EGD);  Surgeon: Washington Diaz MD;  Location: [facility];  Service: Bariatrics    AZ EGD TRANSORAL BIOPSY SINGLE/MULTIPLE N/A 5/3/2017    Procedure: ESOPHAGOGASTRODUODENOSCOPY (EGD) with balloon dilatation;  Surgeon: Washington Diaz MD;  Location: [facility];  Service: Bariatrics    AZ EGD TRANSORAL BIOPSY SINGLE/MULTIPLE N/A 8/16/2017    Procedure: ESOPHAGOGASTRODUODENOSCOPY (EGD) with balloon dilation;  Surgeon: Washington Diaz MD;  Location: [facility];  Service: Bariatrics    AZ EXCISION EXCESSIVE SKIN & SUBQ TISSUE ABDOMEN N/A 3/30/2023    Procedure: LIPO ABDOMINOPLASTY;  Surgeon: Xander Rivera MD;  Location:  MAIN OR;  Service: Plastics    AZ EXCISION SKIN ABD INFRAUMBILICAL PANNICULECTOMY N/A 3/30/2023    Procedure: JENNY WHALEN LIS PANNICULECTOMY;  Surgeon: Xander Rivera MD;  Location:  MAIN OR;  Service: Plastics    UPPER GASTROINTESTINAL ENDOSCOPY       Social History   Social History     Substance and Sexual Activity   Alcohol Use Never     Social History     Substance and Sexual Activity   Drug Use Never     Social History     Tobacco Use   Smoking Status Never    Passive exposure: Never   Smokeless Tobacco Never       Objective       Current Vitals:   Blood Pressure: 102/66 (07/17/24 1035)  Pulse: 81 (07/17/24 1035)  Temperature: 97.8 °F (36.6 °C) (07/17/24 1035)  Temp Source: Tympanic (07/17/24 1035)  Height: 5' 5\" (165.1 cm) (07/17/24 1035)  Weight - Scale: 61.2 kg (135 lb) (07/17/24 1035)    Invasive Devices       None                   Physical Exam  Constitutional:       Appearance: Normal appearance.   HENT:      Head: Normocephalic and atraumatic.   Cardiovascular:      Rate and Rhythm: Normal rate.      Pulses: Normal pulses.   Pulmonary:      Effort: Pulmonary effort is normal.   Neurological:      General: No focal deficit present.      Mental Status: She is alert and oriented to person, place, and time.           Pathology, and Other Studies: I have " personally reviewed pertinent reports.        Assessment/PLAN:    Ricarda Selby is a 64 y.o. female status post laparoscopic RYGB on 2011 with Dr. Diaz.    Workup thus far reviewed and discussed with patient:     UGI        EGD       DATE OF SERVICE:  7/03/24     PHYSICIAN(S):  Attending:   Washington Diaz MD      Fellow:   No Staff Documented         INDICATION:  Bariatric surgery status     POST-OP DIAGNOSIS:  See the impression below.     PREPROCEDURE:  Informed consent was obtained for the procedure, including sedation.  Risks of perforation, hemorrhage, adverse drug reaction and aspiration were discussed. The patient was placed in the left lateral decubitus position.     Patient was explained about the risks and benefits of the procedure. Risks including but not limited to bleeding, infection, and perforation were explained in detail. Also explained about less than 100% sensitivity with the exam and other alternatives.     PROCEDURE: EGD     DETAILS OF PROCEDURE:   Patient was taken to the procedure room where a time out was performed to confirm correct patient and correct procedure. The patient underwent monitored anesthesia care, which was administered by an anesthesia professional. The patient's blood pressure, heart rate, level of consciousness, respirations, oxygen, ECG and ETCO2 were monitored throughout the procedure. The scope was introduced through the mouth and advanced to the proximal part of the jejunum. Retroflexion was performed in the fundus. Prior to the procedure, the patient's H. Pylori status was negative. The patient experienced no blood loss. The procedure was not difficult. The patient tolerated the procedure well. There were no apparent adverse events. This is a 64 y.o. female status post Carline-En-Y Gastric Bypass with me in 02/08/2011 with hx of anastomotic stricture with dilation on 11/17/2021 and another one in 2023 to 18 mm. Presents to the office for annual visit. Overall doing  "fair however reporting in the past month, she experieced nausea and vomiting with epigastric pain associated with food intake. She had experienced these symptoms before in the past prior to her EGD.   Here for an EGD to evaluate the anatomy of the GI tract.      ANESTHESIA INFORMATION:  ASA: III  Anesthesia Type: IV Sedation with Anesthesia     MEDICATIONS:  No administrations occurring from 0807 to 0822 on 07/03/24         FINDINGS:  Mild, generalized erythematous mucosa in the stomach; performed 2 cold forceps biopsies to rule out H. pylori  Benign-appearing and post-operative anastomotic intrinsic stricture (traversable) with diameter of 12 mm in the gastrojejunal anastomosis; dilated with balloon dilator from 18 mm starting size to 20 mm end size with step size of 19 mm. Dilation caused improved lumen appearance  Regular Z-line 36 cm from the incisors        SPECIMENS:  ID Type Source Tests Collected by Time Destination   1 : biopsy retrieved by cold biopsy forceps, R/O H. pylori Tissue Stomach TISSUE EXAM Washington Diaz MD 7/3/2024 0821              IMPRESSION:  Pouchitis with mild erythematous mucosa in the stomach; performed cold forceps biopsies  Post-operative anastomotic intrinsic stricture with diameter of 12 mm in the gastrojejunal anastomosis; dilated to 20 mm end size. Dilation caused improved lumen appearance  No evidence of marginal ulcer or gastrogastric fistula.        RECOMMENDATION:  Continue with the bariatric program process and follow up in the office.  Biopsy results pending.              Washington Diaz MD         Pathology from EGD   A. Stomach, \"Stomach biopsy rule out H. pylori,\" Biopsy:  - Antral mucosa with mild chronic inactive gastritis  - Negative for intestinal metaplasia  - Negative for curvilinear Helicobacter microorganisms on H&E      MANOMETRY/pH Study        GASTRIC EMPTYING STUDY      --------------------------------------------------------------------    Patient to stop " carafate  Should continue omeprazole  Annual visit with HIRAL Haywood MD  Bariatric Surgery  7/17/2024  10:47 AM

## 2024-07-19 ENCOUNTER — TELEPHONE (OUTPATIENT)
Dept: PLASTIC SURGERY | Facility: CLINIC | Age: 64
End: 2024-07-19

## 2024-07-19 ENCOUNTER — PREP FOR PROCEDURE (OUTPATIENT)
Dept: PLASTIC SURGERY | Facility: CLINIC | Age: 64
End: 2024-07-19

## 2024-07-19 DIAGNOSIS — L90.5 SYMPTOMATIC SCAR OF SKIN: Primary | ICD-10-CM

## 2024-07-24 DIAGNOSIS — D64.9 NORMOCYTIC ANEMIA: ICD-10-CM

## 2024-07-24 DIAGNOSIS — Z98.84 H/O BARIATRIC SURGERY: ICD-10-CM

## 2024-07-25 RX ORDER — FERROUS SULFATE 324(65)MG
TABLET, DELAYED RELEASE (ENTERIC COATED) ORAL
Qty: 14 TABLET | Refills: 0 | Status: SHIPPED | OUTPATIENT
Start: 2024-07-25

## 2024-07-25 NOTE — TELEPHONE ENCOUNTER
Is this appropriate for refill? Last one was for 14 days. You are covering Dr. Napier   Received patient AX OX 4, continue on isolation,on room air. Continue on iv axb. White catcher. in place draining clear marsha urine. Patient is very anxious given Xanax po x1 dose at 0948. Patient is refusing to help herself do anything. I continue to encourage. That is an important part of her care to improve her progress. DR Triana is aware.Patient has wounds on admission. Cleanse and  Dressing applied. Patient incon of stool x 1. Continue to assess.

## 2024-07-29 ENCOUNTER — TELEPHONE (OUTPATIENT)
Age: 64
End: 2024-07-29

## 2024-07-29 NOTE — TELEPHONE ENCOUNTER
Pt  calling in re prior authorization questions, also looking to confirm Edson's #. Requested call back at [phone].No other questions at this time.

## 2024-07-30 ENCOUNTER — TELEPHONE (OUTPATIENT)
Dept: INTERNAL MEDICINE CLINIC | Facility: CLINIC | Age: 64
End: 2024-07-30

## 2024-07-30 NOTE — TELEPHONE ENCOUNTER
Received call from patient's spouse. Introduced self and role. Patient needs pre op clearance prior to her procedure scheduled on 8/27/24.     Patient scheduled for 8/14/24 at 1000.     All questions/concerns answered at this time

## 2024-08-06 ENCOUNTER — OFFICE VISIT (OUTPATIENT)
Dept: DENTISTRY | Facility: CLINIC | Age: 64
End: 2024-08-06

## 2024-08-06 VITALS — SYSTOLIC BLOOD PRESSURE: 110 MMHG | DIASTOLIC BLOOD PRESSURE: 75 MMHG

## 2024-08-06 DIAGNOSIS — Z01.21 ENCOUNTER FOR DENTAL EXAMINATION AND CLEANING WITH ABNORMAL FINDINGS: Primary | ICD-10-CM

## 2024-08-06 PROCEDURE — D0274 BITEWINGS - 4 RADIOGRAPHIC IMAGES: HCPCS

## 2024-08-06 PROCEDURE — D0120 PERIODIC ORAL EVALUATION - ESTABLISHED PATIENT: HCPCS

## 2024-08-06 PROCEDURE — D1110 PROPHYLAXIS - ADULT: HCPCS

## 2024-08-06 PROCEDURE — D1330 ORAL HYGIENE INSTRUCTIONS: HCPCS

## 2024-08-06 NOTE — DENTAL PROCEDURE DETAILS
Pt arrived at [facility] for recall apt.   Reviewed Medical History  ASA:II  Chief Complaint:none  Cuban speaking    4 Bitewings, Periodic Exam, Adult Prophy  Intraoral exam:fibroma lower rt lip  Cuban speaking used [employer] ipad #[id number]. Reminded pt. Of extraction recommendation over a year ago #30-pt does not want empty space there and it does not bother her she stated.   Oral Hygiene:poor: heavy general. plaque, bleeding, moderate calculus  Spot probed  Perio.:4+mm  Hand scaled, Flossed, polished  Patient tolerated well  Dr. Pearl examined:no new decay     Periocharting to determine srp need-explained to pt. She understood  Needs:Extraction #30    Bws due 8/9/25     Lynne Martins RDH., PHDHP.

## 2024-08-14 ENCOUNTER — APPOINTMENT (OUTPATIENT)
Dept: LAB | Facility: CLINIC | Age: 64
End: 2024-08-14
Payer: COMMERCIAL

## 2024-08-14 ENCOUNTER — CONSULT (OUTPATIENT)
Dept: INTERNAL MEDICINE CLINIC | Facility: CLINIC | Age: 64
End: 2024-08-14

## 2024-08-14 ENCOUNTER — OFFICE VISIT (OUTPATIENT)
Dept: LAB | Facility: CLINIC | Age: 64
End: 2024-08-14
Payer: COMMERCIAL

## 2024-08-14 VITALS
DIASTOLIC BLOOD PRESSURE: 84 MMHG | WEIGHT: 140.6 LBS | BODY MASS INDEX: 23.4 KG/M2 | TEMPERATURE: 98 F | HEART RATE: 61 BPM | SYSTOLIC BLOOD PRESSURE: 129 MMHG | OXYGEN SATURATION: 98 %

## 2024-08-14 DIAGNOSIS — E65 ABDOMINAL PANNUS: ICD-10-CM

## 2024-08-14 DIAGNOSIS — M25.561 CHRONIC PAIN OF RIGHT KNEE: ICD-10-CM

## 2024-08-14 DIAGNOSIS — I10 PRIMARY HYPERTENSION: Primary | ICD-10-CM

## 2024-08-14 DIAGNOSIS — G89.29 CHRONIC PAIN OF RIGHT KNEE: ICD-10-CM

## 2024-08-14 DIAGNOSIS — M81.0 AGE-RELATED OSTEOPOROSIS WITHOUT CURRENT PATHOLOGICAL FRACTURE: ICD-10-CM

## 2024-08-14 DIAGNOSIS — D64.9 NORMOCYTIC ANEMIA: ICD-10-CM

## 2024-08-14 DIAGNOSIS — L90.5 SYMPTOMATIC SCAR OF SKIN: ICD-10-CM

## 2024-08-14 DIAGNOSIS — E11.9 TYPE 2 DIABETES MELLITUS WITHOUT COMPLICATION, WITHOUT LONG-TERM CURRENT USE OF INSULIN (HCC): ICD-10-CM

## 2024-08-14 DIAGNOSIS — K92.9 GASTRIC ANASTOMOTIC STRICTURE: ICD-10-CM

## 2024-08-14 DIAGNOSIS — K31.89 GASTRIC ANASTOMOTIC STRICTURE: ICD-10-CM

## 2024-08-14 DIAGNOSIS — Z01.818 PREOPERATIVE CLEARANCE: ICD-10-CM

## 2024-08-14 DIAGNOSIS — I48.0 PAROXYSMAL A-FIB (HCC): ICD-10-CM

## 2024-08-14 DIAGNOSIS — Z98.84 H/O BARIATRIC SURGERY: ICD-10-CM

## 2024-08-14 LAB
ALBUMIN SERPL BCG-MCNC: 3.9 G/DL (ref 3.5–5)
ALP SERPL-CCNC: 60 U/L (ref 34–104)
ALT SERPL W P-5'-P-CCNC: 21 U/L (ref 7–52)
ANION GAP SERPL CALCULATED.3IONS-SCNC: 4 MMOL/L (ref 4–13)
AST SERPL W P-5'-P-CCNC: 23 U/L (ref 13–39)
BASOPHILS # BLD AUTO: 0.03 THOUSANDS/ÂΜL (ref 0–0.1)
BASOPHILS NFR BLD AUTO: 1 % (ref 0–1)
BILIRUB SERPL-MCNC: 0.44 MG/DL (ref 0.2–1)
BUN SERPL-MCNC: 20 MG/DL (ref 5–25)
CALCIUM SERPL-MCNC: 8.9 MG/DL (ref 8.4–10.2)
CHLORIDE SERPL-SCNC: 105 MMOL/L (ref 96–108)
CO2 SERPL-SCNC: 30 MMOL/L (ref 21–32)
CREAT SERPL-MCNC: 0.84 MG/DL (ref 0.6–1.3)
CREAT UR-MCNC: 92.2 MG/DL
EOSINOPHIL # BLD AUTO: 0.19 THOUSAND/ÂΜL (ref 0–0.61)
EOSINOPHIL NFR BLD AUTO: 3 % (ref 0–6)
ERYTHROCYTE [DISTWIDTH] IN BLOOD BY AUTOMATED COUNT: 13.2 % (ref 11.6–15.1)
GFR SERPL CREATININE-BSD FRML MDRD: 73 ML/MIN/1.73SQ M
GLUCOSE P FAST SERPL-MCNC: 84 MG/DL (ref 65–99)
HCT VFR BLD AUTO: 35.8 % (ref 34.8–46.1)
HGB BLD-MCNC: 11.1 G/DL (ref 11.5–15.4)
IMM GRANULOCYTES # BLD AUTO: 0.01 THOUSAND/UL (ref 0–0.2)
IMM GRANULOCYTES NFR BLD AUTO: 0 % (ref 0–2)
LYMPHOCYTES # BLD AUTO: 2.05 THOUSANDS/ÂΜL (ref 0.6–4.47)
LYMPHOCYTES NFR BLD AUTO: 37 % (ref 14–44)
MCH RBC QN AUTO: 28.6 PG (ref 26.8–34.3)
MCHC RBC AUTO-ENTMCNC: 31 G/DL (ref 31.4–37.4)
MCV RBC AUTO: 92 FL (ref 82–98)
MICROALBUMIN UR-MCNC: <7 MG/L
MONOCYTES # BLD AUTO: 0.65 THOUSAND/ÂΜL (ref 0.17–1.22)
MONOCYTES NFR BLD AUTO: 12 % (ref 4–12)
NEUTROPHILS # BLD AUTO: 2.68 THOUSANDS/ÂΜL (ref 1.85–7.62)
NEUTS SEG NFR BLD AUTO: 47 % (ref 43–75)
NRBC BLD AUTO-RTO: 0 /100 WBCS
PLATELET # BLD AUTO: 268 THOUSANDS/UL (ref 149–390)
PMV BLD AUTO: 11 FL (ref 8.9–12.7)
POTASSIUM SERPL-SCNC: 4.1 MMOL/L (ref 3.5–5.3)
PROT SERPL-MCNC: 6.8 G/DL (ref 6.4–8.4)
RBC # BLD AUTO: 3.88 MILLION/UL (ref 3.81–5.12)
SODIUM SERPL-SCNC: 139 MMOL/L (ref 135–147)
WBC # BLD AUTO: 5.61 THOUSAND/UL (ref 4.31–10.16)

## 2024-08-14 PROCEDURE — 80053 COMPREHEN METABOLIC PANEL: CPT

## 2024-08-14 PROCEDURE — 82043 UR ALBUMIN QUANTITATIVE: CPT

## 2024-08-14 PROCEDURE — 85025 COMPLETE CBC W/AUTO DIFF WBC: CPT

## 2024-08-14 PROCEDURE — 93005 ELECTROCARDIOGRAM TRACING: CPT

## 2024-08-14 PROCEDURE — 82570 ASSAY OF URINE CREATININE: CPT

## 2024-08-14 PROCEDURE — 36415 COLL VENOUS BLD VENIPUNCTURE: CPT

## 2024-08-14 RX ORDER — ACETAMINOPHEN 500 MG
500 TABLET ORAL EVERY 4 HOURS PRN
Qty: 90 TABLET | Refills: 3 | Status: SHIPPED | OUTPATIENT
Start: 2024-08-14

## 2024-08-14 RX ORDER — IBUPROFEN 200 MG
1.5 CAPSULE ORAL DAILY
Qty: 90 TABLET | Refills: 3 | Status: SHIPPED | OUTPATIENT
Start: 2024-08-14

## 2024-08-14 RX ORDER — ZOLEDRONIC ACID 5 MG/100ML
5 INJECTION, SOLUTION INTRAVENOUS ONCE
Status: DISCONTINUED | OUTPATIENT
Start: 2024-08-14 | End: 2024-08-15

## 2024-08-14 RX ORDER — FERROUS SULFATE 324(65)MG
324 TABLET, DELAYED RELEASE (ENTERIC COATED) ORAL
Qty: 90 TABLET | Refills: 3 | Status: SHIPPED | OUTPATIENT
Start: 2024-08-14

## 2024-08-14 NOTE — ASSESSMENT & PLAN NOTE
-DEXA 8/3/2022: L-spine -2.3 and hip of -2.8  s/p 5mg zoledronic acid infusion 8/23/2022 with appropraite response with NTx urine test of 14 3 months later; s/p 5 mg zoledronic acid on 12/7/2023    -Due for next zoledronic acid infusion in 12/2024  -Due for new DEXA in September 2024 and repeat DEXA every 2 years   -Elevated PTH 98.4, low Vit D 24.7, and calcium 8.4 at [facility] on 6/24/24  -Takes Calcitrate 950 mg and Cholecalciferol 4000 UT daily  -Bariatrics recommended 4000 IU of vitamin D3 plus 1500 to 1800 mg of calcium citrate per day  -Increase the dose of Calcitrate to 1425 mg (1.5 tablets)

## 2024-08-14 NOTE — PROGRESS NOTES
"Ambulatory Visit  Name: Ricarda Selby      : 1960      MRN: [id number]  Encounter Provider: Sly Laguna DO  Encounter Date: 2024   Encounter department: [facility]    Assessment & Plan   1. Primary hypertension  Assessment & Plan:  -Home -130s/70s  -BP in the office today 129/84  -Denies headache, dizziness, visual changes, SOB, chest pain/discomfort, and heart palpitations  -Hyzaar 100-25 mg daily and Toprol-XL 25 mg daily  2. H/O bariatric surgery  Assessment & Plan:  -23 reported intermittent \"hardness\" and cramping/burning pain in her uper quadrants b/l as well as near the T point of her panniculectomy incision. Endorsed it was severe enough that at times she was unable to sleep.  -Abdominal US 10/1/23 showed area of suspected fluid in the deep subcutaneous fat along the superior margin of the midline abdominal incision.  -Tried PT exercises for scar desensitzation, gabapentin 100 mg TID and Robaxin 500 mg TID without significant relief  -Decision was made between patient and plastic surgery to proceed scar revision of vertical and horizontal abdominal scar with complex closure scheduled on 24  3. Age-related osteoporosis without current pathological fracture  Assessment & Plan:  -DEXA 8/3/2022: L-spine -2.3 and hip of -2.8  s/p 5mg zoledronic acid infusion 2022 with appropraite response with NTx urine test of 14 3 months later; s/p 5 mg zoledronic acid on 2023    -Due for next zoledronic acid infusion in 2024  -Due for new DEXA in 2024 and repeat DEXA every 2 years   -Elevated PTH 98.4, low Vit D 24.7, and calcium 8.4 at LNL on 24  -Takes Calcitrate 950 mg and Cholecalciferol 4000 UT daily  -Bariatrics recommended 4000 IU of vitamin D3 plus 1500 to 1800 mg of calcium citrate per day  -Increase the dose of Calcitrate to 1425 mg (1.5 tablets)  4. Paroxysmal A-fib (HCC)  Assessment & Plan:  Hx of Gustabo Parkinson " White  -S/p abalation for over a decade ago  -Seen in ED in 2/2023 due to syncopal episode plus unconscious for about 30 seconds; initial EKG showed AF with controlled ventricular response 78, there were no ST segment changes. Negative D-dimer. Sponteneously cardioversion to NSR.   -Holter monitor ordered by cardiology 3/2023, showed NSVT and SVT, second-degree AV block; previously seen by cardiology earlier that year for preop clearance, AC for afib was deferred at that time  -Chadvasc of 4 (HTN, age, DM, and sex) not on anticoagulant  -Denies presyncopal and syncopal episodes   -Denies SOB, chest pain/discomfort, and heart palpitation  -Takes Toprol-XL 25 mg BID  -Remind patient her upcoming cardiology appointment 8/23/24  5. Gastric anastomotic stricture  Assessment & Plan:  -History of Carline En Y Bypass 2/8/2011 with history of anastomotic stricture with dilation on 11/17/2021 and 2/22/2023 complicated by malabsorption  -Annual visit with GI with complaints of nausea and vomiting with epigastric pain assoc with food intake and underwent EGD and dilation on 7/3/2024 up to 20 mm.   -EGD showed pouchitis with mild erythematous mucosa in the stomach. Post-operative anastomotic intrinsic stricture with diameter of 12 mm in the gastrojejunal anastomosis; dilated to 20 mm end size. No evidence of marginal ulcer or gastrogastric fistula. Biopsy showed antral mucosa with mild chronic inactive gastritis. Negative for intestinal metaplasia. Negative for curvilinear Helicobacter microorganisms on H&E  -Seen Bariatrics on 7/17 and reported she felt better and has been tolerating food better than prior to EGD dilation  -Takes Carafate and omeprazole  6. Type 2 diabetes mellitus without complication, without long-term current use of insulin (HCC)  Assessment & Plan:    Lab Results   Component Value Date    HGBA1C 5.6 06/27/2024   -A1c 6/27/24 5.6; UACR 8 on 6/24/23  -Continue Metformin 500 mg daily  -Last seen podiatrist  5/5/2023 with intact virbation and propioception but diminished monofilament testing in both feet; send new referral upon patient's request  -Eye exam appointment in September 2024  -CMP and UACR ordered and encouraged patient to have the bloodwork drawn before her next appointment for annual physical with PCP    7. Chronic pain of right knee  Assessment & Plan:  - Been going on for years but worsening in the past few months  - Sharp pain localized in right knee upon exertion or after exertion,  - Endorses 10/10 sharp pain yesterday while working house chores  - Denies trauma or fall or past surgery done in RLE, weakness, and unsteady gait  - Pain 5/10 at this time, able to ambulate on her own  - Slightly warm on the R knee compared to the L; crepitus felt in both knees but more in the R knee; intact ROM with slight/trace effusion in the R knee  - Given presentation and physical exam, most likely OA   - Encourage patient to take Tylenol for pain and do the exercises based on the sheet provided   - Orthopedic referral placed; patient is informed to see the orthopedic after her plastic surgery  has been done  8. Normocytic anemia  Assessment & Plan:  -CBC 6/24/24: Hgb 10.3 and MCV 95  -Iron panel 6/24/24 showed low iron sat and low iron with normal TIBC, ferritin, and UIBC  -Iron deficiency 2/2 post gastric bypass surgery   -B12 and folic acid WNL in 12/2023  -Send new prescription for ferrous sulfate, inform patient to take it daily  9. Preoperative clearance  Assessment & Plan:  Preoperative clearance for scar revision of vertical and horizontal abdominal scar with complex closure with plastic surgery on 8/27/2024 due to recurrent muscular abdominal pain.    Patient reports no symptoms of chest pain reported at rest or with exertion, dyspnea at rest or with exertion, PND, LE edema, claudication, or palpitations. No recent anticoagulant or antithrombotic use. Echo in 6/2022 showed EF 65% and no wall motion  abnormality with grade 1 diastolic dysfunction. Stress EKG in 6/2022 was negative for ischemia after maximal exercise; no ST deviation noted. Arrhythmias during recovery: occasional PVCs and triplets arising from LV apex. Rare PACs. The patient is able to achieve >4 METs of activity during walking and house chores.      Pt's RCRI is 0 points, correlating with Class I risk, which carries a 3.9 percent 30 day risk of death, MI, or cardiac arrest. Patient's other comorbid conditions include hx of WPW s/p ablation, one event of Afib with syncope spontaneously converted to NSR, HTN, HLD, prediabetes, and osteoprosis.  Hold metformin and Hyzaar 24 hours before and after the procedure     Patient has appointment with cardiologist, Dr. Walls on 8/23/24. Patient may proceed with surgery with understanding of perioperative risk in light of the benefits of possible surgery.      Come back on any Wednesday in December 2024 when Dr. Gonsalez is here. Informed patient to have the DEXA scan done anytime after September 2024. Patient is due for annual IV Zoledronic acid on December 2024, has appointment with infusion on 12/5/24.     History of Present Illness     64-year-old female with PMH Carline En Y Bypass 2/8/2011 with history of anastomotic stricture with dilation on 11/17/2021 and 2/22/2023 complicated by malabsorption, [id number] panniculectomy and lipoabdominoplasty 3/30/2023 complicated by wound dehiscence in 5/2023 with VAC (removed 6/19/23), T2DM, HTN, Hx WPW, PAF,PAD, osteoporosis on annual zoledronic presents for preoperative clearance for scar revision of vertical and horizontal abdominal scar with complex closure with plastic surgery on 8/27/2024. She has new concern about her right knee pain.      Right knee pain  -Been going on for years but worsening in the past few months; had a severe pain, 10/10 yesterday after working around the house  -Sharp pain localized in right knee upon exertion or after  exertion  -Denies trauma or fall or past surgery done in RLE, weakness, and unsteady gait  -Pain 5/10 at this time, able to ambulate on her own  -Patient has not taken any pain medications; she states she takes gabapentin and robaxin as needed; has not done tylenol     HTN  -Home -130s/70s  -BP in the office today 129/84  -Denies headache, dizziness, visual changes, SOB, chest pain/discomfort, and heart palpitations  -Hyzaar 100-25 mg daily and Toprol-XL 25 mg daily    DMT2  -A1c 6/27/24 5.6; UACR 8 on 6/24/23  -Continue Metformin 500 mg daily  -Last seen podiatrist 5/5/2023 with intact virbation and propioception but diminished monofilament testing in both feet  -She states she has scheduled eye exam appointment in September 2024    Paroxysmal Afib  Hx of Gustabo Parkinson White  -S/p abalation for over a decade ago  -Seen in ED in 2/2023 due to syncopal episode plus unconscious for about 30 seconds; initial EKG showed AF with controlled ventricular response 78, there were no ST segment changes. Negative D-dimer. Sponteneously cardioversion to NSR.   -Holter monitor ordered by cardiology 3/2023, showed NSVT and SVT, second-degree AV block; previously seen by cardiology earlier that year for preop clearance, AC for afib was deferred at that time  -Chadvasc of 4 (HTN, age, DM, and sex) not on anticoagulant  -Denies presyncopal and syncopal episodes   -Denies SOB, chest pain/discomfort, and heart palpitation  -Takes Toprol-XL 25 mg BID     Osteoporosis s/p panniculectomy  -DEXA 8/3/2022: L-spine -2.3 and hip of -2.8  s/p 5mg zoledronic acid infusion 8/23/2022 with appropraite response with NTx urine test of 14 3 months later; s/p 5 mg zoledronic acid on 12/7/2023    -Takes Calcitrate 950 mg and Cholecalciferol 4000 UT daily    Nornmocytic anemia  -CBC 6/24/24: Hgb 10.3 and MCV 95  -Iron panel 6/24/24 showed low iron sat and low iron with normal TIBC, ferritin, and UIBC  -B12 and folic acid WNL in 12/2023  -Patient  states she was given a 14-day supply of ferrous sulfate 324 mg daily, last dose on 7/25          Review of Systems   Constitutional:  Negative for appetite change, chills, diaphoresis, fatigue and fever.   HENT:  Negative for ear pain, sore throat and trouble swallowing.    Eyes:  Negative for pain and visual disturbance.   Respiratory:  Negative for cough, chest tightness, shortness of breath and wheezing.    Cardiovascular:  Negative for chest pain and palpitations.   Gastrointestinal:  Negative for abdominal pain, blood in stool, constipation, diarrhea, nausea and vomiting.   Genitourinary:  Negative for dysuria and hematuria.   Musculoskeletal:  Positive for arthralgias (Right knee). Negative for back pain and gait problem.   Skin:  Negative for color change and rash.   Neurological:  Negative for dizziness, seizures, syncope, weakness, light-headedness and headaches.   Hematological:  Negative for adenopathy.   All other systems reviewed and are negative.    Current Outpatient Medications on File Prior to Visit   Medication Sig Dispense Refill    acetaminophen (TYLENOL) 500 mg tablet Take 1 tablet (500 mg total) by mouth every 4 (four) hours as needed for mild pain or moderate pain 30 tablet 2    atorvastatin (LIPITOR) 20 mg tablet Take 1 tablet by mouth once daily 90 tablet 0    Cholecalciferol (Vitamin D3) 50 MCG (2000 UT) TABS Take 2 tablets (4,000 Units total) by mouth daily 180 tablet 1    docusate sodium (COLACE) 100 mg capsule Take 1 capsule (100 mg total) by mouth 2 (two) times a day 20 capsule 1    ergocalciferol (VITAMIN D2) 50,000 units Take 1 capsule (50,000 Units total) by mouth 2 (two) times a week 24 capsule 0    gabapentin (NEURONTIN) 100 mg capsule Take 3 capsules (300 mg total) by mouth 3 (three) times a day 90 capsule 0    losartan-hydrochlorothiazide (HYZAAR) 100-25 MG per tablet Take 1 tablet by mouth daily 90 tablet 1    metFORMIN (GLUCOPHAGE-XR) 500 mg 24 hr tablet Take 1 tablet (500 mg  total) by mouth daily with breakfast 180 tablet 0    methocarbamol (ROBAXIN) 500 mg tablet Take 1 tablet (500 mg total) by mouth 3 (three) times a day 30 tablet 0    metoprolol succinate (TOPROL-XL) 25 mg 24 hr tablet Take 1 tablet (25 mg total) by mouth 2 (two) times a day 180 tablet 0    Multiple Vitamins-Minerals (ZINC PO) Take by mouth      nystatin (MYCOSTATIN) powder Apply topically 3 (three) times a day 60 g 3    Omega-3 Fatty Acids (FISH OIL) 1,000 mg Take 1,000 mg by mouth 2 (two) times a day      omeprazole (PriLOSEC) 20 mg delayed release capsule Take 1 capsule by mouth twice daily 180 capsule 1    ondansetron (ZOFRAN) 4 mg tablet Take 1 tablet (4 mg total) by mouth every 8 (eight) hours as needed for nausea or vomiting 20 tablet 0    sucralfate (CARAFATE) 1 g/10 mL suspension Take 10 mL (1 g total) by mouth 4 (four) times a day 840 mL 0    VITAMIN E PO Take 1 tablet by mouth daily      [DISCONTINUED] Calcitrate 950 (200 Ca) MG tablet Take 1 tablet by mouth once daily 90 tablet 0    [DISCONTINUED] ferrous sulfate 324 (65 Fe) mg TAKE 1 TABLET BY MOUTH ONCE DAILY BEFORE BREAKFAST FOR 14 DAYS 14 tablet 0     Current Facility-Administered Medications on File Prior to Visit   Medication Dose Route Frequency Provider Last Rate Last Admin    zoledronic acid (RECLAST) IV infusion (premix) 5 mg  5 mg Intravenous Once Ricarda Goodwin MD          Social History     Tobacco Use    Smoking status: Never     Passive exposure: Never    Smokeless tobacco: Never   Vaping Use    Vaping status: Never Used   Substance and Sexual Activity    Alcohol use: Never    Drug use: Never    Sexual activity: Not Currently     Objective     /84 (BP Location: Left arm, Patient Position: Sitting, Cuff Size: Standard)   Pulse 61   Temp 98 °F (36.7 °C) (Temporal)   Wt 63.8 kg (140 lb 9.6 oz)   SpO2 98%   BMI 23.40 kg/m²     Physical Exam  Vitals and nursing note reviewed.   Constitutional:       General: She is not in acute  distress.     Appearance: Normal appearance. She is well-developed.   HENT:      Head: Normocephalic and atraumatic.      Mouth/Throat:      Mouth: Mucous membranes are moist.      Pharynx: No oropharyngeal exudate.   Eyes:      Conjunctiva/sclera: Conjunctivae normal.   Neck:      Vascular: No carotid bruit.   Cardiovascular:      Rate and Rhythm: Normal rate and regular rhythm.      Pulses: Normal pulses.      Heart sounds: Normal heart sounds. No murmur heard.  Pulmonary:      Effort: Pulmonary effort is normal. No respiratory distress.      Breath sounds: Normal breath sounds. No wheezing or rales.   Abdominal:      General: Bowel sounds are normal. There is no distension.      Palpations: Abdomen is soft.      Tenderness: There is no abdominal tenderness. There is no guarding.      Comments: Midline incision scar seen above umbilicus    Musculoskeletal:         General: No swelling. Normal range of motion.      Cervical back: Normal range of motion and neck supple.      Right knee: Effusion (slight/trace), bony tenderness and crepitus present. No deformity or erythema. Normal range of motion. Tenderness present over the medial joint line. No LCL laxity, MCL laxity, ACL laxity or PCL laxity. Normal pulse.      Instability Tests: Anterior drawer test negative. Posterior drawer test negative.      Left knee: No deformity, effusion or erythema. Normal range of motion. No LCL laxity, MCL laxity, ACL laxity or PCL laxity.Normal pulse.      Instability Tests: Anterior drawer test negative. Posterior drawer test negative.   Skin:     General: Skin is warm and dry.      Capillary Refill: Capillary refill takes less than 2 seconds.   Neurological:      Mental Status: She is alert and oriented to person, place, and time.      Motor: No weakness.      Gait: Gait normal.   Psychiatric:         Mood and Affect: Mood normal.       Administrative Statements   I have spent a total time of 40 minutes in caring for this patient  on the day of the visit/encounter including Instructions for management and Patient and family education.

## 2024-08-14 NOTE — ASSESSMENT & PLAN NOTE
"-9/27/23 reported intermittent \"hardness\" and cramping/burning pain in her uper quadrants b/l as well as near the T point of her panniculectomy incision. Endorsed it was severe enough that at times she was unable to sleep.  -Abdominal US 10/1/23 showed area of suspected fluid in the deep subcutaneous fat along the superior margin of the midline abdominal incision.  -Tried PT exercises for scar desensitzation, gabapentin 100 mg TID and Robaxin 500 mg TID without significant relief  -Decision was made between patient and plastic surgery to proceed scar revision of vertical and horizontal abdominal scar with complex closure scheduled on 8/27/24  "

## 2024-08-14 NOTE — ASSESSMENT & PLAN NOTE
Hx of Gustabo Parkinson White  -S/p abalation for over a decade ago  -Seen in ED in 2/2023 due to syncopal episode plus unconscious for about 30 seconds; initial EKG showed AF with controlled ventricular response 78, there were no ST segment changes. Negative D-dimer. Sponteneously cardioversion to NSR.   -Holter monitor ordered by cardiology 3/2023, showed NSVT and SVT, second-degree AV block; previously seen by cardiology earlier that year for preop clearance, AC for afib was deferred at that time  -Chadvasc of 4 (HTN, age, DM, and sex) not on anticoagulant  -Denies presyncopal and syncopal episodes   -Denies SOB, chest pain/discomfort, and heart palpitation  -Takes Toprol-XL 25 mg BID  -Remind patient her upcoming cardiology appointment 8/23/24

## 2024-08-14 NOTE — PROGRESS NOTES
Assessment/Plan:    No problem-specific Assessment & Plan notes found for this encounter.       {Assess/PlanSmartLinks:93807}      Subjective:      Patient ID: Ricarda Selby is a 64 y.o. female.    HPI    {Common ambulatory SmartLinks:23845}    Review of Systems      Objective:      /84 (BP Location: Left arm, Patient Position: Sitting, Cuff Size: Standard)   Pulse 61   Temp 98 °F (36.7 °C) (Temporal)   Wt 63.8 kg (140 lb 9.6 oz)   SpO2 98%   BMI 23.40 kg/m²          Physical Exam

## 2024-08-14 NOTE — ASSESSMENT & PLAN NOTE
-Home -130s/70s  -BP in the office today 129/84  -Denies headache, dizziness, visual changes, SOB, chest pain/discomfort, and heart palpitations  -Hyzaar 100-25 mg daily and Toprol-XL 25 mg daily

## 2024-08-14 NOTE — ASSESSMENT & PLAN NOTE
Preoperative clearance for scar revision of vertical and horizontal abdominal scar with complex closure with plastic surgery on 8/27/2024 due to recurrent muscular abdominal pain.    Patient reports no symptoms of chest pain reported at rest or with exertion, dyspnea at rest or with exertion, PND, LE edema, claudication, or palpitations. No recent anticoagulant or antithrombotic use. Echo in 6/2022 showed EF 65% and no wall motion abnormality with grade 1 diastolic dysfunction. Stress EKG in 6/2022 was negative for ischemia after maximal exercise; no ST deviation noted. Arrhythmias during recovery: occasional PVCs and triplets arising from LV apex. Rare PACs. The patient is able to achieve >4 METs of activity during walking and house chores.      Pt's RCRI is 0 points, correlating with Class I risk, which carries a 3.9 percent 30 day risk of death, MI, or cardiac arrest. Patient's other comorbid conditions include hx of WPW s/p ablation, one event of Afib with syncope spontaneously converted to NSR, HTN, HLD, prediabetes, and osteoprosis.  Hold metformin and Hyzaar 24 hours before and after the procedure     Patient has appointment with cardiologist, Dr. Walls on 8/23/24. Patient may proceed with surgery with understanding of perioperative risk in light of the benefits of possible surgery.

## 2024-08-14 NOTE — ASSESSMENT & PLAN NOTE
- Been going on for years but worsening in the past few months  - Sharp pain localized in right knee upon exertion or after exertion,  - Endorses 10/10 sharp pain yesterday while working house chores  - Denies trauma or fall or past surgery done in RLE, weakness, and unsteady gait  - Pain 5/10 at this time, able to ambulate on her own  - Slightly warm on the R knee compared to the L; crepitus felt in both knees but more in the R knee; intact ROM with slight/trace effusion in the R knee  - Given presentation and physical exam, most likely OA   - Encourage patient to take Tylenol for pain and do the exercises based on the sheet provided   - Orthopedic referral placed; patient is informed to see the orthopedic after her plastic surgery  has been done

## 2024-08-14 NOTE — ASSESSMENT & PLAN NOTE
Lab Results   Component Value Date    HGBA1C 5.6 06/27/2024   -A1c 6/27/24 5.6; UACR 8 on 6/24/23  -Continue Metformin 500 mg daily  -Last seen podiatrist 5/5/2023 with intact virbation and propioception but diminished monofilament testing in both feet; send new referral upon patient's request  -Eye exam appointment in September 2024  -CMP and UACR ordered and encouraged patient to have the bloodwork drawn before her next appointment for annual physical with PCP

## 2024-08-14 NOTE — ASSESSMENT & PLAN NOTE
-CBC 6/24/24: Hgb 10.3 and MCV 95  -Iron panel 6/24/24 showed low iron sat and low iron with normal TIBC, ferritin, and UIBC  -Iron deficiency 2/2 post gastric bypass surgery   -B12 and folic acid WNL in 12/2023  -Send new prescription for ferrous sulfate, inform patient to take it daily

## 2024-08-14 NOTE — PATIENT INSTRUCTIONS
- Remind patient to hold metformin and Hyzaar 24 hours before and after the procedure  - Get DEXA scan anytime after September in year  - Referral for podiatrist will be placed  - Referral to orthopedic after plastic surgery  - Takes Tylenol for knee pain and home exercises in the meantime

## 2024-08-14 NOTE — ASSESSMENT & PLAN NOTE
-History of Carline En Y Bypass 2/8/2011 with history of anastomotic stricture with dilation on 11/17/2021 and 2/22/2023 complicated by malabsorption  -Annual visit with GI with complaints of nausea and vomiting with epigastric pain assoc with food intake and underwent EGD and dilation on 7/3/2024 up to 20 mm.   -EGD showed pouchitis with mild erythematous mucosa in the stomach. Post-operative anastomotic intrinsic stricture with diameter of 12 mm in the gastrojejunal anastomosis; dilated to 20 mm end size. No evidence of marginal ulcer or gastrogastric fistula. Biopsy showed antral mucosa with mild chronic inactive gastritis. Negative for intestinal metaplasia. Negative for curvilinear Helicobacter microorganisms on H&E  -Seen Bariatrics on 7/17 and reported she felt better and has been tolerating food better than prior to EGD dilation  -Takes Carafate and omeprazole

## 2024-08-15 RX ORDER — ZOLEDRONIC ACID 5 MG/100ML
5 INJECTION, SOLUTION INTRAVENOUS ONCE
Status: SHIPPED | OUTPATIENT
Start: 2024-12-05

## 2024-08-16 LAB
ATRIAL RATE: 60 BPM
P AXIS: 23 DEGREES
PR INTERVAL: 170 MS
QRS AXIS: 1 DEGREES
QRSD INTERVAL: 84 MS
QT INTERVAL: 406 MS
QTC INTERVAL: 406 MS
T WAVE AXIS: 26 DEGREES
VENTRICULAR RATE: 60 BPM

## 2024-08-16 PROCEDURE — 93010 ELECTROCARDIOGRAM REPORT: CPT | Performed by: INTERNAL MEDICINE

## 2024-08-20 NOTE — PRE-PROCEDURE INSTRUCTIONS
Pre-Surgery Instructions:   Medication Instructions    acetaminophen (TYLENOL) 500 mg tablet Uses PRN- OK to take day of surgery    atorvastatin (LIPITOR) 20 mg tablet Take night before surgery    calcium citrate (Calcitrate) 950 (200 Ca) MG tablet Stop taking 7 days prior to surgery.    Cholecalciferol (Vitamin D3) 50 MCG (2000 UT) TABS Stop taking 7 days prior to surgery.    ferrous sulfate 324 (65 Fe) mg Hold day of surgery.    gabapentin (NEURONTIN) 100 mg capsule Take day of surgery.    losartan-hydrochlorothiazide (HYZAAR) 100-25 MG per tablet Hold day of surgery.    metFORMIN (GLUCOPHAGE-XR) 500 mg 24 hr tablet Hold day of surgery.    methocarbamol (ROBAXIN) 500 mg tablet Uses PRN- OK to take day of surgery    metoprolol succinate (TOPROL-XL) 25 mg 24 hr tablet Take day of surgery.    Multiple Vitamins-Minerals (ZINC PO) Stop taking 7 days prior to surgery.    Omega-3 Fatty Acids (FISH OIL) 1,000 mg Stop taking 7 days prior to surgery.    omeprazole (PriLOSEC) 20 mg delayed release capsule Take day of surgery.    sucralfate (CARAFATE) 1 g/10 mL suspension Hold day of surgery.    VITAMIN E PO Stop taking 7 days prior to surgery.   Medication instructions for day surgery reviewed. Please use only a sip of water to take your instructed medications. Avoid all over the counter vitamins, supplements and NSAIDS for one week prior to surgery per anesthesia guidelines. Tylenol is ok to take as needed.     You will receive a call one business day prior to surgery with an arrival time and hospital directions. If your surgery is scheduled on a Monday, the hospital will be calling you on the Friday prior to your surgery. If you have not heard from anyone by 8pm, please call the hospital supervisor through the hospital  at [phone]. ([place] [phone] or [place] [phone]).    Do not eat or drink anything after midnight the night before your surgery, including candy, mints, lifesavers, or chewing gum.  Do not drink alcohol 24hrs before your surgery. Try not to smoke at least 24hrs before your surgery.       Follow the pre surgery showering instructions as listed in the “My Surgical Experience Booklet” or otherwise provided by your surgeon's office. Do not use a blade to shave the surgical area 1 week before surgery. It is okay to use a clean electric clippers up to 24 hours before surgery. Do not apply any lotions, creams, including makeup, cologne, deodorant, or perfumes after showering on the day of your surgery. Do not use dry shampoo, hair spray, hair gel, or any type of hair products.     No contact lenses, eye make-up, or artificial eyelashes. Remove nail polish, including gel polish, and any artificial, gel, or acrylic nails if possible. Remove all jewelry including rings and body piercing jewelry.     Wear causal clothing that is easy to take on and off. Consider your type of surgery.    Keep any valuables, jewelry, piercings at home. Please bring any specially ordered equipment (sling, braces) if indicated.    Arrange for a responsible person to drive you to and from the hospital on the day of your surgery. Please confirm the visitor policy for the day of your procedure when you receive your phone call with an arrival time.     Call the surgeon's office with any new illnesses, exposures, or additional questions prior to surgery.    Please reference your “My Surgical Experience Booklet” for additional information to prepare for your upcoming surgery. Instructions to patient and daughter. Pt unsure concerning blood products please discuss on admit .

## 2024-08-23 ENCOUNTER — OFFICE VISIT (OUTPATIENT)
Dept: CARDIOLOGY CLINIC | Facility: CLINIC | Age: 64
End: 2024-08-23
Payer: COMMERCIAL

## 2024-08-23 ENCOUNTER — APPOINTMENT (OUTPATIENT)
Dept: LAB | Facility: CLINIC | Age: 64
End: 2024-08-23
Payer: COMMERCIAL

## 2024-08-23 VITALS
HEART RATE: 74 BPM | SYSTOLIC BLOOD PRESSURE: 110 MMHG | DIASTOLIC BLOOD PRESSURE: 80 MMHG | OXYGEN SATURATION: 98 % | BODY MASS INDEX: 23.64 KG/M2 | HEIGHT: 65 IN | WEIGHT: 141.9 LBS

## 2024-08-23 DIAGNOSIS — I77.810 ASCENDING AORTA DILATATION (HCC): Primary | ICD-10-CM

## 2024-08-23 DIAGNOSIS — I48.0 PAROXYSMAL A-FIB (HCC): ICD-10-CM

## 2024-08-23 DIAGNOSIS — I10 PRIMARY HYPERTENSION: ICD-10-CM

## 2024-08-23 DIAGNOSIS — I45.6 WPW SYNDROME: ICD-10-CM

## 2024-08-23 DIAGNOSIS — E78.2 MIXED HYPERLIPIDEMIA: Chronic | ICD-10-CM

## 2024-08-23 DIAGNOSIS — Z01.810 PREOP CARDIOVASCULAR EXAM: ICD-10-CM

## 2024-08-23 DIAGNOSIS — R94.31 ABNORMAL EKG: ICD-10-CM

## 2024-08-23 DIAGNOSIS — I47.29 NSVT (NONSUSTAINED VENTRICULAR TACHYCARDIA) (HCC): ICD-10-CM

## 2024-08-23 DIAGNOSIS — Z98.890 HISTORY OF CARDIAC RADIOFREQUENCY ABLATION (RFA): ICD-10-CM

## 2024-08-23 LAB
CHOLEST SERPL-MCNC: 156 MG/DL
HDLC SERPL-MCNC: 46 MG/DL
LDLC SERPL CALC-MCNC: 82 MG/DL (ref 0–100)
TRIGL SERPL-MCNC: 138 MG/DL

## 2024-08-23 PROCEDURE — 36415 COLL VENOUS BLD VENIPUNCTURE: CPT

## 2024-08-23 PROCEDURE — 99214 OFFICE O/P EST MOD 30 MIN: CPT | Performed by: INTERNAL MEDICINE

## 2024-08-23 PROCEDURE — 80061 LIPID PANEL: CPT

## 2024-08-23 NOTE — PROGRESS NOTES
Cardiology Follow Up    Ricarda Selby  1960  [id number]  [facility] CARDIAC CATH LAB  [address] [facility] [address]  [phone]    1. Ascending aorta dilatation (HCC)        2. Primary hypertension        3. WPW syndrome        4. Paroxysmal A-fib (HCC)        5. NSVT (nonsustained ventricular tachycardia) (HCC)        6. Preop cardiovascular exam        7. History of cardiac radiofrequency ablation (RFA)        8. Mixed hyperlipidemia        9. Abnormal EKG            Interval History: Cardiology follow-up.  Preoperative cardiac clearance for panniculectomy with plastic surgery.  Patient is doing well from a cardiac point of view, denies any chest pain or dyspnea, occasional palpitations, no syncope or presyncope.  States been compliant with low-cholesterol diet, I do not have a recent lipid profile, LDL from 2 years ago was 75 with an HDL 44.  Compliant with low-sodium diet, blood pressures been well-controlled.  No bleeding issues, patient is not on antiplatelet therapy.  Active, no exertional symptoms.    Patient Active Problem List   Diagnosis    Gastric anastomotic stricture    Mixed hyperlipidemia    Primary hypertension    Postgastrectomy malabsorption    Ventricular pre-excitation syndrome    Vitamin D deficiency    Preop cardiovascular exam    Prediabetes    WPW syndrome    H/O bariatric surgery    Abdominal pannus    Candidal skin infection    Excess skin    NSVT (nonsustained ventricular tachycardia) (HCC)    History of cardiac radiofrequency ablation (RFA)    Abnormal EKG    Osteoporosis    Hypercholesterolemia    Ascending aorta dilatation (HCC)    Paroxysmal A-fib (HCC)    Preoperative clearance    Financial difficulties    Excess skin of abdomen    Panniculitis    Intertrigo    Encounter for cosmetic surgery    S/P panniculectomy    Grade I diastolic dysfunction    Type 2 diabetes mellitus without complication,  without long-term current use of insulin (HCC)    Need for COVID-19 vaccine    Normocytic anemia    Nonhealing surgical wound, subsequent encounter    S/P cosmetic plastic surgery    Symptomatic scar of skin    Chronic pain of right knee     Past Medical History:   Diagnosis Date    Anastomotic stricture of gastrojejunostomy     s/p gastric bypass    Anemia     iron deficiency resolved 11/14/14    Anxiety     resolved 6/7/17    Class 1 obesity due to excess calories with serious comorbidity and body mass index (BMI) of 30.0 to 30.9 in adult 09/18/2020    Degenerative joint disease of ankle and foot, unspecified laterality     last assessed 5/17/13    Depression     last assessed 11/7/12    Diabetes mellitus (HCC)     type 2 uncomplicated, controlled - last assessed 1/31/14    Dysphagia     Gastric ulcer     History of melena     History of transfusion     Hyperlipidemia     Hypertension     Insomnia     Insomnia     Obesity     resolved 11/14/14    Overweight (BMI 25.0-29.9) 05/13/2021    Postgastrectomy malabsorption     Tachycardia     Ventricular pre-excitation syndrome     Vitamin D deficiency      Social History     Socioeconomic History    Marital status: /Civil Union     Spouse name: Not on file    Number of children: Not on file    Years of education: Not on file    Highest education level: Not on file   Occupational History    Not on file   Tobacco Use    Smoking status: Never     Passive exposure: Never    Smokeless tobacco: Never   Vaping Use    Vaping status: Never Used   Substance and Sexual Activity    Alcohol use: Never    Drug use: Never    Sexual activity: Not Currently   Other Topics Concern    Not on file   Social History Narrative    Not on file     Social Determinants of Health     Financial Resource Strain: Low Risk  (6/27/2024)    Overall Financial Resource Strain (CARDIA)     Difficulty of Paying Living Expenses: Not very hard   Food Insecurity: No Food Insecurity (6/27/2024)    Hunger  Vital Sign     Worried About Running Out of Food in the Last Year: Never true     Ran Out of Food in the Last Year: Never true   Transportation Needs: No Transportation Needs (6/27/2024)    PRAPARE - Transportation     Lack of Transportation (Medical): No     Lack of Transportation (Non-Medical): No   Physical Activity: Inactive (2/8/2022)    Exercise Vital Sign     Days of Exercise per Week: 0 days     Minutes of Exercise per Session: 0 min   Stress: No Stress Concern Present (3/11/2020)    [employer] - Occupational Stress Questionnaire     Feeling of Stress : Not at all   Social Connections: Not on file   Intimate Partner Violence: Not on file   Housing Stability: Low Risk  (6/27/2024)    Housing Stability Vital Sign     Unable to Pay for Housing in the Last Year: No     Number of Times Moved in the Last Year: 1     Homeless in the Last Year: No      Family History   Problem Relation Age of Onset    Congenital heart disease Mother     Asthma Father     Heart attack Sister         acute MI    Congenital heart disease Sister     Hypertension Sister     No Known Problems Maternal Grandmother     No Known Problems Maternal Grandfather     No Known Problems Paternal Grandmother     No Known Problems Maternal Aunt     No Known Problems Maternal Aunt     No Known Problems Maternal Aunt     Breast cancer Cousin         ? age     Past Surgical History:   Procedure Laterality Date    CARDIAC CATHETERIZATION      post proc data:____ lesions successfully dilated    COLONOSCOPY      ESOPHAGOGASTRODUODENOSCOPY      GASTRIC BYPASS  02/08/2011    for morbid obesity     HYSTERECTOMY      @ age 35    OOPHORECTOMY Bilateral     @ age 35    MO EGD TRANSORAL BIOPSY SINGLE/MULTIPLE N/A 1/25/2017    Procedure: ESOPHAGOGASTRODUODENOSCOPY (EGD);  Surgeon: Washington Diaz MD;  [facility]: [facility];  Service: Bariatrics    MO EGD TRANSORAL BIOPSY SINGLE/MULTIPLE N/A 5/3/2017    Procedure:  ESOPHAGOGASTRODUODENOSCOPY (EGD) with balloon dilatation;  Surgeon: Washington Diaz MD;  [facility]: [facility];  Service: Bariatrics    AL EGD TRANSORAL BIOPSY SINGLE/MULTIPLE N/A 8/16/2017    Procedure: ESOPHAGOGASTRODUODENOSCOPY (EGD) with balloon dilation;  Surgeon: Washington Diaz MD;  Location: [facility];  Service: Bariatrics    AL EXCISION EXCESSIVE SKIN & SUBQ TISSUE ABDOMEN N/A 3/30/2023    Procedure: LIPO ABDOMINOPLASTY;  Surgeon: Xander Rivera MD;  Location:  MAIN OR;  Service: Plastics    AL EXCISION SKIN ABD INFRAUMBILICAL PANNICULECTOMY N/A 3/30/2023    Procedure: JENNY WINSTON PANNICULECTOMY;  Surgeon: Xander Rivera MD;  Location:  MAIN OR;  Service: Plastics    UPPER GASTROINTESTINAL ENDOSCOPY         Current Outpatient Medications:     acetaminophen (TYLENOL) 500 mg tablet, Take 1 tablet (500 mg total) by mouth every 4 (four) hours as needed for mild pain or moderate pain, Disp: 90 tablet, Rfl: 3    atorvastatin (LIPITOR) 20 mg tablet, Take 1 tablet by mouth once daily, Disp: 90 tablet, Rfl: 0    calcium citrate (Calcitrate) 950 (200 Ca) MG tablet, Take 1.5 tablets (1,425 mg total) by mouth daily, Disp: 90 tablet, Rfl: 3    Cholecalciferol (Vitamin D3) 50 MCG (2000 UT) TABS, Take 2 tablets (4,000 Units total) by mouth daily, Disp: 180 tablet, Rfl: 1    docusate sodium (COLACE) 100 mg capsule, Take 1 capsule (100 mg total) by mouth 2 (two) times a day, Disp: 20 capsule, Rfl: 1    ferrous sulfate 324 (65 Fe) mg, Take 1 tablet (324 mg total) by mouth daily before breakfast, Disp: 90 tablet, Rfl: 3    gabapentin (NEURONTIN) 100 mg capsule, Take 3 capsules (300 mg total) by mouth 3 (three) times a day, Disp: 90 capsule, Rfl: 0    losartan-hydrochlorothiazide (HYZAAR) 100-25 MG per tablet, Take 1 tablet by mouth daily, Disp: 90 tablet, Rfl: 1    metFORMIN (GLUCOPHAGE-XR) 500 mg 24 hr tablet, Take 1 tablet (500 mg total) by mouth daily with breakfast, Disp: 180 tablet, Rfl: 0    methocarbamol  (ROBAXIN) 500 mg tablet, Take 1 tablet (500 mg total) by mouth 3 (three) times a day, Disp: 30 tablet, Rfl: 0    metoprolol succinate (TOPROL-XL) 25 mg 24 hr tablet, Take 1 tablet (25 mg total) by mouth 2 (two) times a day, Disp: 180 tablet, Rfl: 0    Multiple Vitamins-Minerals (ZINC PO), Take by mouth, Disp: , Rfl:     Omega-3 Fatty Acids (FISH OIL) 1,000 mg, Take 1,000 mg by mouth 2 (two) times a day, Disp: , Rfl:     omeprazole (PriLOSEC) 20 mg delayed release capsule, Take 1 capsule by mouth twice daily, Disp: 180 capsule, Rfl: 1    sucralfate (CARAFATE) 1 g/10 mL suspension, Take 10 mL (1 g total) by mouth 4 (four) times a day, Disp: 840 mL, Rfl: 0    VITAMIN E PO, Take 1 tablet by mouth daily, Disp: , Rfl:     ergocalciferol (VITAMIN D2) 50,000 units, Take 1 capsule (50,000 Units total) by mouth 2 (two) times a week, Disp: 24 capsule, Rfl: 0    nystatin (MYCOSTATIN) powder, Apply topically 3 (three) times a day, Disp: 60 g, Rfl: 3    ondansetron (ZOFRAN) 4 mg tablet, Take 1 tablet (4 mg total) by mouth every 8 (eight) hours as needed for nausea or vomiting, Disp: 20 tablet, Rfl: 0    Current Facility-Administered Medications:     [START ON 12/5/2024] zoledronic acid (RECLAST) IV infusion (premix) 5 mg, 5 mg, Intravenous, Once,   No Known Allergies    Labs:  Office Visit on 08/14/2024   Component Date Value    Ventricular Rate 08/14/2024 60     Atrial Rate 08/14/2024 60     NM Interval 08/14/2024 170     QRSD Interval 08/14/2024 84     QT Interval 08/14/2024 406     QTC Interval 08/14/2024 406     P Axis 08/14/2024 23     QRS Axis 08/14/2024 1     T Wave [place] 08/14/2024 26    Appointment on 08/14/2024   Component Date Value    Creatinine, Ur 08/14/2024 92.2     Albumin,U,Random 08/14/2024 <7.0     Albumin Creat Ratio 08/14/2024      Sodium 08/14/2024 139     Potassium 08/14/2024 4.1     Chloride 08/14/2024 105     CO2 08/14/2024 30     ANION GAP 08/14/2024 4     BUN 08/14/2024 20     Creatinine 08/14/2024 0.84   "   Glucose, Fasting 08/14/2024 84     Calcium 08/14/2024 8.9     AST 08/14/2024 23     ALT 08/14/2024 21     Alkaline Phosphatase 08/14/2024 60     Total Protein 08/14/2024 6.8     Albumin 08/14/2024 3.9     Total Bilirubin 08/14/2024 0.44     eGFR 08/14/2024 73     WBC 08/14/2024 5.61     RBC 08/14/2024 3.88     Hemoglobin 08/14/2024 11.1 (L)     Hematocrit 08/14/2024 35.8     MCV 08/14/2024 92     MCH 08/14/2024 28.6     MCHC 08/14/2024 31.0 (L)     RDW 08/14/2024 13.2     MPV 08/14/2024 11.0     Platelets 08/14/2024 268     nRBC 08/14/2024 0     Segmented % 08/14/2024 47     Immature Grans % 08/14/2024 0     Lymphocytes % 08/14/2024 37     Monocytes % 08/14/2024 12     Eosinophils Relative 08/14/2024 3     Basophils Relative 08/14/2024 1     Absolute Neutrophils 08/14/2024 2.68     Absolute Immature Grans 08/14/2024 0.01     Absolute Lymphocytes 08/14/2024 2.05     Absolute Monocytes 08/14/2024 0.65     Eosinophils Absolute 08/14/2024 0.19     Basophils Absolute 08/14/2024 0.03    Hospital Outpatient Visit on 07/03/2024   Component Date Value    POC Glucose 07/03/2024 80     Case Report 07/03/2024                      Value:Surgical Pathology Report                         Case: [id number]                                  Authorizing Provider:  Washington Diaz MD        Collected:           07/03/2024 0821              Ordering Location:     [facility]        Received:            07/03/2024 [address] [place] [address] Endoscopy                                                          Pathologist:           Sylvain Medina MD                                                           Specimen:    Stomach, biopsy retrieved by cold biopsy forceps, R/O H. pylori                            Final Diagnosis 07/03/2024                      Value:A. Stomach, \"Stomach biopsy rule out H. pylori,\" Biopsy:  - Antral mucosa with mild chronic inactive gastritis  - Negative for intestinal " "metaplasia  - Negative for curvilinear Helicobacter microorganisms on H&E        Additional Information 07/03/2024                      Value:All reported additional testing was performed with appropriately reactive controls.  These tests were developed and their performance characteristics determined by [facility] or appropriate performing facility, though some tests may be performed on tissues which have not been validated for performance characteristics (such as staining performed on alcohol exposed cell blocks and decalcified tissues).  Results should be interpreted with caution and in the context of the patients’ clinical condition. These tests may not be cleared or approved by the U.S. Food and Drug Administration, though the FDA has determined that such clearance or approval is not necessary. These tests are used for clinical purposes and they should not be regarded as investigational or for research. This laboratory has been approved by CLIA 88, designated as a high-complexity laboratory and is qualified to perform these tests.  .Interpretation performed at [facility]. [address] [place] [address]        Gross Description 07/03/2024                      Value:A. The specimen is received in formalin, labeled with the patient's name and hospital number, and is designated \" stomach biopsy rule out H. pylori\".  The specimen consists of 3 tan soft tissue fragments measuring 0.3, 0.4 and 0.5 cm.  Entirely submitted. Screened cassette.    Note: The estimated total formalin fixation time based upon information provided by the submitting clinician and the standard processing schedule is under 72 hours.    [facility]      Clinical Information 07/03/2024                      Value:FINDINGS:  · Mild, generalized erythematous mucosa in the stomach; performed 2 cold forceps biopsies to rule out H. pylori  · Benign-appearing and post-operative anastomotic intrinsic " stricture (traversable) with diameter of 12 mm in the gastrojejunal anastomosis; dilated with balloon dilator from 18 mm starting size to 20 mm end size with step size of 19 mm. Dilation caused improved lumen appearance  · Regular Z-line 36 cm from the incisors     Office Visit on 06/27/2024   Component Date Value    Hemoglobin A1C 06/27/2024 5.6    Appointment on 06/24/2024   Component Date Value    WBC 06/24/2024 5.77     RBC 06/24/2024 3.51 (L)     Hemoglobin 06/24/2024 10.3 (L)     Hematocrit 06/24/2024 33.3 (L)     MCV 06/24/2024 95     MCH 06/24/2024 29.3     MCHC 06/24/2024 30.9 (L)     RDW 06/24/2024 13.2     Platelets 06/24/2024 315     MPV 06/24/2024 11.1     Vit D, 25-Hydroxy 06/24/2024 24.7 (L)     PTH 06/24/2024 98.4 (H)     Calcium 06/24/2024 8.4     Iron Saturation 06/24/2024 13 (L)     TIBC 06/24/2024 256     Iron 06/24/2024 33 (L)     UIBC 06/24/2024 223     Ferritin 06/24/2024 16      Imaging: No results found.    Review of Systems:  Review of Systems   Constitutional:  Negative for activity change, diaphoresis, fatigue and fever.   HENT:  Negative for hearing loss and nosebleeds.    Eyes:  Negative for visual disturbance.   Respiratory:  Negative for apnea, shortness of breath, wheezing and stridor.    Cardiovascular:  Positive for palpitations. Negative for chest pain and leg swelling.   Gastrointestinal:  Positive for abdominal distention and abdominal pain. Negative for anal bleeding and blood in stool.   Endocrine: Negative for cold intolerance.   Genitourinary:  Negative for hematuria.   Musculoskeletal:  Negative for arthralgias, gait problem and myalgias.   Skin:  Negative for pallor and rash.   Allergic/Immunologic: Negative for immunocompromised state.   Neurological:  Negative for dizziness, syncope, facial asymmetry, speech difficulty and weakness.   Hematological:  Does not bruise/bleed easily.   Psychiatric/Behavioral:  Negative for sleep disturbance. The patient is not  nervous/anxious.        Physical Exam:  Physical Exam  Vitals reviewed.   Constitutional:       General: She is not in acute distress.     Appearance: Normal appearance. She is not ill-appearing, toxic-appearing or diaphoretic.   Eyes:      General: No scleral icterus.  Neck:      Vascular: No carotid bruit.   Cardiovascular:      Rate and Rhythm: Normal rate and regular rhythm.      Pulses: Normal pulses.      Heart sounds: Normal heart sounds. No murmur heard.     No friction rub. No gallop.   Pulmonary:      Effort: Pulmonary effort is normal. No respiratory distress.      Breath sounds: Normal breath sounds. No stridor. No wheezing, rhonchi or rales.   Musculoskeletal:      Right lower leg: No edema.      Left lower leg: No edema.   Skin:     General: Skin is warm and dry.      Capillary Refill: Capillary refill takes less than 2 seconds.      Coloration: Skin is not jaundiced or pale.      Findings: No bruising or erythema.   Neurological:      Mental Status: She is alert and oriented to person, place, and time.   Psychiatric:         Mood and Affect: Mood normal.         Discussion/Summary:   Paroxysmal atrial fibrillation, new diagnosis in 2022,, in the setting of syncopal event.  With a spontaneous recovery.  EKG is abnormal, suggesting septal infarct, but unchanged.  stress test 2022, she did 8 minutes on a Tom protocol achieving cardiac rate, there was no EKG cardiac ischemia there was no atrial fibrillation, PVCs were noted with triples.  An echocardiogram revealed normal left ventricular systolic function with stage I diastolic function.  There was mild aortic insufficiency there was mild mitral and tricuspid insufficiency with estimated normal pulmonary pressures suggested by Doppler.,  Ascending aorta mildly dilated at 42 mm.  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   2-week monitor 2023 revealed normal sinus rhythm, there  was 1 episode of wide-complex tachycardia, 7 beats with an average rate of 200 bpm.  Possibly aberrancy, asymptomatic, no atrial fibrillation.  Will repeat Holter monitor and echocardiogram..  Favor no anticoagulation at the present time.  Patient is cleared for surgery.  No testing needed       This note was completed in part utilizing [employer] direct voice recognition software.   Grammatical errors, random word insertion, spelling mistakes, and incomplete sentences may be an occasional consequence of the system secondary to software limitations, ambient noise and hardware issues. At the time of dictation, efforts were made to edit, clarify and /or correct errors.  Please read the chart carefully and recognize, using context, where substitutions have occurred.  If you have any questions or concerns about the context, text or information contained within the body of this dictation, please contact myself, the provider, for further clarification.

## 2024-08-26 PROCEDURE — NC001 PR NO CHARGE: Performed by: PHYSICIAN ASSISTANT

## 2024-08-26 NOTE — H&P
H&P - Plastic Surgery   Ricarda Selby 64 y.o. female MRN: [id number]  Unit/Bed#:  Encounter: [id number]           Procedure: REVISION OF ABDOMINAL VERTICAL AND HORIZONTAL SCAR WITH COMPLEX CLOSURE. (Abdomen)   Anesthesia type: General   Diagnosis: Symptomatic scar of skin [L90.5]           HPI:   Ricarda Selby is a 64 y.o. year old female who is status post [id number] panniculectomy and lipoabdominoplasty with Dr. Rivera on 3/30/2023.  Postoperative course was complicated by abdominal wound dehiscence requiring wound VAC which has subsequently been removed.  Patient also with recurrent muscular abdominal pain.          REVIEW OF SYSTEMS    GENERAL/CONSTITUTIONAL: The patient denies fever, fatigue, weakness, weight gain or weight loss.  HEAD, EYES, EARS, NOSE AND THROAT: Eyes - The patient denies pain, redness, loss of vision, double or blurred vision and denies wearing glasses. The patient denies ringing in the ears, nosebleeds sinusitis, post nasal drip. Also denies frequent sore throats, hoarseness, painful swallowing.  CARDIOVASCULAR: The patient denies chest pain, irregular heartbeats, palpitations, shortness of breath, heart murmurs, high blood pressure, cramps in his legs with walking, pain in his feet or toes at night or varicose veins.  RESPIRATORY: The patient denies chronic cough, wheezing or night sweats.  GASTROINTESTINAL: The patient denies decreased appetite, nausea, vomiting, diarrhea, constipation, blood in the stools.  GENITOURINARY: The patient denies difficult urination, pain or burning with urination, blood in the urine.  MUSCULOSKELETAL: The patient denies arm, thigh or calf cramps. No joint or muscle pain. No muscle weakness or tenderness. No joint swelling, neck pain, back pain or major orthopedic injuries.  SKIN AND BREASTS: see hpi The patient denies easy bruising, skin redness, skin rash, hives.  NEUROLOGIC: The patient denies headache, dizziness, fainting, memory loss.  PSYCHIATRIC: The  patient denies depression anxiety.  ENDOCRINE: The patient denies intolerance to hot or cold temperature, flushing, fingernail changes, increased thirst, increased salt intake or decreased sexual desire.  HEMATOLOGIC/LYMPHATIC: The patient denies anemia, bleeding tendency or clotting tendency.  ALLERGIC/IMMUNOLOGIC: The patient denies rhinitis, asthma, skin sensitivity, latex allergies or sensitivity.      Historical Information   Past Medical History:   Diagnosis Date    Anastomotic stricture of gastrojejunostomy     s/p gastric bypass    Anemia     iron deficiency resolved 11/14/14    Anxiety     resolved 6/7/17    Class 1 obesity due to excess calories with serious comorbidity and body mass index (BMI) of 30.0 to 30.9 in adult 09/18/2020    Degenerative joint disease of ankle and foot, unspecified laterality     last assessed 5/17/13    Depression     last assessed 11/7/12    Diabetes mellitus (HCC)     type 2 uncomplicated, controlled - last assessed 1/31/14    Dysphagia     Gastric ulcer     History of melena     History of transfusion     Hyperlipidemia     Hypertension     Insomnia     Insomnia     Obesity     resolved 11/14/14    Overweight (BMI 25.0-29.9) 05/13/2021    Postgastrectomy malabsorption     Tachycardia     Ventricular pre-excitation syndrome     Vitamin D deficiency      Past Surgical History:   Procedure Laterality Date    CARDIAC CATHETERIZATION      post proc data:____ lesions successfully dilated    COLONOSCOPY      ESOPHAGOGASTRODUODENOSCOPY      GASTRIC BYPASS  02/08/2011    for morbid obesity     HYSTERECTOMY      @ age 35    OOPHORECTOMY Bilateral     @ age 35    [place] EGD TRANSORAL BIOPSY SINGLE/MULTIPLE N/A 1/25/2017    Procedure: ESOPHAGOGASTRODUODENOSCOPY (EGD);  Surgeon: Washington Diaz MD;  Location: [facility];  Service: Bariatrics    [place] EGD TRANSORAL BIOPSY SINGLE/MULTIPLE N/A 5/3/2017    Procedure: ESOPHAGOGASTRODUODENOSCOPY (EGD) with balloon dilatation;  Surgeon: Washington Diaz  MD;  Location: [facility];  Service: Bariatrics    [place] EGD TRANSORAL BIOPSY SINGLE/MULTIPLE N/A 8/16/2017    Procedure: ESOPHAGOGASTRODUODENOSCOPY (EGD) with balloon dilation;  Surgeon: Washington Diaz MD;  Location: [facility];  Service: Bariatrics    [place] EXCISION EXCESSIVE SKIN & SUBQ TISSUE ABDOMEN N/A 3/30/2023    Procedure: LIPO ABDOMINOPLASTY;  Surgeon: Xander Rivera MD;  Location:  MAIN OR;  Service: Plastics    [place] EXCISION SKIN ABD INFRAUMBILICAL PANNICULECTOMY N/A 3/30/2023    Procedure: JENNY DE LIS PANNICULECTOMY;  Surgeon: Xander Rivera MD;  Location:  MAIN OR;  Service: Plastics    UPPER GASTROINTESTINAL ENDOSCOPY       Social History   Social History     Substance and Sexual Activity   Alcohol Use Never     Social History     Substance and Sexual Activity   Drug Use Never     Social History     Tobacco Use   Smoking Status Never    Passive exposure: Never   Smokeless Tobacco Never     Family History:   Family History   Problem Relation Age of Onset    Congenital heart disease Mother     Asthma Father     Heart attack Sister         acute MI    Congenital heart disease Sister     Hypertension Sister     No Known Problems Maternal Grandmother     No Known Problems Maternal Grandfather     No Known Problems Paternal Grandmother     No Known Problems Maternal Aunt     No Known Problems Maternal Aunt     No Known Problems Maternal Aunt     Breast cancer Cousin         ? age       Meds/Allergies     Current Facility-Administered Medications:     [START ON 12/5/2024] zoledronic acid (RECLAST) IV infusion (premix) 5 mg, 5 mg, Intravenous, Once,     Current Outpatient Medications:     acetaminophen (TYLENOL) 500 mg tablet, Take 1 tablet (500 mg total) by mouth every 4 (four) hours as needed for mild pain or moderate pain, Disp: 90 tablet, Rfl: 3    atorvastatin (LIPITOR) 20 mg tablet, Take 1 tablet by mouth once daily, Disp: 90 tablet, Rfl: 0    calcium citrate (Calcitrate) 950 (200 Ca) MG tablet, Take 1.5  tablets (1,425 mg total) by mouth daily, Disp: 90 tablet, Rfl: 3    Cholecalciferol (Vitamin D3) 50 MCG (2000 UT) TABS, Take 2 tablets (4,000 Units total) by mouth daily, Disp: 180 tablet, Rfl: 1    ferrous sulfate 324 (65 Fe) mg, Take 1 tablet (324 mg total) by mouth daily before breakfast, Disp: 90 tablet, Rfl: 3    gabapentin (NEURONTIN) 100 mg capsule, Take 3 capsules (300 mg total) by mouth 3 (three) times a day, Disp: 90 capsule, Rfl: 0    losartan-hydrochlorothiazide (HYZAAR) 100-25 MG per tablet, Take 1 tablet by mouth daily, Disp: 90 tablet, Rfl: 1    metFORMIN (GLUCOPHAGE-XR) 500 mg 24 hr tablet, Take 1 tablet (500 mg total) by mouth daily with breakfast, Disp: 180 tablet, Rfl: 0    methocarbamol (ROBAXIN) 500 mg tablet, Take 1 tablet (500 mg total) by mouth 3 (three) times a day, Disp: 30 tablet, Rfl: 0    metoprolol succinate (TOPROL-XL) 25 mg 24 hr tablet, Take 1 tablet (25 mg total) by mouth 2 (two) times a day, Disp: 180 tablet, Rfl: 0    Multiple Vitamins-Minerals (ZINC PO), Take by mouth, Disp: , Rfl:     Omega-3 Fatty Acids (FISH OIL) 1,000 mg, Take 1,000 mg by mouth 2 (two) times a day, Disp: , Rfl:     omeprazole (PriLOSEC) 20 mg delayed release capsule, Take 1 capsule by mouth twice daily, Disp: 180 capsule, Rfl: 1    sucralfate (CARAFATE) 1 g/10 mL suspension, Take 10 mL (1 g total) by mouth 4 (four) times a day, Disp: 840 mL, Rfl: 0    VITAMIN E PO, Take 1 tablet by mouth daily, Disp: , Rfl:     docusate sodium (COLACE) 100 mg capsule, Take 1 capsule (100 mg total) by mouth 2 (two) times a day, Disp: 20 capsule, Rfl: 1    ergocalciferol (VITAMIN D2) 50,000 units, Take 1 capsule (50,000 Units total) by mouth 2 (two) times a week, Disp: 24 capsule, Rfl: 0    nystatin (MYCOSTATIN) powder, Apply topically 3 (three) times a day, Disp: 60 g, Rfl: 3    ondansetron (ZOFRAN) 4 mg tablet, Take 1 tablet (4 mg total) by mouth every 8 (eight) hours as needed for nausea or vomiting, Disp: 20 tablet, Rfl: 0  "      Objective     Blood Pressure: 153/95 (06/21/24 1357)  Pulse: 70 (06/21/24 1357)  Temperature: 97.9 °F (36.6 °C) (06/21/24 1357)  Height: 5' 5\" (165.1 cm) (06/21/24 1357)  Weight - Scale: 63.1 kg (139 lb 1 oz) (06/21/24 1357)     [unfilled]     Invasive Devices         None                         Physical Exam  Vitals and nursing note reviewed.   Constitutional:       General: She is not in acute distress.     Appearance: Normal appearance. She is normal weight. She is not ill-appearing or toxic-appearing.   HENT:      Head: Normocephalic and atraumatic.      Nose: Nose normal.      Mouth/Throat:      Mouth: Mucous membranes are moist.   Eyes:      General:         Right eye: No discharge.         Left eye: No discharge.      Extraocular Movements: Extraocular movements intact.      Conjunctiva/sclera: Conjunctivae normal.      Pupils: Pupils are equal, round, and reactive to light.   Cardiovascular:      Rate and Rhythm: Normal rate and regular rhythm.      Pulses: Normal pulses.      Heart sounds: Normal heart sounds.   Pulmonary:      Effort: Pulmonary effort is normal. No respiratory distress.      Breath sounds: Normal breath sounds.   Abdominal:      General: Abdomen is flat.      Palpations: Abdomen is soft.   Musculoskeletal:         General: No swelling, tenderness, deformity or signs of injury. Normal range of motion.      Right lower leg: No edema.      Left lower leg: No edema.   Skin:     General: Skin is warm and dry.      Comments: See HPI    Neurological:      General: No focal deficit present.      Mental Status: She is alert and oriented to person, place, and time.      Cranial Nerves: No cranial nerve deficit.      Sensory: No sensory deficit.      Motor: No weakness.   Psychiatric:         Mood and Affect: Mood normal.         Behavior: Behavior normal.     Lab Results:   Lab Results   Component Value Date    WBC 5.61 08/14/2024    HGB 11.1 (L) 08/14/2024    HCT 35.8 08/14/2024    MCV 92 " "08/14/2024     08/14/2024          Lab Results   Component Value Date/Time    WOUNDCULT 2+ Growth of Beta Hemolytic Streptococcus Group A (A) 07/10/2023 04:05 PM    WOUNDCULT 1+ Growth of 07/10/2023 04:05 PM         Imaging Studies:   No results found.    EKG, Pathology, and Other Studies:   Lab Results   Component Value Date/Time    FINALDX  07/03/2024 08:21 AM     A. Stomach, \"Stomach biopsy rule out H. pylori,\" Biopsy:  - Antral mucosa with mild chronic inactive gastritis  - Negative for intestinal metaplasia  - Negative for curvilinear Helicobacter microorganisms on H&E           No intake or output data in the 24 hours ending 08/26/24 0720    Invasive Devices       None                   VTE Prophylaxis: Sequential compression device (Venodyne)       "

## 2024-08-27 ENCOUNTER — ANESTHESIA (OUTPATIENT)
Dept: PERIOP | Facility: HOSPITAL | Age: 64
End: 2024-08-27
Payer: COMMERCIAL

## 2024-08-27 ENCOUNTER — ANESTHESIA EVENT (OUTPATIENT)
Dept: PERIOP | Facility: HOSPITAL | Age: 64
End: 2024-08-27
Payer: COMMERCIAL

## 2024-08-27 ENCOUNTER — HOSPITAL ENCOUNTER (OUTPATIENT)
Facility: HOSPITAL | Age: 64
Setting detail: OUTPATIENT SURGERY
Discharge: HOME | End: 2024-08-27
Attending: STUDENT IN AN ORGANIZED HEALTH CARE EDUCATION/TRAINING PROGRAM | Admitting: STUDENT IN AN ORGANIZED HEALTH CARE EDUCATION/TRAINING PROGRAM
Payer: COMMERCIAL

## 2024-08-27 VITALS
BODY MASS INDEX: 23.49 KG/M2 | TEMPERATURE: 96.7 F | HEIGHT: 65 IN | RESPIRATION RATE: 18 BRPM | SYSTOLIC BLOOD PRESSURE: 118 MMHG | OXYGEN SATURATION: 100 % | WEIGHT: 141 LBS | HEART RATE: 68 BPM | DIASTOLIC BLOOD PRESSURE: 73 MMHG

## 2024-08-27 DIAGNOSIS — L90.5 SYMPTOMATIC SCAR OF SKIN: ICD-10-CM

## 2024-08-27 LAB
GLUCOSE SERPL-MCNC: 112 MG/DL (ref 65–140)
GLUCOSE SERPL-MCNC: 87 MG/DL (ref 65–140)

## 2024-08-27 PROCEDURE — 82948 REAGENT STRIP/BLOOD GLUCOSE: CPT

## 2024-08-27 PROCEDURE — 13102 CMPLX RPR TRUNK ADDL 5CM/<: CPT | Performed by: STUDENT IN AN ORGANIZED HEALTH CARE EDUCATION/TRAINING PROGRAM

## 2024-08-27 PROCEDURE — 13101 CMPLX RPR TRUNK 2.6-7.5 CM: CPT | Performed by: STUDENT IN AN ORGANIZED HEALTH CARE EDUCATION/TRAINING PROGRAM

## 2024-08-27 PROCEDURE — 88302 TISSUE EXAM BY PATHOLOGIST: CPT | Performed by: PATHOLOGY

## 2024-08-27 PROCEDURE — 11406 EXC TR-EXT B9+MARG >4.0 CM: CPT | Performed by: STUDENT IN AN ORGANIZED HEALTH CARE EDUCATION/TRAINING PROGRAM

## 2024-08-27 RX ORDER — ONDANSETRON 2 MG/ML
4 INJECTION INTRAMUSCULAR; INTRAVENOUS ONCE AS NEEDED
Status: DISCONTINUED | OUTPATIENT
Start: 2024-08-27 | End: 2024-08-27 | Stop reason: HOSPADM

## 2024-08-27 RX ORDER — FENTANYL CITRATE 50 UG/ML
INJECTION, SOLUTION INTRAMUSCULAR; INTRAVENOUS AS NEEDED
Status: DISCONTINUED | OUTPATIENT
Start: 2024-08-27 | End: 2024-08-27

## 2024-08-27 RX ORDER — TRAMADOL HYDROCHLORIDE 50 MG/1
50 TABLET ORAL EVERY 8 HOURS PRN
Qty: 9 TABLET | Refills: 0 | Status: SHIPPED | OUTPATIENT
Start: 2024-08-27

## 2024-08-27 RX ORDER — ONDANSETRON 2 MG/ML
INJECTION INTRAMUSCULAR; INTRAVENOUS AS NEEDED
Status: DISCONTINUED | OUTPATIENT
Start: 2024-08-27 | End: 2024-08-27

## 2024-08-27 RX ORDER — SODIUM CHLORIDE, SODIUM LACTATE, POTASSIUM CHLORIDE, CALCIUM CHLORIDE 600; 310; 30; 20 MG/100ML; MG/100ML; MG/100ML; MG/100ML
125 INJECTION, SOLUTION INTRAVENOUS CONTINUOUS
Status: DISCONTINUED | OUTPATIENT
Start: 2024-08-27 | End: 2024-08-27 | Stop reason: HOSPADM

## 2024-08-27 RX ORDER — DIPHENHYDRAMINE HYDROCHLORIDE 50 MG/ML
12.5 INJECTION INTRAMUSCULAR; INTRAVENOUS ONCE AS NEEDED
Status: DISCONTINUED | OUTPATIENT
Start: 2024-08-27 | End: 2024-08-27 | Stop reason: HOSPADM

## 2024-08-27 RX ORDER — LIDOCAINE HYDROCHLORIDE 10 MG/ML
INJECTION, SOLUTION EPIDURAL; INFILTRATION; INTRACAUDAL; PERINEURAL AS NEEDED
Status: DISCONTINUED | OUTPATIENT
Start: 2024-08-27 | End: 2024-08-27

## 2024-08-27 RX ORDER — FENTANYL CITRATE/PF 50 MCG/ML
50 SYRINGE (ML) INJECTION
Status: DISCONTINUED | OUTPATIENT
Start: 2024-08-27 | End: 2024-08-27 | Stop reason: HOSPADM

## 2024-08-27 RX ORDER — CEFAZOLIN SODIUM 2 G/50ML
2000 SOLUTION INTRAVENOUS ONCE
Status: COMPLETED | OUTPATIENT
Start: 2024-08-27 | End: 2024-08-27

## 2024-08-27 RX ORDER — GABAPENTIN 300 MG/1
300 CAPSULE ORAL ONCE
Status: COMPLETED | OUTPATIENT
Start: 2024-08-27 | End: 2024-08-27

## 2024-08-27 RX ORDER — ACETAMINOPHEN 325 MG/1
975 TABLET ORAL ONCE
Status: COMPLETED | OUTPATIENT
Start: 2024-08-27 | End: 2024-08-27

## 2024-08-27 RX ORDER — ROCURONIUM BROMIDE 10 MG/ML
INJECTION, SOLUTION INTRAVENOUS AS NEEDED
Status: DISCONTINUED | OUTPATIENT
Start: 2024-08-27 | End: 2024-08-27

## 2024-08-27 RX ORDER — MIDAZOLAM HYDROCHLORIDE 2 MG/2ML
INJECTION, SOLUTION INTRAMUSCULAR; INTRAVENOUS AS NEEDED
Status: DISCONTINUED | OUTPATIENT
Start: 2024-08-27 | End: 2024-08-27

## 2024-08-27 RX ORDER — MAGNESIUM HYDROXIDE 1200 MG/15ML
LIQUID ORAL AS NEEDED
Status: DISCONTINUED | OUTPATIENT
Start: 2024-08-27 | End: 2024-08-27 | Stop reason: HOSPADM

## 2024-08-27 RX ORDER — DEXAMETHASONE SODIUM PHOSPHATE 10 MG/ML
INJECTION, SOLUTION INTRAMUSCULAR; INTRAVENOUS AS NEEDED
Status: DISCONTINUED | OUTPATIENT
Start: 2024-08-27 | End: 2024-08-27

## 2024-08-27 RX ORDER — TRAMADOL HYDROCHLORIDE 50 MG/1
50 TABLET ORAL EVERY 6 HOURS PRN
Status: DISCONTINUED | OUTPATIENT
Start: 2024-08-27 | End: 2024-08-27 | Stop reason: HOSPADM

## 2024-08-27 RX ORDER — PROPOFOL 10 MG/ML
INJECTION, EMULSION INTRAVENOUS AS NEEDED
Status: DISCONTINUED | OUTPATIENT
Start: 2024-08-27 | End: 2024-08-27

## 2024-08-27 RX ORDER — LIDOCAINE HYDROCHLORIDE AND EPINEPHRINE 10; 10 MG/ML; UG/ML
INJECTION, SOLUTION INFILTRATION; PERINEURAL AS NEEDED
Status: DISCONTINUED | OUTPATIENT
Start: 2024-08-27 | End: 2024-08-27 | Stop reason: HOSPADM

## 2024-08-27 RX ADMIN — ROCURONIUM BROMIDE 50 MG: 50 INJECTION, SOLUTION INTRAVENOUS at 11:45

## 2024-08-27 RX ADMIN — FENTANYL CITRATE 50 MCG: 50 INJECTION, SOLUTION INTRAMUSCULAR; INTRAVENOUS at 13:11

## 2024-08-27 RX ADMIN — FENTANYL CITRATE 50 MCG: 50 INJECTION, SOLUTION INTRAMUSCULAR; INTRAVENOUS at 13:00

## 2024-08-27 RX ADMIN — DEXAMETHASONE SODIUM PHOSPHATE 10 MG: 10 INJECTION, SOLUTION INTRAMUSCULAR; INTRAVENOUS at 11:45

## 2024-08-27 RX ADMIN — TRAMADOL HYDROCHLORIDE 50 MG: 50 TABLET, COATED ORAL at 13:44

## 2024-08-27 RX ADMIN — LIDOCAINE HYDROCHLORIDE 50 MG: 10 INJECTION, SOLUTION EPIDURAL; INFILTRATION; INTRACAUDAL; PERINEURAL at 11:44

## 2024-08-27 RX ADMIN — SODIUM CHLORIDE, SODIUM LACTATE, POTASSIUM CHLORIDE, AND CALCIUM CHLORIDE: .6; .31; .03; .02 INJECTION, SOLUTION INTRAVENOUS at 12:48

## 2024-08-27 RX ADMIN — MIDAZOLAM 2 MG: 1 INJECTION INTRAMUSCULAR; INTRAVENOUS at 11:38

## 2024-08-27 RX ADMIN — GABAPENTIN 300 MG: 300 CAPSULE ORAL at 10:50

## 2024-08-27 RX ADMIN — SUGAMMADEX 200 MG: 100 INJECTION, SOLUTION INTRAVENOUS at 12:48

## 2024-08-27 RX ADMIN — ONDANSETRON 4 MG: 2 INJECTION INTRAMUSCULAR; INTRAVENOUS at 11:44

## 2024-08-27 RX ADMIN — CEFAZOLIN SODIUM 1000 MG: 2 SOLUTION INTRAVENOUS at 11:51

## 2024-08-27 RX ADMIN — PROPOFOL 100 MG: 10 INJECTION, EMULSION INTRAVENOUS at 12:43

## 2024-08-27 RX ADMIN — FENTANYL CITRATE 50 MCG: 50 INJECTION, SOLUTION INTRAMUSCULAR; INTRAVENOUS at 11:44

## 2024-08-27 RX ADMIN — FENTANYL CITRATE 50 MCG: 50 INJECTION, SOLUTION INTRAMUSCULAR; INTRAVENOUS at 12:00

## 2024-08-27 RX ADMIN — ACETAMINOPHEN 975 MG: 325 TABLET ORAL at 10:50

## 2024-08-27 RX ADMIN — PROPOFOL 200 MG: 10 INJECTION, EMULSION INTRAVENOUS at 11:43

## 2024-08-27 RX ADMIN — SODIUM CHLORIDE, SODIUM LACTATE, POTASSIUM CHLORIDE, AND CALCIUM CHLORIDE: .6; .31; .03; .02 INJECTION, SOLUTION INTRAVENOUS at 10:47

## 2024-08-27 NOTE — DISCHARGE INSTR - AVS FIRST PAGE
Surgery Date: 8/27/2024                Patient: Ricarda Selby  Surgeon: Dr. Rivera   Postoperative Instructions for Outpatient Surgery  Excision of Scar     Dressings:  [x] Skin glue was applied to your incision over absorbable sutures.  You may feel small pieces of suture at the ends of your incision.    [] Incision is closed with nonabsorbable sutures.    [x] Remove dressing the first morning following your surgery and bathe as directed.  [] No dressings are required but you may cover the incision with band-aid or gauze for comfort.  [] Apply bacitracin or other antibiotic ointment to incision and cover with band-aid or gauze.  [] Leave dressing in place until your follow up appointment.  [x] Other instructions: Wear binder at all times, except showering.     Bathing:  [x] Shower 24 hours after surgery.  Allow soap and water to gently wash over the incision.  No scrubbing.  Gently pat dry and apply dressing as needed/instructed above.  [] Keep incision/dressing dry until your follow up appointment.  [x] No submerging incision in bathtub, pool, hot tub and/or lake.     Activity:  [x] No heavy lifting (> 10lbs).  [x] No strenuous exercise.  [] Walking is permitted and encouraged.  [] Strict sun avoidance/protection of incision site.  [] Other instructions:      Medication:  [x] Resume preoperative medications.  [x] Ok to use Tylenol for pain control.  You may also use ibuprofen 48 hours after surgery. Add Tramadol as needed.  [] Finish all antibiotics as prescribed.  [x] You may not drive until off your pain medications.  [x] Apply ice to area as needed for pain.  Do not place ice directly on skin.  [] Other instructions:      It is expected to have some bruising, swelling and mild oozing at the incision site and the surrounding area.  If there is more than you expect or you suspect an infection, please call the office.     Some patients may experience a low-grade fever after surgery.  If it is above 100.4, please  call the office.     If you do not have a postoperative office appointment scheduled, please call the office today and let the staff know Dr. Rivera needs to see you in 10-14  days.    Please call [phone] with any questions, concerns or changes.

## 2024-08-27 NOTE — ANESTHESIA POSTPROCEDURE EVALUATION
Post-Op Assessment Note    CV Status:  Stable  Pain Score: 0    Pain management: adequate       Mental Status:  Alert and awake   Hydration Status:  Stable   PONV Controlled:  None   Airway Patency:  Patent     Post Op Vitals Reviewed: Yes    No anethesia notable event occurred.    Staff: AMMY               /63 (08/27/24 1314)    Temp (!) 97 °F (36.1 °C) (08/27/24 1314)    Pulse 74 (08/27/24 1314)   Resp 16 (08/27/24 1314)    SpO2 93 % (08/27/24 1314)

## 2024-08-27 NOTE — OP NOTE
OPERATIVE REPORT  PATIENT NAME: Ricarda Selby    :  1960  MRN: [id number]  Pt Location:  OR ROOM 10    SURGERY DATE: 2024    Surgeons and Role:     * Xander Rivera MD - Primary    Preop Diagnosis:  Symptomatic scar of skin [L90.5]    Post-Op Diagnosis Codes:     * Symptomatic scar of skin [L90.5]    Procedure(s):  Excision of symptomatic abdominal scars via separate incisions with complex closure (total length of complex closure: 39 cm)  Horizontal symptomatic scar excision (size 30x2 cm), complex closure 32 cm  Vertical symptomatic scar excision (size 6x2 cm), complex closure 7 cm    Specimen(s):  ID Type Source Tests Collected by Time Destination   1 : 2 SEPARATE ABDOMINAL SCARS Tissue Skin, Plastic Repair TISSUE EXAM Xander Rivera MD 2024 12:09 PM        Estimated Blood Loss:   Minimal    Drains:  * No LDAs found *    Anesthesia Type:   General    Operative Indications:  Symptomatic scar of skin [L90.5]    Operative Findings:  Horizontal symptomatic scar excision (size 30x2 cm), complex closure 32 cm  Vertical symptomatic scar excision (size 6x2 cm), complex closure 7 cm  Total length of complex closure 39 cm  20 cc of 1% lidocaine with epi utilized    Complications:   None    Procedure and Technique:  Patient was brought to the operating room, transferred to the operating table in supine fashion. After undergoing general anesthesia, a timeout was performed at which point all patient identifiers were deemed to be correct. The chest and abdomen were prepped and draped in the normal sterile fashion. I first began by marking the areas of resection via separate incision on the horizontal and vertical regions. A combined total of 20 cc of 1% lidocaine with epi was utilized and infiltrated into both areas. After allowing for the local to take effect, the symptomatic scars were excised via separate incisions down to the fascia and sent for routine pathology. The operative fields were irrigated  and hemostasis was achieved with electrocautery. I then proceeded with complex closure. The surrounding skin flaps were raised at least one width of the defect in all directions for each specific lesion to allow for tension free closure of the wound. For the horizontal incision, the fascia was reapproximated with 3-0 vicryl, dermis with insorb stapler, and skin with subcuticular 3-0 stratafix followed by exofin skin glue. The vertical wound fascia was reapproximated with 2-0 PDS followed by insorb stapler for the dermis and 4-0 subcuticular monocryl for the skin followed by exofin skin glue.    This concluded the procedure. Patient tolerated the procedure well without complications. At the end of the case, all sponge, needle, and instrument counts were correct. Patient was awakened from anesthesia and taken to the PACU in stable condition.    I was present for the entire procedure, A qualified resident physician was not available and A physician assistant was required during the procedure for retraction, tissue handling, dissection and suturing        Patient Disposition:  PACU         SIGNATURE: Xander Rivera MD  DATE: August 27, 2024  TIME: 12:53 PM

## 2024-08-27 NOTE — ANESTHESIA PREPROCEDURE EVALUATION
Procedure:  REVISION OF ABDOMINAL VERTICAL AND HORIZONTAL SCAR WITH COMPLEX CLOSURE. (Abdomen)    Relevant Problems   CARDIO   (+) Ascending aorta dilatation (HCC)   (+) Hypercholesterolemia   (+) Mixed hyperlipidemia   (+) Paroxysmal A-fib (HCC)   (+) Primary hypertension   (+) Ventricular pre-excitation syndrome   (+) WPW syndrome      ENDO   (+) Type 2 diabetes mellitus without complication, without long-term current use of insulin (HCC)      GI/HEPATIC   (+) H/O bariatric surgery      HEMATOLOGY   (+) Normocytic anemia        Physical Exam    Airway    Mallampati score: II  TM Distance: <3 FB  Neck ROM: full     Dental        Cardiovascular  Cardiovascular exam normal    Pulmonary  Pulmonary exam normal     Other Findings  post-pubertal.      Anesthesia Plan  ASA Score- 2     Anesthesia Type- general with ASA Monitors.         Additional Monitors:     Airway Plan: LMA.           Plan Factors-Exercise tolerance (METS): >4 METS.    Chart reviewed. EKG reviewed.  Existing labs reviewed.     Patient is not a current smoker. Patient not instructed to abstain from smoking on day of procedure. Patient did not smoke on day of surgery.            Induction- intravenous.    Postoperative Plan-         Informed Consent- Anesthetic plan and risks discussed with patient.  I personally reviewed this patient with the CRNA. Discussed and agreed on the Anesthesia Plan with the CRNA..

## 2024-08-27 NOTE — NURSING NOTE
Pt able to tolerate po intake. Pt in no apparent distress and pain improved with medication. IV removed; vs stable. Pt verbalized understanding to AVS instructions.

## 2024-08-29 DIAGNOSIS — L90.5 SYMPTOMATIC SCAR OF SKIN: Primary | ICD-10-CM

## 2024-08-29 RX ORDER — DOCUSATE SODIUM 100 MG/1
100 CAPSULE, LIQUID FILLED ORAL DAILY PRN
Qty: 30 CAPSULE | Refills: 1 | Status: SHIPPED | OUTPATIENT
Start: 2024-08-29 | End: 2025-08-29

## 2024-08-29 NOTE — TELEPHONE ENCOUNTER
Pt  calling in post wife's 8/27 abd scar revision and complex closure, using language line  relayed pt wanting to shower, inquiring if can take off binder to shower. Reviewed AVS with  and confirmed will take off binder to shower then reapply afterwards.     Also requesting stool softener be called into [facility] on [address] as pt complaining of constipation. Confirmed that is preferred pharmacy on file. Confirmed pt walking a little bit as tolerated    Notes otherwise pt is ok, pain improving, pt taking pain medications as needed. No other questions at this time. Routing for review.

## 2024-09-03 PROCEDURE — 88302 TISSUE EXAM BY PATHOLOGIST: CPT | Performed by: PATHOLOGY

## 2024-09-05 ENCOUNTER — OFFICE VISIT (OUTPATIENT)
Dept: PLASTIC SURGERY | Facility: CLINIC | Age: 64
End: 2024-09-05

## 2024-09-05 DIAGNOSIS — Z98.890 POST-OPERATIVE STATE: Primary | ICD-10-CM

## 2024-09-06 NOTE — PROGRESS NOTES
Patient came into office today, S/P REVISION OF ABDOMINAL VERTICAL AND HORIZONTAL SCAR WITH COMPLEX CLOSURE on 8/27/2024.     She has removed her bandages and gauze had been stuck to her surgical glue. Removed gauze from the surgical glue. Patient to follow up on 9/11/2024.

## 2024-09-11 ENCOUNTER — OFFICE VISIT (OUTPATIENT)
Dept: PLASTIC SURGERY | Facility: CLINIC | Age: 64
End: 2024-09-11

## 2024-09-11 DIAGNOSIS — L90.5 SYMPTOMATIC SCAR OF SKIN: Primary | ICD-10-CM

## 2024-09-11 PROCEDURE — 99024 POSTOP FOLLOW-UP VISIT: CPT | Performed by: PHYSICIAN ASSISTANT

## 2024-09-11 NOTE — PROGRESS NOTES
Assessment/Plan:     Patient is a 63-year-old female who is status post [id number] panniculectomy and lipoabdominoplasty with Dr. Rivera on 3/30/2023.  Postoperative course was complicated by abdominal wound dehiscence requiring wound VAC which has subsequently been removed.  Patient also with recurrent muscular abdominal pain, now status post excision of symptomatic abdominal scar x 2 by Dr. Rivera on 8/27/2024.  Please see HPI.    Patient returns to the office today for first postoperative visit. She is very pleased with her aesthetic and functional results. She will continue to wear a supportive binder and return to the office in 2 weeks for an incision check or sooner with any questions or concerns.      Diagnoses and all orders for this visit:    Symptomatic scar of skin          Subjective:     Patient ID: Ricarda Selby is a 64 y.o. female.    HPI    Patient reports no issues or concerns.  She is very pleased with her results and reports that she is free of pain.    Review of Systems    See HPI    Objective:     Physical Exam      All incisions are clean, dry, intact and healing appropriately.

## 2024-09-14 DIAGNOSIS — Z98.84 BARIATRIC SURGERY STATUS: ICD-10-CM

## 2024-09-14 DIAGNOSIS — E78.2 MIXED HYPERLIPIDEMIA: ICD-10-CM

## 2024-09-16 RX ORDER — ATORVASTATIN CALCIUM 20 MG/1
TABLET, FILM COATED ORAL
Qty: 90 TABLET | Refills: 3 | Status: SHIPPED | OUTPATIENT
Start: 2024-09-16

## 2024-09-19 DIAGNOSIS — Z98.84 BARIATRIC SURGERY STATUS: Primary | ICD-10-CM

## 2024-09-19 DIAGNOSIS — E55.9 VITAMIN D DEFICIENCY: ICD-10-CM

## 2024-09-25 ENCOUNTER — OFFICE VISIT (OUTPATIENT)
Dept: PLASTIC SURGERY | Facility: CLINIC | Age: 64
End: 2024-09-25

## 2024-09-25 DIAGNOSIS — L90.5 SYMPTOMATIC SCAR OF SKIN: Primary | ICD-10-CM

## 2024-09-25 PROCEDURE — 99024 POSTOP FOLLOW-UP VISIT: CPT | Performed by: PHYSICIAN ASSISTANT

## 2024-09-25 NOTE — PROGRESS NOTES
Assessment/Plan:     Patient is a 63-year-old female who is status post [id number] panniculectomy and lipoabdominoplasty with Dr. Rivera on 3/30/2023.  Postoperative course was complicated by abdominal wound dehiscence requiring wound VAC which has subsequently been removed.  Patient also with recurrent muscular abdominal pain, now status post excision of symptomatic abdominal scar x 2 by Dr. Rivera on 8/27/2024.  Please see HPI.     Patient returns to the office today for first postoperative visit. She is very pleased with her aesthetic and functional results.  She states pain has largely resolved.  She will continue to wear a supportive binder and return to the office in 4 weeks for an incision check or sooner with any questions or concerns.      Diagnoses and all orders for this visit:    Symptomatic scar of skin          Subjective:     Patient ID: Ricarda Selby is a 64 y.o. female.    HPI    Patient reports that she is doing well.  She is very pleased that she is no longer having pain.  She does report some minor tightness intermittently.    Review of Systems    See HPI    Objective:     Physical Exam      All incisions are completely healed, clean and dry with minimal scarring.

## 2024-09-30 ENCOUNTER — HOSPITAL ENCOUNTER (OUTPATIENT)
Dept: BONE DENSITY | Facility: CLINIC | Age: 64
Discharge: HOME | End: 2024-09-30
Payer: COMMERCIAL

## 2024-09-30 DIAGNOSIS — M81.0 AGE-RELATED OSTEOPOROSIS WITHOUT CURRENT PATHOLOGICAL FRACTURE: ICD-10-CM

## 2024-09-30 PROCEDURE — 77080 DXA BONE DENSITY AXIAL: CPT

## 2024-10-01 ENCOUNTER — TELEPHONE (OUTPATIENT)
Dept: BARIATRICS | Facility: CLINIC | Age: 64
End: 2024-10-01

## 2024-10-01 NOTE — TELEPHONE ENCOUNTER
Spoke with Pt's . Explained to Bhavin Pt must go have labs drawn to determine necessity of high dose vitamin d. Bhavin verbalized understanding and was agreeable to plan.

## 2024-10-03 ENCOUNTER — OFFICE VISIT (OUTPATIENT)
Dept: OBGYN CLINIC | Facility: MEDICAL CENTER | Age: 64
End: 2024-10-03
Payer: COMMERCIAL

## 2024-10-03 ENCOUNTER — APPOINTMENT (OUTPATIENT)
Dept: RADIOLOGY | Facility: MEDICAL CENTER | Age: 64
End: 2024-10-03
Payer: COMMERCIAL

## 2024-10-03 VITALS
HEART RATE: 68 BPM | DIASTOLIC BLOOD PRESSURE: 82 MMHG | BODY MASS INDEX: 23.66 KG/M2 | SYSTOLIC BLOOD PRESSURE: 126 MMHG | HEIGHT: 65 IN | WEIGHT: 142 LBS

## 2024-10-03 DIAGNOSIS — M25.561 CHRONIC PAIN OF RIGHT KNEE: Primary | ICD-10-CM

## 2024-10-03 DIAGNOSIS — G89.29 CHRONIC PAIN OF RIGHT KNEE: ICD-10-CM

## 2024-10-03 DIAGNOSIS — G89.29 CHRONIC PAIN OF RIGHT KNEE: Primary | ICD-10-CM

## 2024-10-03 DIAGNOSIS — M17.11 PRIMARY OSTEOARTHRITIS OF RIGHT KNEE: ICD-10-CM

## 2024-10-03 DIAGNOSIS — M25.561 CHRONIC PAIN OF RIGHT KNEE: ICD-10-CM

## 2024-10-03 PROCEDURE — 73564 X-RAY EXAM KNEE 4 OR MORE: CPT

## 2024-10-03 PROCEDURE — 20610 DRAIN/INJ JOINT/BURSA W/O US: CPT | Performed by: EMERGENCY MEDICINE

## 2024-10-03 PROCEDURE — 99203 OFFICE O/P NEW LOW 30 MIN: CPT | Performed by: EMERGENCY MEDICINE

## 2024-10-03 RX ORDER — TRIAMCINOLONE ACETONIDE 40 MG/ML
40 INJECTION, SUSPENSION INTRA-ARTICULAR; INTRAMUSCULAR
Status: COMPLETED | OUTPATIENT
Start: 2024-10-03 | End: 2024-10-03

## 2024-10-03 RX ORDER — ROPIVACAINE HYDROCHLORIDE 2 MG/ML
4 INJECTION, SOLUTION EPIDURAL; INFILTRATION; PERINEURAL ONCE
Status: COMPLETED | OUTPATIENT
Start: 2024-10-03 | End: 2024-10-03

## 2024-10-03 RX ADMIN — TRIAMCINOLONE ACETONIDE 40 MG: 40 INJECTION, SUSPENSION INTRA-ARTICULAR; INTRAMUSCULAR at 11:00

## 2024-10-03 RX ADMIN — ROPIVACAINE HYDROCHLORIDE 4 ML: 2 INJECTION, SOLUTION EPIDURAL; INFILTRATION; PERINEURAL at 13:50

## 2024-10-03 NOTE — PATIENT INSTRUCTIONS
"Patient Education     Inyección de esteroides   Conceptos Básicos   Redactado por los médicos y editores de UpToDate   ¿Qué es trino inyección de esteroides? -- Los esteroides, también conocidos renetta \"glucocorticoides\", son medicinas que ayudan a reducir la inflamación y el dolor. A veces, los médicos inyectan un esteroide en trino articulación u otra parte del cuerpo para aliviar el dolor. A esto también se le llama a veces \"inyección de cortisona\".  Después de la inyección, el esteroide comienza a actuar a los pocos días.  ¿Cuánto tiempo dura trino inyección de esteroides? -- Depende de la persona y del lugar donde se aplica la inyección. Para algunas personas, los efectos de trino inyección de esteroides pueden durar algunas semanas o más.  A veces, el médico también inyecta trino medicina llamada \"anestésico local\" con el esteroide. [place] podría ayudar a aliviar el dolor hasta que el esteroide comience a actuar.  Trino inyección de esteroides puede ayudar con el dolor, cecil no curará el problema que lo está causando.  ¿Cómo me preparo para trino inyección de esteroides? -- El médico o enfermero le dirá si debe hacer algo especial para prepararse. Antes de byrd procedimiento, byrd médico le hará un examen.  Byrd médico también le preguntará sobre liea \"antecedentes de debby\". [place] implica hacerle preguntas sobre cualquier problema de debby que tenga o haya tenido en el pasado, cirugías anteriores y cualquier medicina que tome. Cuéntele sobre:   Cualquier medicina que esté tomando - [place] incluye cualquier medicina recetada o \"de venta jessica\" que use, además de cualquier suplemento a base de hierbas que tome. Es útil anotar y llevar trino lista de todas las medicinas que deanna, o que lleve consigo trino bolsa con todas leia medicinas.   Cualquier alergia que tenga   Cualquier problema de sangrado que tenga   Cualquier otra inyección de esteroides que haya recibido  Pregunte al médico o enfermero si tiene dudas o si hay algo que no " "entiende.  ¿Cómo se administra trino inyección de esteroides? -- Cuando llegue el momento de la inyección:   El médico limpiará la piel de la ravindra donde planea aplicar la inyección. ([place] se llama \"sitio de inyección\").   Es posible que utilice ultrasonido o un tipo especial de radiografía nieves el procedimiento. [place] es para comprobar dónde administrar el esteroide.   A veces, es posible que le aplique trino inyección de medicina para adormecer la piel.   Le inyectará el esteroide.   Luego, sacará la aguja y cubrirá el sitio de la inyección con trino venda.  ¿Qué sucede después de trino inyección de esteroides? -- Puede irse a casa después de la inyección.  Nieves los próximos días, es posible que desee:   Aplicar un paquete de gel frío, trino bolsa de hielo o trino bolsa de verduras congeladas en el sitio de la inyección cada hora a cada dos horas, nieves 15 minutos cada vez. Coloque trino toalla sanya entre el hielo (o el objeto frío) y byrd piel.   Descanse la parte del cuerpo tratada nieves unos días.   [place] medicinas para aliviar el dolor. Algunos ejemplos son la paracetamol (acetaminofén) (ejemplo de giuliana comercial: Tylenol), el ibuprofeno (ejemplos de marcas comerciales: Advil, Motrin) o el naproxeno (ejemplo de giuliana comercial: Aleve).  Si tiene diabetes, es posible que el médico quiera que revise leia niveles de azúcar en cyndi con más frecuencia nieves algunos días. El esteroide podría aumentar temporalmente el nivel de azúcar en la cyndi.  ¿Cuáles son los riesgos de trino inyección de esteroides? -- Byrd médico le hablará sobre todos los riesgos posibles, y responderá a leia preguntas. Los riesgos posibles son, entre otros:   Sangrado, moretones o dolor en el sitio de la inyección   Daño a las partes cercanas al sitio de la inyección - [place] puede incluir daño al cartílago, lesión de los nervios, rotura de tendones o adelgazamiento de la piel y los huesos.   La piel alrededor del sitio de la inyección se vuelve más " beny o más mary   Infección   Padecimientos de debby renetta diabetes o presión arterial анна que empeoran nieves unos días   Reacción alérgica a la medicina  ¿Qué más afrhan saber? -- La mayoría de las veces, los médicos limitan la cantidad total de inyecciones de esteroides a trino ravindra determinada.  Trino inyección de esteroides podría ser solo trino parte de byrd plan de tratamiento. [place] las otras medicinas según las indicaciones. Además, siga las recomendaciones de byrd médico sobre otros tratamientos. Estos podrían incluir cosas renetta fisioterapia o dispositivos renetta un soporte o un bastón.  Todos los artículos se actualizan a medida que se descubre nueva evidencia y culmina nuestro proceso de evaluación por homólogos   Lisa artículo se recuperó de UpToDate el: Apr 25, 2024.  Artículo 298193 Versión [id number].es-419.1  Release: 32.4.2 - C32.114  © 2024 UpToDate, Inc. Todos los derechos reservados.  Exención de responsabilidad y uso de la información del consumidor   Descargo de responsabilidad: esta información generalizada es un resumen limitado de información sobre el diagnóstico, el tratamiento y/o los medicamentos. No pretende ser exhaustiva y se debe utilizar renetta herramienta para ayudar al usuario a comprender y/o evaluar las posibles opciones de diagnóstico y tratamiento. No incluye toda la información sobre afecciones, tratamientos, medicamentos, efectos secundarios o riesgos puedan ser aplicables a un paciente específico. No tiene el propósito de servir renetta recomendación médica ni de sustituir la recomendación médica, el diagnóstico o el tratamiento de un profesional de atención médica que se base en el examen y la evaluación de lisa profesional de la debby respecto a las circunstancias específicas y únicas del paciente. Los pacientes deben hablar con un profesional de atención médica para obtener información completa sobre byrd debby, cuestiones médicas y opciones de tratamiento, incluidos los riesgos o los beneficios  relacionados con el uso de medicamentos. Esta información no certifica que los tratamientos o medicamentos gilmar seguros, eficaces o estén aprobados para tratar a un paciente específico. [employer] y leia afiliados renuncian a cualquier garantía o responsabilidad relacionada con esta información o el uso de la misma.El uso de esta información está sujeto a las Condiciones de uso, disponibles en https://www.[employer].com/en/know/clinical-effectiveness-terms. 2024© [employer] y leia afiliados y/o licenciantes. Todos los derechos reservados.  Copyright   © 2024 [employer] Todos los derechos reservados.

## 2024-10-03 NOTE — PROGRESS NOTES
Assessment/Plan:    Diagnoses and all orders for this visit:    Chronic pain of right knee  -     Ambulatory Referral to Orthopedic Surgery  -     XR knee 4+ vw right injury; Future  -     Large joint arthrocentesis: R knee  -     ropivacaine (NAROPIN) injection 4 mL    Primary osteoarthritis of right knee  -     Large joint arthrocentesis: R knee  -     ropivacaine (NAROPIN) injection 4 mL    CSI provided today    No follow-ups on file.      Subjective:   Patient ID: Ricarda Selby is a 64 y.o. female.    NP presents with daughter for chronic right knee pain notes previous dx osteoarthritis and injection.  Takes Tylenol for pain  Unable to take NSAIDs hx bariatric surgery         Review of Systems    The following portions of the patient's chart were reviewed and updated as appropriate:   Allergy:  No Known Allergies    Medications:    Current Outpatient Medications:     acetaminophen (TYLENOL) 500 mg tablet, Take 1 tablet (500 mg total) by mouth every 4 (four) hours as needed for mild pain or moderate pain, Disp: 90 tablet, Rfl: 3    atorvastatin (LIPITOR) 20 mg tablet, Take 1 tablet by mouth once daily, Disp: 90 tablet, Rfl: 3    calcium citrate (Calcitrate) 950 (200 Ca) MG tablet, Take 1.5 tablets (1,425 mg total) by mouth daily, Disp: 90 tablet, Rfl: 3    Cholecalciferol (Vitamin D3) 50 MCG (2000 UT) TABS, Take 2 tablets (4,000 Units total) by mouth daily, Disp: 180 tablet, Rfl: 1    docusate sodium (COLACE) 100 mg capsule, Take 1 capsule (100 mg total) by mouth 2 (two) times a day, Disp: 20 capsule, Rfl: 1    docusate sodium (Colace) 100 mg capsule, Take 1 capsule (100 mg total) by mouth daily as needed for constipation, Disp: 30 capsule, Rfl: 1    ferrous sulfate 324 (65 Fe) mg, Take 1 tablet (324 mg total) by mouth daily before breakfast, Disp: 90 tablet, Rfl: 3    gabapentin (NEURONTIN) 100 mg capsule, Take 3 capsules (300 mg total) by mouth 3 (three) times a day, Disp: 90 capsule, Rfl: 0     losartan-hydrochlorothiazide (HYZAAR) 100-25 MG per tablet, Take 1 tablet by mouth daily, Disp: 90 tablet, Rfl: 1    metFORMIN (GLUCOPHAGE-XR) 500 mg 24 hr tablet, Take 1 tablet (500 mg total) by mouth daily with breakfast, Disp: 180 tablet, Rfl: 0    methocarbamol (ROBAXIN) 500 mg tablet, Take 1 tablet (500 mg total) by mouth 3 (three) times a day, Disp: 30 tablet, Rfl: 0    metoprolol succinate (TOPROL-XL) 25 mg 24 hr tablet, Take 1 tablet (25 mg total) by mouth 2 (two) times a day, Disp: 180 tablet, Rfl: 0    Multiple Vitamins-Minerals (ZINC PO), Take by mouth, Disp: , Rfl:     Omega-3 Fatty Acids (FISH OIL) 1,000 mg, Take 1,000 mg by mouth 2 (two) times a day, Disp: , Rfl:     omeprazole (PriLOSEC) 20 mg delayed release capsule, Take 1 capsule by mouth twice daily, Disp: 180 capsule, Rfl: 1    traMADol (ULTRAM) 50 mg tablet, Take 1 tablet (50 mg total) by mouth every 8 (eight) hours as needed for moderate pain for up to 9 doses, Disp: 9 tablet, Rfl: 0    VITAMIN E PO, Take 1 tablet by mouth daily, Disp: , Rfl:     ergocalciferol (VITAMIN D2) 50,000 units, Take 1 capsule (50,000 Units total) by mouth 2 (two) times a week, Disp: 24 capsule, Rfl: 0    ondansetron (ZOFRAN) 4 mg tablet, Take 1 tablet (4 mg total) by mouth every 8 (eight) hours as needed for nausea or vomiting (Patient not taking: Reported on 10/3/2024), Disp: 20 tablet, Rfl: 0    sucralfate (CARAFATE) 1 g/10 mL suspension, Take 10 mL (1 g total) by mouth 4 (four) times a day, Disp: 840 mL, Rfl: 0    Current Facility-Administered Medications:     ropivacaine (NAROPIN) injection 4 mL, 4 mL, Intra-articular, Once,     [START ON 12/5/2024] zoledronic acid (RECLAST) IV infusion (premix) 5 mg, 5 mg, Intravenous, Once,     Patient Active Problem List   Diagnosis    Gastric anastomotic stricture    Mixed hyperlipidemia    Primary hypertension    Postgastrectomy malabsorption    Ventricular pre-excitation syndrome    Vitamin D deficiency    Preop  "cardiovascular exam    Prediabetes    WPW syndrome    H/O bariatric surgery    Abdominal pannus    Candidal skin infection    Excess skin    NSVT (nonsustained ventricular tachycardia) (HCC)    History of cardiac radiofrequency ablation (RFA)    Abnormal EKG    Osteoporosis    Hypercholesterolemia    Ascending aorta dilatation (HCC)    Paroxysmal A-fib (HCC)    Preoperative clearance    Financial difficulties    Excess skin of abdomen    Panniculitis    Intertrigo    Encounter for cosmetic surgery    S/P panniculectomy    Grade I diastolic dysfunction    Type 2 diabetes mellitus without complication, without long-term current use of insulin (HCC)    Need for COVID-19 vaccine    Normocytic anemia    Nonhealing surgical wound, subsequent encounter    S/P cosmetic plastic surgery    Symptomatic scar of skin    Chronic pain of right knee       Objective:  /82   Pulse 68   Ht 5' 5\" (1.651 m)   Wt 64.4 kg (142 lb)   BMI 23.63 kg/m²     Right Knee Exam     Tenderness   The patient is experiencing tenderness in the medial joint line.    Range of Motion   The patient has normal right knee ROM.    Other   Erythema: absent  Sensation: normal          Physical Exam      Neurologic Exam    Large joint arthrocentesis: R knee  Universal Protocol:  Consent: Verbal consent obtained.  Risks and benefits: risks, benefits and alternatives were discussed  Consent given by: patient  Time out: Immediately prior to procedure a \"time out\" was called to verify the correct patient, procedure, equipment, support staff and site/side marked as required.  Timeout called at: 10/3/2024 11:31 AM.  Patient understanding: patient states understanding of the procedure being performed  Test results: test results available and properly labeled  Site marked: the operative site was marked  Patient identity confirmed: verbally with patient  Supporting Documentation  Indications: pain   Procedure Details  Location: knee - R knee  Preparation: " Patient was prepped and draped in the usual sterile fashion  Needle size: 22 G  Ultrasound guidance: no  Approach: anterolateral  Medications administered: 40 mg triamcinolone acetonide 40 mg/mL    Patient tolerance: patient tolerated the procedure well with no immediate complications  Dressing:  Sterile dressing applied    No erythema of knee(s)        I have personally reviewed pertinent films in PACS and my interpretation is x-rays right knee show degenerative changes mostly of the medial compartment or joint space narrowing on the PA flexion.  No acute findings or osseous lesions.            Past Medical History:   Diagnosis Date    Anastomotic stricture of gastrojejunostomy     s/p gastric bypass    Anemia     iron deficiency resolved 11/14/14    Anxiety     resolved 6/7/17    Class 1 obesity due to excess calories with serious comorbidity and body mass index (BMI) of 30.0 to 30.9 in adult 09/18/2020    Degenerative joint disease of ankle and foot, unspecified laterality     last assessed 5/17/13    Depression     last assessed 11/7/12    Diabetes mellitus (HCC)     type 2 uncomplicated, controlled - last assessed 1/31/14    Dysphagia     Gastric ulcer     History of melena     History of transfusion     Hyperlipidemia     Hypertension     Insomnia     Insomnia     Obesity     resolved 11/14/14    Overweight (BMI 25.0-29.9) 05/13/2021    Postgastrectomy malabsorption     Tachycardia     Ventricular pre-excitation syndrome     Vitamin D deficiency        Past Surgical History:   Procedure Laterality Date    CARDIAC CATHETERIZATION      post proc data:____ lesions successfully dilated    COLONOSCOPY      ESOPHAGOGASTRODUODENOSCOPY      GASTRIC BYPASS  02/08/2011    for morbid obesity     HYSTERECTOMY      @ age 35    OOPHORECTOMY Bilateral     @ age 35    CA EGD TRANSORAL BIOPSY SINGLE/MULTIPLE N/A 1/25/2017    Procedure: ESOPHAGOGASTRODUODENOSCOPY (EGD);  Surgeon: Washington Diaz MD;  Location: [facility];   Service: Bariatrics    ND EGD TRANSORAL BIOPSY SINGLE/MULTIPLE N/A 5/3/2017    Procedure: ESOPHAGOGASTRODUODENOSCOPY (EGD) with balloon dilatation;  Surgeon: Washington Diaz MD;  Location: [facility];  Service: Bariatrics    ND EGD TRANSORAL BIOPSY SINGLE/MULTIPLE N/A 8/16/2017    Procedure: ESOPHAGOGASTRODUODENOSCOPY (EGD) with balloon dilation;  Surgeon: Washington Diaz MD;  Location: [facility];  Service: Bariatrics    ND EXCISION EXCESSIVE SKIN & SUBQ TISSUE ABDOMEN N/A 3/30/2023    Procedure: LIPO ABDOMINOPLASTY;  Surgeon: Xander Rivera MD;  Location:  MAIN OR;  Service: Plastics    ND EXCISION SKIN ABD INFRAUMBILICAL PANNICULECTOMY N/A 3/30/2023    Procedure: JENNY DE LIS PANNICULECTOMY;  Surgeon: Xander Rivera MD;  Location:  MAIN OR;  Service: Plastics    REVISION OF SCAR ON TORSO N/A 8/27/2024    Procedure: REVISION OF ABDOMINAL VERTICAL AND HORIZONTAL SCAR WITH COMPLEX CLOSURE.;  Surgeon: Xander Rivera MD;  Location:  MAIN OR;  Service: Plastics    UPPER GASTROINTESTINAL ENDOSCOPY         Social History     Socioeconomic History    Marital status: /Civil Union     Spouse name: Not on file    Number of children: Not on file    Years of education: Not on file    Highest education level: Not on file   Occupational History    Not on file   Tobacco Use    Smoking status: Never     Passive exposure: Never    Smokeless tobacco: Never   Vaping Use    Vaping status: Never Used   Substance and Sexual Activity    Alcohol use: Never    Drug use: Never    Sexual activity: Not Currently   Other Topics Concern    Not on file   Social History Narrative    Not on file     Social Determinants of Health     Financial Resource Strain: Low Risk  (6/27/2024)    Overall Financial Resource Strain (CARDIA)     Difficulty of Paying Living Expenses: Not very hard   Food Insecurity: No Food Insecurity (6/27/2024)    Hunger Vital Sign     Worried About Running Out of Food in the Last Year: Never true     Ran Out of  Food in the Last Year: Never true   Transportation Needs: No Transportation Needs (6/27/2024)    PRAPARE - Transportation     Lack of Transportation (Medical): No     Lack of Transportation (Non-Medical): No   Physical Activity: Inactive (2/8/2022)    Exercise Vital Sign     Days of Exercise per Week: 0 days     Minutes of Exercise per Session: 0 min   Stress: No Stress Concern Present (3/11/2020)    [employer] [place] [employer] - Occupational Stress Questionnaire     Feeling of Stress : Not at all   Social Connections: Not on file   Intimate Partner Violence: Not on file   Housing Stability: Low Risk  (6/27/2024)    Housing Stability Vital Sign     Unable to Pay for Housing in the Last Year: No     Number of Times Moved in the Last Year: 1     Homeless in the Last Year: No       Family History   Problem Relation Age of Onset    Congenital heart disease Mother     Asthma Father     Heart attack Sister         acute MI    Congenital heart disease Sister     Hypertension Sister     No Known Problems Maternal Grandmother     No Known Problems Maternal Grandfather     No Known Problems Paternal Grandmother     No Known Problems Maternal Aunt     No Known Problems Maternal Aunt     No Known Problems Maternal Aunt     Breast cancer Cousin         ? age

## 2024-10-14 ENCOUNTER — TELEPHONE (OUTPATIENT)
Dept: INTERNAL MEDICINE CLINIC | Facility: CLINIC | Age: 64
End: 2024-10-14

## 2024-10-14 ENCOUNTER — APPOINTMENT (OUTPATIENT)
Dept: LAB | Facility: CLINIC | Age: 64
End: 2024-10-14
Payer: COMMERCIAL

## 2024-10-14 DIAGNOSIS — Z98.84 BARIATRIC SURGERY STATUS: ICD-10-CM

## 2024-10-14 DIAGNOSIS — E55.9 VITAMIN D DEFICIENCY: ICD-10-CM

## 2024-10-14 LAB — 25(OH)D3 SERPL-MCNC: 29.7 NG/ML (ref 30–100)

## 2024-10-14 PROCEDURE — 36415 COLL VENOUS BLD VENIPUNCTURE: CPT

## 2024-10-14 PROCEDURE — 82306 VITAMIN D 25 HYDROXY: CPT

## 2024-10-14 NOTE — TELEPHONE ENCOUNTER
Called patient regarding results of osteopenia, and left message with English interpretor [phone]. Patient has infusion in December and should have annual physical appointment as well in December on Wednesday if possible with Dr. Gonsalez. Thank you

## 2024-10-14 NOTE — TELEPHONE ENCOUNTER
Can you please review this. I am unsure how it was ordered by Dr. Hardwick, I dont see any visit from him but order says [facility] [place].

## 2024-10-15 ENCOUNTER — TELEPHONE (OUTPATIENT)
Dept: BARIATRICS | Facility: CLINIC | Age: 64
End: 2024-10-15

## 2024-10-15 NOTE — TELEPHONE ENCOUNTER
Used interrupter , left a voicemail and asked pt if they can call back to speak about her vitamin d levels and also give her some recommendations .

## 2024-10-15 NOTE — TELEPHONE ENCOUNTER
----- Message from ANNE Sawant sent at 10/15/2024 12:58 PM EDT -----  Please call pt to ask her how much vitamin D she is on? If she is on 2000 I, please tell her to increase that to 4000 IU daily.     No need to repeat vitamin D levels for now.

## 2024-10-17 ENCOUNTER — HOSPITAL ENCOUNTER (OUTPATIENT)
Dept: NON INVASIVE DIAGNOSTICS | Facility: CLINIC | Age: 64
Discharge: HOME | End: 2024-10-17
Payer: COMMERCIAL

## 2024-10-17 VITALS
BODY MASS INDEX: 23.65 KG/M2 | HEIGHT: 65 IN | HEART RATE: 68 BPM | WEIGHT: 141.98 LBS | SYSTOLIC BLOOD PRESSURE: 126 MMHG | DIASTOLIC BLOOD PRESSURE: 82 MMHG

## 2024-10-17 DIAGNOSIS — I45.6 WPW SYNDROME: ICD-10-CM

## 2024-10-17 DIAGNOSIS — I10 PRIMARY HYPERTENSION: ICD-10-CM

## 2024-10-17 DIAGNOSIS — I47.29 NSVT (NONSUSTAINED VENTRICULAR TACHYCARDIA) (HCC): ICD-10-CM

## 2024-10-17 DIAGNOSIS — I48.0 PAROXYSMAL A-FIB (HCC): ICD-10-CM

## 2024-10-17 DIAGNOSIS — R94.31 ABNORMAL EKG: ICD-10-CM

## 2024-10-17 DIAGNOSIS — I77.810 ASCENDING AORTA DILATATION (HCC): ICD-10-CM

## 2024-10-17 DIAGNOSIS — Z98.890 HISTORY OF CARDIAC RADIOFREQUENCY ABLATION (RFA): ICD-10-CM

## 2024-10-17 LAB
AORTIC ROOT: 3.2 CM
AORTIC VALVE MEAN VELOCITY: 8.4 M/S
APICAL FOUR CHAMBER EJECTION FRACTION: 75 %
ASCENDING AORTA: 4.1 CM
AV AREA BY CONTINUOUS VTI: 2.8 CM2
AV AREA PEAK VELOCITY: 2.7 CM2
AV LVOT MEAN GRADIENT: 2 MMHG
AV LVOT PEAK GRADIENT: 5 MMHG
AV MEAN GRADIENT: 3 MMHG
AV PEAK GRADIENT: 6 MMHG
AV REGURGITATION PRESSURE HALF TIME: 758 MS
AV VALVE AREA: 2.79 CM2
AV VELOCITY RATIO: 0.85
BSA FOR ECHO PROCEDURE: 1.71 M2
DOP CALC AO PEAK VEL: 1.25 M/S
DOP CALC AO VTI: 28.67 CM
DOP CALC LVOT AREA: 3.14 CM2
DOP CALC LVOT CARDIAC INDEX: 2.63 L/MIN/M2
DOP CALC LVOT CARDIAC OUTPUT: 4.5 L/MIN
DOP CALC LVOT DIAMETER: 2 CM
DOP CALC LVOT PEAK VEL VTI: 25.43 CM
DOP CALC LVOT PEAK VEL: 1.06 M/S
DOP CALC LVOT STROKE INDEX: 47.4 ML/M2
DOP CALC LVOT STROKE VOLUME: 79.85
E WAVE DECELERATION TIME: 275 MS
E/A RATIO: 0.92
FRACTIONAL SHORTENING: 36 (ref 28–44)
INTERVENTRICULAR SEPTUM IN DIASTOLE (PARASTERNAL SHORT AXIS VIEW): 1 CM
INTERVENTRICULAR SEPTUM: 1 CM (ref 0.6–1.1)
LAAS-AP2: 21.9 CM2
LAAS-AP4: 19.3 CM2
LEFT ATRIUM SIZE: 3.5 CM
LEFT ATRIUM VOLUME (MOD BIPLANE): 62 ML
LEFT ATRIUM VOLUME INDEX (MOD BIPLANE): 36.3 ML/M2
LEFT INTERNAL DIMENSION IN SYSTOLE: 2.5 CM (ref 2.1–4)
LEFT VENTRICULAR INTERNAL DIMENSION IN DIASTOLE: 3.9 CM (ref 3.5–6)
LEFT VENTRICULAR POSTERIOR WALL IN END DIASTOLE: 1 CM
LEFT VENTRICULAR STROKE VOLUME: 42 ML
LVSV (TEICH): 42 ML
MV E'TISSUE VEL-LAT: 9 CM/S
MV E'TISSUE VEL-SEP: 7 CM/S
MV PEAK A VEL: 0.83 M/S
MV PEAK E VEL: 76 CM/S
MV STENOSIS PRESSURE HALF TIME: 80 MS
MV VALVE AREA P 1/2 METHOD: 2.75
RA PRESSURE ESTIMATED: 3 MMHG
RIGHT ATRIUM AREA SYSTOLE A4C: 10.5 CM2
RIGHT VENTRICLE ID DIMENSION: 2.3 CM
RV PSP: 23 MMHG
SINOTUBULAR JUNCTION: 4 CM
SL CV AV DECELERATION TIME RETROGRADE: 2613 MS
SL CV AV PEAK GRADIENT RETROGRADE: 62 MMHG
SL CV LEFT ATRIUM LENGTH A2C: 5.8 CM
SL CV LV EF: 60
SL CV PED ECHO LEFT VENTRICLE DIASTOLIC VOLUME (MOD BIPLANE) 2D: 65 ML
SL CV PED ECHO LEFT VENTRICLE SYSTOLIC VOLUME (MOD BIPLANE) 2D: 22 ML
SL CV SINUS OF VALSALVA 2D: 2.9 CM
STJ: 4 CM
TR MAX PG: 20 MMHG
TR PEAK VELOCITY: 2.3 M/S
TRICUSPID ANNULAR PLANE SYSTOLIC EXCURSION: 1.9 CM
TRICUSPID VALVE PEAK REGURGITATION VELOCITY: 2.25 M/S

## 2024-10-17 PROCEDURE — 93226 XTRNL ECG REC<48 HR SCAN A/R: CPT

## 2024-10-17 PROCEDURE — 93225 XTRNL ECG REC<48 HRS REC: CPT

## 2024-10-17 PROCEDURE — 93306 TTE W/DOPPLER COMPLETE: CPT | Performed by: INTERNAL MEDICINE

## 2024-10-17 PROCEDURE — 93306 TTE W/DOPPLER COMPLETE: CPT

## 2024-10-25 PROCEDURE — 93227 XTRNL ECG REC<48 HR R&I: CPT | Performed by: INTERNAL MEDICINE

## 2024-11-07 ENCOUNTER — OFFICE VISIT (OUTPATIENT)
Dept: PLASTIC SURGERY | Facility: CLINIC | Age: 64
End: 2024-11-07

## 2024-11-07 DIAGNOSIS — L90.5 SYMPTOMATIC SCAR OF SKIN: ICD-10-CM

## 2024-11-07 DIAGNOSIS — Z98.890 S/P PANNICULECTOMY: Primary | ICD-10-CM

## 2024-11-07 PROCEDURE — 99024 POSTOP FOLLOW-UP VISIT: CPT

## 2024-11-13 ENCOUNTER — TELEPHONE (OUTPATIENT)
Dept: INTERNAL MEDICINE CLINIC | Facility: CLINIC | Age: 64
End: 2024-11-13

## 2024-11-14 ENCOUNTER — OFFICE VISIT (OUTPATIENT)
Dept: DENTISTRY | Facility: CLINIC | Age: 64
End: 2024-11-14

## 2024-11-14 VITALS — HEART RATE: 60 BPM | DIASTOLIC BLOOD PRESSURE: 76 MMHG | SYSTOLIC BLOOD PRESSURE: 110 MMHG

## 2024-11-14 DIAGNOSIS — K05.6 PERIODONTAL DISEASE: Primary | ICD-10-CM

## 2024-11-14 PROCEDURE — D0191 ASSESSMENT OF A PATIENT: HCPCS

## 2024-11-14 NOTE — PROGRESS NOTES
"Procedure Details   - ASSESSMENT OF A PATIENT    Probing to determine SRP needs    Ricarda Selby 64 y.o. female presents with  [facility]. Per previous hygiene note, patient scheduled to come in to update perio charting and determine need for SRP- however, prophy already completed at last appt.   PMH reviewed, no changes, ASA II. Significant medical history: TIIDM, cardiac conditions, etc. Significant allergies: NKA. Significant medications: n/a.    Chief complaint:    \"She is having some pain on the tooth that needs to come out\"    Consent:  Discussed that limited exam focuses on problem area, and same day tx is not guaranteed.  Patient explained to if they wish to have anything else evaluated, they need to return to the practice at which they are a patient of record or schedule a comprehensive exam afterwards.  Patient understands and consent was given by self via verbal consent.    Findings:    Perio probings and radiographic bone levels show that patient needs SRP all 4 quads. Moderate to severe bone loss with furcation involvement on #30. Pt has been getting prophy in the past, but needs to be switched to perio maintannce appts with 3 month recalls due to periodontal disease after SRP.    Perio dx: Stage III grade B.     - Pt is interested in going forward with extraction of #30 with future replacement with an implant. Discussed the recommendation of getting bone graft during extraction to preserve bone levels and patient agreed with planned tx.     Patient dismissed ambulatory and alert.    NV: ext #30 w bone graft    Attending: Dr. Dunaway was present in clinic.   "

## 2024-11-18 NOTE — TELEPHONE ENCOUNTER
Spoke with patient/ on the phone. Introduced self and role. Patient's spouse stated she needs a refill of her Onetouch Verio Reflect lancets and test strips. Patient checks her blood sugars twice a day.     Pharmacy preference is [facility] off [address] in [place].     Please review and advise.

## 2024-11-20 DIAGNOSIS — E11.9 TYPE 2 DIABETES MELLITUS WITHOUT COMPLICATION, WITHOUT LONG-TERM CURRENT USE OF INSULIN (HCC): Primary | ICD-10-CM

## 2024-11-20 RX ORDER — BLOOD SUGAR DIAGNOSTIC
STRIP MISCELLANEOUS
Qty: 200 EACH | Refills: 3 | Status: SHIPPED | OUTPATIENT
Start: 2024-11-20

## 2024-11-20 RX ORDER — LANCETS 33 GAUGE
EACH MISCELLANEOUS
Qty: 200 EACH | Refills: 3 | Status: SHIPPED | OUTPATIENT
Start: 2024-11-20

## 2024-11-20 NOTE — TELEPHONE ENCOUNTER
Contacted patient's [facility] pharmacy at . Spoke with pharmacist. Introduced self and role. Patient's scripts for lancets and test strips were received and picked up by patient.     No other needs noted at this time.

## 2024-11-25 ENCOUNTER — HOSPITAL ENCOUNTER (OUTPATIENT)
Dept: MAMMOGRAPHY | Facility: CLINIC | Age: 64
Discharge: HOME | End: 2024-11-25
Payer: COMMERCIAL

## 2024-11-25 VITALS — BODY MASS INDEX: 23.49 KG/M2 | WEIGHT: 141 LBS | HEIGHT: 65 IN

## 2024-11-25 DIAGNOSIS — Z12.31 ENCOUNTER FOR SCREENING MAMMOGRAM FOR BREAST CANCER: ICD-10-CM

## 2024-11-25 PROCEDURE — 77063 BREAST TOMOSYNTHESIS BI: CPT

## 2024-11-25 PROCEDURE — 77067 SCR MAMMO BI INCL CAD: CPT

## 2024-11-25 NOTE — PROGRESS NOTES
[facility]  [address], [place] [address]  [phone]    Patient Identification: Ricarda Selby is a 64 y.o. female     History of Present Illness: The patient is a 64 y.o.  year-old female  who presents to the office for post-op visit. Patient is s/p [id number] panniculectomy and lipoabdominoplasty with Dr. Rivera on 3/30/2023. Patient is 72 days s/p Revision Of Abdominal Vertical And Horizontal Scar With Complex Closure.  on 8/27/2024 by Dr. Rivera.    Patient is doing well at this time and has no complaints. She is moisturizing incisions daily and massaging. She is happy with her results.      Past Medical History:   Diagnosis Date    Anastomotic stricture of gastrojejunostomy     s/p gastric bypass    Anemia     iron deficiency resolved 11/14/14    Anxiety     resolved 6/7/17    Class 1 obesity due to excess calories with serious comorbidity and body mass index (BMI) of 30.0 to 30.9 in adult 09/18/2020    Degenerative joint disease of ankle and foot, unspecified laterality     last assessed 5/17/13    Depression     last assessed 11/7/12    Diabetes mellitus (HCC)     type 2 uncomplicated, controlled - last assessed 1/31/14    Dysphagia     Gastric ulcer     History of melena     History of transfusion     Hyperlipidemia     Hypertension     Insomnia     Insomnia     Obesity     resolved 11/14/14    Overweight (BMI 25.0-29.9) 05/13/2021    Postgastrectomy malabsorption     Tachycardia     Ventricular pre-excitation syndrome     Vitamin D deficiency           Review of Systems  Constitutional: Denies fevers, chills or pain.  Skin: Denies any warmth, erythema, edema or mucopurulent drainage.     Physical Exam    Abdomen: Surgical incisions are clean, dry, and intact, healing well. Skin perfusion is intact. Belly button is viable. There are no signs of infection, obvious hematoma, seroma or wound dehiscence.    Assessment and Plan:  The patient is an 64 y.o.   year-old female who presents to the office for post-op visit. Patient is 72 days s/p Revision Of Abdominal Vertical And Horizontal Scar With Complex Closure.  on 8/27/2024 by Dr. Rivera      -At today's visit abdominal incisions examined, healing. Pt is happy with her results thus far.  -Continue to mositurize daily and use scar creams. Conduct scar massage. Wear compression garment as needed.  -The patient is to return in the new year for a follow up with Dr. Rivera.  -The patient is to call the office with any questions or concerns. All of the patient's questions were answered at this time and they agree with the plan of care.      Maddison Torres PA-C  [facility]astic [facility]

## 2024-12-02 ENCOUNTER — OFFICE VISIT (OUTPATIENT)
Dept: BARIATRICS | Facility: CLINIC | Age: 64
End: 2024-12-02
Payer: COMMERCIAL

## 2024-12-02 VITALS
RESPIRATION RATE: 14 BRPM | HEART RATE: 78 BPM | HEIGHT: 63 IN | WEIGHT: 143 LBS | BODY MASS INDEX: 25.34 KG/M2 | SYSTOLIC BLOOD PRESSURE: 100 MMHG | DIASTOLIC BLOOD PRESSURE: 68 MMHG | TEMPERATURE: 98.5 F

## 2024-12-02 DIAGNOSIS — Z98.84 BARIATRIC SURGERY STATUS: ICD-10-CM

## 2024-12-02 DIAGNOSIS — K91.2 POSTSURGICAL MALABSORPTION: ICD-10-CM

## 2024-12-02 DIAGNOSIS — Z48.815 ENCOUNTER FOR SURGICAL AFTERCARE FOLLOWING SURGERY OF DIGESTIVE SYSTEM: Primary | ICD-10-CM

## 2024-12-02 DIAGNOSIS — K31.89 GASTRIC ANASTOMOTIC STRICTURE: ICD-10-CM

## 2024-12-02 DIAGNOSIS — E66.3 OVERWEIGHT: ICD-10-CM

## 2024-12-02 DIAGNOSIS — K92.9 GASTRIC ANASTOMOTIC STRICTURE: ICD-10-CM

## 2024-12-02 PROCEDURE — 99214 OFFICE O/P EST MOD 30 MIN: CPT | Performed by: NURSE PRACTITIONER

## 2024-12-02 NOTE — PROGRESS NOTES
Assessment/Plan:     Patient ID: Ricarda Selby is a 64 y.o. female.     Bariatric Surgery Status/BMI 23/anastomotic Stricture  -s/p Carline-En-Y Gastric Bypass with Dr. Diaz on 02/08/2011. Presents with her  to the office today for annual visit. Since last visit, she underwent an EGD due to symptoms of regurgitation. Since her EGD she has been tolerating foods better. She is denies having abdominal pain, N/V/D/C, regurgitation, reflux or dysphagia. Taking her multivitamins daily.     PLAN:     - f/u sooner if needed.   - Routine follow up in 1 year for annual visit  - Continue with healthy lifestyle, adequate protein intake of 60 gm, fluid intake of at least 64 oz.   - Continue with MVI daily.   - Activity as tolerated.   - Labs ordered and will adjust accordingly if any deficiency.   - Follow up with RD and SW as needed.   .    Continued/Maintain healthy weight loss with good nutrition intakes.  Adequate hydration with at least 64oz. fluid intake.  Follow diet as discussed.  Follow vitamin and mineral recommendations as reviewed with you.  Exercise as tolerated.    Colonoscopy referral made: UTD - due in 2031  Mammogram - UTD    Follow-up in 1 year for annual visit or sooner if needed.. We kindly ask that your arrive 15 minutes before your scheduled appointment time with your provider to allow our staff to room you, get your vital signs and update your chart.    Get lab work done prior to annual visit. Please call the office if you need a script.  It is recommended to check with your insurance BEFORE getting labs done to make sure they are covered by your policy.      Call our office if you have any problems with abdominal pain especially associated with fever, chills, nausea, vomiting or any other concerns.    All  Post-bariatric surgery patients should be aware that very small quantities of any alcohol can cause impairment and it is very possible not to feel the effect. The effect can be in the system for  several hours.  It is also a stomach irritant.     It is advised to AVOID alcohol, Nonsteroidal antiinflammatory drugs (NSAIDS) and nicotine of all forms . Any of these can cause stomach irritation/pain.    Discussed the effects of alcohol on a bariatric patient and the increased impairment risk.     Keep up the good work!     Postsurgical Malabsorption   -At risk for malabsorption of vitamins/minerals secondary to malabsorption and restriction of intake from bariatric surgery  -Currently taking adequate postop bariatric surgery vitamin supplementation  -Next set of bariatric labs ordered for approximately 3 months  -Patient received education about the importance of adhering to a lifelong supplementation regimen to avoid vitamin/mineral deficiencies      Diagnoses and all orders for this visit:    Encounter for surgical aftercare following surgery of digestive system  -     CBC; Future  -     Comprehensive metabolic panel; Future  -     Folate; Future  -     Iron Panel (Includes Ferritin, Iron Sat%, Iron, and TIBC); Future  -     PTH, intact; Future  -     Vitamin A; Future  -     Vitamin B1, whole blood; Future  -     Vitamin B12; Future  -     Vitamin D 25 hydroxy; Future  -     Zinc; Future    Bariatric surgery status  -     CBC; Future  -     Comprehensive metabolic panel; Future  -     Folate; Future  -     Iron Panel (Includes Ferritin, Iron Sat%, Iron, and TIBC); Future  -     PTH, intact; Future  -     Vitamin A; Future  -     Vitamin B1, whole blood; Future  -     Vitamin B12; Future  -     Vitamin D 25 hydroxy; Future  -     Zinc; Future    Postsurgical malabsorption  -     CBC; Future  -     Comprehensive metabolic panel; Future  -     Folate; Future  -     Iron Panel (Includes Ferritin, Iron Sat%, Iron, and TIBC); Future  -     PTH, intact; Future  -     Vitamin A; Future  -     Vitamin B1, whole blood; Future  -     Vitamin B12; Future  -     Vitamin D 25 hydroxy; Future  -     Zinc; Future    BMI  25.0-25.9,adult  -     CBC; Future  -     Comprehensive metabolic panel; Future  -     Folate; Future  -     Iron Panel (Includes Ferritin, Iron Sat%, Iron, and TIBC); Future  -     PTH, intact; Future  -     Vitamin A; Future  -     Vitamin B1, whole blood; Future  -     Vitamin B12; Future  -     Vitamin D 25 hydroxy; Future  -     Zinc; Future    Overweight  -     CBC; Future  -     Comprehensive metabolic panel; Future  -     Folate; Future  -     Iron Panel (Includes Ferritin, Iron Sat%, Iron, and TIBC); Future  -     PTH, intact; Future  -     Vitamin A; Future  -     Vitamin B1, whole blood; Future  -     Vitamin B12; Future  -     Vitamin D 25 hydroxy; Future  -     Zinc; Future    Gastric anastomotic stricture         Subjective:      Patient ID: Ricarda Selby is a 64 y.o. female.  -s/p Carline-En-Y Gastric Bypass with Dr. Diaz on 02/08/2011. Presents with her  to the office today for annual visit. Since last visit, she underwent an EGD due to symptoms of regurgitation. Since her EGD she has been tolerating foods better. She is denies having abdominal pain, N/V/D/C, regurgitation, reflux or dysphagia. Taking her multivitamins daily.    Initial: 257 lbs  Current: 143 lbs  EWL: (Weight loss is ahead of schedule at this post surgical period.)  Miguel: 150s after surgery.   Current BMI is Body mass index is 25.17 kg/m².    Tolerating a regular diet-yes  Eating at least 60 grams of protein per day-yes  Following 30/60 minute rule with liquids-yes  Drinking at least 64 ounces of fluid per day-yes  Drinking carbonated beverages-no  Sufficient exercise-yes - little  Using NSAIDs regularly-no  Using nicotine-no  Using alcohol-no  Supplements:  multivitamin, calcium, vitamin D -taking gummy vitamins     EWL is 99%, which places the patient ahead of schedule for expected post surgical weight loss at this time.     The following portions of the patient's history were reviewed and updated as appropriate:  "allergies, current medications, past family history, past medical history, past social history, past surgical history and problem list.    Review of Systems   Constitutional: Negative.    Respiratory: Negative.     Cardiovascular: Negative.    Gastrointestinal:  Positive for vomiting (depending on food intake.).   Musculoskeletal: Negative.    Neurological: Negative.    Psychiatric/Behavioral: Negative.           Objective:    /68 (Patient Position: Sitting, Cuff Size: Large)   Pulse 78   Temp 98.5 °F (36.9 °C) (Tympanic)   Resp 14   Ht 5' 3.2\" (1.605 m)   Wt 64.9 kg (143 lb)   BMI 25.17 kg/m²      Physical Exam  Vitals and nursing note reviewed.   Constitutional:       Appearance: Normal appearance.   Cardiovascular:      Rate and Rhythm: Normal rate and regular rhythm.      Pulses: Normal pulses.      Heart sounds: Normal heart sounds.   Pulmonary:      Effort: Pulmonary effort is normal.      Breath sounds: Normal breath sounds.   Abdominal:      General: Bowel sounds are normal.      Palpations: Abdomen is soft.      Tenderness: There is no abdominal tenderness.   Musculoskeletal:         General: Normal range of motion.   Skin:     General: Skin is warm and dry.   Neurological:      General: No focal deficit present.      Mental Status: She is alert and oriented to person, place, and time.   Psychiatric:         Mood and Affect: Mood normal.         Behavior: Behavior normal.         Thought Content: Thought content normal.         Judgment: Judgment normal.             "

## 2024-12-02 NOTE — PROGRESS NOTES
Date of surgery:2011  Procedure: RNY  Performing surgeon: Dr. Diaz    Initial Weight - 269 lb  Current Weight -143lb  Miguel Weight - 141 lb  Total Body Weight Loss (EWL)- 23.6%  EWL% - 99%  TWB % -14%

## 2024-12-05 ENCOUNTER — TELEPHONE (OUTPATIENT)
Dept: INFUSION CENTER | Facility: HOSPITAL | Age: 64
End: 2024-12-05

## 2024-12-05 ENCOUNTER — OFFICE VISIT (OUTPATIENT)
Dept: INTERNAL MEDICINE CLINIC | Facility: CLINIC | Age: 64
End: 2024-12-05

## 2024-12-05 ENCOUNTER — APPOINTMENT (OUTPATIENT)
Dept: LAB | Facility: CLINIC | Age: 64
End: 2024-12-05
Payer: COMMERCIAL

## 2024-12-05 ENCOUNTER — HOSPITAL ENCOUNTER (OUTPATIENT)
Dept: INFUSION CENTER | Facility: HOSPITAL | Age: 64
Discharge: HOME | End: 2024-12-05

## 2024-12-05 VITALS
HEART RATE: 81 BPM | DIASTOLIC BLOOD PRESSURE: 73 MMHG | BODY MASS INDEX: 25.69 KG/M2 | SYSTOLIC BLOOD PRESSURE: 107 MMHG | TEMPERATURE: 98.1 F | HEIGHT: 63 IN | WEIGHT: 145 LBS

## 2024-12-05 DIAGNOSIS — Z01.00 DIABETIC EYE EXAM (HCC): ICD-10-CM

## 2024-12-05 DIAGNOSIS — K21.9 GASTROESOPHAGEAL REFLUX DISEASE WITHOUT ESOPHAGITIS: ICD-10-CM

## 2024-12-05 DIAGNOSIS — M81.0 AGE-RELATED OSTEOPOROSIS WITHOUT CURRENT PATHOLOGICAL FRACTURE: ICD-10-CM

## 2024-12-05 DIAGNOSIS — I10 PRIMARY HYPERTENSION: ICD-10-CM

## 2024-12-05 DIAGNOSIS — E55.9 VITAMIN D DEFICIENCY: ICD-10-CM

## 2024-12-05 DIAGNOSIS — I10 ESSENTIAL HYPERTENSION: ICD-10-CM

## 2024-12-05 DIAGNOSIS — E78.2 MIXED HYPERLIPIDEMIA: ICD-10-CM

## 2024-12-05 DIAGNOSIS — E11.9 DIABETIC EYE EXAM (HCC): ICD-10-CM

## 2024-12-05 DIAGNOSIS — D64.9 NORMOCYTIC ANEMIA: ICD-10-CM

## 2024-12-05 DIAGNOSIS — Z00.00 MEDICARE ANNUAL WELLNESS VISIT, INITIAL: Primary | ICD-10-CM

## 2024-12-05 DIAGNOSIS — Z29.11 NEED FOR RSV IMMUNIZATION: ICD-10-CM

## 2024-12-05 DIAGNOSIS — Z98.84 BARIATRIC SURGERY STATUS: ICD-10-CM

## 2024-12-05 DIAGNOSIS — E65 ABDOMINAL PANNUS: ICD-10-CM

## 2024-12-05 DIAGNOSIS — E11.9 TYPE 2 DIABETES MELLITUS WITHOUT COMPLICATION, WITHOUT LONG-TERM CURRENT USE OF INSULIN (HCC): ICD-10-CM

## 2024-12-05 LAB
ALBUMIN SERPL BCG-MCNC: 3.9 G/DL (ref 3.5–5)
ALP SERPL-CCNC: 67 U/L (ref 34–104)
ALT SERPL W P-5'-P-CCNC: 31 U/L (ref 7–52)
ANION GAP SERPL CALCULATED.3IONS-SCNC: 4 MMOL/L (ref 4–13)
AST SERPL W P-5'-P-CCNC: 33 U/L (ref 13–39)
BILIRUB SERPL-MCNC: 0.28 MG/DL (ref 0.2–1)
BUN SERPL-MCNC: 27 MG/DL (ref 5–25)
CALCIUM SERPL-MCNC: 8.9 MG/DL (ref 8.4–10.2)
CHLORIDE SERPL-SCNC: 106 MMOL/L (ref 96–108)
CO2 SERPL-SCNC: 30 MMOL/L (ref 21–32)
CREAT SERPL-MCNC: 0.9 MG/DL (ref 0.6–1.3)
GFR SERPL CREATININE-BSD FRML MDRD: 67 ML/MIN/1.73SQ M
GLUCOSE SERPL-MCNC: 73 MG/DL (ref 65–140)
LEFT EYE DIABETIC RETINOPATHY: NORMAL
LEFT EYE IMAGE QUALITY: NORMAL
LEFT EYE MACULAR EDEMA: NORMAL
LEFT EYE OTHER RETINOPATHY: NORMAL
POTASSIUM SERPL-SCNC: 4.1 MMOL/L (ref 3.5–5.3)
PROT SERPL-MCNC: 7 G/DL (ref 6.4–8.4)
RIGHT EYE DIABETIC RETINOPATHY: NORMAL
RIGHT EYE IMAGE QUALITY: NORMAL
RIGHT EYE MACULAR EDEMA: NORMAL
RIGHT EYE OTHER RETINOPATHY: NORMAL
SEVERITY (EYE EXAM): NORMAL
SL AMB POCT HEMOGLOBIN AIC: 6.6 (ref ?–6.5)
SODIUM SERPL-SCNC: 140 MMOL/L (ref 135–147)

## 2024-12-05 PROCEDURE — 83036 HEMOGLOBIN GLYCOSYLATED A1C: CPT | Performed by: STUDENT IN AN ORGANIZED HEALTH CARE EDUCATION/TRAINING PROGRAM

## 2024-12-05 PROCEDURE — 80053 COMPREHEN METABOLIC PANEL: CPT

## 2024-12-05 PROCEDURE — 36415 COLL VENOUS BLD VENIPUNCTURE: CPT

## 2024-12-05 PROCEDURE — 90678 RSV VACC PREF BIVALENT IM: CPT | Performed by: STUDENT IN AN ORGANIZED HEALTH CARE EDUCATION/TRAINING PROGRAM

## 2024-12-05 PROCEDURE — 90471 IMMUNIZATION ADMIN: CPT | Performed by: STUDENT IN AN ORGANIZED HEALTH CARE EDUCATION/TRAINING PROGRAM

## 2024-12-05 PROCEDURE — G0438 PPPS, INITIAL VISIT: HCPCS | Performed by: STUDENT IN AN ORGANIZED HEALTH CARE EDUCATION/TRAINING PROGRAM

## 2024-12-05 RX ORDER — FAMOTIDINE 20 MG/1
20 TABLET, FILM COATED ORAL 2 TIMES DAILY
Qty: 180 TABLET | Refills: 1 | Status: SHIPPED | OUTPATIENT
Start: 2024-12-05 | End: 2025-06-03

## 2024-12-05 RX ORDER — ATORVASTATIN CALCIUM 20 MG/1
20 TABLET, FILM COATED ORAL DAILY
Qty: 180 TABLET | Refills: 0 | Status: SHIPPED | OUTPATIENT
Start: 2024-12-05 | End: 2025-06-03

## 2024-12-05 RX ORDER — FERROUS SULFATE 324(65)MG
324 TABLET, DELAYED RELEASE (ENTERIC COATED) ORAL
Qty: 180 TABLET | Refills: 0 | Status: SHIPPED | OUTPATIENT
Start: 2024-12-05 | End: 2025-06-03

## 2024-12-05 RX ORDER — METOPROLOL SUCCINATE 25 MG/1
25 TABLET, EXTENDED RELEASE ORAL 2 TIMES DAILY
Qty: 360 TABLET | Refills: 0 | Status: SHIPPED | OUTPATIENT
Start: 2024-12-05 | End: 2025-06-03

## 2024-12-05 RX ORDER — ATORVASTATIN CALCIUM 20 MG/1
20 TABLET, FILM COATED ORAL DAILY
Qty: 90 TABLET | Refills: 3 | Status: SHIPPED | OUTPATIENT
Start: 2024-12-05 | End: 2024-12-05

## 2024-12-05 RX ORDER — FERROUS SULFATE 324(65)MG
324 TABLET, DELAYED RELEASE (ENTERIC COATED) ORAL
Qty: 90 TABLET | Refills: 3 | Status: SHIPPED | OUTPATIENT
Start: 2024-12-05 | End: 2024-12-05

## 2024-12-05 RX ORDER — CHOLECALCIFEROL (VITAMIN D3) 50 MCG
4000 TABLET ORAL DAILY
Qty: 180 TABLET | Refills: 1 | Status: SHIPPED | OUTPATIENT
Start: 2024-12-05

## 2024-12-05 RX ORDER — FAMOTIDINE 20 MG/1
20 TABLET, FILM COATED ORAL 2 TIMES DAILY
Qty: 180 TABLET | Refills: 0 | Status: SHIPPED | OUTPATIENT
Start: 2024-12-05 | End: 2024-12-05

## 2024-12-05 RX ORDER — LOSARTAN POTASSIUM AND HYDROCHLOROTHIAZIDE 25; 100 MG/1; MG/1
1 TABLET ORAL DAILY
Qty: 90 TABLET | Refills: 1 | Status: SHIPPED | OUTPATIENT
Start: 2024-12-05 | End: 2025-06-03

## 2024-12-05 RX ORDER — LOSARTAN POTASSIUM AND HYDROCHLOROTHIAZIDE 25; 100 MG/1; MG/1
1 TABLET ORAL DAILY
Qty: 90 TABLET | Refills: 0 | Status: SHIPPED | OUTPATIENT
Start: 2024-12-05 | End: 2024-12-05

## 2024-12-05 RX ORDER — METOPROLOL SUCCINATE 25 MG/1
25 TABLET, EXTENDED RELEASE ORAL 2 TIMES DAILY
Qty: 180 TABLET | Refills: 0 | Status: SHIPPED | OUTPATIENT
Start: 2024-12-05 | End: 2024-12-05

## 2024-12-05 RX ORDER — ACETAMINOPHEN 500 MG
500 TABLET ORAL EVERY 4 HOURS PRN
Qty: 90 TABLET | Refills: 3 | Status: SHIPPED | OUTPATIENT
Start: 2024-12-05

## 2024-12-05 NOTE — ASSESSMENT & PLAN NOTE
Orders:    acetaminophen (TYLENOL) 500 mg tablet; Take 1 tablet (500 mg total) by mouth every 4 (four) hours as needed for mild pain or moderate pain

## 2024-12-05 NOTE — TELEPHONE ENCOUNTER
LYDIA GAVIN for Bhavin and informed we need to reschedule todays appt. Informed todays appt will be cancelled and left number to call back to reschedule.

## 2024-12-05 NOTE — ASSESSMENT & PLAN NOTE
Orders:    losartan-hydrochlorothiazide (HYZAAR) 100-25 MG per tablet; Take 1 tablet by mouth daily

## 2024-12-05 NOTE — ASSESSMENT & PLAN NOTE
Orders:    ferrous sulfate 324 (65 Fe) mg; Take 1 tablet (324 mg total) by mouth daily before breakfast

## 2024-12-05 NOTE — TELEPHONE ENCOUNTER
TC received from Bhavin. Appt rescheduled to 12/10 at 230. Bhavin verbalized understanding of rescheduling.

## 2024-12-05 NOTE — PATIENT INSTRUCTIONS
Medicare Preventive Visit Patient Instructions  Thank you for completing your Welcome to Medicare Visit or Medicare Annual Wellness Visit today. Your next wellness visit will be due in one year (12/6/2025).  The screening/preventive services that you may require over the next 5-10 years are detailed below. Some tests may not apply to you based off risk factors and/or age. Screening tests ordered at today's visit but not completed yet may show as past due. Also, please note that scanned in results may not display below.  Preventive Screenings:  Service Recommendations Previous Testing/Comments   Colorectal Cancer Screening  * Colonoscopy    * Fecal Occult Blood Test (FOBT)/Fecal Immunochemical Test (FIT)  * Fecal DNA/Cologuard Test  * Flexible Sigmoidoscopy Age: 45-75 years old   Colonoscopy: every 10 years (may be performed more frequently if at higher risk)  OR  FOBT/FIT: every 1 year  OR  Cologuard: every 3 years  OR  Sigmoidoscopy: every 5 years  Screening may be recommended earlier than age 45 if at higher risk for colorectal cancer. Also, an individualized decision between you and your healthcare provider will decide whether screening between the ages of 76-85 would be appropriate. Colonoscopy: 07/29/2021  FOBT/FIT: Not on file  Cologuard: Not on file  Sigmoidoscopy: Not on file          Breast Cancer Screening Age: 40+ years old  Frequency: every 1-2 years  Not required if history of left and right mastectomy Mammogram: 11/25/2024        Cervical Cancer Screening Between the ages of 21-29, pap smear recommended once every 3 years.   Between the ages of 30-65, can perform pap smear with HPV co-testing every 5 years.   Recommendations may differ for women with a history of total hysterectomy, cervical cancer, or abnormal pap smears in past. Pap Smear: 03/19/2020        Hepatitis C Screening Once for adults born between 1945 and 1965  More frequently in patients at high risk for Hepatitis C Hep C Antibody:  03/24/2015        Diabetes Screening 1-2 times per year if you're at risk for diabetes or have pre-diabetes Fasting glucose: 84 mg/dL (8/14/2024)  A1C: 5.6 (6/27/2024)      Cholesterol Screening Once every 5 years if you don't have a lipid disorder. May order more often based on risk factors. Lipid panel: 08/23/2024          Other Preventive Screenings Covered by Medicare:  Abdominal Aortic Aneurysm (AAA) Screening: covered once if your at risk. You're considered to be at risk if you have a family history of AAA.  Lung Cancer Screening: covers low dose CT scan once per year if you meet all of the following conditions: (1) Age 55-77; (2) No signs or symptoms of lung cancer; (3) Current smoker or have quit smoking within the last 15 years; (4) You have a tobacco smoking history of at least 20 pack years (packs per day multiplied by number of years you smoked); (5) You get a written order from a healthcare provider.  Glaucoma Screening: covered annually if you're considered high risk: (1) You have diabetes OR (2) Family history of glaucoma OR (3)  aged 50 and older OR (4)  American aged 65 and older  Osteoporosis Screening: covered every 2 years if you meet one of the following conditions: (1) You're estrogen deficient and at risk for osteoporosis based off medical history and other findings; (2) Have a vertebral abnormality; (3) On glucocorticoid therapy for more than 3 months; (4) Have primary hyperparathyroidism; (5) On osteoporosis medications and need to assess response to drug therapy.   Last bone density test (DXA Scan): 09/30/2024.  HIV Screening: covered annually if you're between the age of 15-65. Also covered annually if you are younger than 15 and older than 65 with risk factors for HIV infection. For pregnant patients, it is covered up to 3 times per pregnancy.    Immunizations:  Immunization Recommendations   Influenza Vaccine Annual influenza vaccination during flu season is  recommended for all persons aged >= 6 months who do not have contraindications   Pneumococcal Vaccine   * Pneumococcal conjugate vaccine = PCV13 (Prevnar 13), PCV15 (Vaxneuvance), PCV20 (Prevnar 20)  * Pneumococcal polysaccharide vaccine = PPSV23 (Pneumovax) Adults 19-63 yo with certain risk factors or if 65+ yo  If never received any pneumonia vaccine: recommend Prevnar 20 (PCV20)  Give PCV20 if previously received 1 dose of PCV13 or PPSV23   Hepatitis B Vaccine 3 dose series if at intermediate or high risk (ex: diabetes, end stage renal disease, liver disease)   Respiratory syncytial virus (RSV) Vaccine - COVERED BY MEDICARE PART D  * RSVPreF3 (Arexvy) CDC recommends that adults 60 years of age and older may receive a single dose of RSV vaccine using shared clinical decision-making (SCDM)   Tetanus (Td) Vaccine - COST NOT COVERED BY MEDICARE PART B Following completion of primary series, a booster dose should be given every 10 years to maintain immunity against tetanus. Td may also be given as tetanus wound prophylaxis.   Tdap Vaccine - COST NOT COVERED BY MEDICARE PART B Recommended at least once for all adults. For pregnant patients, recommended with each pregnancy.   Shingles Vaccine (Shingrix) - COST NOT COVERED BY MEDICARE PART B  2 shot series recommended in those 19 years and older who have or will have weakened immune systems or those 50 years and older     Health Maintenance Due:      Topic Date Due   • Breast Cancer Screening: Mammogram  11/25/2025   • Colorectal Cancer Screening  07/27/2031   • HIV Screening  Completed   • Hepatitis C Screening  Completed   • Cervical Cancer Screening  Discontinued     Immunizations Due:      Topic Date Due   • Influenza Vaccine (1) 09/01/2024   • COVID-19 Vaccine (6 - 2024-25 season) 09/01/2024     Advance Directives   What are advance directives?  Advance directives are legal documents that state your wishes and plans for medical care. These plans are made ahead of  time in case you lose your ability to make decisions for yourself. Advance directives can apply to any medical decision, such as the treatments you want, and if you want to donate organs.   What are the types of advance directives?  There are many types of advance directives, and each state has rules about how to use them. You may choose a combination of any of the following:  Living will:  This is a written record of the treatment you want. You can also choose which treatments you do not want, which to limit, and which to stop at a certain time. This includes surgery, medicine, IV fluid, and tube feedings.   Durable power of  for healthcare (DPAHC):  This is a written record that states who you want to make healthcare choices for you when you are unable to make them for yourself. This person, called a proxy, is usually a family member or a friend. You may choose more than 1 proxy.  Do not resuscitate (DNR) order:  A DNR order is used in case your heart stops beating or you stop breathing. It is a request not to have certain forms of treatment, such as CPR. A DNR order may be included in other types of advance directives.  Medical directive:  This covers the care that you want if you are in a coma, near death, or unable to make decisions for yourself. You can list the treatments you want for each condition. Treatment may include pain medicine, surgery, blood transfusions, dialysis, IV or tube feedings, and a ventilator (breathing machine).  Values history:  This document has questions about your views, beliefs, and how you feel and think about life. This information can help others choose the care that you would choose.  Why are advance directives important?  An advance directive helps you control your care. Although spoken wishes may be used, it is better to have your wishes written down. Spoken wishes can be misunderstood, or not followed. Treatments may be given even if you do not want them. An advance  directive may make it easier for your family to make difficult choices about your care.   Weight Management   Why it is important to manage your weight:  Being overweight increases your risk of health conditions such as heart disease, high blood pressure, type 2 diabetes, and certain types of cancer. It can also increase your risk for osteoarthritis, sleep apnea, and other respiratory problems. Aim for a slow, steady weight loss. Even a small amount of weight loss can lower your risk of health problems.  How to lose weight safely:  A safe and healthy way to lose weight is to eat fewer calories and get regular exercise. You can lose up about 1 pound a week by decreasing the number of calories you eat by 500 calories each day.   Healthy meal plan for weight management:  A healthy meal plan includes a variety of foods, contains fewer calories, and helps you stay healthy. A healthy meal plan includes the following:  Eat whole-grain foods more often.  A healthy meal plan should contain fiber. Fiber is the part of grains, fruits, and vegetables that is not broken down by your body. Whole-grain foods are healthy and provide extra fiber in your diet. Some examples of whole-grain foods are whole-wheat breads and pastas, oatmeal, brown rice, and bulgur.  Eat a variety of vegetables every day.  Include dark, leafy greens such as spinach, kale, avila greens, and mustard greens. Eat yellow and orange vegetables such as carrots, sweet potatoes, and winter squash.   Eat a variety of fruits every day.  Choose fresh or canned fruit (canned in its own juice or light syrup) instead of juice. Fruit juice has very little or no fiber.  Eat low-fat dairy foods.  Drink fat-free (skim) milk or 1% milk. Eat fat-free yogurt and low-fat cottage cheese. Try low-fat cheeses such as mozzarella and other reduced-fat cheeses.  Choose meat and other protein foods that are low in fat.  Choose beans or other legumes such as split peas or lentils.  Choose fish, skinless poultry (chicken or turkey), or lean cuts of red meat (beef or pork). Before you cook meat or poultry, cut off any visible fat.   Use less fat and oil.  Try baking foods instead of frying them. Add less fat, such as margarine, sour cream, regular salad dressing and mayonnaise to foods. Eat fewer high-fat foods. Some examples of high-fat foods include french fries, doughnuts, ice cream, and cakes.  Eat fewer sweets.  Limit foods and drinks that are high in sugar. This includes candy, cookies, regular soda, and sweetened drinks.  Exercise:  Exercise at least 30 minutes per day on most days of the week. Some examples of exercise include walking, biking, dancing, and swimming. You can also fit in more physical activity by taking the stairs instead of the elevator or parking farther away from stores. Ask your healthcare provider about the best exercise plan for you.      © Copyright [employer] 2018 Information is for End User's use only and may not be sold, redistributed or otherwise used for commercial purposes. All illustrations and images included in CareNotes® are the copyrighted property of [employer] or [employer].

## 2024-12-05 NOTE — PROGRESS NOTES
Name: Ricarda Selby      : 1960      MRN: [id number]  Encounter Provider: Kranthi Napier MD  Encounter Date: 2024   [facility]    1. Medicare annual wellness visit, initial (Primary)  2. T2DM with increased A1C 6.6    PLAN:  -Up to date on mammogram (negative) next in   - colonoscopy, repeat in 10 years  -Diabetic foot and eye exam performed  -Renal function stable, normal ACR  -RSV vaccine today, up to date on vaccinations  -Switch omeprazole to famotidine due to interactions and increased risk of vitamin deficiencies  -BP well controlled, continue regimen  -Discussed weight loss and exercise, dietary material provided in Latvian  -Patient preferred not to increase medication, if A1C not at goal next visit, will increase Metformin to BID  -Continue plastic surgery and orthopedic follow up        2. Vitamin D deficiency  - Cholecalciferol (Vitamin D3) 50 MCG (2000 UT) TABS; Take 2 tablets (4,000 Units total) by mouth daily  Dispense: 180 tablet; Refill: 1    3. Abdominal pannus  - acetaminophen (TYLENOL) 500 mg tablet; Take 1 tablet (500 mg total) by mouth every 4 (four) hours as needed for mild pain or moderate pain  Dispense: 90 tablet; Refill: 3    4. Mixed hyperlipidemia  - atorvastatin (LIPITOR) 20 mg tablet; Take 1 tablet (20 mg total) by mouth daily  Dispense: 90 tablet; Refill: 3    5. Normocytic anemia  - ferrous sulfate 324 (65 Fe) mg; Take 1 tablet (324 mg total) by mouth daily before breakfast  Dispense: 90 tablet; Refill: 3    6. Primary hypertension    - losartan-hydrochlorothiazide (HYZAAR) 100-25 MG per tablet; Take 1 tablet by mouth daily  Dispense: 90 tablet; Refill: 0  - metoprolol succinate (TOPROL-XL) 25 mg 24 hr tablet; Take 1 tablet (25 mg total) by mouth 2 (two) times a day  Dispense: 180 tablet; Refill: 0    8. Bariatric surgery status    - famotidine (PEPCID) 20 mg tablet; Take 1 tablet (20 mg total) by mouth 2  (two) times a day  Dispense: 180 tablet; Refill: 0    9. Diabetic eye exam (HCC)  - IRIS Diabetic eye exam    10. Gastroesophageal reflux disease without esophagitis    - famotidine (PEPCID) 20 mg tablet; Take 1 tablet (20 mg total) by mouth 2 (two) times a day  Dispense: 180 tablet; Refill: 0    11. Need for RSV immunization    - Respiratory Syncytial Virus (RSV) vaccine (recombinant) (Abrysvo)   Assessment & Plan  Medicare annual wellness visit, initial         Vitamin D deficiency    Orders:    Cholecalciferol (Vitamin D3) 50 MCG (2000 UT) TABS; Take 2 tablets (4,000 Units total) by mouth daily    Abdominal pannus    Orders:    acetaminophen (TYLENOL) 500 mg tablet; Take 1 tablet (500 mg total) by mouth every 4 (four) hours as needed for mild pain or moderate pain    Mixed hyperlipidemia    Orders:    atorvastatin (LIPITOR) 20 mg tablet; Take 1 tablet (20 mg total) by mouth daily    Normocytic anemia    Orders:    ferrous sulfate 324 (65 Fe) mg; Take 1 tablet (324 mg total) by mouth daily before breakfast    Primary hypertension    Orders:    losartan-hydrochlorothiazide (HYZAAR) 100-25 MG per tablet; Take 1 tablet by mouth daily    Essential hypertension    Orders:    metoprolol succinate (TOPROL-XL) 25 mg 24 hr tablet; Take 1 tablet (25 mg total) by mouth 2 (two) times a day    Bariatric surgery status    Orders:    famotidine (PEPCID) 20 mg tablet; Take 1 tablet (20 mg total) by mouth 2 (two) times a day    Diabetic eye exam (HCC)    Orders:    IRIS Diabetic eye exam    Gastroesophageal reflux disease without esophagitis    Orders:    famotidine (PEPCID) 20 mg tablet; Take 1 tablet (20 mg total) by mouth 2 (two) times a day    Need for RSV immunization    Orders:    Respiratory Syncytial Virus (RSV) vaccine (recombinant) (Abrysvo)       Preventive health issues were discussed with patient, and age appropriate screening tests were ordered as noted in patient's After Visit Summary. Personalized health advice and  appropriate referrals for health education or preventive services given if needed, as noted in patient's After Visit Summary.    History of Present Illness     HPI   Patient is a 64 yr old F with PMH of  [id number] panniculectomy and lipoabdominoplasty with Dr. Rivera on 3/30/2023 and Pouchitis with an anastomotic stricture, successful balloon dilatation, T2DM, HTN,  Hx WPW PAF, PAD, osteopenia on Zoledronic, nutritional deficiencies post surgery who presents for medicare annual.     Patient feels well overall. Minimal abdominal pain, well controlled with current medications. Will be obtaining Reclast infusion. Admits to eating chocolate and bread, without any formal exercise. Wanted to obtain RSV vaccine, due for diabetic eye exam.   Last labwork unremarkable with known anemia, good renal function with normal ACR. Taking medication and vitamin supplementation with good compliance. Has some R knee pain but tolerable.   Reviewed mammogram, negative.       Patient Care Team:  Kranthi Napier MD as PCP - General (Internal Medicine)  Chrissy Gilbert MD as PCP - PCP-[employer] (RTE)  Deny Masters DO as PCP - PCP-[facility] ([facility]E)  Gilberto Vega DO    Review of Systems   Constitutional:  Negative for chills and fever.   HENT:  Negative for congestion.    Respiratory:  Negative for chest tightness and shortness of breath.    Cardiovascular:  Negative for chest pain and palpitations.   Gastrointestinal:  Positive for abdominal pain. Negative for nausea and vomiting.   Endocrine: Negative for polyuria.   Genitourinary:  Negative for dysuria.   Musculoskeletal:  Negative for back pain.   Neurological:  Negative for dizziness and headaches.     Medical History Reviewed by provider this encounter:  Meds       Annual Wellness Visit Questionnaire   Last Medicare Wellness visit information reviewed, patient interviewed and updates made to the record today.      Health Risk Assessment:   Patient rates overall  health as good. Patient feels that their physical health rating is much better. Patient is satisfied with their life. Eyesight was rated as same. Hearing was rated as same. Patient feels that their emotional and mental health rating is same. Patients states they are sometimes angry. Patient states they are sometimes unusually tired/fatigued. Pain experienced in the last 7 days has been some. Patient's pain rating has been 5/10. Patient states that she has experienced no weight loss or gain in last 6 months.     Fall Risk Screening:   In the past year, patient has experienced: no history of falling in past year      Urinary Incontinence Screening:   Patient has not leaked urine accidently in the last six months.     Home Safety:  Patient has trouble with stairs inside or outside of their home. Patient has working smoke alarms and has no working carbon monoxide detector. Home safety hazards include: none.     Nutrition:   Current diet is Regular.     Medications:   Patient is currently taking over-the-counter supplements. OTC medications include: see medication list. Patient is able to manage medications.     Activities of Daily Living (ADLs)/Instrumental Activities of Daily Living (IADLs):   Walk and transfer into and out of bed and chair?: Yes  Dress and groom yourself?: Yes    Bathe or shower yourself?: Yes    Feed yourself? Yes  Do your laundry/housekeeping?: Yes  Manage your money, pay your bills and track your expenses?: Yes  Make your own meals?: Yes    Do your own shopping?: Yes    Previous Hospitalizations:   Any hospitalizations or ED visits within the last 12 months?: Yes    How many hospitalizations have you had in the last year?: 1-2    Advance Care Planning:   Living will: No    Advanced directive: No    Advanced directive counseling given: Yes    ACP document given: Yes    Patient declined ACP directive: No    End of Life Decisions reviewed with patient: Yes      Cognitive Screening:   Provider or  family/friend/caregiver concerned regarding cognition?: No    PREVENTIVE SCREENINGS      Cardiovascular Screening:    General: Screening Not Indicated and History Lipid Disorder      Diabetes Screening:     General: Screening Not Indicated and History Diabetes      Colorectal Cancer Screening:     General: Screening Current      Breast Cancer Screening:     General: Screening Current      Cervical Cancer Screening:    General: Screening Not Indicated      Osteoporosis Screening:    General: Screening Not Indicated and History Osteoporosis      Lung Cancer Screening:     General: Screening Not Indicated      Hepatitis C Screening:    General: Screening Current    Screening, Brief Intervention, and Referral to Treatment (SBIRT)    Screening  Typical number of drinks in a day: 0  Typical number of drinks in a week: 0  Interpretation: Low risk drinking behavior.    Single Item Drug Screening:  How often have you used an illegal drug (including marijuana) or a prescription medication for non-medical reasons in the past year? never    Single Item Drug Screen Score: 0  Interpretation: Negative screen for possible drug use disorder    Brief Intervention  Alcohol & drug use screenings were reviewed. No concerns regarding substance use disorder identified.     Other Counseling Topics:   Car/seat belt/driving safety and calcium and vitamin D intake.     Social Drivers of Health     Financial Resource Strain: Low Risk  (12/5/2024)    Overall Financial Resource Strain (CARDIA)     Difficulty of Paying Living Expenses: Not hard at all   Food Insecurity: No Food Insecurity (12/5/2024)    Hunger Vital Sign     Worried About Running Out of Food in the Last Year: Never true     Ran Out of Food in the Last Year: Never true   Transportation Needs: No Transportation Needs (12/5/2024)    PRAPARE - Transportation     Lack of Transportation (Medical): No     Lack of Transportation (Non-Medical): No   Housing Stability: Low Risk   "(12/5/2024)    Housing Stability Vital Sign     Unable to Pay for Housing in the Last Year: No     Number of Times Moved in the Last Year: 1     Homeless in the Last Year: No   Utilities: Not At Risk (12/5/2024)    [facility] Utilities     Threatened with loss of utilities: No     No results found.    Objective   /73 (BP Location: Left arm, Patient Position: Sitting, Cuff Size: Large)   Pulse 81   Temp 98.1 °F (36.7 °C) (Temporal)   Ht 5' 3\" (1.6 m)   Wt 65.8 kg (145 lb)   BMI 25.69 kg/m²     Physical Exam  Vitals reviewed.   Constitutional:       General: She is not in acute distress.  HENT:      Head: Normocephalic and atraumatic.      Mouth/Throat:      Pharynx: Oropharynx is clear.   Eyes:      Extraocular Movements: Extraocular movements intact.      Conjunctiva/sclera: Conjunctivae normal.      Pupils: Pupils are equal, round, and reactive to light.   Cardiovascular:      Rate and Rhythm: Normal rate and regular rhythm.      Pulses: Normal pulses. no weak pulses.           Dorsalis pedis pulses are 2+ on the right side and 2+ on the left side.        Posterior tibial pulses are 2+ on the right side and 2+ on the left side.      Heart sounds: Normal heart sounds.   Pulmonary:      Effort: Pulmonary effort is normal.      Breath sounds: Normal breath sounds.   Abdominal:      Palpations: Abdomen is soft.      Tenderness: There is no abdominal tenderness.   Musculoskeletal:         General: Normal range of motion.      Right lower leg: No edema.      Left lower leg: No edema.   Feet:      Right foot:      Skin integrity: No ulcer, skin breakdown, erythema, warmth, callus or dry skin.      Left foot:      Skin integrity: No ulcer, skin breakdown, erythema, warmth, callus or dry skin.   Neurological:      Mental Status: She is alert and oriented to person, place, and time.     Patient's shoes and socks removed.    Right Foot/Ankle   Right Foot Inspection  Skin Exam: skin normal and skin intact. No dry skin, no " warmth, no callus, no erythema, no maceration, no abnormal color, no pre-ulcer, no ulcer and no callus.     Toe Exam: ROM and strength within normal limits.     Sensory   Vibration: intact  Proprioception: intact  Monofilament testing: intact    Vascular  Capillary refills: < 3 seconds  The right DP pulse is 2+. The right PT pulse is 2+.     Left Foot/Ankle  Left Foot Inspection  Skin Exam: skin normal and skin intact. No dry skin, no warmth, no erythema, no maceration, normal color, no pre-ulcer, no ulcer and no callus.     Toe Exam: ROM and strength within normal limits.     Sensory   Vibration: intact  Proprioception: intact  Monofilament testing: intact    Vascular  Capillary refills: < 3 seconds  The left DP pulse is 2+. The left PT pulse is 2+.     Assign Risk Category  No deformity present  No loss of protective sensation  No weak pulses  Risk: 0

## 2024-12-09 DIAGNOSIS — M81.0 AGE-RELATED OSTEOPOROSIS WITHOUT CURRENT PATHOLOGICAL FRACTURE: Primary | ICD-10-CM

## 2024-12-09 RX ORDER — SODIUM CHLORIDE 9 MG/ML
20 INJECTION, SOLUTION INTRAVENOUS ONCE
Status: CANCELLED | OUTPATIENT
Start: 2024-12-10

## 2024-12-09 RX ORDER — ZOLEDRONIC ACID 0.05 MG/ML
5 INJECTION, SOLUTION INTRAVENOUS ONCE
Status: CANCELLED | OUTPATIENT
Start: 2024-12-10

## 2024-12-10 ENCOUNTER — HOSPITAL ENCOUNTER (OUTPATIENT)
Dept: INFUSION CENTER | Facility: HOSPITAL | Age: 64
Discharge: HOME | End: 2024-12-10
Payer: COMMERCIAL

## 2024-12-10 VITALS
TEMPERATURE: 97 F | SYSTOLIC BLOOD PRESSURE: 110 MMHG | RESPIRATION RATE: 16 BRPM | HEART RATE: 62 BPM | DIASTOLIC BLOOD PRESSURE: 74 MMHG

## 2024-12-10 DIAGNOSIS — M81.0 AGE-RELATED OSTEOPOROSIS WITHOUT CURRENT PATHOLOGICAL FRACTURE: Primary | ICD-10-CM

## 2024-12-10 PROCEDURE — 96374 THER/PROPH/DIAG INJ IV PUSH: CPT

## 2024-12-10 RX ORDER — SODIUM CHLORIDE 9 MG/ML
20 INJECTION, SOLUTION INTRAVENOUS ONCE
OUTPATIENT
Start: 2025-12-05

## 2024-12-10 RX ORDER — ZOLEDRONIC ACID 0.05 MG/ML
5 INJECTION, SOLUTION INTRAVENOUS ONCE
OUTPATIENT
Start: 2025-12-05

## 2024-12-10 RX ORDER — ZOLEDRONIC ACID 0.05 MG/ML
5 INJECTION, SOLUTION INTRAVENOUS ONCE
Status: COMPLETED | OUTPATIENT
Start: 2024-12-10 | End: 2024-12-10

## 2024-12-10 RX ORDER — SODIUM CHLORIDE 9 MG/ML
20 INJECTION, SOLUTION INTRAVENOUS ONCE
Status: COMPLETED | OUTPATIENT
Start: 2024-12-10 | End: 2024-12-10

## 2024-12-10 RX ADMIN — ZOLEDRONIC ACID 5 MG: 0.05 INJECTION, SOLUTION INTRAVENOUS at 14:39

## 2024-12-10 RX ADMIN — SODIUM CHLORIDE 20 ML/HR: 0.9 INJECTION, SOLUTION INTRAVENOUS at 14:37

## 2024-12-10 NOTE — PROGRESS NOTES
Ricarda Selby  tolerated Reclast well with no complications.      Ricarda Selby is aware of future appt on 12/11/2025 at 2:30pm.     Written appointment card provided.     Left unit in stable condition.

## 2024-12-12 ENCOUNTER — OFFICE VISIT (OUTPATIENT)
Dept: DENTISTRY | Facility: CLINIC | Age: 64
End: 2024-12-12

## 2024-12-12 VITALS — HEART RATE: 68 BPM | SYSTOLIC BLOOD PRESSURE: 122 MMHG | DIASTOLIC BLOOD PRESSURE: 82 MMHG

## 2024-12-12 DIAGNOSIS — K05.6 PERIODONTAL DISEASE: ICD-10-CM

## 2024-12-12 DIAGNOSIS — K05.4 PERIODONTOSIS: Primary | ICD-10-CM

## 2024-12-12 PROCEDURE — D0191 ASSESSMENT OF A PATIENT: HCPCS

## 2024-12-12 NOTE — PROGRESS NOTES
Procedure Details   - ASSESSMENT OF A PATIENT      Planned for ext + bone graft #30    Ricarda Selby 64 y.o. female presents with  to Dia for Limited exam  PMH reviewed, no changes, ASA II. Significant medical history: osteopenia, HTN, TIIDM. Significant allergies: NKA. Significant medications: IV bisphosphonate (zoledronic acid)- patient had therapy 12/5/24      - Patient was scheduled the day prior in the afternoon- chart was not reviewed later in the day after clinic was closed so was not able to inform patient beforehand. Due to recent infusion of bisphosphonate drug, patient was highly advised against getting the tooth extracted today. Explained 10% risk of osteonecrosis of jaw if patient elects for extraction today, and highly recommended that she be seen by oral surgeon so they can actively monitor her and possibly place her on hyperbaric O2. Referral was given and messaged referral specialist to schedule patient at soonest appt date.      - Patient was also tx planned for SRP back when she was seen 11/2024. Preauth for SRP were not sent out- billing was informed to send out so she can be brought back for tx.      Patient dismissed ambulatory and alert.    NV: SRP once pre auth actually back    Attending: Dr. Dunaway was present in clinic.

## 2025-01-22 ENCOUNTER — OFFICE VISIT (OUTPATIENT)
Dept: PLASTIC SURGERY | Facility: CLINIC | Age: 65
End: 2025-01-22
Payer: COMMERCIAL

## 2025-01-22 DIAGNOSIS — L90.5 SYMPTOMATIC SCAR OF SKIN: Primary | ICD-10-CM

## 2025-01-22 PROCEDURE — 99214 OFFICE O/P EST MOD 30 MIN: CPT | Performed by: STUDENT IN AN ORGANIZED HEALTH CARE EDUCATION/TRAINING PROGRAM

## 2025-01-22 NOTE — PROGRESS NOTES
PRS Note    Patient is 9 months from [id number] panniculectomy and lipoabdominoplasty and over 4 months from revision of symptomatic abdominal vertical and horizontal scars.    She is doing well. After her revision of symptomatic scars, she no longer has any residual abdominal pain. Her scar is healing well.    She does have some fullness and laxity in the central abdominal region. I informed her that with history of massive weight loss, the tissue is less elastic and has a tendency to stretch. Her weight today is 147 lbs. I instructed her to exercise for core strengthening.    Horizontal and vertical scars well healed. No hypertrophy or keloids. No abdominal tenderness.    -F/U PRN  -Spent 30 minutes in consultation with patient. Greater than 50% of the total time was spent obtaining history, evaluation, performing exam, discussion of management options including post-operative care, answering patient's questions and concerns, chart reviewing, and documentation    Xander Rivera MD   [facility]   [address] [place] [address], [address]   [place] [address]   Office: [phone]

## 2025-01-23 ENCOUNTER — OFFICE VISIT (OUTPATIENT)
Dept: DENTISTRY | Facility: CLINIC | Age: 65
End: 2025-01-23

## 2025-01-23 DIAGNOSIS — K05.6 PERIODONTAL DISEASE: Primary | ICD-10-CM

## 2025-01-23 PROCEDURE — D4341 PERIODONTAL SCALING AND ROOT PLANING - 4 OR MORE TEETH PER QUADRANT: HCPCS

## 2025-01-23 NOTE — PROGRESS NOTES
Scaling and root planing LR and LL 4+ teeth    HISTORY:  Ricarda Selby is a 64 y.o. year old female patient presenting   for scaling and root planing appointment.  Current diagnosis:   1. Periodontal disease      Reviewed chief complaint, dental history, allergies, and medications prior to the procedure. Reviewed medical history with no significant changes noted.      PRE-PROCEDURE MEDICATIONS:  none    INFORMED CONSENT:  Risks, benefits, and alternative options were discussed with the patientVerbalized understanding and informed consent was obtained to proceed with the scaling and root planing procedure.    ANESTHETIC:  Anesthetic used: 1 carpule oraquix administered interproximal and subgingivally entire lower arch.     PROCEDURE:    Scaling and root planing location: LR/ LL   Procedure done with Cavitron and hand instruments.  Patient tolerated the procedure well. and no complications were noted.    INSTRUCTIONS, ORDERS, AND FOLLOW UP:  Verbal and written oral hygiene instructions reinforced including Brush two times daily, Floss daily. Reviewed importance of periodic follow ups and perio maintenance appointments.  and instructed to return for next visit tomorrow to continue scrp     Nv: scrp UR

## 2025-01-24 ENCOUNTER — OFFICE VISIT (OUTPATIENT)
Dept: DENTISTRY | Facility: CLINIC | Age: 65
End: 2025-01-24

## 2025-01-24 VITALS — SYSTOLIC BLOOD PRESSURE: 118 MMHG | HEART RATE: 104 BPM | DIASTOLIC BLOOD PRESSURE: 75 MMHG

## 2025-01-24 DIAGNOSIS — K05.6 PERIODONTAL DISEASE: Primary | ICD-10-CM

## 2025-01-24 PROCEDURE — D4341 PERIODONTAL SCALING AND ROOT PLANING - 4 OR MORE TEETH PER QUADRANT: HCPCS

## 2025-01-27 ENCOUNTER — OFFICE VISIT (OUTPATIENT)
Dept: DENTISTRY | Facility: CLINIC | Age: 65
End: 2025-01-27

## 2025-01-27 DIAGNOSIS — K05.6 PERIODONTAL DISEASE: Primary | ICD-10-CM

## 2025-01-27 PROCEDURE — D4341 PERIODONTAL SCALING AND ROOT PLANING - 4 OR MORE TEETH PER QUADRANT: HCPCS

## 2025-01-27 NOTE — PROGRESS NOTES
Scaling and root planing UL 4+ teeth      HISTORY:  Ricarda Selby is a 64 y.o. year old female patient presenting   for scaling and root planing appointment.  Current diagnosis:   1. Periodontal disease       Reviewed chief complaint, dental history, allergies, and medications prior to the procedure. Reviewed medical history with no significant changes noted.        PRE-PROCEDURE MEDICATIONS:  none     INFORMED CONSENT:  Risks, benefits, and alternative options were discussed with the patientVerbalized understanding and informed consent was obtained to proceed with the scaling and root planing procedure.     ANESTHETIC:  Anesthetic used: 1 carpule oraquix administered interproximal and subgingivally entire UL quadrant      PROCEDURE:    Scaling and root planing location: UL   Procedure done with Cavitron and hand instruments.  Patient tolerated the procedure well. and no complications were noted.     INSTRUCTIONS, ORDERS, AND FOLLOW UP:  Verbal and written oral hygiene instructions reinforced including Brush two times daily, Floss daily. Reviewed importance of periodic follow ups and perio maintenance appointments.  and instructed to return for next visit tomorrow to continue scrp      Nv: 4 m perio maintenance

## 2025-01-28 NOTE — DENTAL PROCEDURE DETAILS
SCALE AND RP UR   Local anesthesia administered by Dr. Murry     20% Bencocaine topical used  CHIEF CONCERN: none   PAIN SCALE: 1  ASA CLASS: ASA 2 - Patient with mild systemic disease with no functional limitations  PLAQUE: moderate  CALCULUS: Generalized  BLEEDING: moderate  STAIN : Localized  PERIO: III, B    Hand scaled, polished and flossed. Used cavitron    Oral Hygiene Instruction:  recommended brushing 2 x daily for 2 minutes MIN, recommended flossing daily, reviewed dietary precautions. Post op sc/rp instructions placed in AVS, printed and handed/ reviewed with patient.     Soft tissue exam:  soft tissue exam was normal  ExtraOral exam:   Extraoral exam was normal    REFERRALS: no referrals provided         NEXT VISIT:   --->sc/rp UL

## 2025-01-28 NOTE — PROGRESS NOTES
Procedure Details  UR  - PERIODONTAL SCALING AND ROOT PLANING - 4 OR MORE TEETH PER QUADRANT    SCALE AND RP UR   Local anesthesia administered by Dr. Murry     20% Bencocaine topical used  CHIEF CONCERN: none   PAIN SCALE: 1  ASA CLASS: ASA 2 - Patient with mild systemic disease with no functional limitations  PLAQUE: moderate  CALCULUS: Generalized  BLEEDING: moderate  STAIN : Localized  PERIO: III, B    Hand scaled, polished and flossed. Used cavitron    Oral Hygiene Instruction:  recommended brushing 2 x daily for 2 minutes MIN, recommended flossing daily, reviewed dietary precautions. Post op sc/rp instructions placed in AVS, printed and handed/ reviewed with patient.     Soft tissue exam:  soft tissue exam was normal  ExtraOral exam:   Extraoral exam was normal    REFERRALS: no referrals provided         NEXT VISIT:   --->sc/rp UL

## 2025-02-11 ENCOUNTER — HOSPITAL ENCOUNTER (OUTPATIENT)
Dept: RADIOLOGY | Facility: HOSPITAL | Age: 65
Discharge: HOME | End: 2025-02-11
Payer: COMMERCIAL

## 2025-02-11 ENCOUNTER — OFFICE VISIT (OUTPATIENT)
Dept: INTERNAL MEDICINE CLINIC | Facility: CLINIC | Age: 65
End: 2025-02-11

## 2025-02-11 VITALS
BODY MASS INDEX: 26.22 KG/M2 | TEMPERATURE: 97.8 F | WEIGHT: 148 LBS | HEART RATE: 77 BPM | HEIGHT: 63 IN | DIASTOLIC BLOOD PRESSURE: 72 MMHG | SYSTOLIC BLOOD PRESSURE: 102 MMHG

## 2025-02-11 DIAGNOSIS — I48.0 PAROXYSMAL ATRIAL FIBRILLATION (HCC): ICD-10-CM

## 2025-02-11 DIAGNOSIS — K91.850 POUCHITIS (HCC): ICD-10-CM

## 2025-02-11 DIAGNOSIS — M25.512 ACUTE PAIN OF LEFT SHOULDER: ICD-10-CM

## 2025-02-11 DIAGNOSIS — Z59.9 FINANCIAL DIFFICULTIES: Primary | ICD-10-CM

## 2025-02-11 DIAGNOSIS — Z01.00 DIABETIC EYE EXAM (HCC): ICD-10-CM

## 2025-02-11 DIAGNOSIS — E11.9 DIABETIC EYE EXAM (HCC): ICD-10-CM

## 2025-02-11 DIAGNOSIS — Z59.82 INABILITY TO ACQUIRE TRANSPORTATION: ICD-10-CM

## 2025-02-11 PROBLEM — L98.7 EXCESS SKIN OF ABDOMEN: Status: RESOLVED | Noted: 2023-03-30 | Resolved: 2025-02-11

## 2025-02-11 PROBLEM — L30.4 INTERTRIGO: Status: RESOLVED | Noted: 2023-03-30 | Resolved: 2025-02-11

## 2025-02-11 PROBLEM — L98.7 EXCESS SKIN: Status: RESOLVED | Noted: 2022-02-08 | Resolved: 2025-02-11

## 2025-02-11 PROBLEM — Z41.1 ENCOUNTER FOR COSMETIC SURGERY: Status: RESOLVED | Noted: 2023-03-30 | Resolved: 2025-02-11

## 2025-02-11 PROBLEM — Z01.810 PREOP CARDIOVASCULAR EXAM: Status: RESOLVED | Noted: 2020-03-19 | Resolved: 2025-02-11

## 2025-02-11 PROBLEM — L90.5 SYMPTOMATIC SCAR OF SKIN: Status: RESOLVED | Noted: 2024-06-21 | Resolved: 2025-02-11

## 2025-02-11 PROBLEM — E65 ABDOMINAL PANNUS: Status: RESOLVED | Noted: 2021-11-15 | Resolved: 2025-02-11

## 2025-02-11 PROBLEM — Z01.818 PREOPERATIVE CLEARANCE: Status: RESOLVED | Noted: 2023-03-01 | Resolved: 2025-02-11

## 2025-02-11 PROBLEM — Z23 NEED FOR COVID-19 VACCINE: Status: RESOLVED | Noted: 2023-04-25 | Resolved: 2025-02-11

## 2025-02-11 PROCEDURE — 99213 OFFICE O/P EST LOW 20 MIN: CPT

## 2025-02-11 PROCEDURE — G2211 COMPLEX E/M VISIT ADD ON: HCPCS

## 2025-02-11 PROCEDURE — 73030 X-RAY EXAM OF SHOULDER: CPT

## 2025-02-11 RX ORDER — MELOXICAM 7.5 MG/1
7.5 TABLET ORAL DAILY
Qty: 30 TABLET | Refills: 2 | Status: SHIPPED | OUTPATIENT
Start: 2025-02-11

## 2025-02-11 SDOH — ECONOMIC STABILITY - TRANSPORTATION SECURITY: TRANSPORTATION INSECURITY: Z59.82

## 2025-02-11 SDOH — ECONOMIC STABILITY - INCOME SECURITY: PROBLEM RELATED TO HOUSING AND ECONOMIC CIRCUMSTANCES, UNSPECIFIED: Z59.9

## 2025-02-11 NOTE — PATIENT INSTRUCTIONS
Please take the Mobic (pain medication) once a day for the pain as needed instead of the tylenol    Please complete the X Ray, it is walk in at the hospital    Please schedule an appointment with orthopedic surgery to review and give you treatment     If anything changes, call us earlier and schedule an appointment

## 2025-02-11 NOTE — PROGRESS NOTES
Name: Ricarda Selby      : 1960      MRN: [id number]  Encounter Provider: Stephanie Suárez DO  Encounter Date: 2025   Encounter department: [facility] [place]  :  Assessment & Plan  Acute pain of left shoulder  Pt reports 1 year hx of progressively worsening L shoulder pain, waking her up at night  No trauma, some numbness in hands b/l  Minimal relief with nightly tylenol, icy hot, and massaging  Significantly reduced active and passive ROM in abduction, flexion, and external rotation on exam    Ddx: adhesive capsulitis vs rotator cuff injury/tear vs osteoarthritis vs fracture/dislocation    Plan:  -will order X ray  -referral to orthopedic surgery placed  -start taking meloxican daily prn until appointment with ortho  -consider PT based on XR findings   -f/u as needed     Orders:    XR shoulder 2+ vw left; Future    Ambulatory Referral to Orthopedic Surgery; Future    meloxicam (Mobic) 7.5 mg tablet; Take 1 tablet (7.5 mg total) by mouth daily           History of Present Illness   Ricarda Selby is a 65 yo F with PMH of [id number] panniculectomy and lipoabdominoplasty with Dr. Rivera on 3/30/2023 and Pouchitis with an anastomotic stricture, successful balloon dilatation, T2DM, HTN,  Hx WPW PAF, PAD, osteopenia on Zoledronic, nutritional deficiencies post surgery who presents for L shoulder pain. Reports that symptoms began 1 year ago, but has been progressively worsening. The pain has been worsening to the point where she has trouble sleeping at night. Takes tylenol once at nighttime daily for relief. Does endorse decreased range of motion and weakness. Tries to massage the region and notes some improvement. Tried icy-hot with some improvement as well. No trauma to the region. Occasionally has nubmness in b/l hands.         Review of Systems   Constitutional:  Negative for chills and fever.   Respiratory:  Negative for shortness of breath.    Cardiovascular:  Negative for chest  "pain and palpitations.   Gastrointestinal:  Negative for abdominal pain, constipation, diarrhea, nausea and vomiting.   Genitourinary:  Negative for dysuria, frequency and hematuria.   Skin:  Negative for rash.   Neurological:  Negative for dizziness, light-headedness and headaches.       Objective   /72 (BP Location: Right arm, Patient Position: Sitting, Cuff Size: Adult)   Pulse 77   Temp 97.8 °F (36.6 °C) (Temporal)   Ht 5' 3\" (1.6 m)   Wt 67.1 kg (148 lb)   BMI 26.22 kg/m²      Physical Exam  Constitutional:       Appearance: Normal appearance.   HENT:      Head: Normocephalic and atraumatic.   Eyes:      Extraocular Movements: Extraocular movements intact.      Conjunctiva/sclera: Conjunctivae normal.   Cardiovascular:      Rate and Rhythm: Normal rate and regular rhythm.      Pulses: Normal pulses.      Heart sounds: No murmur heard.  Pulmonary:      Effort: Pulmonary effort is normal. No respiratory distress.      Breath sounds: Normal breath sounds. No wheezing.   Abdominal:      General: There is no distension.      Palpations: Abdomen is soft.      Tenderness: There is no abdominal tenderness.   Musculoskeletal:      Right upper arm: Normal.      Left upper arm: No swelling or edema.      Cervical back: Normal range of motion and neck supple.      Comments: ROM of L shoulder: active and passive ROM including abduction, flexion, and external rotation significantly reduced  ROM of R shoulder wnl    Neurological:      Mental Status: She is alert.         Stephanie Suárez DO  PGY-2  Internal Medicine  [facility]   "

## 2025-02-16 DIAGNOSIS — Z98.84 BARIATRIC SURGERY STATUS: ICD-10-CM

## 2025-02-20 ENCOUNTER — PATIENT OUTREACH (OUTPATIENT)
Dept: INTERNAL MEDICINE CLINIC | Facility: CLINIC | Age: 65
End: 2025-02-20

## 2025-02-20 NOTE — PROGRESS NOTES
SW has reached to pt via  ID # [id number] to assist pt with referral deborah BONDS.      Pt is known to SW from previous intervention when info re Insurance and [employer]  provided.    SW attempted to call the Cell # listed  which was her  Bhavin and SW has to leave a message .  SW also tried her Home # but the phone was not in service.    SW to reach out again and assist as indicated.

## 2025-02-21 ENCOUNTER — PATIENT OUTREACH (OUTPATIENT)
Dept: INTERNAL MEDICINE CLINIC | Facility: CLINIC | Age: 65
End: 2025-02-21

## 2025-02-21 DIAGNOSIS — E11.9 TYPE 2 DIABETES MELLITUS WITHOUT COMPLICATION, WITHOUT LONG-TERM CURRENT USE OF INSULIN (HCC): Primary | ICD-10-CM

## 2025-02-21 NOTE — PROGRESS NOTES
SW has reached out to pt's  via  ID # [id number] to return his call. (He is on the Communication consent on file).  SW had to leave another message for pt/ to return SW call.  SW to remain available to assist as indicated.

## 2025-02-24 ENCOUNTER — OFFICE VISIT (OUTPATIENT)
Dept: OBGYN CLINIC | Facility: OTHER | Age: 65
End: 2025-02-24
Payer: COMMERCIAL

## 2025-02-24 ENCOUNTER — RESULTS FOLLOW-UP (OUTPATIENT)
Dept: OTHER | Facility: HOSPITAL | Age: 65
End: 2025-02-24

## 2025-02-24 ENCOUNTER — PATIENT OUTREACH (OUTPATIENT)
Dept: INTERNAL MEDICINE CLINIC | Facility: CLINIC | Age: 65
End: 2025-02-24

## 2025-02-24 VITALS — WEIGHT: 147 LBS | HEIGHT: 63 IN | BODY MASS INDEX: 26.05 KG/M2

## 2025-02-24 DIAGNOSIS — M75.82 ROTATOR CUFF TENDONITIS, LEFT: Primary | ICD-10-CM

## 2025-02-24 DIAGNOSIS — M75.52 SUBACROMIAL BURSITIS OF LEFT SHOULDER JOINT: ICD-10-CM

## 2025-02-24 PROCEDURE — 20610 DRAIN/INJ JOINT/BURSA W/O US: CPT | Performed by: ORTHOPAEDIC SURGERY

## 2025-02-24 PROCEDURE — 99203 OFFICE O/P NEW LOW 30 MIN: CPT | Performed by: ORTHOPAEDIC SURGERY

## 2025-02-24 RX ORDER — BETAMETHASONE SODIUM PHOSPHATE AND BETAMETHASONE ACETATE 3; 3 MG/ML; MG/ML
6 INJECTION, SUSPENSION INTRA-ARTICULAR; INTRALESIONAL; INTRAMUSCULAR; SOFT TISSUE
Status: COMPLETED | OUTPATIENT
Start: 2025-02-24 | End: 2025-02-24

## 2025-02-24 RX ORDER — BUPIVACAINE HYDROCHLORIDE 2.5 MG/ML
2 INJECTION, SOLUTION INFILTRATION; PERINEURAL
Status: COMPLETED | OUTPATIENT
Start: 2025-02-24 | End: 2025-02-24

## 2025-02-24 RX ADMIN — BETAMETHASONE SODIUM PHOSPHATE AND BETAMETHASONE ACETATE 6 MG: 3; 3 INJECTION, SUSPENSION INTRA-ARTICULAR; INTRALESIONAL; INTRAMUSCULAR; SOFT TISSUE at 15:15

## 2025-02-24 RX ADMIN — BUPIVACAINE HYDROCHLORIDE 2 ML: 2.5 INJECTION, SOLUTION INFILTRATION; PERINEURAL at 15:15

## 2025-02-24 NOTE — PROGRESS NOTES
Assessment & Plan  Rotator cuff tendonitis, left  The patient has an examination consistent with subacromial impingement syndrome of the left shoulder.  I have discussed with the patient the pathophysiology of this diagnosis and reviewed how the examination correlates with this diagnosis.  Treatment options were discussed at length and after discussing these treatment options, the patient elected for and received a subacromial injection of corticosteroid (as described in the procedure note) with a prescription for referral to physical therapy.  We will reevaluate the patient in 6-8 weeks.  If the symptoms fail to improve with this treatment the patient would be indicated for further imaging in the form of an MRI scan of the shoulder.   Orders:    Ambulatory Referral to Orthopedic Surgery    Ambulatory Referral to Physical Therapy; Future    Large joint arthrocentesis: L subacromial bursa    Subacromial bursitis of left shoulder joint    Orders:    Ambulatory Referral to Physical Therapy; Future    Large joint arthrocentesis: L subacromial bursa      Subjective:   Patient ID: Ricarda Selby is a 64 y.o. female  *Patient is Tajik speaking,  was used for translation     HPI  The patient presents with a chief complaint of left shoulder pain.   The pain began 1 year(s) ago and worse of the past few months. Is not associated with an acute injury. The patient describes the pain as aching, dull, and sharp in intensity,  intermittent in timing, and localizes the pain to the  left lateral shoulder.  The pain is worse with movement and overuse and relieved by rest.  The pain is not associated with numbness and tingling.  The pain is not associated with constitutional symptoms. The patient is awoken at night by the pain.    The patient has had not any treatment.          The following portions of the patient's history were reviewed and updated as appropriate: allergies, current medications, past family history, past  "medical history, past social history, past surgical history and problem list.    Objective:  Ht 5' 3\" (1.6 m)   Wt 66.7 kg (147 lb)   BMI 26.04 kg/m²       Left Shoulder Exam     Tenderness   The patient is experiencing no tenderness.     Range of Motion   External rotation:  80   Forward flexion:  170   Internal rotation 0 degrees:  Lumbar     Muscle Strength   Abduction: 4/5   External rotation: 4/5     Tests   Morris test: positive  Impingement: positive    Other   Erythema: absent  Sensation: normal  Pulse: present             Physical Exam  Vitals reviewed.   Constitutional:       Appearance: She is well-developed.   Eyes:      Pupils: Pupils are equal, round, and reactive to light.   Pulmonary:      Effort: Pulmonary effort is normal.      Breath sounds: Normal breath sounds.   Abdominal:      General: Abdomen is flat. There is no distension.   Skin:     General: Skin is warm and dry.   Neurological:      Mental Status: She is alert and oriented to person, place, and time.   Psychiatric:         Behavior: Behavior normal.         Thought Content: Thought content normal.         Judgment: Judgment normal.         Large joint arthrocentesis: L subacromial bursa  [place] Protocol:  procedure performed by consultantConsent: Verbal consent obtained.  Consent given by: patient  Patient understanding: patient states understanding of the procedure being performed  Site marked: the operative site was marked  Patient identity confirmed: verbally with patient  Supporting Documentation  Indications: pain   Procedure Details  Location: shoulder - L subacromial bursa  Needle size: 22 G  Ultrasound guidance: no  Approach: lateral  Medications administered: 2 mL bupivacaine 0.25 %; 6 mg betamethasone acetate-betamethasone sodium phosphate 6 (3-3) mg/mL    Patient tolerance: patient tolerated the procedure well with no immediate complications  Dressing:  Sterile dressing applied           I have personally reviewed " pertinent films in PACS and my interpretation is as follows.    Left shoulder x-rays demonstrates no fracture or dislocation       Records Reviewed: PCP Dr. Suárez's office note from 2/11/25.    Scribe Attestation      I,:  Liya Palacios MA am acting as a scribe while in the presence of the attending physician.:       I,:  Pravin Hoyt MD personally performed the services described in this documentation    as scribed in my presence.:

## 2025-02-24 NOTE — PROGRESS NOTES
SW is following up on request to assist with transportation.     SW has reached out to pt 's  this date [phone] via  # [phone] as he had attempted to return SW call and Communication consent on file.   SW has had to leave another message.   SW also attempted is Home # [phone] but was unable to leave a message.    In addition, AUDRA has reached out to pt's dgt  Yasmincuatemargaret Erazomirna @ [phone] who is also on the Communication Consent.  Request is to assist with transportation .    SW  has send an out reach letter via [employer] and will remain available to assist as indicated.

## 2025-02-24 NOTE — LETTER
02/24/25    Estimado/a Inocente Oliveros un coordinador de la atención médica en [facility]  [address] [place] [address] [address]  [facility] [address] [phone]. Intentamos comunicarnos con usted por teléfono varias veces. Es importante que se comunique con nosotros al Dept: [phone] para que podamos ofrecerle ayuda con leia necesidades de atención médica.     Atentamente.         Karina Duval

## 2025-02-24 NOTE — ASSESSMENT & PLAN NOTE
Orders:    Ambulatory Referral to Physical Therapy; Future    Large joint arthrocentesis: L subacromial bursa

## 2025-02-24 NOTE — ASSESSMENT & PLAN NOTE
The patient has an examination consistent with subacromial impingement syndrome of the left shoulder.  I have discussed with the patient the pathophysiology of this diagnosis and reviewed how the examination correlates with this diagnosis.  Treatment options were discussed at length and after discussing these treatment options, the patient elected for and received a subacromial injection of corticosteroid (as described in the procedure note) with a prescription for referral to physical therapy.  We will reevaluate the patient in 6-8 weeks.  If the symptoms fail to improve with this treatment the patient would be indicated for further imaging in the form of an MRI scan of the shoulder.   Orders:    Ambulatory Referral to Orthopedic Surgery    Ambulatory Referral to Physical Therapy; Future    Large joint arthrocentesis: L subacromial bursa

## 2025-03-03 ENCOUNTER — PATIENT OUTREACH (OUTPATIENT)
Dept: INTERNAL MEDICINE CLINIC | Facility: CLINIC | Age: 65
End: 2025-03-03

## 2025-03-03 NOTE — PROGRESS NOTES
SW has again Reached out to pt via  ID # [id number] at her 's cell # [phone] and left a message as well as their Home # but could not leave a message.      In addition, SW has reached out to pt's dgt Kp Sanchez @ [phone] who is also on the Communication Consent but SW had to leave a message .    SW to remain available to assist should pt return SW call or letter.

## 2025-03-04 ENCOUNTER — PATIENT OUTREACH (OUTPATIENT)
Dept: CASE MANAGEMENT | Facility: HOSPITAL | Age: 65
End: 2025-03-04

## 2025-03-04 NOTE — PROGRESS NOTES
Outpatient Care Management Note:    Re:  chronic care management     Patient was identified for chronic care management program. Chart reviewed. Patient has history of type 2 diabetes, hyperlipidemia, hypertension, A-fib, osteoporosis, normocytic anemia, chronic pain of right knee. Patient has history of bariatric surgery. Patient last seen by PCP office on 2/11/25. Patient see for worsening left shoulder pain and referred to ortho and to obtain xray. Xray shows mild glenohumeral and acromioclavicular osteoarthritis. Seen by ortho on 2/24 and received subacromial corticosteroid injection and to follow up in 6-8 weeks. Note reviewed.     Reclast for osteoporosis.      attempting outreach for SDOH referral.    I called patient using [employer]  # [phone]. No answer and message left with my name, role and reason for call and requested a call back. Contact number left.

## 2025-03-07 ENCOUNTER — PATIENT OUTREACH (OUTPATIENT)
Dept: INTERNAL MEDICINE CLINIC | Facility: CLINIC | Age: 65
End: 2025-03-07

## 2025-03-07 NOTE — LETTER
Ricarda Selby  [address] [place] [id number]      Estimado Ms. Selby:    [facility] desea invitarlo a participar en nuestro programa de Administración de la atención a pacientes crónicos.   Admnistración de la atención a pacientes crónicos es un equipo que incluye a byrd proveedor, administrador de cuidados de enfermería, administrador de la atención de trabajadores sociales y otros miembros del consultorio de atención primaria. Susan equipo nikolas o establece recursos para ayudarle a manejar afecciones crónicas. [place] contribuye a reducir las complicaciones y mejora la calidad de chucho. Algunos de los servicios que se ofrecen incluyen ayudarle a lidiar con la enfermedad y con cualquier factor de estrés que pueda estar afectando la capacidad para manejar las afecciones crónicas.  Nuestro objetivo es asegurnos de que usted tenga las herramientas y la información necesarias para mallika decisiones relativas a la atención médica.  Lo acompañará un Administrador de atención médica, quien se comunicará con usted por teléfono y lo ayudará a crear y a lograr objetivos en lo que a byrd debby respecta.   Revise el folleto en el programa de Administración de la atención a pacientes crónicos. Si desea inscribirse en el programa o si desea obtener más información, comuníquese con el consultorio del Administrador de atención médica al [phone].   Los Administradores de atención médica están disponibles de lunes a viernes de 8 a. m. a 4:30 p. m.   Nos complace ayudarle a convertirse en trino persona más saludable.  Atentamente,      Byrd Equipo de atención  Programa de Administración de la atención a pacientes crónicos:  El programa de Administración de la atención a pacientes crónicos de [facility] es trino forma de asegurarnos de que está recibiendo la atención médica más completa posible. Contará con un equipo dedicado que incluyen a byrd Proveedor de atención primaria, personal de la oficina y  personal de administración de la atención, quienes trabajarán junto con [facility] para desarrollar un plan de atención médica que se adecúe a leia objetivos en cuanto a byrd debby.  La Administración de la atención a pacientes crónicos (CCM, por leia siglas en inglés) se define renetta los servicios a distancia que se brindan a los afiliados de Medicare que sufren múltiples (dos o más) afecciones crónicas significativas.  Las afecciones crónicas son problemas médicos continuos que deben ser tratados de manera efectiva en colaboración entre [facility] y byrd equipo de atención médica con el fin de mantener el mejor estado de debby posible.  Algunos ejemplos de afecciones crónicas incluyen, entre otros:  artritis; hipertensión arterial анна;   asma; cardiopatías;   enfermedad pulmonar obstructiva crónica (EPOC); obesidad;   depresión; osteoporosis;   diabetes.      ¿Qué es la Administración de la atención a pacientes crónicos?  Al participar de trino llamada mensual, byrd médico y byrd equipo de atención médica primaria supervisarán y brindarán atención completa para leia afecciones crónicas de forma analítica y personalizada.  Dicha llamada será un complemento a la atención recibida nieves leia visitas regulares al consultorio.  Las leyes federales actualmente le permiten a Medicare financiar la administración de la atención a pacientes crónicos.    ¿Cuáles son los beneficios de la Administración de la atención a pacientes crónicos?  Mejor acceso a byrd equipo de atención médica primaria.  Monitoreo riguroso de byrd medicación.  Plan de atención personalizado e integral para todas leia necesidades médicas.  Atención coordinada por byrd equipo de atención médica primaria y otros proveedores de la debby, incluida la atención que pueda recibir en otros lugares, renetta el hospital, otros centros de debby o byrd hogar.    ¿Qué debe saber antes de adherirse?  Conforme a byrd plan de cobertura actual, lisa tipo de atención personalizada puede requerir que abone  $8 o $9 (byrd monto de copago de Medicare) en byrd práctica de atención primaria cada mes que reciba administración de la atención a pacientes crónicos.  Susan servicio también está sujeto a byrd deducible de Medicare.  Asimismo, debe firmar un acuerdo para recibir susan tipo de administración de la atención a pacientes crónicos.  Puede retirarse de susan programa en cualquier momento.  [facility] nikolas trino atención integral, un servicio excelente, junto con la compasión y el cuidado personal que usted merece.  Tratar leia afecciones crónicas es trino parte importante del cuidado de byrd debby, a cualquier edad.

## 2025-03-07 NOTE — PROGRESS NOTES
Outpatient Care Management Note:     Re:  chronic care management      Patient was identified for chronic care management program. Chart reviewed. Patient has history of type 2 diabetes, hyperlipidemia, hypertension, A-fib, osteoporosis, normocytic anemia, chronic pain of right knee. Patient has history of bariatric surgery. Patient last seen by PCP office on 2/11/25. Patient see for worsening left shoulder pain and referred to ortho and to obtain xray. Xray shows mild glenohumeral and acromioclavicular osteoarthritis. Seen by ortho on 2/24 and received subacromial corticosteroid injection and to follow up in 6-8 weeks. Note reviewed.      Reclast for osteoporosis.       attempting outreach for SDOH referral.    I called patient using [place]  # [phone]. No answer and message left with my name, role and reason for call and requested a call back. Contact number left.     2nd attempt to reach and will send CCM letter via [place].     Please re-consult as needed.

## 2025-03-18 DIAGNOSIS — R73.03 PREDIABETES: ICD-10-CM

## 2025-03-19 RX ORDER — METFORMIN HYDROCHLORIDE 500 MG/1
500 TABLET, EXTENDED RELEASE ORAL
Qty: 180 TABLET | Refills: 1 | Status: SHIPPED | OUTPATIENT
Start: 2025-03-19

## 2025-03-24 ENCOUNTER — OFFICE VISIT (OUTPATIENT)
Dept: OBGYN CLINIC | Facility: MEDICAL CENTER | Age: 65
End: 2025-03-24
Payer: COMMERCIAL

## 2025-03-24 VITALS — BODY MASS INDEX: 26.05 KG/M2 | HEIGHT: 63 IN | WEIGHT: 147 LBS

## 2025-03-24 DIAGNOSIS — M25.561 CHRONIC PAIN OF RIGHT KNEE: Primary | ICD-10-CM

## 2025-03-24 DIAGNOSIS — M17.11 PRIMARY OSTEOARTHRITIS OF RIGHT KNEE: ICD-10-CM

## 2025-03-24 DIAGNOSIS — G89.29 CHRONIC PAIN OF RIGHT KNEE: Primary | ICD-10-CM

## 2025-03-24 PROCEDURE — 20610 DRAIN/INJ JOINT/BURSA W/O US: CPT | Performed by: EMERGENCY MEDICINE

## 2025-03-24 PROCEDURE — 99213 OFFICE O/P EST LOW 20 MIN: CPT | Performed by: EMERGENCY MEDICINE

## 2025-03-24 RX ORDER — ROPIVACAINE HYDROCHLORIDE 2 MG/ML
4 INJECTION, SOLUTION EPIDURAL; INFILTRATION; PERINEURAL
Status: COMPLETED | OUTPATIENT
Start: 2025-03-24 | End: 2025-03-24

## 2025-03-24 RX ORDER — TRIAMCINOLONE ACETONIDE 40 MG/ML
40 INJECTION, SUSPENSION INTRA-ARTICULAR; INTRAMUSCULAR
Status: COMPLETED | OUTPATIENT
Start: 2025-03-24 | End: 2025-03-24

## 2025-03-24 RX ORDER — OMEPRAZOLE 20 MG/1
1 CAPSULE, DELAYED RELEASE ORAL 2 TIMES DAILY
COMMUNITY
Start: 2025-01-11

## 2025-03-24 RX ADMIN — TRIAMCINOLONE ACETONIDE 40 MG: 40 INJECTION, SUSPENSION INTRA-ARTICULAR; INTRAMUSCULAR at 10:15

## 2025-03-24 RX ADMIN — ROPIVACAINE HYDROCHLORIDE 4 ML: 2 INJECTION, SOLUTION EPIDURAL; INFILTRATION; PERINEURAL at 10:15

## 2025-03-24 NOTE — PROGRESS NOTES
Assessment/Plan:    Diagnoses and all orders for this visit:    Chronic pain of right knee  -     Large joint arthrocentesis: R knee    Primary osteoarthritis of right knee  -     Large joint arthrocentesis: R knee    Other orders  -     omeprazole (PriLOSEC) 20 mg delayed release capsule; Take 1 capsule by mouth 2 (two) times a day    Repeat CSI provided today    No follow-ups on file.      Subjective:   Patient ID: Ricarda Selby is a 65 y.o. female.    Patient returns for chronic right knee pain and osteoarthritis status post CSI last evaluation 10/3/2024        Review of Systems    The following portions of the patient's chart were reviewed and updated as appropriate:   Allergy:  No Known Allergies    Medications:    Current Outpatient Medications:     acetaminophen (TYLENOL) 500 mg tablet, Take 1 tablet (500 mg total) by mouth every 4 (four) hours as needed for mild pain or moderate pain, Disp: 90 tablet, Rfl: 3    atorvastatin (LIPITOR) 20 mg tablet, Take 1 tablet (20 mg total) by mouth daily, Disp: 180 tablet, Rfl: 0    calcium citrate (Calcitrate) 950 (200 Ca) MG tablet, Take 1.5 tablets (1,425 mg total) by mouth daily, Disp: 90 tablet, Rfl: 3    Cholecalciferol (Vitamin D3) 50 MCG (2000 UT) TABS, Take 2 tablets (4,000 Units total) by mouth daily, Disp: 180 tablet, Rfl: 1    docusate sodium (Colace) 100 mg capsule, Take 1 capsule (100 mg total) by mouth daily as needed for constipation, Disp: 30 capsule, Rfl: 1    famotidine (PEPCID) 20 mg tablet, Take 1 tablet (20 mg total) by mouth 2 (two) times a day, Disp: 180 tablet, Rfl: 1    ferrous sulfate 324 (65 Fe) mg, Take 1 tablet (324 mg total) by mouth daily before breakfast, Disp: 180 tablet, Rfl: 0    gabapentin (NEURONTIN) 100 mg capsule, Take 3 capsules (300 mg total) by mouth 3 (three) times a day, Disp: 90 capsule, Rfl: 0    glucose blood (OneTouch Verio) test strip, Check blood sugars twice daily. Please substitute with appropriate alternative as  covered by patient's insurance. Dx: E11.65, Disp: 200 each, Rfl: 3    losartan-hydrochlorothiazide (HYZAAR) 100-25 MG per tablet, Take 1 tablet by mouth daily, Disp: 90 tablet, Rfl: 1    meloxicam (Mobic) 7.5 mg tablet, Take 1 tablet (7.5 mg total) by mouth daily, Disp: 30 tablet, Rfl: 2    metFORMIN (GLUCOPHAGE-XR) 500 mg 24 hr tablet, Take 1 tablet by mouth once daily with breakfast, Disp: 180 tablet, Rfl: 1    methocarbamol (ROBAXIN) 500 mg tablet, Take 1 tablet (500 mg total) by mouth 3 (three) times a day, Disp: 30 tablet, Rfl: 0    metoprolol succinate (TOPROL-XL) 25 mg 24 hr tablet, Take 1 tablet (25 mg total) by mouth 2 (two) times a day, Disp: 360 tablet, Rfl: 0    Multiple Vitamins-Minerals (ZINC PO), Take by mouth, Disp: , Rfl:     Omega-3 Fatty Acids (FISH OIL) 1,000 mg, Take 1,000 mg by mouth 2 (two) times a day, Disp: , Rfl:     omeprazole (PriLOSEC) 20 mg delayed release capsule, Take 1 capsule by mouth 2 (two) times a day, Disp: , Rfl:     OneTouch Delica Lancets 33G MISC, Check blood sugars twice daily. Please substitute with appropriate alternative as covered by patient's insurance. Dx: E11.65, Disp: 200 each, Rfl: 3    VITAMIN E PO, Take 1 tablet by mouth daily, Disp: , Rfl:     Current Facility-Administered Medications:     zoledronic acid (RECLAST) IV infusion (premix) 5 mg, 5 mg, Intravenous, Once,     Patient Active Problem List   Diagnosis    Gastric anastomotic stricture    Mixed hyperlipidemia    Primary hypertension    Postgastrectomy malabsorption    Ventricular pre-excitation syndrome    Vitamin D deficiency    Prediabetes    WPW syndrome    H/O bariatric surgery    Candidal skin infection    NSVT (nonsustained ventricular tachycardia) (HCC)    History of cardiac radiofrequency ablation (RFA)    Abnormal EKG    Osteoporosis    Hypercholesterolemia    Ascending aorta dilatation (HCC)    Paroxysmal A-fib (HCC)    Financial difficulties    Panniculitis    S/P panniculectomy    Grade I  "diastolic dysfunction    Type 2 diabetes mellitus without complication, without long-term current use of insulin (HCC)    Normocytic anemia    Nonhealing surgical wound, subsequent encounter    S/P cosmetic plastic surgery    Chronic pain of right knee    Pouchitis (HCC)    Rotator cuff tendonitis, left    Subacromial bursitis of left shoulder joint       Objective:  Ht 5' 3\" (1.6 m)   Wt 66.7 kg (147 lb)   BMI 26.04 kg/m²     Right Ankle Exam     Other   Erythema: absent             Physical Exam      Neurologic Exam    Large joint arthrocentesis: R knee  Universal Protocol:  Consent: Verbal consent obtained.  Risks and benefits: risks, benefits and alternatives were discussed  Consent given by: patient  Time out: Immediately prior to procedure a \"time out\" was called to verify the correct patient, procedure, equipment, support staff and site/side marked as required.  Timeout called at: 3/24/2025 10:13 AM.  Patient understanding: patient states understanding of the procedure being performed  Test results: test results available and properly labeled  Site marked: the operative site was marked  Patient identity confirmed: verbally with patient  Supporting Documentation  Indications: pain   Procedure Details  Location: knee - R knee  Preparation: Patient was prepped and draped in the usual sterile fashion  Needle size: 22 G  Ultrasound guidance: no  Approach: anterolateral  Medications administered: 40 mg triamcinolone acetonide 40 mg/mL; 4 mL ropivacaine 0.2 %    Patient tolerance: patient tolerated the procedure well with no immediate complications  Dressing:  Sterile dressing applied    No erythema of knee(s)        I have personally reviewed the written report of the pertinent studies.             Past Medical History:   Diagnosis Date    Anastomotic stricture of gastrojejunostomy     s/p gastric bypass    Anemia     iron deficiency resolved 11/14/14    Anxiety     resolved 6/7/17    Class 1 obesity due to " excess calories with serious comorbidity and body mass index (BMI) of 30.0 to 30.9 in adult 09/18/2020    Degenerative joint disease of ankle and foot, unspecified laterality     last assessed 5/17/13    Depression     last assessed 11/7/12    Diabetes mellitus (HCC)     type 2 uncomplicated, controlled - last assessed 1/31/14    Dysphagia     Gastric ulcer     History of melena     History of transfusion     Hyperlipidemia     Hypertension     Insomnia     Insomnia     Obesity     resolved 11/14/14    Overweight (BMI 25.0-29.9) 05/13/2021    Postgastrectomy malabsorption     Tachycardia     Ventricular pre-excitation syndrome     Vitamin D deficiency        Past Surgical History:   Procedure Laterality Date    CARDIAC CATHETERIZATION      post proc data:____ lesions successfully dilated    COLONOSCOPY      ESOPHAGOGASTRODUODENOSCOPY      GASTRIC BYPASS  02/08/2011    for morbid obesity     HYSTERECTOMY      @ age 35    OOPHORECTOMY Bilateral     @ age 35    GA EGD TRANSORAL BIOPSY SINGLE/MULTIPLE N/A 1/25/2017    Procedure: ESOPHAGOGASTRODUODENOSCOPY (EGD);  Surgeon: Washington Diaz MD;  Location: [facility];  Service: Bariatrics    GA EGD TRANSORAL BIOPSY SINGLE/MULTIPLE N/A 5/3/2017    Procedure: ESOPHAGOGASTRODUODENOSCOPY (EGD) with balloon dilatation;  Surgeon: Washington Diaz MD;  Location: [facility];  Service: Bariatrics    GA EGD TRANSORAL BIOPSY SINGLE/MULTIPLE N/A 8/16/2017    Procedure: ESOPHAGOGASTRODUODENOSCOPY (EGD) with balloon dilation;  Surgeon: Washington Diaz MD;  Location: [facility];  Service: Bariatrics    GA EXC EXCSV SKN ABD INFRAUMBILICAL PANNICULECTOMY N/A 3/30/2023    Procedure: JENNY WINSTON PANNICULECTOMY;  Surgeon: Xander Rivera MD;  Location:  MAIN OR;  Service: Plastics    GA EXCISION EXCESSIVE SKIN & SUBQ TISSUE ABDOMEN N/A 3/30/2023    Procedure: LIPO ABDOMINOPLASTY;  Surgeon: Xander Rivera MD;  Location:  MAIN OR;  Service: Plastics    REVISION OF SCAR ON TORSO N/A  8/27/2024    Procedure: REVISION OF ABDOMINAL VERTICAL AND HORIZONTAL SCAR WITH COMPLEX CLOSURE.;  Surgeon: Xander Rivera MD;  Location:  MAIN OR;  Service: Plastics    UPPER GASTROINTESTINAL ENDOSCOPY         Social History     Socioeconomic History    Marital status: /Civil Union     Spouse name: Not on file    Number of children: Not on file    Years of education: Not on file    Highest education level: Not on file   Occupational History    Not on file   Tobacco Use    Smoking status: Never     Passive exposure: Never    Smokeless tobacco: Never   Vaping Use    Vaping status: Never Used   Substance and Sexual Activity    Alcohol use: Never    Drug use: Never    Sexual activity: Not Currently   Other Topics Concern    Not on file   Social History Narrative    Not on file     Social Drivers of Health     Financial Resource Strain: Low Risk  (2/11/2025)    Overall Financial Resource Strain (CARDIA)     Difficulty of Paying Living Expenses: Not hard at all   Food Insecurity: No Food Insecurity (2/11/2025)    Hunger Vital Sign     Worried About Running Out of Food in the Last Year: Never true     Ran Out of Food in the Last Year: Never true   Transportation Needs: Unmet Transportation Needs (2/11/2025)    PRAPARE - Transportation     Lack of Transportation (Medical): Yes     Lack of Transportation (Non-Medical): Yes   Physical Activity: Inactive (2/8/2022)    Exercise Vital Sign     Days of Exercise per Week: 0 days     Minutes of Exercise per Session: 0 min   Stress: No Stress Concern Present (3/11/2020)    [employer] - Occupational Stress Questionnaire     Feeling of Stress : Not at all   Social Connections: Unknown (6/18/2024)    Received from [employer]    Social Connections     How often do you feel lonely or isolated from those around you? (Adult - for ages 18 years and over): Not on file   Intimate Partner Violence: Not on file   Housing Stability: Low Risk  (2/11/2025)     Housing Stability Vital Sign     Unable to Pay for Housing in the Last Year: No     Number of Times Moved in the Last Year: 1     Homeless in the Last Year: No       Family History   Problem Relation Age of Onset    Congenital heart disease Mother     Asthma Father     Heart attack Sister         acute MI    Congenital heart disease Sister     Hypertension Sister     No Known Problems Maternal Grandmother     No Known Problems Maternal Grandfather     No Known Problems Paternal Grandmother     No Known Problems Paternal Grandfather     No Known Problems Maternal Aunt     No Known Problems Maternal Aunt     No Known Problems Maternal Aunt     No Known Problems Maternal Aunt     No Known Problems Maternal Aunt     No Known Problems Maternal Aunt     Breast cancer Cousin         ? age

## 2025-03-26 DIAGNOSIS — E78.2 MIXED HYPERLIPIDEMIA: ICD-10-CM

## 2025-03-26 DIAGNOSIS — I10 PRIMARY HYPERTENSION: ICD-10-CM

## 2025-03-27 RX ORDER — LOSARTAN POTASSIUM AND HYDROCHLOROTHIAZIDE 25; 100 MG/1; MG/1
1 TABLET ORAL DAILY
Qty: 90 TABLET | Refills: 1 | Status: SHIPPED | OUTPATIENT
Start: 2025-03-27

## 2025-03-27 RX ORDER — ATORVASTATIN CALCIUM 20 MG/1
20 TABLET, FILM COATED ORAL DAILY
Qty: 180 TABLET | Refills: 0 | Status: SHIPPED | OUTPATIENT
Start: 2025-03-27

## 2025-04-23 ENCOUNTER — OFFICE VISIT (OUTPATIENT)
Dept: PLASTIC SURGERY | Facility: CLINIC | Age: 65
End: 2025-04-23
Payer: COMMERCIAL

## 2025-04-23 VITALS
TEMPERATURE: 97.6 F | SYSTOLIC BLOOD PRESSURE: 129 MMHG | HEART RATE: 78 BPM | WEIGHT: 144 LBS | HEIGHT: 63 IN | BODY MASS INDEX: 25.52 KG/M2 | DIASTOLIC BLOOD PRESSURE: 90 MMHG

## 2025-04-23 DIAGNOSIS — R10.9 ABDOMINAL CRAMPING: Primary | ICD-10-CM

## 2025-04-23 PROCEDURE — 99214 OFFICE O/P EST MOD 30 MIN: CPT | Performed by: STUDENT IN AN ORGANIZED HEALTH CARE EDUCATION/TRAINING PROGRAM

## 2025-04-23 RX ORDER — METHOCARBAMOL 500 MG/1
500 TABLET, FILM COATED ORAL 3 TIMES DAILY PRN
Qty: 60 TABLET | Refills: 0 | Status: SHIPPED | OUTPATIENT
Start: 2025-04-23

## 2025-04-23 NOTE — PROGRESS NOTES
"PRS Note    Patient is over 2 years s/p [id number] panniculectomy with abdominoplasty.    She had revision of symptomatic scars in August 2024.    She continues to have cramping abdominal pain diffusely across her abdomen, particularly on the right side. She states that it is intermittent and begins spontaneously. She describes a \"firmness\" at times of her abdomen, which I would attribute to muscle cramping or spasms. This is not in the area of her plication which would be more understandable; however, even the plication was performed with absorbable sutures at the midline.     Abdomen feels soft with good contour. Well healed scars. No abdominal firmness or tightness of the muscles on exam.     I instructed her on massage and prescribed robaxin TID PRN for muscle spasms.    Instructed to follow-up in 2 weeks, if needed.    -Spent 30 minutes in consultation with patient. Greater than 50% of the total time was spent obtaining history, evaluation, performing exam, discussion of management options including post-operative care, answering patient's questions and concerns, chart reviewing, and documentation    Xander Rivera MD   [facility]   [address] [place] [address], [address]   [place] [address]   Office: [phone]    "

## 2025-04-30 ENCOUNTER — TELEPHONE (OUTPATIENT)
Dept: INTERNAL MEDICINE CLINIC | Facility: CLINIC | Age: 65
End: 2025-04-30

## 2025-04-30 NOTE — TELEPHONE ENCOUNTER
Voicemail received - patient wanted a returned call - no other message left       Returned call - lvm

## 2025-05-06 ENCOUNTER — OFFICE VISIT (OUTPATIENT)
Dept: INTERNAL MEDICINE CLINIC | Facility: CLINIC | Age: 65
End: 2025-05-06

## 2025-05-06 VITALS
OXYGEN SATURATION: 98 % | DIASTOLIC BLOOD PRESSURE: 74 MMHG | SYSTOLIC BLOOD PRESSURE: 108 MMHG | BODY MASS INDEX: 25.97 KG/M2 | WEIGHT: 146.6 LBS | TEMPERATURE: 97.3 F | HEART RATE: 82 BPM

## 2025-05-06 DIAGNOSIS — Z98.890 S/P PANNICULECTOMY: ICD-10-CM

## 2025-05-06 DIAGNOSIS — E11.9 TYPE 2 DIABETES MELLITUS WITHOUT COMPLICATION, WITHOUT LONG-TERM CURRENT USE OF INSULIN (HCC): ICD-10-CM

## 2025-05-06 DIAGNOSIS — R10.9 ABDOMINAL CRAMPING: Primary | ICD-10-CM

## 2025-05-06 LAB — SL AMB POCT HEMOGLOBIN AIC: 7 (ref ?–6.5)

## 2025-05-06 RX ORDER — GABAPENTIN 400 MG/1
400 CAPSULE ORAL 3 TIMES DAILY
Qty: 90 CAPSULE | Refills: 2 | Status: SHIPPED | OUTPATIENT
Start: 2025-05-06 | End: 2025-08-04

## 2025-05-06 NOTE — PROGRESS NOTES
Name: Ricarda Selby      : 1960      MRN: [id number]  Encounter Provider: Karthikeyan Walton MD  Encounter Date: 2025   Encounter department: [facility] [place]  :  Assessment & Plan  Abdominal cramping  -Patient presenting with concerns for abdominal cramping.  -Per plastics, patient is 2+ years S/P [id number] panniculoectomy with abdominoplasty, and revision of symptomatic scars in 2024.  -Saw plastics on  with chronic cramping abdominal pain, prescribed Robaxin TID for muscle spasms.  -Patient says she had not had much improvement with Robaxin.  -Describes pain as burning, currently taking Gabapentin 300 mg TID.     Plan:  -On my exam, consider muscular spasm in the setting of scarring vs neuropathic component given patient's reported burning sensation.   -Would continue Robaxin 500 mg TID as prescribed by plastics.  -Would increase Gabapentin to 400 mg TID given patient reports burning sensation.  -Discussed with patient that given chronic nature of pain, she will require incremental management.   -Recommend physical therapy, referral placed.  -Recommend following up with plastic surgery for further management of symptoms.   -Follow up in 4 weeks.    Orders:    gabapentin (NEURONTIN) 400 mg capsule; Take 1 capsule (400 mg total) by mouth 3 (three) times a day    Ambulatory Referral to Physical Therapy; Future    Type 2 diabetes mellitus without complication, without long-term current use of insulin ([facility])    Lab Results   Component Value Date    HGBA1C 7.0 (A) 2025     -Noted, well controlled at this time.  -Follow up at future appointment for follow up of chronic diabetes.    Orders:    POCT hemoglobin A1c    S/P panniculectomy    Orders:    gabapentin (NEURONTIN) 400 mg capsule; Take 1 capsule (400 mg total) by mouth 3 (three) times a day    Ambulatory Referral to Physical Therapy; Future           History of Present Illness     Patient is a 64 y/o  female with history including PAF, HTN, T2DM who presents for same day appointment for abdominal cramping. She was recently seen by plastic surgery, who notes she is 2+ years S/P [id number] panniculoectomy with abdominoplasty, and revision of symptomatic scars in August 2024. At her prior visit, she was instructed to perform massage and prescribed Robaxin TID. This visit, she notes that the Robaxin has not helped much. She describes the abdominal pain as burning, constantly present for the last 2 years. Nothing particularly makes it better or worse.    Review of Systems   Constitutional:  Negative for chills and fever.   HENT:  Negative for ear pain and sore throat.    Eyes:  Negative for pain and visual disturbance.   Respiratory:  Negative for cough and shortness of breath.    Cardiovascular:  Negative for chest pain and palpitations.   Gastrointestinal:  Positive for abdominal pain. Negative for vomiting.   Genitourinary:  Negative for dysuria and hematuria.   Musculoskeletal:  Negative for arthralgias and back pain.   Skin:  Negative for color change and rash.   Neurological:  Negative for seizures and syncope.   Psychiatric/Behavioral:  The patient is not nervous/anxious.    All other systems reviewed and are negative.      Objective   /74 (BP Location: Left arm, Patient Position: Sitting, Cuff Size: Large)   Pulse 82   Temp (!) 97.3 °F (36.3 °C) (Temporal)   Wt 66.5 kg (146 lb 9.6 oz)   SpO2 98%   BMI 25.97 kg/m²      Physical Exam  Vitals and nursing note reviewed.   Constitutional:       General: She is not in acute distress.     Appearance: She is well-developed.   HENT:      Head: Normocephalic and atraumatic.      Right Ear: External ear normal.      Left Ear: External ear normal.      Nose: Nose normal.      Mouth/Throat:      Mouth: Mucous membranes are moist.   Eyes:      Conjunctiva/sclera: Conjunctivae normal.   Cardiovascular:      Rate and Rhythm: Normal rate and regular rhythm.       Heart sounds: No murmur heard.  Pulmonary:      Effort: Pulmonary effort is normal. No respiratory distress.      Breath sounds: Normal breath sounds.   Abdominal:      General: Abdomen is flat.      Comments: Surgical scars noted, nontender to palpation.   Musculoskeletal:         General: No swelling.      Cervical back: Neck supple.   Skin:     General: Skin is warm and dry.      Capillary Refill: Capillary refill takes less than 2 seconds.   Neurological:      General: No focal deficit present.      Mental Status: She is alert and oriented to person, place, and time.   Psychiatric:         Mood and Affect: Mood normal.

## 2025-05-07 NOTE — ASSESSMENT & PLAN NOTE
Lab Results   Component Value Date    HGBA1C 7.0 (A) 05/06/2025     -Noted, well controlled at this time.  -Follow up at future appointment for follow up of chronic diabetes.    Orders:    POCT hemoglobin A1c

## 2025-05-07 NOTE — ASSESSMENT & PLAN NOTE
Orders:    gabapentin (NEURONTIN) 400 mg capsule; Take 1 capsule (400 mg total) by mouth 3 (three) times a day    Ambulatory Referral to Physical Therapy; Future

## 2025-05-13 ENCOUNTER — EVALUATION (OUTPATIENT)
Dept: PHYSICAL THERAPY | Facility: REHABILITATION | Age: 65
End: 2025-05-13
Payer: COMMERCIAL

## 2025-05-13 DIAGNOSIS — Z98.890 S/P PANNICULECTOMY: Primary | ICD-10-CM

## 2025-05-13 DIAGNOSIS — R10.84 GENERALIZED ABDOMINAL PAIN: ICD-10-CM

## 2025-05-13 DIAGNOSIS — R10.9 ABDOMINAL CRAMPING: ICD-10-CM

## 2025-05-13 PROCEDURE — 97161 PT EVAL LOW COMPLEX 20 MIN: CPT

## 2025-05-13 PROCEDURE — 97112 NEUROMUSCULAR REEDUCATION: CPT

## 2025-05-13 PROCEDURE — 97110 THERAPEUTIC EXERCISES: CPT

## 2025-05-13 NOTE — PROGRESS NOTES
PT Evaluation     Today's date: 2025  Patient name: Ricarda Selby  : 1960  MRN: [id number]  Referring provider: Casey Josue MD  Dx:   Encounter Diagnosis     ICD-10-CM    1. S/P panniculectomy  Z98.890 Ambulatory Referral to Physical Therapy      2. Abdominal cramping  R10.9 Ambulatory Referral to Physical Therapy      3. Generalized abdominal pain  R10.84           Start Time: 1745  Stop Time:   Total time in clinic (min): 60 minutes    Assessment  Impairments: abnormal gait, abnormal muscle firing, abnormal or restricted ROM, abnormal movement, activity intolerance, impaired physical strength, lacks appropriate home exercise program, pain with function, poor posture , poor body mechanics, unable to perform ADL, participation limitations, activity limitations and endurance  Symptom irritability: low    Assessment details: Ricarda Selby is a 65 y.o. female referred to physical therapy for abdominal cramping and s/p panniculectomy. Primary impairments include increased pain with functional activities, decreased core strength, thoracic and lumbar AROM dysfunction, core/paraspinal motor control dysfunction, and decreased lifting tolerance which is limiting her ability to perform ADLs and recreational activities without pain or functional restrictions. Pt responded well to core stability exercises as she noted slight relief in symptoms following session.     Pt was provided with a basic HEP which will be reviewed in the upcoming session. Pt was educated on anatomy and physiology of diagnosis and demonstrated verbal understanding. Pt would benefit from skilled PT interventions to increase functional core/LE strength, increase pain free ROM, and facilitate return to recreational activities and ADL management/independence with less limitations and pain. 1:1 with Brendon Florentino DPT entirety of tx.  Barriers to therapy: [place] speaking only  Understanding of Dx/Px/POC: excellent     Prognosis:  good    Goals  Short term goals:   STGs to be met in 3-4 weeks:  1.  Increase BLE and core/paraspinal strength by half grade or more to increase functional strength.  2.  Decrease subjective report of pain at worst by 25% or more to improve QoL.   3.  Improve ROM by 25% or more with little to no pain at end ranges.   4.  Independent with basic HEP.     Long term goals:  LTG's to be met by DC:  1.  Ambulate community distances with little to no pain or difficulty.  2.  Lift >/=20 lbs with minimal to no pain or discomfort at abdomen.   3.  Perform recreational activities / ADLs with little pain or difficulty.  4.  Increase strength to 4+/5 or better in BLEs and core/paraspinal musculature.   5.  Independent with advanced HEP.   6.  Increase FOTO to predicted value by DC.     Plan  Patient would benefit from: skilled physical therapy and PT eval  Referral necessary: No    Planned therapy interventions: abdominal trunk stabilization, balance/weight bearing training, body mechanics training, functional ROM exercises, home exercise program, gait training, joint mobilization, manual therapy, massage, neuromuscular re-education, patient education, postural training, strengthening, therapeutic activities, therapeutic exercise, self care, graded motor, graded exercise, graded activity, IASTM and stretching    Frequency: 1x week  Duration in weeks: 8  Plan of Care beginning date: 5/13/2025  Plan of Care expiration date: 7/8/2025  Treatment plan discussed with: patient      Subjective  HPI: Pt referred to physical therapy for abdominal cramping and s/p panniculectomy. Pt mentioned that she has a history of gastric bypass in 2011 as she was 270lb and now weighs 145lb. She notes no significant fluctuations in weight and no radiating symptoms or referred pain.  Pt mentioned that she had tried some massage therapy in the past and this has helped but was unable to continue due to the high cost. She mentioned that she has been having  abdominal cramps since her surgery in 2023/2024 that bothers her most with exerting force and lifting things. She mentioned that the new medication that she has been using has been helping with her symptoms.   Pain Location: Abdomen  Pain Intensity: Current: 6/10, Worst: 10/10, Best: 5/10  CONSTANZA: Revision of abdominal scar in 8/2024  DOI: 8/2024  Aggravating Factors: Sitting, standing, walking, lifting things  Alleviating Factors: Rest  Goals: To have less pain in her abdomen   PLOF: Sedentary    Objective  Vitals: BP- 118/86      Postural Findings:     Head Position  Protracted  X Neutral   Retracted   Scapular Position  Protracted  X Neutral   Retracted   Thoracic Spine   Inc Kyphosis X Neutral       Lumbar Spine   Inc Lordosis  X Neutral  Dec Lordosis   Pelvis   Anterior Tilt X Neutral   Posterior Tilt   Iliac Crest   L elevated X Neutral   R elevated   Feet   Pronated X Neutral   Supinated   Lateral Shift   Right   Left X None      Strength and ROM evaluated B from a regional biomechanical perspective and values relevant to this episode recorded in tables below.     ROM:   Joint / Motion  Right: 5/13/2025  Left: 5/13/2025    Thoracic Flex/Ext 25% limited    Thoracic Rot 25% limited    Lumbar Flexion  WFL     Lumbar Extension  25% limited (p!)     Lumbar Sidebending  WFL WFL   Lumbar rotation WFL WFL         Strength: MMT revealed the following findings.  Joint Motion Right: 5/13/2025 Left: 5/13/2025   Hip Flexion 4/5 4/5   Hip Abduction 4/5 4/5   Hip Adduction 4/5 4/5   Hip Extension 4/5 4/5   Knee Extension 4/5 4/5   Knee Flexion 4/5 4/5   Ankle Plantarflexion 4/5 4/5   Ankle Dorsiflexion 4/5 4/5   Core Strength 3+/5 3+/5      Light Touch Sensation:  RLE - 5/5  LLE - 5/5    Deep Tendon Reflexes:  Patellar Reflex - normal  Ankle Reflex - normal    Additional Assessments:  Pain with palpation noted: TTP along BL lower quadrants of abdomen  Gait Assessment: WFL  Flowsheet Rows      Flowsheet Row Most Recent Value    PT/OT G-Codes    Current Score 26   Projected Score 40               Precautions: PMH includes hx of gastric bypass, s/p panniculectomy, obesity, depression, DM, dysphagia, HTN, insomnia, tachycardia, Revision of abdominal scar in 8/2024      POC expires Unit limit Auth Expiration date PT/OT + Visit Limit?   7/8/25 BOMN TBD BOMN         Visit/Unit Tracking  AUTH Status:  Date 5/13        TBD Used 1         Remaining             Pertinent Findings:      POC End Date: 7/8/25                                                                                          Test / Measure  5/13/2025   FOTO (Predicted 40) 26   Thoracic/Lumbar ROM Limited ext due to slight soreness   Core Strength  3+/5     Access Code: [email or web address]  URL: [email or web address]  Date: 05/13/2025  Prepared by: Brendon Florentino    Exercises  - Supine Transversus Abdominis Bracing - Hands on Stomach  - 1 x daily - 7 x weekly - 2 sets - 10 reps - 5 seconds hold  - Supine March  - 1 x daily - 7 x weekly - 3 sets - 10 reps  - Supine Transversus Abdominis Bracing with Leg Extension  - 1 x daily - 7 x weekly - 3 sets - 6 reps  - Supine Bridge  - 1 x daily - 7 x weekly - 3 sets - 10 reps  - Supine Lower Trunk Rotation  - 1 x daily - 7 x weekly - 3 sets - 10 reps  - Cobra  - 1 x daily - 7 x weekly - 1 sets - 10 reps - 10 segundos hold    Visit Number: 1            Manuals 5/13            Scar Tissue Massage Abdomen                                        Neuro Re-Ed             Supine TA Recruitment 5x, 10s hold            TA Recruitment + Bridge 10x            Supine Tball Press             Modified Dead Bug             Tball Press             Tball Press + Hip flex/abd             Quadruped Rocking             Bird Dog                          Ther Ex             HEP Review + Pt Edu 10 min            NuStep             LTRs 10x            Cobra Stretch 10x            STS             Palloff Press ABCs             Standing Tband/[place] Rotation              NICK DL             Leg Press             Emily Rope Crunch                                                    Ther Activity                                       Gait Training                                       Modalities

## 2025-05-20 ENCOUNTER — OFFICE VISIT (OUTPATIENT)
Dept: PHYSICAL THERAPY | Facility: REHABILITATION | Age: 65
End: 2025-05-20
Payer: COMMERCIAL

## 2025-05-20 DIAGNOSIS — Z98.890 S/P PANNICULECTOMY: ICD-10-CM

## 2025-05-20 DIAGNOSIS — R10.9 ABDOMINAL CRAMPING: Primary | ICD-10-CM

## 2025-05-20 DIAGNOSIS — R10.84 GENERALIZED ABDOMINAL PAIN: ICD-10-CM

## 2025-05-20 PROCEDURE — 97112 NEUROMUSCULAR REEDUCATION: CPT

## 2025-05-20 PROCEDURE — 97110 THERAPEUTIC EXERCISES: CPT

## 2025-05-20 NOTE — PROGRESS NOTES
Daily Note     Today's date: 2025  Patient name: Ricarda Selby  : 1960  MRN: [id number]  Referring provider: Casey Josue MD  Dx:   Encounter Diagnosis     ICD-10-CM    1. Abdominal cramping  R10.9       2. S/P panniculectomy  Z98.890       3. Generalized abdominal pain  R10.84           Start Time:   Stop Time: 1850  Total time in clinic (min): 38 minutes    Subjective: Pt notes that the combination of her exercises and medications has been helping with her abdominal pain and cramping.       Objective: See treatment diary below      Assessment: Tolerated treatment well. Patient would benefit from continued PT. Pt was progressed with further functional core strength/endurance exercises and responded well. She shows improving TA recruitment during bedlevel exercises. She responded well to Vcs for exercises technique and reminders for volume and showed improving movement quality following. Assess response to tx session next visit. 1:1 with Brendon Florentino DPT entirety of tx.      Plan: Continue per plan of care.      Precautions: PMH includes hx of gastric bypass, s/p panniculectomy, obesity, depression, DM, dysphagia, HTN, insomnia, tachycardia, Revision of abdominal scar in 2024      POC expires Unit limit Auth Expiration date PT/OT + Visit Limit?   25 BOMN 25 BOMN         Visit/Unit Tracking  AUTH Status:  Date        Approved Used 1 2        Remaining             Pertinent Findings:      POC End Date: 25                                                                                          Test / Measure  2025   FOTO (Predicted 40) 26   Thoracic/Lumbar ROM Limited ext due to slight soreness   Core Strength  3+/5     Access Code: [email or web address]  URL: [email or web address]  Date: 2025  Prepared by: Brendon Florentino    Exercises  - Supine Transversus Abdominis Bracing - Hands on Stomach  - 1 x daily - 7 x weekly - 2 sets - 10 reps - 5 seconds hold  - Supine  March  - 1 x daily - 7 x weekly - 3 sets - 10 reps  - Supine Transversus Abdominis Bracing with Leg Extension  - 1 x daily - 7 x weekly - 3 sets - 6 reps  - Supine Bridge  - 1 x daily - 7 x weekly - 3 sets - 10 reps  - Supine Lower Trunk Rotation  - 1 x daily - 7 x weekly - 3 sets - 10 reps  - Cobra  - 1 x daily - 7 x weekly - 1 sets - 10 reps - 10 segundos hold    Visit Number: 1 2           Manuals 5/13 5/20           Scar Tissue Massage Abdomen                                        Neuro Re-Ed             Supine TA Recruitment 5x, 10s hold 5x, 10s hold           TA Recruitment + Bridge 10x 7.5#, 2x10           Supine Tball Press  15x, 5s hold           Modified Dead Bug  Marching w/ ball, 15x            Tball Press + Hip flex/abd  10x ea           Quadruped Rocking             Bird Dog                          Ther Ex             HEP Review + Pt Edu 10 min            NuStep  7', lvl 5           LTRs 10x 20x, 20x w/ ball           Cobra Stretch 10x 10x           STS w/ press  3x7, 8# DB           Palloff Press Circles  20x ea, 5#           Standing Tband/Emily Rotation  5#, 2x10           KB DL             Leg Press             Emily Rope Crunch                                                    Ther Activity                                       Gait Training                                       Modalities

## 2025-05-27 ENCOUNTER — APPOINTMENT (OUTPATIENT)
Dept: PHYSICAL THERAPY | Facility: REHABILITATION | Age: 65
End: 2025-05-27
Payer: COMMERCIAL

## 2025-05-29 ENCOUNTER — RA CDI HCC (OUTPATIENT)
Dept: OTHER | Facility: HOSPITAL | Age: 65
End: 2025-05-29

## 2025-06-02 DIAGNOSIS — K21.9 GASTROESOPHAGEAL REFLUX DISEASE WITHOUT ESOPHAGITIS: ICD-10-CM

## 2025-06-02 DIAGNOSIS — I10 ESSENTIAL HYPERTENSION: ICD-10-CM

## 2025-06-02 DIAGNOSIS — Z98.84 BARIATRIC SURGERY STATUS: ICD-10-CM

## 2025-06-02 RX ORDER — METOPROLOL SUCCINATE 25 MG/1
25 TABLET, EXTENDED RELEASE ORAL 2 TIMES DAILY
Qty: 360 TABLET | Refills: 0 | Status: SHIPPED | OUTPATIENT
Start: 2025-06-02

## 2025-06-02 RX ORDER — FAMOTIDINE 20 MG/1
20 TABLET, FILM COATED ORAL 2 TIMES DAILY
Qty: 180 TABLET | Refills: 1 | Status: SHIPPED | OUTPATIENT
Start: 2025-06-02

## 2025-06-03 ENCOUNTER — OFFICE VISIT (OUTPATIENT)
Dept: PHYSICAL THERAPY | Facility: REHABILITATION | Age: 65
End: 2025-06-03
Payer: COMMERCIAL

## 2025-06-03 DIAGNOSIS — R10.84 GENERALIZED ABDOMINAL PAIN: ICD-10-CM

## 2025-06-03 DIAGNOSIS — Z98.890 S/P PANNICULECTOMY: ICD-10-CM

## 2025-06-03 DIAGNOSIS — R10.9 ABDOMINAL CRAMPING: Primary | ICD-10-CM

## 2025-06-03 PROCEDURE — 97112 NEUROMUSCULAR REEDUCATION: CPT

## 2025-06-03 PROCEDURE — 97110 THERAPEUTIC EXERCISES: CPT

## 2025-06-03 NOTE — PROGRESS NOTES
Daily Note     Today's date: 6/3/2025  Patient name: Ricarda Selby  : 1960  MRN: [id number]  Referring provider: Casey Josue MD  Dx:   Encounter Diagnosis     ICD-10-CM    1. Abdominal cramping  R10.9       2. S/P panniculectomy  Z98.890       3. Generalized abdominal pain  R10.84           Start Time:   Stop Time: 1900  Total time in clinic (min): 45 minutes    Subjective: Pt notes that her stomach has been feeling better as her muscles feel they are getting stronger. She was not excessively sore after last tx session.       Objective: See treatment diary below      Assessment: Tolerated treatment well. Patient would benefit from continued PT. Pt was progressed with dead bug with legs elevated and was challenged due to core weakness. She was introduced to suitcase carries to improve dynamic core strength and paraspinal strength and responded well. She continues to benefit from moderate level cues for exercise technique and counting. Continue to progress as tolerated, 1:1 with Brendon Florentino DPT entirety of tx.      Plan: Continue per plan of care.      Precautions: PMH includes hx of gastric bypass, s/p panniculectomy, obesity, depression, DM, dysphagia, HTN, insomnia, tachycardia, Revision of abdominal scar in 2024      POC expires Unit limit Auth Expiration date PT/OT + Visit Limit?   25 BOMN 25 BOMN         Visit/Unit Tracking  AUTH Status:  Date  6/3      Approved Used 1 2 3       Remaining             Pertinent Findings:      POC End Date: 25                                                                                          Test / Measure  2025   FOTO (Predicted 40) 26   Thoracic/Lumbar ROM Limited ext due to slight soreness   Core Strength  3+/5     Access Code: [email or web address]  URL: [email or web address]  Date: 2025  Prepared by: Brendon Florentino    Exercises  - Supine Transversus Abdominis Bracing - Hands on Stomach  - 1 x daily - 7 x weekly - 2  sets - 10 reps - 5 seconds hold  - Supine March  - 1 x daily - 7 x weekly - 3 sets - 10 reps  - Supine Transversus Abdominis Bracing with Leg Extension  - 1 x daily - 7 x weekly - 3 sets - 6 reps  - Supine Bridge  - 1 x daily - 7 x weekly - 3 sets - 10 reps  - Supine Lower Trunk Rotation  - 1 x daily - 7 x weekly - 3 sets - 10 reps  - Cobra  - 1 x daily - 7 x weekly - 1 sets - 10 reps - 10 segundos hold    Visit Number: 1 2 3          Manuals 5/13 5/20 6/3          Scar Tissue Massage Abdomen                                        Neuro Re-Ed             Supine TA Recruitment 5x, 10s hold 5x, 10s hold 5x, 10s hold          TA Recruitment + Bridge 10x 7.5#, 2x10 7.5#, 2x10          Supine Tball Press  15x, 5s hold 15x, 5s hold          Modified Dead Bug  Marching w/ ball, 15x  Marching w/ ball, 15x           Tball Press + Hip flex/abd  10x ea 10x ea          Quadruped Rocking             Bird Dog                          Ther Ex             HEP Review + Pt Edu 10 min            NuStep  7', lvl 5 7', lvl 5          LTRs 10x 20x, 20x w/ ball 20x, 20x w/ ball          Cobra Stretch 10x 10x 10x          STS w/ press  3x7, 8# DB 3x7, 8# DB          Palloff Press Circles  20x ea, 5# 20x ea, 5#          Standing Tband/Emily Rotation  5#, 2x10 8#, 2x10          Suitcase Carries   20#, 3 laps 50 ft          KB DL             Leg Press             Orient Rope Crunch                                                    Ther Activity                                       Gait Training                                       Modalities

## 2025-06-06 ENCOUNTER — TELEPHONE (OUTPATIENT)
Dept: INTERNAL MEDICINE CLINIC | Facility: CLINIC | Age: 65
End: 2025-06-06

## 2025-06-10 ENCOUNTER — APPOINTMENT (OUTPATIENT)
Dept: PHYSICAL THERAPY | Facility: REHABILITATION | Age: 65
End: 2025-06-10
Payer: COMMERCIAL

## 2025-06-17 ENCOUNTER — APPOINTMENT (OUTPATIENT)
Dept: PHYSICAL THERAPY | Facility: REHABILITATION | Age: 65
End: 2025-06-17
Payer: COMMERCIAL

## 2025-06-19 NOTE — TELEPHONE ENCOUNTER
SPOKE WITH PATIENT TO MAKE HER AWARE THAT SHE HAVE AN APPT WITH [facility] ON 2/15/2019 AND THERE'S BLOOD WORK TO BE COMPLETED  PATIENT SAID THAT SHE'S COMING TOMORROW MORNING TO HAVE IT COMPLETED BECAUSE SHE DOESN'T HAVE SOME ONE WHO CAN TAKE IT TO THE LAB  PATIENT  SAID HE CAN BRING HER TOMORROW FOR LAB AND HER APPT  What Type Of Note Output Would You Prefer (Optional)?: Standard Output How Severe Are Your Spot(S)?: mild Have Your Spot(S) Been Treated In The Past?: has not been treated Hpi Title: Evaluation of Skin Lesions

## 2025-07-15 ENCOUNTER — TELEPHONE (OUTPATIENT)
Dept: INTERNAL MEDICINE CLINIC | Facility: CLINIC | Age: 65
End: 2025-07-15

## (undated) DEVICE — INTENDED FOR TISSUE SEPARATION, AND OTHER PROCEDURES THAT REQUIRE A SHARP SURGICAL BLADE TO PUNCTURE OR CUT.: Brand: BARD-PARKER ® SAFETYLOCK CARBON RIB-BACK BLADES

## (undated) DEVICE — NEEDLE 25G X 1 1/2

## (undated) DEVICE — SUT VICRYL 0 CT-1 27 IN J340H

## (undated) DEVICE — ESOPHAGEAL/PYLORIC/COLONIC/BILIARY WIREGUIDED BALLOON DILATATION CATHETER: Brand: CRE™ PRO

## (undated) DEVICE — ADHESIVE SKIN CLOSR DERMABOND PRINEO

## (undated) DEVICE — NEEDLE BLUNT 18 G X 1 1/2IN

## (undated) DEVICE — STAPLER INSORB SUBCUTICULAR 30 SINGLE USE

## (undated) DEVICE — TUBING INFILTRATION 9FT

## (undated) DEVICE — PROXIMATE SKIN STAPLERS (35 WIDE) CONTAINS 35 STAINLESS STEEL STAPLES (FIXED HEAD): Brand: PROXIMATE

## (undated) DEVICE — 3M™ TEGADERM™ TRANSPARENT FILM DRESSING FRAME STYLE, 1626W, 4 IN X 4-3/4 IN (10 CM X 12 CM), 50/CT 4CT/CASE: Brand: 3M™ TEGADERM™

## (undated) DEVICE — GLOVE SRG LF STRL BGL SKNSNS 6.5 PF

## (undated) DEVICE — CANNULA 4MM DBL MERCEDES SINGLE USE 30CM 10MM PORT

## (undated) DEVICE — TRAY FOLEY 16FR URIMETER SURESTEP

## (undated) DEVICE — SUTURE STRATAFIX 1 45CM

## (undated) DEVICE — SYRINGE 10ML LL CONTROL TOP

## (undated) DEVICE — SUT VICRYL 0 CT-1 CR/8 27 IN JJ41G

## (undated) DEVICE — ARGYLE YANKAUER BULB TIP, NO VENT WITH TUBING 1/4” X 6’ (6 MM X 1.8 M): Brand: ARGYLE

## (undated) DEVICE — SUT PDS II 2-0 CT-1 27 IN Z339H

## (undated) DEVICE — CHEST/BREAST DRAPE: Brand: CONVERTORS

## (undated) DEVICE — STANDARD SURGICAL GOWN, L: Brand: CONVERTORS

## (undated) DEVICE — STERILE POLYISOPRENE POWDER-FREE SURGICAL GLOVES WITH EMOLLIENT COATING: Brand: PROTEXIS

## (undated) DEVICE — SUT ETHILON 4-0 P-S 18 IN 699H

## (undated) DEVICE — SPONGE LAP 18 X 18 IN STRL RFD

## (undated) DEVICE — SYRINGE 50ML LL

## (undated) DEVICE — PACK UNIVERSAL DRAPES SUB-Q ICD

## (undated) DEVICE — Device

## (undated) DEVICE — SUT VICRYL 3-0 SH 27 IN J416H

## (undated) DEVICE — SKIN MARKER DUAL TIP WITH RULER CAP, FLEXIBLE RULER AND LABELS: Brand: DEVON

## (undated) DEVICE — DRAPE SHEET THREE QUARTER

## (undated) DEVICE — BASIC PACK: Brand: CONVERTORS

## (undated) DEVICE — NEPTUNE E-SEP SMOKE EVACUATION PENCIL, COATED, 70MM BLADE, PUSH BUTTON SWITCH: Brand: NEPTUNE E-SEP

## (undated) DEVICE — CHLORAPREP HI-LITE 26ML ORANGE

## (undated) DEVICE — SUT MONOCRYL 3-0 PS-2 18 IN Y497G

## (undated) DEVICE — LIGHT GLOVE GREEN

## (undated) DEVICE — DRAIN CHANNEL RND FULL FLUTED 19FR W/TROCAR

## (undated) DEVICE — SUT ETHILON 3-0 PS-1 18 IN 1663G

## (undated) DEVICE — BULB SYRINGE,IRRIGATION WITH PROTECTIVE CAP: Brand: DOVER

## (undated) DEVICE — ELECTRODE BLADE MOD E-Z CLEAN 2.5IN 6.4CM -0012M

## (undated) DEVICE — SYRINGE 30ML LL

## (undated) DEVICE — SCD SEQUENTIAL COMPRESSION COMFORT SLEEVE MEDIUM KNEE LENGTH: Brand: KENDALL SCD

## (undated) DEVICE — SINGLE-USE BIOPSY FORCEPS: Brand: RADIAL JAW 4

## (undated) DEVICE — 3M™ STERI-STRIP™ REINFORCED ADHESIVE SKIN CLOSURES, R1547, 1/2 IN X 4 IN (12 MM X 100 MM), 6 STRIPS/ENVELOPE: Brand: 3M™ STERI-STRIP™

## (undated) DEVICE — ELECTRODE BLADE E-Z CLEAN 6.5IN -0014

## (undated) DEVICE — DRAPE FLUID WARMER (BIRD BATH)

## (undated) DEVICE — SINGLE PORT MANIFOLD: Brand: NEPTUNE 2

## (undated) DEVICE — 1820 FOAM BLOCK NEEDLE COUNTER: Brand: DEVON

## (undated) DEVICE — TUBING SUCTION 5MM X 12 FT

## (undated) DEVICE — ESOPHAGEAL/COLONIC/BILIARY WIREGUIDED BALLOON DILATATION CATHETER: Brand: CRE™ PRO

## (undated) DEVICE — SUT MONOCRYL 4-0 PS-2 27 IN Y426H

## (undated) DEVICE — TUBING ASPIRATION LIPOSUCTION SET 12FT

## (undated) DEVICE — SUT SILK 3-0 18 IN A184H

## (undated) DEVICE — BASIC SINGLE BASIN-LF: Brand: MEDLINE INDUSTRIES, INC.

## (undated) DEVICE — DRESSING BIOPATCH ANTIMICROBIAL 1 IN DISC

## (undated) DEVICE — CANNISTER WASTE IMPLOSION PROOF

## (undated) DEVICE — SUT STRATAFIX SPIRAL PLUS 3-0 PS-2 30 X 30 CM SXMP2B408

## (undated) DEVICE — DISPOSABLE OR TOWEL: Brand: CARDINAL HEALTH

## (undated) DEVICE — STERILE POLYISOPRENE POWDER-FREE SURGICAL GLOVES: Brand: PROTEXIS

## (undated) DEVICE — ABDOMINAL PAD: Brand: DERMACEA

## (undated) DEVICE — DRAPE EQUIPMENT RF WAND

## (undated) DEVICE — JACKSON-PRATT 100CC BULB RESERVOIR: Brand: CARDINAL HEALTH

## (undated) DEVICE — 3-0 VICRYL

## (undated) DEVICE — EXOFIN PRECISION PEN HIGH VISCOSITY TOPICAL SKIN ADHESIVE: Brand: EXOFIN PRECISION PEN, 1G

## (undated) DEVICE — SURGICAL CLIPPER BLADE GENERAL USE

## (undated) DEVICE — OCCLUSIVE GAUZE STRIP,3% BISMUTH TRIBROMOPHENATE IN PETROLATUM BLEND: Brand: XEROFORM

## (undated) DEVICE — GAUZE SPONGES,16 PLY: Brand: CURITY